# Patient Record
Sex: MALE | Race: WHITE | NOT HISPANIC OR LATINO | Employment: OTHER | URBAN - METROPOLITAN AREA
[De-identification: names, ages, dates, MRNs, and addresses within clinical notes are randomized per-mention and may not be internally consistent; named-entity substitution may affect disease eponyms.]

---

## 2017-01-05 ENCOUNTER — ALLSCRIPTS OFFICE VISIT (OUTPATIENT)
Dept: OTHER | Facility: OTHER | Age: 62
End: 2017-01-05

## 2017-01-17 ENCOUNTER — APPOINTMENT (OUTPATIENT)
Dept: CARDIAC REHAB | Facility: HOSPITAL | Age: 62
End: 2017-01-17
Payer: COMMERCIAL

## 2017-01-17 PROCEDURE — 94620 HB PULMONARY STRESS TEST/SIMPLE: CPT

## 2017-01-27 ENCOUNTER — APPOINTMENT (OUTPATIENT)
Dept: CARDIAC REHAB | Facility: HOSPITAL | Age: 62
End: 2017-01-27
Payer: COMMERCIAL

## 2017-01-27 ENCOUNTER — GENERIC CONVERSION - ENCOUNTER (OUTPATIENT)
Dept: OTHER | Facility: OTHER | Age: 62
End: 2017-01-27

## 2017-01-27 PROCEDURE — G0424 PULMONARY REHAB W EXER: HCPCS

## 2017-01-30 ENCOUNTER — APPOINTMENT (OUTPATIENT)
Dept: CARDIAC REHAB | Facility: HOSPITAL | Age: 62
End: 2017-01-30
Payer: COMMERCIAL

## 2017-01-30 PROCEDURE — G0424 PULMONARY REHAB W EXER: HCPCS

## 2017-01-31 ENCOUNTER — ALLSCRIPTS OFFICE VISIT (OUTPATIENT)
Dept: OTHER | Facility: OTHER | Age: 62
End: 2017-01-31

## 2017-01-31 ENCOUNTER — APPOINTMENT (EMERGENCY)
Dept: CT IMAGING | Facility: HOSPITAL | Age: 62
DRG: 309 | End: 2017-01-31
Payer: COMMERCIAL

## 2017-01-31 ENCOUNTER — APPOINTMENT (EMERGENCY)
Dept: RADIOLOGY | Facility: HOSPITAL | Age: 62
DRG: 309 | End: 2017-01-31
Payer: COMMERCIAL

## 2017-01-31 ENCOUNTER — HOSPITAL ENCOUNTER (INPATIENT)
Facility: HOSPITAL | Age: 62
LOS: 4 days | Discharge: HOME | DRG: 309 | End: 2017-02-04
Attending: EMERGENCY MEDICINE | Admitting: INTERNAL MEDICINE
Payer: COMMERCIAL

## 2017-01-31 DIAGNOSIS — G47.30 SLEEP APNEA: ICD-10-CM

## 2017-01-31 DIAGNOSIS — J18.9 PNEUMONIA: ICD-10-CM

## 2017-01-31 DIAGNOSIS — I48.91 ATRIAL FIBRILLATION, UNSPECIFIED TYPE (HCC): Primary | ICD-10-CM

## 2017-01-31 PROBLEM — I51.9 CARDIAC DISEASE: Status: ACTIVE | Noted: 2017-01-31

## 2017-01-31 PROBLEM — E78.5 HYPERLIPIDEMIA: Status: ACTIVE | Noted: 2017-01-31

## 2017-01-31 PROBLEM — I63.9 CVA (CEREBRAL VASCULAR ACCIDENT) (HCC): Status: ACTIVE | Noted: 2017-01-31

## 2017-01-31 PROBLEM — Q24.0 DEXTROCARDIA: Status: ACTIVE | Noted: 2017-01-31

## 2017-01-31 PROBLEM — I10 HYPERTENSION: Status: ACTIVE | Noted: 2017-01-31

## 2017-01-31 PROBLEM — E11.9 DIABETES MELLITUS TYPE 2, CONTROLLED (HCC): Status: ACTIVE | Noted: 2017-01-31

## 2017-01-31 PROBLEM — E66.01 MORBID OBESITY (HCC): Status: ACTIVE | Noted: 2017-01-31

## 2017-01-31 LAB
ALBUMIN SERPL BCP-MCNC: 3.9 G/DL (ref 3.5–5)
ALP SERPL-CCNC: 60 U/L (ref 46–116)
ALT SERPL W P-5'-P-CCNC: 38 U/L (ref 12–78)
ANION GAP SERPL CALCULATED.3IONS-SCNC: 8 MMOL/L (ref 4–13)
APTT PPP: 33 SECONDS (ref 24–36)
AST SERPL W P-5'-P-CCNC: 33 U/L (ref 5–45)
BASOPHILS # BLD AUTO: 0.04 THOUSANDS/ΜL (ref 0–0.1)
BASOPHILS NFR BLD AUTO: 1 % (ref 0–1)
BILIRUB SERPL-MCNC: 0.6 MG/DL (ref 0.2–1)
BUN SERPL-MCNC: 17 MG/DL (ref 5–25)
CALCIUM SERPL-MCNC: 8.5 MG/DL (ref 8.3–10.1)
CHLORIDE SERPL-SCNC: 104 MMOL/L (ref 100–108)
CO2 SERPL-SCNC: 30 MMOL/L (ref 21–32)
CREAT SERPL-MCNC: 0.89 MG/DL (ref 0.6–1.3)
EOSINOPHIL # BLD AUTO: 0.09 THOUSAND/ΜL (ref 0–0.61)
EOSINOPHIL NFR BLD AUTO: 1 % (ref 0–6)
ERYTHROCYTE [DISTWIDTH] IN BLOOD BY AUTOMATED COUNT: 14.3 % (ref 11.6–15.1)
GFR SERPL CREATININE-BSD FRML MDRD: >60 ML/MIN/1.73SQ M
GLUCOSE SERPL-MCNC: 100 MG/DL (ref 65–140)
GLUCOSE SERPL-MCNC: 132 MG/DL (ref 65–140)
GLUCOSE SERPL-MCNC: 62 MG/DL (ref 65–140)
GLUCOSE SERPL-MCNC: 93 MG/DL (ref 65–140)
HCT VFR BLD AUTO: 40.5 % (ref 36.5–49.3)
HGB BLD-MCNC: 13.2 G/DL (ref 12–17)
INR PPP: 1.24 (ref 0.86–1.16)
LACTATE SERPL-SCNC: 1.6 MMOL/L (ref 0.5–2)
LYMPHOCYTES # BLD AUTO: 1.15 THOUSANDS/ΜL (ref 0.6–4.47)
LYMPHOCYTES NFR BLD AUTO: 18 % (ref 14–44)
MAGNESIUM SERPL-MCNC: 1.6 MG/DL (ref 1.6–2.6)
MCH RBC QN AUTO: 30.8 PG (ref 26.8–34.3)
MCHC RBC AUTO-ENTMCNC: 32.6 G/DL (ref 31.4–37.4)
MCV RBC AUTO: 94 FL (ref 82–98)
MONOCYTES # BLD AUTO: 1.08 THOUSAND/ΜL (ref 0.17–1.22)
MONOCYTES NFR BLD AUTO: 17 % (ref 4–12)
NEUTROPHILS # BLD AUTO: 4.19 THOUSANDS/ΜL (ref 1.85–7.62)
NEUTS SEG NFR BLD AUTO: 63 % (ref 43–75)
NT-PROBNP SERPL-MCNC: 515 PG/ML
PLATELET # BLD AUTO: 186 THOUSANDS/UL (ref 149–390)
PMV BLD AUTO: 10.6 FL (ref 8.9–12.7)
POTASSIUM SERPL-SCNC: 4 MMOL/L (ref 3.5–5.3)
PROT SERPL-MCNC: 7.4 G/DL (ref 6.4–8.2)
PROTHROMBIN TIME: 15.4 SECONDS (ref 12–14.3)
RBC # BLD AUTO: 4.29 MILLION/UL (ref 3.88–5.62)
SODIUM SERPL-SCNC: 142 MMOL/L (ref 136–145)
TROPONIN I SERPL-MCNC: 0.02 NG/ML
TROPONIN I SERPL-MCNC: <0.02 NG/ML
TROPONIN I SERPL-MCNC: <0.02 NG/ML
WBC # BLD AUTO: 6.55 THOUSAND/UL (ref 4.31–10.16)

## 2017-01-31 PROCEDURE — 85610 PROTHROMBIN TIME: CPT | Performed by: NURSE PRACTITIONER

## 2017-01-31 PROCEDURE — 96366 THER/PROPH/DIAG IV INF ADDON: CPT

## 2017-01-31 PROCEDURE — 94640 AIRWAY INHALATION TREATMENT: CPT

## 2017-01-31 PROCEDURE — 83880 ASSAY OF NATRIURETIC PEPTIDE: CPT | Performed by: EMERGENCY MEDICINE

## 2017-01-31 PROCEDURE — 94664 DEMO&/EVAL PT USE INHALER: CPT

## 2017-01-31 PROCEDURE — 94760 N-INVAS EAR/PLS OXIMETRY 1: CPT

## 2017-01-31 PROCEDURE — 36415 COLL VENOUS BLD VENIPUNCTURE: CPT | Performed by: EMERGENCY MEDICINE

## 2017-01-31 PROCEDURE — 85730 THROMBOPLASTIN TIME PARTIAL: CPT | Performed by: NURSE PRACTITIONER

## 2017-01-31 PROCEDURE — 96375 TX/PRO/DX INJ NEW DRUG ADDON: CPT

## 2017-01-31 PROCEDURE — 82948 REAGENT STRIP/BLOOD GLUCOSE: CPT

## 2017-01-31 PROCEDURE — 71010 HB CHEST X-RAY 1 VIEW FRONTAL (PORTABLE): CPT

## 2017-01-31 PROCEDURE — 84484 ASSAY OF TROPONIN QUANT: CPT | Performed by: EMERGENCY MEDICINE

## 2017-01-31 PROCEDURE — 96365 THER/PROPH/DIAG IV INF INIT: CPT

## 2017-01-31 PROCEDURE — 85025 COMPLETE CBC W/AUTO DIFF WBC: CPT | Performed by: EMERGENCY MEDICINE

## 2017-01-31 PROCEDURE — 71275 CT ANGIOGRAPHY CHEST: CPT

## 2017-01-31 PROCEDURE — 84484 ASSAY OF TROPONIN QUANT: CPT | Performed by: INTERNAL MEDICINE

## 2017-01-31 PROCEDURE — 99285 EMERGENCY DEPT VISIT HI MDM: CPT

## 2017-01-31 PROCEDURE — 83735 ASSAY OF MAGNESIUM: CPT | Performed by: EMERGENCY MEDICINE

## 2017-01-31 PROCEDURE — 80053 COMPREHEN METABOLIC PANEL: CPT | Performed by: EMERGENCY MEDICINE

## 2017-01-31 PROCEDURE — 83605 ASSAY OF LACTIC ACID: CPT | Performed by: NURSE PRACTITIONER

## 2017-01-31 PROCEDURE — 87040 BLOOD CULTURE FOR BACTERIA: CPT | Performed by: NURSE PRACTITIONER

## 2017-01-31 PROCEDURE — 93005 ELECTROCARDIOGRAM TRACING: CPT | Performed by: EMERGENCY MEDICINE

## 2017-01-31 RX ORDER — FUROSEMIDE 10 MG/ML
40 INJECTION INTRAMUSCULAR; INTRAVENOUS 2 TIMES DAILY
Status: DISCONTINUED | OUTPATIENT
Start: 2017-01-31 | End: 2017-02-04 | Stop reason: HOSPADM

## 2017-01-31 RX ORDER — CEFTRIAXONE SODIUM 1 G/50ML
1000 INJECTION, SOLUTION INTRAVENOUS ONCE
Status: COMPLETED | OUTPATIENT
Start: 2017-01-31 | End: 2017-01-31

## 2017-01-31 RX ORDER — INSULIN GLARGINE 100 [IU]/ML
40 INJECTION, SOLUTION SUBCUTANEOUS 2 TIMES DAILY
Status: DISCONTINUED | OUTPATIENT
Start: 2017-01-31 | End: 2017-02-04 | Stop reason: HOSPADM

## 2017-01-31 RX ORDER — FLUTICASONE PROPIONATE 50 MCG
1 SPRAY, SUSPENSION (ML) NASAL DAILY
COMMUNITY
End: 2017-02-04 | Stop reason: HOSPADM

## 2017-01-31 RX ORDER — SODIUM CHLORIDE 30 MG/ML INHALATION SOLUTION 30 MG/ML
3 SOLUTION INHALANT ONCE
Status: DISCONTINUED | OUTPATIENT
Start: 2017-01-31 | End: 2017-01-31

## 2017-01-31 RX ORDER — DEXTROSE MONOHYDRATE 25 G/50ML
50 INJECTION, SOLUTION INTRAVENOUS ONCE
Status: DISCONTINUED | OUTPATIENT
Start: 2017-01-31 | End: 2017-01-31

## 2017-01-31 RX ORDER — SODIUM CHLORIDE 30 MG/ML INHALATION SOLUTION 30 MG/ML
4 SOLUTION INHALANT ONCE
Status: DISCONTINUED | OUTPATIENT
Start: 2017-01-31 | End: 2017-01-31

## 2017-01-31 RX ORDER — TAMSULOSIN HYDROCHLORIDE 0.4 MG/1
0.4 CAPSULE ORAL
Status: DISCONTINUED | OUTPATIENT
Start: 2017-01-31 | End: 2017-02-04 | Stop reason: HOSPADM

## 2017-01-31 RX ORDER — BETAMETHASONE DIPROPIONATE 0.5 MG/G
CREAM TOPICAL
COMMUNITY
Start: 2016-06-09 | End: 2017-02-04 | Stop reason: HOSPADM

## 2017-01-31 RX ORDER — RANITIDINE 150 MG/1
TABLET ORAL
COMMUNITY
Start: 2013-11-03 | End: 2018-02-06 | Stop reason: SDUPTHER

## 2017-01-31 RX ORDER — ALBUTEROL SULFATE 2.5 MG/3ML
2.5 SOLUTION RESPIRATORY (INHALATION) EVERY 4 HOURS PRN
Status: DISCONTINUED | OUTPATIENT
Start: 2017-01-31 | End: 2017-02-04 | Stop reason: HOSPADM

## 2017-01-31 RX ORDER — ONDANSETRON 2 MG/ML
4 INJECTION INTRAMUSCULAR; INTRAVENOUS EVERY 6 HOURS PRN
Status: DISCONTINUED | OUTPATIENT
Start: 2017-01-31 | End: 2017-02-04 | Stop reason: HOSPADM

## 2017-01-31 RX ORDER — MONTELUKAST SODIUM 10 MG/1
10 TABLET ORAL
COMMUNITY
End: 2018-02-06 | Stop reason: SDUPTHER

## 2017-01-31 RX ORDER — 0.9 % SODIUM CHLORIDE 0.9 %
3 VIAL (ML) INJECTION ONCE
Status: DISCONTINUED | OUTPATIENT
Start: 2017-01-31 | End: 2017-02-04 | Stop reason: HOSPADM

## 2017-01-31 RX ORDER — DILTIAZEM HYDROCHLORIDE 5 MG/ML
20 INJECTION INTRAVENOUS ONCE
Status: COMPLETED | OUTPATIENT
Start: 2017-01-31 | End: 2017-01-31

## 2017-01-31 RX ORDER — HYDROCHLOROTHIAZIDE 25 MG/1
TABLET ORAL
COMMUNITY
Start: 2012-12-31 | End: 2017-02-04 | Stop reason: HOSPADM

## 2017-01-31 RX ORDER — ATORVASTATIN CALCIUM 80 MG/1
80 TABLET, FILM COATED ORAL
COMMUNITY
End: 2018-06-11 | Stop reason: SDUPTHER

## 2017-01-31 RX ORDER — GABAPENTIN 300 MG/1
CAPSULE ORAL 2 TIMES DAILY
COMMUNITY
Start: 2012-02-24 | End: 2018-03-14 | Stop reason: SDUPTHER

## 2017-01-31 RX ORDER — TAMSULOSIN HYDROCHLORIDE 0.4 MG/1
CAPSULE ORAL
COMMUNITY
Start: 2016-08-16 | End: 2021-01-01 | Stop reason: HOSPADM

## 2017-01-31 RX ORDER — LEVALBUTEROL 1.25 MG/.5ML
2.5 SOLUTION, CONCENTRATE RESPIRATORY (INHALATION) ONCE
Status: COMPLETED | OUTPATIENT
Start: 2017-01-31 | End: 2017-01-31

## 2017-01-31 RX ORDER — METOPROLOL TARTRATE 50 MG/1
50 TABLET, FILM COATED ORAL EVERY 12 HOURS SCHEDULED
Status: DISCONTINUED | OUTPATIENT
Start: 2017-01-31 | End: 2017-02-04 | Stop reason: HOSPADM

## 2017-01-31 RX ORDER — BETAMETHASONE DIPROPIONATE 0.5 MG/G
CREAM TOPICAL 2 TIMES DAILY
Status: DISCONTINUED | OUTPATIENT
Start: 2017-01-31 | End: 2017-02-02

## 2017-01-31 RX ORDER — AZITHROMYCIN 250 MG/1
500 TABLET, FILM COATED ORAL ONCE
Status: COMPLETED | OUTPATIENT
Start: 2017-01-31 | End: 2017-01-31

## 2017-01-31 RX ORDER — GABAPENTIN 300 MG/1
300 CAPSULE ORAL 2 TIMES DAILY
Status: DISCONTINUED | OUTPATIENT
Start: 2017-01-31 | End: 2017-02-04 | Stop reason: HOSPADM

## 2017-01-31 RX ORDER — SODIUM CHLORIDE FOR INHALATION 0.9 %
VIAL, NEBULIZER (ML) INHALATION
Status: COMPLETED
Start: 2017-01-31 | End: 2017-01-31

## 2017-01-31 RX ORDER — 0.9 % SODIUM CHLORIDE 0.9 %
3 VIAL (ML) INJECTION AS NEEDED
Status: DISCONTINUED | OUTPATIENT
Start: 2017-01-31 | End: 2017-02-04 | Stop reason: HOSPADM

## 2017-01-31 RX ORDER — FAMOTIDINE 20 MG/1
20 TABLET, FILM COATED ORAL DAILY
Status: DISCONTINUED | OUTPATIENT
Start: 2017-01-31 | End: 2017-02-04 | Stop reason: HOSPADM

## 2017-01-31 RX ORDER — ASPIRIN 325 MG
325 TABLET, DELAYED RELEASE (ENTERIC COATED) ORAL DAILY
Status: DISCONTINUED | OUTPATIENT
Start: 2017-01-31 | End: 2017-02-04 | Stop reason: HOSPADM

## 2017-01-31 RX ORDER — ACETAMINOPHEN 325 MG/1
650 TABLET ORAL EVERY 6 HOURS PRN
Status: DISCONTINUED | OUTPATIENT
Start: 2017-01-31 | End: 2017-02-04 | Stop reason: HOSPADM

## 2017-01-31 RX ORDER — ATORVASTATIN CALCIUM 40 MG/1
80 TABLET, FILM COATED ORAL
Status: DISCONTINUED | OUTPATIENT
Start: 2017-01-31 | End: 2017-02-04 | Stop reason: HOSPADM

## 2017-01-31 RX ORDER — CHOLECALCIFEROL (VITAMIN D3) 125 MCG
CAPSULE ORAL
COMMUNITY
End: 2017-02-04 | Stop reason: HOSPADM

## 2017-01-31 RX ORDER — AMLODIPINE BESYLATE 10 MG/1
TABLET ORAL
COMMUNITY
Start: 2016-01-08 | End: 2017-02-04 | Stop reason: HOSPADM

## 2017-01-31 RX ADMIN — INSULIN LISPRO 25 UNITS: 100 INJECTION, SOLUTION INTRAVENOUS; SUBCUTANEOUS at 17:41

## 2017-01-31 RX ADMIN — ATORVASTATIN CALCIUM 80 MG: 40 TABLET, FILM COATED ORAL at 17:45

## 2017-01-31 RX ADMIN — DILTIAZEM HYDROCHLORIDE 5 MG/HR: 5 INJECTION INTRAVENOUS at 12:34

## 2017-01-31 RX ADMIN — IOHEXOL 100 ML: 350 INJECTION, SOLUTION INTRAVENOUS at 14:00

## 2017-01-31 RX ADMIN — ACETAMINOPHEN 650 MG: 325 TABLET, FILM COATED ORAL at 21:20

## 2017-01-31 RX ADMIN — ALBUTEROL SULFATE 2.5 MG: 2.5 SOLUTION RESPIRATORY (INHALATION) at 20:29

## 2017-01-31 RX ADMIN — INSULIN GLARGINE 40 UNITS: 100 INJECTION, SOLUTION SUBCUTANEOUS at 17:42

## 2017-01-31 RX ADMIN — TAMSULOSIN HYDROCHLORIDE 0.4 MG: 0.4 CAPSULE ORAL at 17:51

## 2017-01-31 RX ADMIN — METFORMIN HYDROCHLORIDE 1000 MG: 500 TABLET, FILM COATED ORAL at 17:45

## 2017-01-31 RX ADMIN — FAMOTIDINE 20 MG: 20 TABLET ORAL at 17:46

## 2017-01-31 RX ADMIN — GABAPENTIN 300 MG: 300 CAPSULE ORAL at 17:46

## 2017-01-31 RX ADMIN — CEFTRIAXONE 1000 MG: 1 INJECTION, SOLUTION INTRAVENOUS at 14:42

## 2017-01-31 RX ADMIN — LEVALBUTEROL HYDROCHLORIDE 2.5 MG: 1.25 SOLUTION, CONCENTRATE RESPIRATORY (INHALATION) at 12:26

## 2017-01-31 RX ADMIN — AZITHROMYCIN 500 MG: 250 TABLET, FILM COATED ORAL at 14:42

## 2017-01-31 RX ADMIN — ISODIUM CHLORIDE 3 ML: 0.03 SOLUTION RESPIRATORY (INHALATION) at 12:27

## 2017-01-31 RX ADMIN — METOPROLOL TARTRATE 50 MG: 50 TABLET ORAL at 17:45

## 2017-01-31 RX ADMIN — APIXABAN 5 MG: 5 TABLET, FILM COATED ORAL at 17:45

## 2017-01-31 RX ADMIN — FUROSEMIDE 40 MG: 10 INJECTION, SOLUTION INTRAMUSCULAR; INTRAVENOUS at 17:51

## 2017-01-31 RX ADMIN — BETAMETHASONE DIPROPIONATE: 0.5 CREAM TOPICAL at 18:01

## 2017-01-31 RX ADMIN — FLUTICASONE PROPIONATE AND SALMETEROL 1 PUFF: 50; 250 POWDER RESPIRATORY (INHALATION) at 20:05

## 2017-01-31 RX ADMIN — DILTIAZEM HYDROCHLORIDE 20 MG: 5 INJECTION INTRAVENOUS at 12:34

## 2017-02-01 ENCOUNTER — APPOINTMENT (INPATIENT)
Dept: NON INVASIVE DIAGNOSTICS | Facility: HOSPITAL | Age: 62
DRG: 309 | End: 2017-02-01
Payer: COMMERCIAL

## 2017-02-01 ENCOUNTER — GENERIC CONVERSION - ENCOUNTER (OUTPATIENT)
Dept: OTHER | Facility: OTHER | Age: 62
End: 2017-02-01

## 2017-02-01 PROBLEM — I63.9 CVA (CEREBRAL VASCULAR ACCIDENT) (HCC): Chronic | Status: ACTIVE | Noted: 2017-01-31

## 2017-02-01 PROBLEM — L03.119 CELLULITIS OF EXTREMITY: Status: ACTIVE | Noted: 2017-02-01

## 2017-02-01 LAB
ANION GAP SERPL CALCULATED.3IONS-SCNC: 9 MMOL/L (ref 4–13)
ATRIAL RATE: 72 BPM
BUN SERPL-MCNC: 18 MG/DL (ref 5–25)
CALCIUM SERPL-MCNC: 8.2 MG/DL (ref 8.3–10.1)
CHLORIDE SERPL-SCNC: 103 MMOL/L (ref 100–108)
CO2 SERPL-SCNC: 29 MMOL/L (ref 21–32)
CREAT SERPL-MCNC: 0.9 MG/DL (ref 0.6–1.3)
ERYTHROCYTE [DISTWIDTH] IN BLOOD BY AUTOMATED COUNT: 14.5 % (ref 11.6–15.1)
GFR SERPL CREATININE-BSD FRML MDRD: >60 ML/MIN/1.73SQ M
GLUCOSE SERPL-MCNC: 130 MG/DL (ref 65–140)
GLUCOSE SERPL-MCNC: 143 MG/DL (ref 65–140)
GLUCOSE SERPL-MCNC: 199 MG/DL (ref 65–140)
GLUCOSE SERPL-MCNC: 65 MG/DL (ref 65–140)
GLUCOSE SERPL-MCNC: 78 MG/DL (ref 65–140)
GLUCOSE SERPL-MCNC: 98 MG/DL (ref 65–140)
HCT VFR BLD AUTO: 38.4 % (ref 36.5–49.3)
HGB BLD-MCNC: 12.3 G/DL (ref 12–17)
MCH RBC QN AUTO: 30.4 PG (ref 26.8–34.3)
MCHC RBC AUTO-ENTMCNC: 32 G/DL (ref 31.4–37.4)
MCV RBC AUTO: 95 FL (ref 82–98)
PLATELET # BLD AUTO: 176 THOUSANDS/UL (ref 149–390)
PMV BLD AUTO: 10.5 FL (ref 8.9–12.7)
POTASSIUM SERPL-SCNC: 3.6 MMOL/L (ref 3.5–5.3)
QRS AXIS: -16 DEGREES
QRSD INTERVAL: 78 MS
QT INTERVAL: 322 MS
QTC INTERVAL: 475 MS
RBC # BLD AUTO: 4.05 MILLION/UL (ref 3.88–5.62)
SODIUM SERPL-SCNC: 141 MMOL/L (ref 136–145)
T WAVE AXIS: 48 DEGREES
TSH SERPL DL<=0.05 MIU/L-ACNC: 1.23 UIU/ML (ref 0.36–3.74)
VENTRICULAR RATE: 131 BPM
WBC # BLD AUTO: 5.52 THOUSAND/UL (ref 4.31–10.16)

## 2017-02-01 PROCEDURE — 87205 SMEAR GRAM STAIN: CPT | Performed by: INTERNAL MEDICINE

## 2017-02-01 PROCEDURE — 87070 CULTURE OTHR SPECIMN AEROBIC: CPT | Performed by: INTERNAL MEDICINE

## 2017-02-01 PROCEDURE — 84443 ASSAY THYROID STIM HORMONE: CPT | Performed by: NURSE PRACTITIONER

## 2017-02-01 PROCEDURE — 80048 BASIC METABOLIC PNL TOTAL CA: CPT | Performed by: NURSE PRACTITIONER

## 2017-02-01 PROCEDURE — 93306 TTE W/DOPPLER COMPLETE: CPT

## 2017-02-01 PROCEDURE — 93005 ELECTROCARDIOGRAM TRACING: CPT | Performed by: INTERNAL MEDICINE

## 2017-02-01 PROCEDURE — 85027 COMPLETE CBC AUTOMATED: CPT | Performed by: INTERNAL MEDICINE

## 2017-02-01 PROCEDURE — 94760 N-INVAS EAR/PLS OXIMETRY 1: CPT

## 2017-02-01 PROCEDURE — 82948 REAGENT STRIP/BLOOD GLUCOSE: CPT

## 2017-02-01 PROCEDURE — 94640 AIRWAY INHALATION TREATMENT: CPT

## 2017-02-01 RX ORDER — OXYBUTYNIN CHLORIDE 5 MG/1
5 TABLET ORAL 2 TIMES DAILY
Status: DISCONTINUED | OUTPATIENT
Start: 2017-02-01 | End: 2017-02-04 | Stop reason: HOSPADM

## 2017-02-01 RX ORDER — CEFTRIAXONE 1 G/1
1000 INJECTION, POWDER, FOR SOLUTION INTRAMUSCULAR; INTRAVENOUS EVERY 24 HOURS
Status: DISCONTINUED | OUTPATIENT
Start: 2017-02-02 | End: 2017-02-02

## 2017-02-01 RX ORDER — CEPHALEXIN 250 MG/1
500 CAPSULE ORAL EVERY 6 HOURS SCHEDULED
Status: DISCONTINUED | OUTPATIENT
Start: 2017-02-01 | End: 2017-02-01

## 2017-02-01 RX ADMIN — TAMSULOSIN HYDROCHLORIDE 0.4 MG: 0.4 CAPSULE ORAL at 16:12

## 2017-02-01 RX ADMIN — OXYBUTYNIN CHLORIDE 5 MG: 5 TABLET ORAL at 21:47

## 2017-02-01 RX ADMIN — GABAPENTIN 300 MG: 300 CAPSULE ORAL at 18:09

## 2017-02-01 RX ADMIN — DILTIAZEM HYDROCHLORIDE 5 MG/HR: 5 INJECTION INTRAVENOUS at 21:11

## 2017-02-01 RX ADMIN — ASPIRIN 325 MG: 325 TABLET, DELAYED RELEASE ORAL at 08:50

## 2017-02-01 RX ADMIN — INSULIN LISPRO 15 UNITS: 100 INJECTION, SOLUTION INTRAVENOUS; SUBCUTANEOUS at 09:38

## 2017-02-01 RX ADMIN — ALBUTEROL SULFATE 2.5 MG: 2.5 SOLUTION RESPIRATORY (INHALATION) at 17:57

## 2017-02-01 RX ADMIN — APIXABAN 5 MG: 5 TABLET, FILM COATED ORAL at 08:50

## 2017-02-01 RX ADMIN — METOPROLOL TARTRATE 50 MG: 50 TABLET ORAL at 21:47

## 2017-02-01 RX ADMIN — APIXABAN 5 MG: 5 TABLET, FILM COATED ORAL at 18:09

## 2017-02-01 RX ADMIN — FLUTICASONE PROPIONATE AND SALMETEROL 1 PUFF: 50; 250 POWDER RESPIRATORY (INHALATION) at 20:35

## 2017-02-01 RX ADMIN — CEPHALEXIN 500 MG: 250 CAPSULE ORAL at 12:27

## 2017-02-01 RX ADMIN — FLUTICASONE PROPIONATE AND SALMETEROL 1 PUFF: 50; 250 POWDER RESPIRATORY (INHALATION) at 09:29

## 2017-02-01 RX ADMIN — GABAPENTIN 300 MG: 300 CAPSULE ORAL at 08:50

## 2017-02-01 RX ADMIN — CEPHALEXIN 500 MG: 250 CAPSULE ORAL at 23:03

## 2017-02-01 RX ADMIN — FAMOTIDINE 20 MG: 20 TABLET ORAL at 08:50

## 2017-02-01 RX ADMIN — FUROSEMIDE 40 MG: 10 INJECTION, SOLUTION INTRAMUSCULAR; INTRAVENOUS at 18:09

## 2017-02-01 RX ADMIN — FUROSEMIDE 40 MG: 10 INJECTION, SOLUTION INTRAMUSCULAR; INTRAVENOUS at 08:51

## 2017-02-01 RX ADMIN — METFORMIN HYDROCHLORIDE 1000 MG: 500 TABLET, FILM COATED ORAL at 08:50

## 2017-02-01 RX ADMIN — METFORMIN HYDROCHLORIDE 1000 MG: 500 TABLET, FILM COATED ORAL at 16:12

## 2017-02-01 RX ADMIN — INSULIN GLARGINE 40 UNITS: 100 INJECTION, SOLUTION SUBCUTANEOUS at 09:03

## 2017-02-01 RX ADMIN — INSULIN GLARGINE 40 UNITS: 100 INJECTION, SOLUTION SUBCUTANEOUS at 18:09

## 2017-02-01 RX ADMIN — ATORVASTATIN CALCIUM 80 MG: 40 TABLET, FILM COATED ORAL at 16:12

## 2017-02-01 RX ADMIN — METOPROLOL TARTRATE 50 MG: 50 TABLET ORAL at 08:50

## 2017-02-01 RX ADMIN — CEPHALEXIN 500 MG: 250 CAPSULE ORAL at 18:09

## 2017-02-02 ENCOUNTER — APPOINTMENT (INPATIENT)
Dept: NON INVASIVE DIAGNOSTICS | Facility: HOSPITAL | Age: 62
DRG: 309 | End: 2017-02-02
Payer: COMMERCIAL

## 2017-02-02 ENCOUNTER — GENERIC CONVERSION - ENCOUNTER (OUTPATIENT)
Dept: OTHER | Facility: OTHER | Age: 62
End: 2017-02-02

## 2017-02-02 ENCOUNTER — APPOINTMENT (INPATIENT)
Dept: NON INVASIVE DIAGNOSTICS | Facility: HOSPITAL | Age: 62
DRG: 309 | End: 2017-02-02
Attending: INTERNAL MEDICINE
Payer: COMMERCIAL

## 2017-02-02 LAB
ANION GAP SERPL CALCULATED.3IONS-SCNC: 9 MMOL/L (ref 4–13)
ATRIAL RATE: 122 BPM
ATRIAL RATE: 250 BPM
BUN SERPL-MCNC: 21 MG/DL (ref 5–25)
CALCIUM SERPL-MCNC: 8.1 MG/DL (ref 8.3–10.1)
CHLORIDE SERPL-SCNC: 102 MMOL/L (ref 100–108)
CO2 SERPL-SCNC: 30 MMOL/L (ref 21–32)
CREAT SERPL-MCNC: 0.93 MG/DL (ref 0.6–1.3)
ERYTHROCYTE [DISTWIDTH] IN BLOOD BY AUTOMATED COUNT: 14.2 % (ref 11.6–15.1)
GFR SERPL CREATININE-BSD FRML MDRD: >60 ML/MIN/1.73SQ M
GLUCOSE SERPL-MCNC: 104 MG/DL (ref 65–140)
GLUCOSE SERPL-MCNC: 113 MG/DL (ref 65–140)
GLUCOSE SERPL-MCNC: 139 MG/DL (ref 65–140)
GLUCOSE SERPL-MCNC: 155 MG/DL (ref 65–140)
GLUCOSE SERPL-MCNC: 96 MG/DL (ref 65–140)
HCT VFR BLD AUTO: 38.1 % (ref 36.5–49.3)
HGB BLD-MCNC: 12.1 G/DL (ref 12–17)
MCH RBC QN AUTO: 30.1 PG (ref 26.8–34.3)
MCHC RBC AUTO-ENTMCNC: 31.8 G/DL (ref 31.4–37.4)
MCV RBC AUTO: 95 FL (ref 82–98)
PLATELET # BLD AUTO: 167 THOUSANDS/UL (ref 149–390)
PMV BLD AUTO: 10.5 FL (ref 8.9–12.7)
POTASSIUM SERPL-SCNC: 3.7 MMOL/L (ref 3.5–5.3)
QRS AXIS: 221 DEGREES
QRS AXIS: 231 DEGREES
QRSD INTERVAL: 68 MS
QRSD INTERVAL: 82 MS
QT INTERVAL: 356 MS
QT INTERVAL: 366 MS
QTC INTERVAL: 430 MS
QTC INTERVAL: 432 MS
RBC # BLD AUTO: 4.02 MILLION/UL (ref 3.88–5.62)
SODIUM SERPL-SCNC: 141 MMOL/L (ref 136–145)
T WAVE AXIS: 145 DEGREES
T WAVE AXIS: 167 DEGREES
VENTRICULAR RATE: 84 BPM
VENTRICULAR RATE: 88 BPM
WBC # BLD AUTO: 5.1 THOUSAND/UL (ref 4.31–10.16)

## 2017-02-02 PROCEDURE — 93005 ELECTROCARDIOGRAM TRACING: CPT | Performed by: INTERNAL MEDICINE

## 2017-02-02 PROCEDURE — G8978 MOBILITY CURRENT STATUS: HCPCS

## 2017-02-02 PROCEDURE — 92961 CARDIOVERSION ELECTRIC INT: CPT

## 2017-02-02 PROCEDURE — 5A2204Z RESTORATION OF CARDIAC RHYTHM, SINGLE: ICD-10-PCS | Performed by: INTERNAL MEDICINE

## 2017-02-02 PROCEDURE — G8980 MOBILITY D/C STATUS: HCPCS

## 2017-02-02 PROCEDURE — G8988 SELF CARE GOAL STATUS: HCPCS

## 2017-02-02 PROCEDURE — G8979 MOBILITY GOAL STATUS: HCPCS

## 2017-02-02 PROCEDURE — G8987 SELF CARE CURRENT STATUS: HCPCS

## 2017-02-02 PROCEDURE — 97163 PT EVAL HIGH COMPLEX 45 MIN: CPT

## 2017-02-02 PROCEDURE — 80048 BASIC METABOLIC PNL TOTAL CA: CPT | Performed by: INTERNAL MEDICINE

## 2017-02-02 PROCEDURE — 82948 REAGENT STRIP/BLOOD GLUCOSE: CPT

## 2017-02-02 PROCEDURE — 85027 COMPLETE CBC AUTOMATED: CPT | Performed by: INTERNAL MEDICINE

## 2017-02-02 PROCEDURE — 97165 OT EVAL LOW COMPLEX 30 MIN: CPT

## 2017-02-02 PROCEDURE — 94640 AIRWAY INHALATION TREATMENT: CPT

## 2017-02-02 PROCEDURE — 93312 ECHO TRANSESOPHAGEAL: CPT

## 2017-02-02 PROCEDURE — 94760 N-INVAS EAR/PLS OXIMETRY 1: CPT

## 2017-02-02 PROCEDURE — G8989 SELF CARE D/C STATUS: HCPCS

## 2017-02-02 RX ORDER — PROPOFOL 10 MG/ML
INJECTION, EMULSION INTRAVENOUS AS NEEDED
Status: DISCONTINUED | OUTPATIENT
Start: 2017-02-02 | End: 2017-02-02 | Stop reason: SURG

## 2017-02-02 RX ORDER — AMIODARONE HYDROCHLORIDE 200 MG/1
200 TABLET ORAL
Status: DISCONTINUED | OUTPATIENT
Start: 2017-02-02 | End: 2017-02-04 | Stop reason: HOSPADM

## 2017-02-02 RX ORDER — DEXTROSE AND SODIUM CHLORIDE 5; .45 G/100ML; G/100ML
75 INJECTION, SOLUTION INTRAVENOUS CONTINUOUS
Status: DISCONTINUED | OUTPATIENT
Start: 2017-02-02 | End: 2017-02-03

## 2017-02-02 RX ORDER — SODIUM CHLORIDE 9 MG/ML
INJECTION, SOLUTION INTRAVENOUS CONTINUOUS PRN
Status: DISCONTINUED | OUTPATIENT
Start: 2017-02-02 | End: 2017-02-02 | Stop reason: SURG

## 2017-02-02 RX ORDER — AZITHROMYCIN 500 MG/1
INJECTION, POWDER, LYOPHILIZED, FOR SOLUTION INTRAVENOUS
Status: DISPENSED
Start: 2017-02-02 | End: 2017-02-02

## 2017-02-02 RX ORDER — INSULIN GLARGINE 100 [IU]/ML
20 INJECTION, SOLUTION SUBCUTANEOUS ONCE
Status: COMPLETED | OUTPATIENT
Start: 2017-02-02 | End: 2017-02-02

## 2017-02-02 RX ADMIN — INSULIN GLARGINE 40 UNITS: 100 INJECTION, SOLUTION SUBCUTANEOUS at 18:00

## 2017-02-02 RX ADMIN — METOPROLOL TARTRATE 50 MG: 50 TABLET ORAL at 22:00

## 2017-02-02 RX ADMIN — AMIODARONE HYDROCHLORIDE 150 MG: 50 INJECTION, SOLUTION INTRAVENOUS at 16:31

## 2017-02-02 RX ADMIN — FLUTICASONE PROPIONATE AND SALMETEROL 1 PUFF: 50; 250 POWDER RESPIRATORY (INHALATION) at 09:10

## 2017-02-02 RX ADMIN — APIXABAN 5 MG: 5 TABLET, FILM COATED ORAL at 18:00

## 2017-02-02 RX ADMIN — INSULIN GLARGINE 20 UNITS: 100 INJECTION, SOLUTION SUBCUTANEOUS at 11:22

## 2017-02-02 RX ADMIN — FUROSEMIDE 40 MG: 10 INJECTION, SOLUTION INTRAMUSCULAR; INTRAVENOUS at 18:00

## 2017-02-02 RX ADMIN — FLUTICASONE PROPIONATE AND SALMETEROL 1 PUFF: 50; 250 POWDER RESPIRATORY (INHALATION) at 20:04

## 2017-02-02 RX ADMIN — PROPOFOL 50 MG: 10 INJECTION, EMULSION INTRAVENOUS at 15:39

## 2017-02-02 RX ADMIN — FAMOTIDINE 20 MG: 20 TABLET ORAL at 08:40

## 2017-02-02 RX ADMIN — METOPROLOL TARTRATE 50 MG: 50 TABLET ORAL at 08:40

## 2017-02-02 RX ADMIN — METFORMIN HYDROCHLORIDE 1000 MG: 500 TABLET, FILM COATED ORAL at 16:52

## 2017-02-02 RX ADMIN — ASPIRIN 325 MG: 325 TABLET, DELAYED RELEASE ORAL at 08:40

## 2017-02-02 RX ADMIN — PROPOFOL 50 MG: 10 INJECTION, EMULSION INTRAVENOUS at 15:48

## 2017-02-02 RX ADMIN — PROPOFOL 50 MG: 10 INJECTION, EMULSION INTRAVENOUS at 15:56

## 2017-02-02 RX ADMIN — ATORVASTATIN CALCIUM 80 MG: 40 TABLET, FILM COATED ORAL at 16:53

## 2017-02-02 RX ADMIN — GABAPENTIN 300 MG: 300 CAPSULE ORAL at 18:00

## 2017-02-02 RX ADMIN — APIXABAN 5 MG: 5 TABLET, FILM COATED ORAL at 08:40

## 2017-02-02 RX ADMIN — AMIODARONE HYDROCHLORIDE 200 MG: 200 TABLET ORAL at 16:52

## 2017-02-02 RX ADMIN — GABAPENTIN 300 MG: 300 CAPSULE ORAL at 08:40

## 2017-02-02 RX ADMIN — OXYBUTYNIN CHLORIDE 5 MG: 5 TABLET ORAL at 08:40

## 2017-02-02 RX ADMIN — CEFTRIAXONE SODIUM 1000 MG: 1 INJECTION, POWDER, FOR SOLUTION INTRAMUSCULAR; INTRAVENOUS at 00:18

## 2017-02-02 RX ADMIN — TAMSULOSIN HYDROCHLORIDE 0.4 MG: 0.4 CAPSULE ORAL at 16:52

## 2017-02-02 RX ADMIN — DEXTROSE AND SODIUM CHLORIDE 75 ML/HR: 5; .45 INJECTION, SOLUTION INTRAVENOUS at 11:22

## 2017-02-02 RX ADMIN — PROPOFOL 50 MG: 10 INJECTION, EMULSION INTRAVENOUS at 15:43

## 2017-02-02 RX ADMIN — AZITHROMYCIN MONOHYDRATE 500 MG: 500 INJECTION, POWDER, LYOPHILIZED, FOR SOLUTION INTRAVENOUS at 00:58

## 2017-02-02 RX ADMIN — FUROSEMIDE 40 MG: 10 INJECTION, SOLUTION INTRAMUSCULAR; INTRAVENOUS at 08:40

## 2017-02-02 RX ADMIN — SODIUM CHLORIDE: 0.9 INJECTION, SOLUTION INTRAVENOUS at 15:21

## 2017-02-02 RX ADMIN — OXYBUTYNIN CHLORIDE 5 MG: 5 TABLET ORAL at 18:00

## 2017-02-02 RX ADMIN — INSULIN LISPRO 1 UNITS: 100 INJECTION, SOLUTION INTRAVENOUS; SUBCUTANEOUS at 22:05

## 2017-02-03 ENCOUNTER — APPOINTMENT (INPATIENT)
Dept: RADIOLOGY | Facility: HOSPITAL | Age: 62
DRG: 309 | End: 2017-02-03
Payer: COMMERCIAL

## 2017-02-03 ENCOUNTER — APPOINTMENT (INPATIENT)
Dept: NON INVASIVE DIAGNOSTICS | Facility: HOSPITAL | Age: 62
DRG: 309 | End: 2017-02-03
Payer: COMMERCIAL

## 2017-02-03 ENCOUNTER — APPOINTMENT (OUTPATIENT)
Dept: CARDIAC REHAB | Facility: HOSPITAL | Age: 62
End: 2017-02-03
Payer: COMMERCIAL

## 2017-02-03 LAB
ANION GAP SERPL CALCULATED.3IONS-SCNC: 10 MMOL/L (ref 4–13)
ARTERIAL PATENCY WRIST A: ABNORMAL
BASE EXCESS BLDA CALC-SCNC: 6 MMOL/L (ref -2–3)
BUN SERPL-MCNC: 22 MG/DL (ref 5–25)
CALCIUM SERPL-MCNC: 8.1 MG/DL (ref 8.3–10.1)
CHLORIDE SERPL-SCNC: 101 MMOL/L (ref 100–108)
CO2 SERPL-SCNC: 28 MMOL/L (ref 21–32)
CREAT SERPL-MCNC: 0.94 MG/DL (ref 0.6–1.3)
FIO2 GAS DIL.REBREATH: 21 L
GFR SERPL CREATININE-BSD FRML MDRD: >60 ML/MIN/1.73SQ M
GLUCOSE SERPL-MCNC: 108 MG/DL (ref 65–140)
GLUCOSE SERPL-MCNC: 115 MG/DL (ref 65–140)
GLUCOSE SERPL-MCNC: 122 MG/DL (ref 65–140)
GLUCOSE SERPL-MCNC: 122 MG/DL (ref 65–140)
GLUCOSE SERPL-MCNC: 129 MG/DL (ref 65–140)
GLUCOSE SERPL-MCNC: 68 MG/DL (ref 65–140)
HCO3 BLDA-SCNC: 32.7 MMOL/L (ref 22–28)
PCO2 BLD: 34 MMOL/L (ref 21–32)
PCO2 BLD: 54.3 MM HG (ref 36–44)
PH BLD: 7.39 [PH] (ref 7.35–7.45)
PO2 BLD: 65 MM HG (ref 75–129)
POTASSIUM SERPL-SCNC: 3.7 MMOL/L (ref 3.5–5.3)
SAMPLE SITE: ABNORMAL
SAO2 % BLD FROM PO2: 91 % (ref 95–98)
SODIUM SERPL-SCNC: 139 MMOL/L (ref 136–145)
SPECIMEN SOURCE: ABNORMAL

## 2017-02-03 PROCEDURE — 93970 EXTREMITY STUDY: CPT

## 2017-02-03 PROCEDURE — 82803 BLOOD GASES ANY COMBINATION: CPT

## 2017-02-03 PROCEDURE — 82948 REAGENT STRIP/BLOOD GLUCOSE: CPT

## 2017-02-03 PROCEDURE — 36600 WITHDRAWAL OF ARTERIAL BLOOD: CPT

## 2017-02-03 PROCEDURE — 94760 N-INVAS EAR/PLS OXIMETRY 1: CPT

## 2017-02-03 PROCEDURE — 94640 AIRWAY INHALATION TREATMENT: CPT

## 2017-02-03 PROCEDURE — 71020 HB CHEST X-RAY 2VW FRONTAL&LATL: CPT

## 2017-02-03 PROCEDURE — 80048 BASIC METABOLIC PNL TOTAL CA: CPT | Performed by: INTERNAL MEDICINE

## 2017-02-03 RX ADMIN — FLUTICASONE PROPIONATE AND SALMETEROL 1 PUFF: 50; 250 POWDER RESPIRATORY (INHALATION) at 19:58

## 2017-02-03 RX ADMIN — INSULIN GLARGINE 40 UNITS: 100 INJECTION, SOLUTION SUBCUTANEOUS at 08:56

## 2017-02-03 RX ADMIN — OXYBUTYNIN CHLORIDE 5 MG: 5 TABLET ORAL at 17:10

## 2017-02-03 RX ADMIN — ASPIRIN 325 MG: 325 TABLET, DELAYED RELEASE ORAL at 08:56

## 2017-02-03 RX ADMIN — FUROSEMIDE 40 MG: 10 INJECTION, SOLUTION INTRAMUSCULAR; INTRAVENOUS at 17:10

## 2017-02-03 RX ADMIN — AMIODARONE HYDROCHLORIDE 200 MG: 200 TABLET ORAL at 11:29

## 2017-02-03 RX ADMIN — OXYBUTYNIN CHLORIDE 5 MG: 5 TABLET ORAL at 08:56

## 2017-02-03 RX ADMIN — AMIODARONE HYDROCHLORIDE 200 MG: 200 TABLET ORAL at 16:52

## 2017-02-03 RX ADMIN — APIXABAN 5 MG: 5 TABLET, FILM COATED ORAL at 08:56

## 2017-02-03 RX ADMIN — ALBUTEROL SULFATE 2.5 MG: 2.5 SOLUTION RESPIRATORY (INHALATION) at 07:31

## 2017-02-03 RX ADMIN — METFORMIN HYDROCHLORIDE 1000 MG: 500 TABLET, FILM COATED ORAL at 16:52

## 2017-02-03 RX ADMIN — ATORVASTATIN CALCIUM 80 MG: 40 TABLET, FILM COATED ORAL at 16:52

## 2017-02-03 RX ADMIN — GABAPENTIN 300 MG: 300 CAPSULE ORAL at 17:09

## 2017-02-03 RX ADMIN — FLUTICASONE PROPIONATE AND SALMETEROL 1 PUFF: 50; 250 POWDER RESPIRATORY (INHALATION) at 07:30

## 2017-02-03 RX ADMIN — ACETAMINOPHEN 650 MG: 325 TABLET, FILM COATED ORAL at 22:16

## 2017-02-03 RX ADMIN — FAMOTIDINE 20 MG: 20 TABLET ORAL at 08:56

## 2017-02-03 RX ADMIN — FLUTICASONE PROPIONATE AND SALMETEROL 1 PUFF: 50; 250 POWDER RESPIRATORY (INHALATION) at 20:25

## 2017-02-03 RX ADMIN — APIXABAN 5 MG: 5 TABLET, FILM COATED ORAL at 17:09

## 2017-02-03 RX ADMIN — GABAPENTIN 300 MG: 300 CAPSULE ORAL at 08:56

## 2017-02-03 RX ADMIN — METOPROLOL TARTRATE 50 MG: 50 TABLET ORAL at 22:17

## 2017-02-03 RX ADMIN — METOPROLOL TARTRATE 50 MG: 50 TABLET ORAL at 08:56

## 2017-02-03 RX ADMIN — FUROSEMIDE 40 MG: 10 INJECTION, SOLUTION INTRAMUSCULAR; INTRAVENOUS at 08:56

## 2017-02-03 RX ADMIN — AMIODARONE HYDROCHLORIDE 200 MG: 200 TABLET ORAL at 07:27

## 2017-02-03 RX ADMIN — METFORMIN HYDROCHLORIDE 1000 MG: 500 TABLET, FILM COATED ORAL at 07:27

## 2017-02-03 RX ADMIN — TAMSULOSIN HYDROCHLORIDE 0.4 MG: 0.4 CAPSULE ORAL at 16:52

## 2017-02-03 RX ADMIN — INSULIN GLARGINE 40 UNITS: 100 INJECTION, SOLUTION SUBCUTANEOUS at 17:09

## 2017-02-04 VITALS
TEMPERATURE: 97.5 F | WEIGHT: 315 LBS | HEART RATE: 61 BPM | RESPIRATION RATE: 16 BRPM | DIASTOLIC BLOOD PRESSURE: 64 MMHG | BODY MASS INDEX: 45.1 KG/M2 | OXYGEN SATURATION: 97 % | SYSTOLIC BLOOD PRESSURE: 136 MMHG | HEIGHT: 70 IN

## 2017-02-04 LAB
GLUCOSE SERPL-MCNC: 154 MG/DL (ref 65–140)
GLUCOSE SERPL-MCNC: 79 MG/DL (ref 65–140)

## 2017-02-04 PROCEDURE — 94640 AIRWAY INHALATION TREATMENT: CPT

## 2017-02-04 PROCEDURE — 82948 REAGENT STRIP/BLOOD GLUCOSE: CPT

## 2017-02-04 PROCEDURE — 94760 N-INVAS EAR/PLS OXIMETRY 1: CPT

## 2017-02-04 RX ORDER — METOPROLOL TARTRATE 50 MG/1
50 TABLET, FILM COATED ORAL EVERY 12 HOURS SCHEDULED
Qty: 60 TABLET | Refills: 0 | Status: SHIPPED | OUTPATIENT
Start: 2017-02-04 | End: 2018-04-04 | Stop reason: CLARIF

## 2017-02-04 RX ORDER — AMIODARONE HYDROCHLORIDE 200 MG/1
200 TABLET ORAL 2 TIMES DAILY
Qty: 60 TABLET | Refills: 0 | Status: SHIPPED | OUTPATIENT
Start: 2017-02-04 | End: 2018-04-04 | Stop reason: CLARIF

## 2017-02-04 RX ORDER — FUROSEMIDE 80 MG
80 TABLET ORAL DAILY
Qty: 30 TABLET | Refills: 3 | Status: SHIPPED | OUTPATIENT
Start: 2017-02-04 | End: 2018-04-04 | Stop reason: CLARIF

## 2017-02-04 RX ADMIN — AMIODARONE HYDROCHLORIDE 200 MG: 200 TABLET ORAL at 07:39

## 2017-02-04 RX ADMIN — METOPROLOL TARTRATE 50 MG: 50 TABLET ORAL at 08:18

## 2017-02-04 RX ADMIN — OXYBUTYNIN CHLORIDE 5 MG: 5 TABLET ORAL at 08:18

## 2017-02-04 RX ADMIN — GABAPENTIN 300 MG: 300 CAPSULE ORAL at 08:18

## 2017-02-04 RX ADMIN — APIXABAN 5 MG: 5 TABLET, FILM COATED ORAL at 08:18

## 2017-02-04 RX ADMIN — ASPIRIN 325 MG: 325 TABLET, DELAYED RELEASE ORAL at 08:18

## 2017-02-04 RX ADMIN — METFORMIN HYDROCHLORIDE 1000 MG: 500 TABLET, FILM COATED ORAL at 07:38

## 2017-02-04 RX ADMIN — FAMOTIDINE 20 MG: 20 TABLET ORAL at 08:18

## 2017-02-04 RX ADMIN — FLUTICASONE PROPIONATE AND SALMETEROL 1 PUFF: 50; 250 POWDER RESPIRATORY (INHALATION) at 07:52

## 2017-02-04 RX ADMIN — INSULIN GLARGINE 40 UNITS: 100 INJECTION, SOLUTION SUBCUTANEOUS at 08:18

## 2017-02-05 LAB
BACTERIA WND AEROBE CULT: NO GROWTH
GRAM STN SPEC: NORMAL

## 2017-02-06 ENCOUNTER — APPOINTMENT (OUTPATIENT)
Dept: CARDIAC REHAB | Facility: HOSPITAL | Age: 62
End: 2017-02-06
Payer: COMMERCIAL

## 2017-02-06 LAB
BACTERIA BLD CULT: NORMAL
BACTERIA BLD CULT: NORMAL

## 2017-02-08 ENCOUNTER — ALLSCRIPTS OFFICE VISIT (OUTPATIENT)
Dept: OTHER | Facility: OTHER | Age: 62
End: 2017-02-08

## 2017-02-08 DIAGNOSIS — I48.0 PAROXYSMAL ATRIAL FIBRILLATION (HCC): ICD-10-CM

## 2017-02-09 ENCOUNTER — GENERIC CONVERSION - ENCOUNTER (OUTPATIENT)
Dept: OTHER | Facility: OTHER | Age: 62
End: 2017-02-09

## 2017-02-09 ENCOUNTER — ALLSCRIPTS OFFICE VISIT (OUTPATIENT)
Dept: OTHER | Facility: OTHER | Age: 62
End: 2017-02-09

## 2017-02-10 ENCOUNTER — APPOINTMENT (OUTPATIENT)
Dept: CARDIAC REHAB | Facility: HOSPITAL | Age: 62
End: 2017-02-10
Payer: COMMERCIAL

## 2017-02-13 ENCOUNTER — APPOINTMENT (OUTPATIENT)
Dept: CARDIAC REHAB | Facility: HOSPITAL | Age: 62
End: 2017-02-13
Payer: COMMERCIAL

## 2017-02-16 ENCOUNTER — GENERIC CONVERSION - ENCOUNTER (OUTPATIENT)
Dept: OTHER | Facility: OTHER | Age: 62
End: 2017-02-16

## 2017-02-16 ENCOUNTER — TRANSCRIBE ORDERS (OUTPATIENT)
Dept: ADMINISTRATIVE | Facility: HOSPITAL | Age: 62
End: 2017-02-16

## 2017-02-16 DIAGNOSIS — I48.0 PAROXYSMAL ATRIAL FIBRILLATION (HCC): Primary | ICD-10-CM

## 2017-02-17 ENCOUNTER — APPOINTMENT (OUTPATIENT)
Dept: CARDIAC REHAB | Facility: HOSPITAL | Age: 62
End: 2017-02-17
Payer: COMMERCIAL

## 2017-02-20 ENCOUNTER — APPOINTMENT (OUTPATIENT)
Dept: CARDIAC REHAB | Facility: HOSPITAL | Age: 62
End: 2017-02-20
Payer: COMMERCIAL

## 2017-02-21 ENCOUNTER — TRANSCRIBE ORDERS (OUTPATIENT)
Dept: ADMINISTRATIVE | Facility: HOSPITAL | Age: 62
End: 2017-02-21

## 2017-02-21 ENCOUNTER — ALLSCRIPTS OFFICE VISIT (OUTPATIENT)
Dept: OTHER | Facility: OTHER | Age: 62
End: 2017-02-21

## 2017-02-21 DIAGNOSIS — R06.2 WHEEZING: Primary | ICD-10-CM

## 2017-02-24 ENCOUNTER — APPOINTMENT (OUTPATIENT)
Dept: CARDIAC REHAB | Facility: HOSPITAL | Age: 62
End: 2017-02-24
Payer: COMMERCIAL

## 2017-02-24 PROCEDURE — G0424 PULMONARY REHAB W EXER: HCPCS

## 2017-02-27 ENCOUNTER — APPOINTMENT (OUTPATIENT)
Dept: CARDIAC REHAB | Facility: HOSPITAL | Age: 62
End: 2017-02-27
Payer: COMMERCIAL

## 2017-02-27 ENCOUNTER — HOSPITAL ENCOUNTER (OUTPATIENT)
Dept: NON INVASIVE DIAGNOSTICS | Facility: CLINIC | Age: 62
Discharge: HOME | End: 2017-02-27
Payer: COMMERCIAL

## 2017-02-27 DIAGNOSIS — I48.0 PAROXYSMAL ATRIAL FIBRILLATION (HCC): ICD-10-CM

## 2017-02-27 LAB
MAX DIASTOLIC BP: 64 MMHG
MAX HEART RATE: 68 BPM
MAX PREDICTED HEART RATE: 158 BPM
MAX. SYSTOLIC BP: 145 MMHG
PROTOCOL NAME: NORMAL
REASON FOR TERMINATION: NORMAL
TARGET HR FORMULA: NORMAL
TIME IN EXERCISE PHASE: 183 S

## 2017-02-27 PROCEDURE — 93017 CV STRESS TEST TRACING ONLY: CPT

## 2017-02-27 PROCEDURE — A9502 TC99M TETROFOSMIN: HCPCS

## 2017-02-27 PROCEDURE — 78452 HT MUSCLE IMAGE SPECT MULT: CPT

## 2017-02-27 RX ADMIN — REGADENOSON 0.4 MG: 0.08 INJECTION, SOLUTION INTRAVENOUS at 08:30

## 2017-03-03 ENCOUNTER — APPOINTMENT (OUTPATIENT)
Dept: CARDIAC REHAB | Facility: HOSPITAL | Age: 62
End: 2017-03-03
Payer: COMMERCIAL

## 2017-03-03 ENCOUNTER — HOSPITAL ENCOUNTER (OUTPATIENT)
Dept: PULMONOLOGY | Facility: HOSPITAL | Age: 62
Discharge: HOME | End: 2017-03-03
Attending: INTERNAL MEDICINE
Payer: COMMERCIAL

## 2017-03-03 ENCOUNTER — GENERIC CONVERSION - ENCOUNTER (OUTPATIENT)
Dept: OTHER | Facility: OTHER | Age: 62
End: 2017-03-03

## 2017-03-03 DIAGNOSIS — R06.2 WHEEZING: ICD-10-CM

## 2017-03-03 PROCEDURE — G0424 PULMONARY REHAB W EXER: HCPCS

## 2017-03-03 PROCEDURE — 94760 N-INVAS EAR/PLS OXIMETRY 1: CPT

## 2017-03-03 PROCEDURE — 94726 PLETHYSMOGRAPHY LUNG VOLUMES: CPT

## 2017-03-03 PROCEDURE — 94070 EVALUATION OF WHEEZING: CPT

## 2017-03-03 PROCEDURE — 94729 DIFFUSING CAPACITY: CPT

## 2017-03-03 PROCEDURE — 94060 EVALUATION OF WHEEZING: CPT

## 2017-03-03 RX ORDER — ALBUTEROL SULFATE 2.5 MG/3ML
2.5 SOLUTION RESPIRATORY (INHALATION) EVERY 6 HOURS PRN
Status: DISCONTINUED | OUTPATIENT
Start: 2017-03-03 | End: 2017-03-07 | Stop reason: HOSPADM

## 2017-03-06 ENCOUNTER — APPOINTMENT (OUTPATIENT)
Dept: CARDIAC REHAB | Facility: HOSPITAL | Age: 62
End: 2017-03-06
Payer: COMMERCIAL

## 2017-03-06 PROCEDURE — G0424 PULMONARY REHAB W EXER: HCPCS

## 2017-03-10 ENCOUNTER — APPOINTMENT (OUTPATIENT)
Dept: CARDIAC REHAB | Facility: HOSPITAL | Age: 62
End: 2017-03-10
Payer: COMMERCIAL

## 2017-03-10 PROCEDURE — G0424 PULMONARY REHAB W EXER: HCPCS

## 2017-03-13 ENCOUNTER — APPOINTMENT (OUTPATIENT)
Dept: CARDIAC REHAB | Facility: HOSPITAL | Age: 62
End: 2017-03-13
Payer: COMMERCIAL

## 2017-03-15 ENCOUNTER — LAB CONVERSION - ENCOUNTER (OUTPATIENT)
Dept: OTHER | Facility: OTHER | Age: 62
End: 2017-03-15

## 2017-03-16 ENCOUNTER — ALLSCRIPTS OFFICE VISIT (OUTPATIENT)
Dept: OTHER | Facility: OTHER | Age: 62
End: 2017-03-16

## 2017-03-17 ENCOUNTER — APPOINTMENT (OUTPATIENT)
Dept: CARDIAC REHAB | Facility: HOSPITAL | Age: 62
End: 2017-03-17
Payer: COMMERCIAL

## 2017-03-17 LAB — MICROALBUM.,U,RANDOM (HISTORICAL): 47.3 UG/ML

## 2017-03-17 PROCEDURE — G0424 PULMONARY REHAB W EXER: HCPCS

## 2017-03-20 ENCOUNTER — APPOINTMENT (OUTPATIENT)
Dept: CARDIAC REHAB | Facility: HOSPITAL | Age: 62
End: 2017-03-20
Payer: COMMERCIAL

## 2017-03-20 PROCEDURE — G0424 PULMONARY REHAB W EXER: HCPCS

## 2017-03-21 ENCOUNTER — GENERIC CONVERSION - ENCOUNTER (OUTPATIENT)
Dept: OTHER | Facility: OTHER | Age: 62
End: 2017-03-21

## 2017-03-24 ENCOUNTER — APPOINTMENT (OUTPATIENT)
Dept: CARDIAC REHAB | Facility: HOSPITAL | Age: 62
End: 2017-03-24
Payer: COMMERCIAL

## 2017-03-24 ENCOUNTER — ALLSCRIPTS OFFICE VISIT (OUTPATIENT)
Dept: OTHER | Facility: OTHER | Age: 62
End: 2017-03-24

## 2017-03-24 PROCEDURE — G0424 PULMONARY REHAB W EXER: HCPCS

## 2017-03-27 ENCOUNTER — APPOINTMENT (OUTPATIENT)
Dept: CARDIAC REHAB | Facility: HOSPITAL | Age: 62
End: 2017-03-27
Payer: COMMERCIAL

## 2017-03-27 PROCEDURE — G0424 PULMONARY REHAB W EXER: HCPCS

## 2017-03-28 ENCOUNTER — APPOINTMENT (OUTPATIENT)
Dept: CARDIAC REHAB | Facility: HOSPITAL | Age: 62
End: 2017-03-28
Payer: COMMERCIAL

## 2017-03-30 ENCOUNTER — APPOINTMENT (OUTPATIENT)
Dept: CARDIAC REHAB | Facility: HOSPITAL | Age: 62
End: 2017-03-30
Payer: COMMERCIAL

## 2017-03-30 PROCEDURE — G0424 PULMONARY REHAB W EXER: HCPCS

## 2017-03-31 ENCOUNTER — APPOINTMENT (OUTPATIENT)
Dept: CARDIAC REHAB | Facility: HOSPITAL | Age: 62
End: 2017-03-31
Payer: COMMERCIAL

## 2017-04-03 ENCOUNTER — APPOINTMENT (OUTPATIENT)
Dept: CARDIAC REHAB | Facility: HOSPITAL | Age: 62
End: 2017-04-03
Payer: COMMERCIAL

## 2017-04-04 ENCOUNTER — APPOINTMENT (OUTPATIENT)
Dept: CARDIAC REHAB | Facility: HOSPITAL | Age: 62
End: 2017-04-04
Payer: COMMERCIAL

## 2017-04-04 PROCEDURE — G0424 PULMONARY REHAB W EXER: HCPCS

## 2017-04-06 ENCOUNTER — APPOINTMENT (OUTPATIENT)
Dept: CARDIAC REHAB | Facility: HOSPITAL | Age: 62
End: 2017-04-06
Payer: COMMERCIAL

## 2017-04-06 PROCEDURE — G0424 PULMONARY REHAB W EXER: HCPCS

## 2017-04-07 ENCOUNTER — APPOINTMENT (OUTPATIENT)
Dept: CARDIAC REHAB | Facility: HOSPITAL | Age: 62
End: 2017-04-07
Payer: COMMERCIAL

## 2017-04-10 ENCOUNTER — APPOINTMENT (OUTPATIENT)
Dept: CARDIAC REHAB | Facility: HOSPITAL | Age: 62
End: 2017-04-10
Payer: COMMERCIAL

## 2017-04-11 ENCOUNTER — APPOINTMENT (OUTPATIENT)
Dept: CARDIAC REHAB | Facility: HOSPITAL | Age: 62
End: 2017-04-11
Payer: COMMERCIAL

## 2017-04-11 PROCEDURE — G0424 PULMONARY REHAB W EXER: HCPCS

## 2017-04-12 ENCOUNTER — GENERIC CONVERSION - ENCOUNTER (OUTPATIENT)
Dept: OTHER | Facility: OTHER | Age: 62
End: 2017-04-12

## 2017-04-13 ENCOUNTER — APPOINTMENT (OUTPATIENT)
Dept: CARDIAC REHAB | Facility: HOSPITAL | Age: 62
End: 2017-04-13
Payer: COMMERCIAL

## 2017-04-13 PROCEDURE — G0424 PULMONARY REHAB W EXER: HCPCS

## 2017-04-14 ENCOUNTER — APPOINTMENT (OUTPATIENT)
Dept: CARDIAC REHAB | Facility: HOSPITAL | Age: 62
End: 2017-04-14
Payer: COMMERCIAL

## 2017-04-17 ENCOUNTER — APPOINTMENT (OUTPATIENT)
Dept: CARDIAC REHAB | Facility: HOSPITAL | Age: 62
End: 2017-04-17
Payer: COMMERCIAL

## 2017-04-18 ENCOUNTER — APPOINTMENT (OUTPATIENT)
Dept: CARDIAC REHAB | Facility: HOSPITAL | Age: 62
End: 2017-04-18
Payer: COMMERCIAL

## 2017-04-19 ENCOUNTER — GENERIC CONVERSION - ENCOUNTER (OUTPATIENT)
Dept: OTHER | Facility: OTHER | Age: 62
End: 2017-04-19

## 2017-04-20 ENCOUNTER — GENERIC CONVERSION - ENCOUNTER (OUTPATIENT)
Dept: OTHER | Facility: OTHER | Age: 62
End: 2017-04-20

## 2017-04-20 ENCOUNTER — APPOINTMENT (OUTPATIENT)
Dept: CARDIAC REHAB | Facility: HOSPITAL | Age: 62
End: 2017-04-20
Payer: COMMERCIAL

## 2017-04-20 LAB — HBA1C MFR BLD HPLC: 6.7 %

## 2017-04-20 PROCEDURE — G0424 PULMONARY REHAB W EXER: HCPCS

## 2017-04-21 ENCOUNTER — APPOINTMENT (OUTPATIENT)
Dept: CARDIAC REHAB | Facility: HOSPITAL | Age: 62
End: 2017-04-21
Payer: COMMERCIAL

## 2017-04-24 ENCOUNTER — APPOINTMENT (OUTPATIENT)
Dept: CARDIAC REHAB | Facility: HOSPITAL | Age: 62
End: 2017-04-24
Payer: COMMERCIAL

## 2017-04-25 ENCOUNTER — APPOINTMENT (OUTPATIENT)
Dept: CARDIAC REHAB | Facility: HOSPITAL | Age: 62
End: 2017-04-25
Payer: COMMERCIAL

## 2017-04-25 PROCEDURE — G0424 PULMONARY REHAB W EXER: HCPCS

## 2017-04-27 ENCOUNTER — APPOINTMENT (OUTPATIENT)
Dept: CARDIAC REHAB | Facility: HOSPITAL | Age: 62
End: 2017-04-27
Payer: COMMERCIAL

## 2017-04-28 ENCOUNTER — APPOINTMENT (OUTPATIENT)
Dept: CARDIAC REHAB | Facility: HOSPITAL | Age: 62
End: 2017-04-28
Payer: COMMERCIAL

## 2017-04-28 PROCEDURE — G0424 PULMONARY REHAB W EXER: HCPCS

## 2017-04-30 ENCOUNTER — HOSPITAL ENCOUNTER (EMERGENCY)
Facility: HOSPITAL | Age: 62
Discharge: HOME | End: 2017-04-30
Attending: EMERGENCY MEDICINE | Admitting: EMERGENCY MEDICINE
Payer: COMMERCIAL

## 2017-04-30 VITALS
SYSTOLIC BLOOD PRESSURE: 172 MMHG | HEART RATE: 66 BPM | TEMPERATURE: 97.4 F | DIASTOLIC BLOOD PRESSURE: 76 MMHG | OXYGEN SATURATION: 94 % | RESPIRATION RATE: 20 BRPM | BODY MASS INDEX: 51.65 KG/M2 | WEIGHT: 315 LBS

## 2017-04-30 DIAGNOSIS — R33.9 URINARY RETENTION: ICD-10-CM

## 2017-04-30 DIAGNOSIS — K56.41 FECAL IMPACTION (HCC): Primary | ICD-10-CM

## 2017-04-30 LAB
BILIRUB UR QL STRIP: NEGATIVE
CLARITY UR: NORMAL
COLOR UR: YELLOW
GLUCOSE UR STRIP-MCNC: NEGATIVE MG/DL
HGB UR QL STRIP.AUTO: NEGATIVE
KETONES UR STRIP-MCNC: NEGATIVE MG/DL
LEUKOCYTE ESTERASE UR QL STRIP: NEGATIVE
NITRITE UR QL STRIP: NEGATIVE
PH UR STRIP.AUTO: 6 [PH] (ref 4.5–8)
PROT UR STRIP-MCNC: NEGATIVE MG/DL
SP GR UR STRIP.AUTO: 1.02 (ref 1–1.03)
UROBILINOGEN UR QL STRIP.AUTO: 0.2 E.U./DL

## 2017-04-30 PROCEDURE — 81003 URINALYSIS AUTO W/O SCOPE: CPT | Performed by: EMERGENCY MEDICINE

## 2017-04-30 PROCEDURE — 99283 EMERGENCY DEPT VISIT LOW MDM: CPT

## 2017-04-30 RX ORDER — QUINAPRIL 40 MG/1
40 TABLET ORAL
COMMUNITY
End: 2018-09-10 | Stop reason: SDUPTHER

## 2017-05-01 ENCOUNTER — APPOINTMENT (OUTPATIENT)
Dept: CARDIAC REHAB | Facility: HOSPITAL | Age: 62
End: 2017-05-01
Payer: COMMERCIAL

## 2017-05-01 DIAGNOSIS — E11.65 TYPE 2 DIABETES MELLITUS WITH HYPERGLYCEMIA (HCC): ICD-10-CM

## 2017-05-02 ENCOUNTER — APPOINTMENT (OUTPATIENT)
Dept: CARDIAC REHAB | Facility: HOSPITAL | Age: 62
End: 2017-05-02
Payer: COMMERCIAL

## 2017-05-02 PROCEDURE — G0424 PULMONARY REHAB W EXER: HCPCS

## 2017-05-03 ENCOUNTER — GENERIC CONVERSION - ENCOUNTER (OUTPATIENT)
Dept: OTHER | Facility: OTHER | Age: 62
End: 2017-05-03

## 2017-05-04 ENCOUNTER — APPOINTMENT (OUTPATIENT)
Dept: CARDIAC REHAB | Facility: HOSPITAL | Age: 62
End: 2017-05-04
Payer: COMMERCIAL

## 2017-05-05 ENCOUNTER — APPOINTMENT (OUTPATIENT)
Dept: CARDIAC REHAB | Facility: HOSPITAL | Age: 62
End: 2017-05-05
Payer: COMMERCIAL

## 2017-05-05 PROCEDURE — G0424 PULMONARY REHAB W EXER: HCPCS

## 2017-05-08 ENCOUNTER — APPOINTMENT (OUTPATIENT)
Dept: CARDIAC REHAB | Facility: HOSPITAL | Age: 62
End: 2017-05-08
Payer: COMMERCIAL

## 2017-05-09 ENCOUNTER — APPOINTMENT (OUTPATIENT)
Dept: CARDIAC REHAB | Facility: HOSPITAL | Age: 62
End: 2017-05-09
Payer: COMMERCIAL

## 2017-05-09 PROCEDURE — G0424 PULMONARY REHAB W EXER: HCPCS

## 2017-05-11 ENCOUNTER — APPOINTMENT (OUTPATIENT)
Dept: CARDIAC REHAB | Facility: HOSPITAL | Age: 62
End: 2017-05-11
Payer: COMMERCIAL

## 2017-05-11 PROCEDURE — G0424 PULMONARY REHAB W EXER: HCPCS

## 2017-05-12 ENCOUNTER — GENERIC CONVERSION - ENCOUNTER (OUTPATIENT)
Dept: OTHER | Facility: OTHER | Age: 62
End: 2017-05-12

## 2017-05-12 ENCOUNTER — APPOINTMENT (OUTPATIENT)
Dept: CARDIAC REHAB | Facility: HOSPITAL | Age: 62
End: 2017-05-12
Payer: COMMERCIAL

## 2017-05-15 ENCOUNTER — APPOINTMENT (OUTPATIENT)
Dept: CARDIAC REHAB | Facility: HOSPITAL | Age: 62
End: 2017-05-15
Payer: COMMERCIAL

## 2017-05-16 ENCOUNTER — APPOINTMENT (OUTPATIENT)
Dept: CARDIAC REHAB | Facility: HOSPITAL | Age: 62
End: 2017-05-16
Payer: COMMERCIAL

## 2017-05-16 PROCEDURE — G0424 PULMONARY REHAB W EXER: HCPCS

## 2017-05-17 ENCOUNTER — ALLSCRIPTS OFFICE VISIT (OUTPATIENT)
Dept: OTHER | Facility: OTHER | Age: 62
End: 2017-05-17

## 2017-05-18 ENCOUNTER — APPOINTMENT (OUTPATIENT)
Dept: CARDIAC REHAB | Facility: HOSPITAL | Age: 62
End: 2017-05-18
Payer: COMMERCIAL

## 2017-05-18 PROCEDURE — G0424 PULMONARY REHAB W EXER: HCPCS

## 2017-05-19 ENCOUNTER — APPOINTMENT (OUTPATIENT)
Dept: CARDIAC REHAB | Facility: HOSPITAL | Age: 62
End: 2017-05-19
Payer: COMMERCIAL

## 2017-05-22 ENCOUNTER — APPOINTMENT (OUTPATIENT)
Dept: CARDIAC REHAB | Facility: HOSPITAL | Age: 62
End: 2017-05-22
Payer: COMMERCIAL

## 2017-05-23 ENCOUNTER — APPOINTMENT (OUTPATIENT)
Dept: CARDIAC REHAB | Facility: HOSPITAL | Age: 62
End: 2017-05-23
Payer: COMMERCIAL

## 2017-05-23 PROCEDURE — G0424 PULMONARY REHAB W EXER: HCPCS

## 2017-05-25 ENCOUNTER — APPOINTMENT (OUTPATIENT)
Dept: CARDIAC REHAB | Facility: HOSPITAL | Age: 62
End: 2017-05-25
Payer: COMMERCIAL

## 2017-05-25 PROCEDURE — G0424 PULMONARY REHAB W EXER: HCPCS

## 2017-05-26 ENCOUNTER — APPOINTMENT (OUTPATIENT)
Dept: CARDIAC REHAB | Facility: HOSPITAL | Age: 62
End: 2017-05-26
Payer: COMMERCIAL

## 2017-05-26 ENCOUNTER — ALLSCRIPTS OFFICE VISIT (OUTPATIENT)
Dept: OTHER | Facility: OTHER | Age: 62
End: 2017-05-26

## 2017-05-30 ENCOUNTER — APPOINTMENT (OUTPATIENT)
Dept: CARDIAC REHAB | Facility: HOSPITAL | Age: 62
End: 2017-05-30
Payer: COMMERCIAL

## 2017-05-30 PROCEDURE — G0424 PULMONARY REHAB W EXER: HCPCS

## 2017-05-31 ENCOUNTER — ALLSCRIPTS OFFICE VISIT (OUTPATIENT)
Dept: OTHER | Facility: OTHER | Age: 62
End: 2017-05-31

## 2017-06-01 ENCOUNTER — APPOINTMENT (OUTPATIENT)
Dept: CARDIAC REHAB | Facility: HOSPITAL | Age: 62
End: 2017-06-01
Payer: COMMERCIAL

## 2017-06-01 PROCEDURE — G0424 PULMONARY REHAB W EXER: HCPCS

## 2017-06-06 ENCOUNTER — APPOINTMENT (OUTPATIENT)
Dept: CARDIAC REHAB | Facility: HOSPITAL | Age: 62
End: 2017-06-06
Payer: COMMERCIAL

## 2017-06-08 ENCOUNTER — APPOINTMENT (OUTPATIENT)
Dept: CARDIAC REHAB | Facility: HOSPITAL | Age: 62
End: 2017-06-08
Payer: COMMERCIAL

## 2017-06-08 LAB
A/G RATIO (HISTORICAL): 1.8 (ref 1.2–2.2)
ALBUMIN SERPL BCP-MCNC: 4.6 G/DL (ref 3.6–4.8)
ALP SERPL-CCNC: 65 IU/L (ref 39–117)
ALT SERPL W P-5'-P-CCNC: 21 IU/L (ref 0–44)
AST SERPL W P-5'-P-CCNC: 21 IU/L (ref 0–40)
BILIRUB SERPL-MCNC: 0.6 MG/DL (ref 0–1.2)
BUN SERPL-MCNC: 15 MG/DL (ref 8–27)
BUN/CREA RATIO (HISTORICAL): 16 (ref 10–24)
CALCIUM SERPL-MCNC: 9.2 MG/DL (ref 8.6–10.2)
CHLORIDE SERPL-SCNC: 100 MMOL/L (ref 96–106)
CO2 SERPL-SCNC: 27 MMOL/L (ref 18–29)
CREAT SERPL-MCNC: 0.92 MG/DL (ref 0.76–1.27)
EGFR AFRICAN AMERICAN (HISTORICAL): 103 ML/MIN/1.73
EGFR-AMERICAN CALC (HISTORICAL): 89 ML/MIN/1.73
GLUCOSE SERPL-MCNC: 118 MG/DL (ref 65–99)
POTASSIUM SERPL-SCNC: 4.3 MMOL/L (ref 3.5–5.2)
SODIUM SERPL-SCNC: 144 MMOL/L (ref 134–144)
TOT. GLOBULIN, SERUM (HISTORICAL): 2.5 G/DL (ref 1.5–4.5)
TOTAL PROTEIN (HISTORICAL): 7.1 G/DL (ref 6–8.5)

## 2017-06-08 PROCEDURE — G0424 PULMONARY REHAB W EXER: HCPCS

## 2017-06-09 LAB — HBA1C MFR BLD HPLC: 7.1 % (ref 4.8–5.6)

## 2017-06-13 ENCOUNTER — APPOINTMENT (OUTPATIENT)
Dept: CARDIAC REHAB | Facility: HOSPITAL | Age: 62
End: 2017-06-13
Payer: COMMERCIAL

## 2017-06-13 PROCEDURE — G0424 PULMONARY REHAB W EXER: HCPCS

## 2017-06-15 ENCOUNTER — APPOINTMENT (OUTPATIENT)
Dept: CARDIAC REHAB | Facility: HOSPITAL | Age: 62
End: 2017-06-15
Payer: COMMERCIAL

## 2017-06-15 PROCEDURE — G0424 PULMONARY REHAB W EXER: HCPCS

## 2017-06-19 ENCOUNTER — ALLSCRIPTS OFFICE VISIT (OUTPATIENT)
Dept: OTHER | Facility: OTHER | Age: 62
End: 2017-06-19

## 2017-06-19 DIAGNOSIS — I89.0 LYMPHEDEMA, NOT ELSEWHERE CLASSIFIED: ICD-10-CM

## 2017-06-19 DIAGNOSIS — E11.65 TYPE 2 DIABETES MELLITUS WITH HYPERGLYCEMIA (HCC): ICD-10-CM

## 2017-06-20 ENCOUNTER — APPOINTMENT (OUTPATIENT)
Dept: CARDIAC REHAB | Facility: HOSPITAL | Age: 62
End: 2017-06-20
Payer: COMMERCIAL

## 2017-06-22 ENCOUNTER — APPOINTMENT (OUTPATIENT)
Dept: CARDIAC REHAB | Facility: HOSPITAL | Age: 62
End: 2017-06-22
Payer: COMMERCIAL

## 2017-06-27 ENCOUNTER — APPOINTMENT (OUTPATIENT)
Dept: PHYSICAL THERAPY | Facility: CLINIC | Age: 62
End: 2017-06-27
Payer: COMMERCIAL

## 2017-06-27 ENCOUNTER — APPOINTMENT (OUTPATIENT)
Dept: CARDIAC REHAB | Facility: HOSPITAL | Age: 62
End: 2017-06-27
Payer: COMMERCIAL

## 2017-06-27 DIAGNOSIS — I89.0 LYMPHEDEMA, NOT ELSEWHERE CLASSIFIED: ICD-10-CM

## 2017-06-27 PROCEDURE — 97162 PT EVAL MOD COMPLEX 30 MIN: CPT

## 2017-06-29 ENCOUNTER — APPOINTMENT (OUTPATIENT)
Dept: CARDIAC REHAB | Facility: HOSPITAL | Age: 62
End: 2017-06-29
Payer: COMMERCIAL

## 2017-07-03 ENCOUNTER — GENERIC CONVERSION - ENCOUNTER (OUTPATIENT)
Dept: OTHER | Facility: OTHER | Age: 62
End: 2017-07-03

## 2017-07-05 ENCOUNTER — APPOINTMENT (OUTPATIENT)
Dept: PHYSICAL THERAPY | Facility: CLINIC | Age: 62
End: 2017-07-05
Payer: COMMERCIAL

## 2017-07-05 PROCEDURE — 97140 MANUAL THERAPY 1/> REGIONS: CPT

## 2017-07-05 PROCEDURE — 97016 VASOPNEUMATIC DEVICE THERAPY: CPT

## 2017-07-07 ENCOUNTER — APPOINTMENT (OUTPATIENT)
Dept: PHYSICAL THERAPY | Facility: CLINIC | Age: 62
End: 2017-07-07
Payer: COMMERCIAL

## 2017-07-07 PROCEDURE — 97016 VASOPNEUMATIC DEVICE THERAPY: CPT

## 2017-07-07 PROCEDURE — 97140 MANUAL THERAPY 1/> REGIONS: CPT

## 2017-07-10 ENCOUNTER — APPOINTMENT (OUTPATIENT)
Dept: PHYSICAL THERAPY | Facility: CLINIC | Age: 62
End: 2017-07-10
Payer: COMMERCIAL

## 2017-07-10 PROCEDURE — 97016 VASOPNEUMATIC DEVICE THERAPY: CPT

## 2017-07-10 PROCEDURE — 97140 MANUAL THERAPY 1/> REGIONS: CPT

## 2017-07-13 ENCOUNTER — APPOINTMENT (OUTPATIENT)
Dept: PHYSICAL THERAPY | Facility: CLINIC | Age: 62
End: 2017-07-13
Payer: COMMERCIAL

## 2017-07-13 PROCEDURE — 97140 MANUAL THERAPY 1/> REGIONS: CPT

## 2017-07-13 PROCEDURE — 97016 VASOPNEUMATIC DEVICE THERAPY: CPT

## 2017-07-17 ENCOUNTER — APPOINTMENT (OUTPATIENT)
Dept: CARDIAC REHAB | Facility: HOSPITAL | Age: 62
End: 2017-07-17
Payer: COMMERCIAL

## 2017-07-17 PROCEDURE — G0424 PULMONARY REHAB W EXER: HCPCS

## 2017-07-20 ENCOUNTER — APPOINTMENT (OUTPATIENT)
Dept: CARDIAC REHAB | Facility: HOSPITAL | Age: 62
End: 2017-07-20
Payer: COMMERCIAL

## 2017-07-20 PROCEDURE — G0424 PULMONARY REHAB W EXER: HCPCS

## 2017-07-25 ENCOUNTER — APPOINTMENT (OUTPATIENT)
Dept: PHYSICAL THERAPY | Facility: CLINIC | Age: 62
End: 2017-07-25
Payer: COMMERCIAL

## 2017-07-25 PROCEDURE — 97140 MANUAL THERAPY 1/> REGIONS: CPT

## 2017-07-25 PROCEDURE — 97016 VASOPNEUMATIC DEVICE THERAPY: CPT

## 2017-07-28 ENCOUNTER — APPOINTMENT (OUTPATIENT)
Dept: PHYSICAL THERAPY | Facility: CLINIC | Age: 62
End: 2017-07-28
Payer: COMMERCIAL

## 2017-07-28 PROCEDURE — 97016 VASOPNEUMATIC DEVICE THERAPY: CPT

## 2017-07-28 PROCEDURE — 97140 MANUAL THERAPY 1/> REGIONS: CPT

## 2017-08-01 ENCOUNTER — APPOINTMENT (OUTPATIENT)
Dept: PHYSICAL THERAPY | Facility: CLINIC | Age: 62
End: 2017-08-01
Payer: COMMERCIAL

## 2017-08-01 PROCEDURE — 97140 MANUAL THERAPY 1/> REGIONS: CPT

## 2017-08-01 PROCEDURE — 97016 VASOPNEUMATIC DEVICE THERAPY: CPT

## 2017-08-04 ENCOUNTER — APPOINTMENT (OUTPATIENT)
Dept: PHYSICAL THERAPY | Facility: CLINIC | Age: 62
End: 2017-08-04
Payer: COMMERCIAL

## 2017-08-04 PROCEDURE — 97140 MANUAL THERAPY 1/> REGIONS: CPT

## 2017-08-04 PROCEDURE — 97016 VASOPNEUMATIC DEVICE THERAPY: CPT

## 2017-08-08 ENCOUNTER — APPOINTMENT (OUTPATIENT)
Dept: PHYSICAL THERAPY | Facility: CLINIC | Age: 62
End: 2017-08-08
Payer: COMMERCIAL

## 2017-08-08 PROCEDURE — 97140 MANUAL THERAPY 1/> REGIONS: CPT

## 2017-08-08 PROCEDURE — 97016 VASOPNEUMATIC DEVICE THERAPY: CPT

## 2017-08-10 ENCOUNTER — APPOINTMENT (OUTPATIENT)
Dept: PHYSICAL THERAPY | Facility: CLINIC | Age: 62
End: 2017-08-10
Payer: COMMERCIAL

## 2017-08-10 PROCEDURE — 97140 MANUAL THERAPY 1/> REGIONS: CPT

## 2017-08-10 PROCEDURE — 97016 VASOPNEUMATIC DEVICE THERAPY: CPT

## 2017-08-11 ENCOUNTER — APPOINTMENT (OUTPATIENT)
Dept: PHYSICAL THERAPY | Facility: CLINIC | Age: 62
End: 2017-08-11
Payer: COMMERCIAL

## 2017-08-15 ENCOUNTER — APPOINTMENT (OUTPATIENT)
Dept: PHYSICAL THERAPY | Facility: CLINIC | Age: 62
End: 2017-08-15
Payer: COMMERCIAL

## 2017-08-15 PROCEDURE — 97140 MANUAL THERAPY 1/> REGIONS: CPT

## 2017-08-15 PROCEDURE — 97016 VASOPNEUMATIC DEVICE THERAPY: CPT

## 2017-08-18 ENCOUNTER — APPOINTMENT (OUTPATIENT)
Dept: PHYSICAL THERAPY | Facility: CLINIC | Age: 62
End: 2017-08-18
Payer: COMMERCIAL

## 2017-08-18 PROCEDURE — 97016 VASOPNEUMATIC DEVICE THERAPY: CPT

## 2017-08-18 PROCEDURE — 97140 MANUAL THERAPY 1/> REGIONS: CPT

## 2017-09-19 ENCOUNTER — GENERIC CONVERSION - ENCOUNTER (OUTPATIENT)
Dept: OTHER | Facility: OTHER | Age: 62
End: 2017-09-19

## 2017-09-19 ENCOUNTER — ALLSCRIPTS OFFICE VISIT (OUTPATIENT)
Dept: OTHER | Facility: OTHER | Age: 62
End: 2017-09-19

## 2017-09-19 DIAGNOSIS — E11.65 TYPE 2 DIABETES MELLITUS WITH HYPERGLYCEMIA (HCC): ICD-10-CM

## 2017-09-19 LAB
A/G RATIO (HISTORICAL): 1.8 (ref 1.2–2.2)
ALBUMIN SERPL BCP-MCNC: 4.3 G/DL (ref 3.6–4.8)
ALP SERPL-CCNC: 62 IU/L (ref 39–117)
ALT SERPL W P-5'-P-CCNC: 25 IU/L (ref 0–44)
AMBIG ABBREV CMP14 DEFAULT (HISTORICAL): NORMAL
AMBIG ABBREV LP DEFAULT (HISTORICAL): NORMAL
AST SERPL W P-5'-P-CCNC: 27 IU/L (ref 0–40)
BILIRUB SERPL-MCNC: 0.6 MG/DL (ref 0–1.2)
BUN SERPL-MCNC: 16 MG/DL (ref 8–27)
BUN/CREA RATIO (HISTORICAL): 19 (ref 10–24)
CALCIUM SERPL-MCNC: 8.9 MG/DL (ref 8.6–10.2)
CHLORIDE SERPL-SCNC: 101 MMOL/L (ref 96–106)
CHOLEST SERPL-MCNC: 133 MG/DL (ref 100–199)
CO2 SERPL-SCNC: 26 MMOL/L (ref 18–29)
CREAT SERPL-MCNC: 0.83 MG/DL (ref 0.76–1.27)
EGFR AFRICAN AMERICAN (HISTORICAL): 108 ML/MIN/1.73
EGFR-AMERICAN CALC (HISTORICAL): 94 ML/MIN/1.73
GLUCOSE SERPL-MCNC: 97 MG/DL (ref 65–99)
HBA1C MFR BLD HPLC: 8.3 % (ref 4.8–5.6)
HDLC SERPL-MCNC: 47 MG/DL
LDLC SERPL CALC-MCNC: 63 MG/DL (ref 0–99)
POTASSIUM SERPL-SCNC: 4.1 MMOL/L (ref 3.5–5.2)
SODIUM SERPL-SCNC: 144 MMOL/L (ref 134–144)
TOT. GLOBULIN, SERUM (HISTORICAL): 2.4 G/DL (ref 1.5–4.5)
TOTAL PROTEIN (HISTORICAL): 6.7 G/DL (ref 6–8.5)
TRIGL SERPL-MCNC: 117 MG/DL (ref 0–149)

## 2017-09-20 ENCOUNTER — GENERIC CONVERSION - ENCOUNTER (OUTPATIENT)
Dept: OTHER | Facility: OTHER | Age: 62
End: 2017-09-20

## 2017-10-05 ENCOUNTER — GENERIC CONVERSION - ENCOUNTER (OUTPATIENT)
Dept: OTHER | Facility: OTHER | Age: 62
End: 2017-10-05

## 2017-11-22 ENCOUNTER — ALLSCRIPTS OFFICE VISIT (OUTPATIENT)
Dept: OTHER | Facility: OTHER | Age: 62
End: 2017-11-22

## 2017-11-23 NOTE — PROGRESS NOTES
Assessment  Assessed    1  Paroxysmal atrial fibrillation (427 31) (I48 0)   2  Benign essential hypertension (401 1) (I10)    Plan  Benign essential hypertension, Chronic venous insufficiency    · Furosemide 20 MG Oral Tablet; take 1 tablet daily prn edema   Rx By: Snow Barron; Dispense: 30 Days ; #:30 Tablet; Refill: 4;Benign essential hypertension, Chronic venous insufficiency; DIVYA = N; Verified Transmission to [facility]; Last Updated By: System, [employer]; 11/22/2017 11:32:26 AM  Benign essential hypertension, Paroxysmal atrial fibrillation    · Follow-up visit in 6 months Evaluation and Treatment  Follow-up  Status: Complete Done: 91HUC2411   Ordered; For: Benign essential hypertension, Paroxysmal atrial fibrillation; Ordered By: Snow Barron Performed:  Order Comments: put on w/l Due: 67CAW5354; Last Updated By: Ruben Garcia; 11/22/2017 11:33:06 AM  Paroxysmal atrial fibrillation    · EKG/ECG- POC; Status:Complete;   Done: 74SVF6050 11:04AM   Perform: In Office; Last Updated By:Mikki Fields; 11/22/2017 11:04:20 AM;Ordered;atrial fibrillation; Ordered By:Romina Velásquez; Unlinked    · Furosemide 80 MG Oral Tablet   Dispense: 90 Days ; #:90 Tablet; Refill: 3; DIVYA = N; Record; Last Updated By: Snow Barron; 11/22/2017 11:30:37 AM    Discussion/Summary  Cardiology Discussion Summary Free Text Note Form [facility]:   1  Persistent Atrial Fibrillation: s/p CHERYL guided cardioversion  He is stable on Flecainide and eliquis  he is cutting his lasix into quarters, so will change to 20mg daily as needed  he also uses compression stockings  HTN: BP stable on current medical therapy  Counseling Documentation With Imm: The patient was counseled regarding diagnostic results,-- instructions for management,-- risk factor reductions,-- impressions  total time of encounter was 25 minutes-- and-- 15 minutes was spent counseling        Chief Complaint  Chief Complaint Free Text Note Form: 6m rtn ov      History of Present Illness  Cardiology [facility] Free Text Note Form [facility]: followup for afib  some intermittent LE edema  Does not tolerate lasix well  no orthonpea, no PND  He has stable exertional dyspnea  Atrial Fibrillation (Follow-Up): The patient presents with persistent atrial fibrillation  He is status post cardioversion  He states his atrial fibrillation has been stable since the last visit  Symptoms: denies palpitations,-- denies chest pain,-- denies exercise intolerance,-- denies dyspnea on exertion-- and-- denies dizziness  Review of Systems  Cardiology Male ROS:    Cardiac: rhythm problems, but-- no chest pain  Skin: No complaints of nonhealing sores or skin rash  Genitourinary: No complaints of recurrent urinary tract infections, frequent urination at night, difficult urination, blood in urine, kidney stones, loss of bladder control, no kidney or prostate problems, no erectile dysfunction  Psychological: No complaints of feeling depressed, anxiety, panic attacks, or difficulty concentrating  General: No complaints of trouble sleeping, lack of energy, fatigue, appetite changes, weight changes, fever, frequent infections, or night sweats  Respiratory: No complaints of shortness of breath, cough with sputum, or wheezing  HEENT: No complaints of serious problems, hearing problems, nose problems, throat problems, or snoring  Gastrointestinal: No complaints of liver problems, nausea, vomiting, heartburn, constipation, bloody stools, diarrhea, problems swallowing, adbominal pain, or rectal bleeding  Hematologic: No complaints of bleeding disorders, anemia, blood clots, or excessive brusing  Neurological: No complaints of numbness, tingling, dizziness, weakness, seizures, headaches, syncope or fainting, AM fatigue, daytime sleepiness, no witnessed apnea episodes  Musculoskeletal: No complaints of arthritis, back pain, or painfull swelling  ROS Reviewed:   ROS reviewed        Active Problems  Problems 1  Back pain, lumbosacral (724 2,724 6) (M54 5)   2  Benign essential hypertension (401 1) (I10)   3  BPH (benign prostatic hyperplasia) (600 00) (N40 0)   4  Cerebrovascular accident (CVA) with involvement of left side of body (434 91) (I63 9)   5  Chronic venous insufficiency (459 81) (I87 2)   6  Esophagitis, reflux (530 11) (K21 0)   7  Hyperlipidemia (272 4) (E78 5)   8  Incontinence of urine (788 30) (R32)   9  Lymphedema (457 1) (I89 0)   10  Morbid obesity due to excess calories (278 01) (E66 01)   11  Need for influenza vaccination (V04 81) (Z23)   12  No advance directives (V49 89) (Z78 9)   13  Paroxysmal atrial fibrillation (427 31) (I48 0)   14  Restrictive lung disease (518 89) (J98 4)   15  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)   16  Sleep apnea (780 57) (G47 30)   17  Type 2 diabetes mellitus, uncontrolled (250 02) (E11 65)    Past Medical History  Problems    1  History of Allergic symptoms (995 3) (T78 40XA)   2  History of Dextrocardia (746 87)   3  History of High cholesterol (272 0) (E78 00)   4  History of cardiac arrhythmia (V12 59) (Z86 79)   5  History of diabetes mellitus (V12 29) (Z86 39)   6  History of gastroesophageal reflux (GERD) (V12 79) (Z87 19)   7  History of hypertension (V12 59) (Z86 79)   8  History of sinusitis (V12 69) (Z87 09)   9  History of sleep apnea (V13 89) (Z86 69)   10  History of stroke (V12 54) (Z86 73)   11  History of Need for immunization against influenza (V04 81) (Z23)  Active Problems And Past Medical History Reviewed: The active problems and past medical history were reviewed and updated today  Surgical History  Problems    1  History of Cholecystectomy   2  History of Gallbladder Surgery   3  History of Hernia Repair  Surgical History Reviewed: The surgical history was reviewed and updated today  Family History  Father    1  Family history of diabetes mellitus (V18 0) (Z83 3)   2   Family history of malignant neoplasm (V16 9) (Z80 9)  Paternal Grandmother    3  Family history of diabetes mellitus (V18 0) (Z83 3)   4  Family history of emphysema (V17 6) (Z82 5)  Maternal Grandfather    5  Family history of malignant neoplasm (V16 9) (Z80 9)  Paternal Grandfather    6  Family history of heart attack (V17 3) (Z82 49)  Family History    7  Family history of hypertension (V17 49) (Z82 49)   8  Family history of neuropathy (V17 2) (Z82 0)   9  Denied: Family history of substance abuse   10  Denied: Family history of Mental problem  Family History Reviewed: The family history was reviewed and updated today  Social History  Problems    · Completed 12th grade   · Daily caffeine consumption, 2-3 servings a day   · Dental care, regularly   ·    · Exercise: Cycling   · Lives with parents   · Living Situation: Supportive and safe   · Never A Smoker   · No advance directives (V49 89) (Z78 9)   · No advance directives (V49 89) (Z78 9)   · No drug use   · Social alcohol use (Z78 9)   · Unemployed (V62 0) (Z56 0)  Social History Reviewed: The social history was reviewed and updated today  Current Meds   1  AmLODIPine Besylate 10 MG Oral Tablet; TAKE ONE TABLET BY MOUTH ONCE DAILY; Therapy: 90SZS6368 to (Evaluate:74Dky5023)  Requested for: 05Jfk1372; Last Rx:88Oub1926 Ordered   2  Atorvastatin Calcium 80 MG Oral Tablet; TAKE ONE TABLET BY MOUTH AT BEDTIME; Therapy: 40NOZ2303 to (Evaluate:69Xoo6072)  Requested for: 02Zgc3512; Last Rx:07Bap3538 Ordered   3  Centrum Silver Ultra Mens TABS; TAKE 1 TABLET DAILY; Therapy: (Recorded:89Zjb0643) to Recorded   4  Eliquis 5 MG Oral Tablet; take one tablet by mouth twice daily; Therapy: 28TXP6219 to (Rajan Champion)  Requested for: 17RGZ4586; Last Rx:11Oct2017 Ordered   5  Flecainide Acetate 100 MG Oral Tablet; TAKE ONE TABLET BY MOUTH EVERY 12 HOURS DAILY; Therapy: 41NEN1394 to (Evaluate:11Jan2018)  Requested for: 22LLW8310; Last Rx:90Ysl3923 Ordered   6   Fluticasone Propionate 50 MCG/ACT Nasal Suspension; USE ONE SPRAY(S) IN EACH NOSTRIL TWICE DAILY; Therapy: 89IRG9757 to (Evaluate:09Dec2017)  Requested for: 89WMI6015; Last Rx:09Nov2017 Ordered   7  Furosemide 80 MG Oral Tablet; Take 1/4 tab daily; Therapy: 46UHO1126 to (Evaluate:17Nov2018) Recorded   8  Gabapentin 300 MG Oral Capsule; TAKE ONE CAPSULE BY MOUTH TWICE DAILY; Therapy: 26HGS1645 to (Evaluate:19Mar2018)  Requested for: 67WCH3575; Last Rx:20Sep2017 Ordered   9  Lantus SoloStar 100 UNIT/ML Subcutaneous Solution Pen-injector; INJECT 50 UNITS TWICE DAILY; Therapy: 71Zgq8428 to (Evaluate:03Rhk8217)  Requested for: 15Dvp7667; Last Rx:08Aug2017 Ordered   10  MetFORMIN HCl - 500 MG Oral Tablet; take 1 tablet twice daily with food; Therapy: 26BGE1304 to (Evaluate:21Sep2018)  Requested for: 52EKZ9743; Last  Rx:80Zug1475 Ordered   11  Metoprolol Succinate ER 25 MG Oral Tablet Extended Release 24 Hour; TAKE ONE  TABLET BY MOUTH ONCE DAILY; Therapy: 91VNG8852 to (Ronnell Sanchez)  Requested for: 24ZWK8036; Last  Rx:09Nov2017 Ordered   12  Montelukast Sodium 10 MG Oral Tablet; TAKE ONE TABLET BY MOUTH ONCE DAILY; Therapy: 98Anf6310 to (Evaluate:09Dec2017)  Requested for: 46UUI7346; Last  Rx:09Nov2017 Ordered   13  Myrbetriq 50 MG Oral Tablet Extended Release 24 Hour; 1 qd  by dr Cassandra Melgar; Therapy: 74AKE6186 to Recorded   14  NovoLOG 100 UNIT/ML Subcutaneous Solution; INJECT 55 to 60  UNITS  SUBQUTANEOUSLY THREE TIMES DAILY; Therapy: 42Zhv3361 to (Evaluate:19Mar2018)  Requested for: 21NQY0373 Recorded   15  Quinapril HCl - 40 MG Oral Tablet; TAKE ONE TABLET BY MOUTH ONCE DAILY AS  DIRECTED; Therapy: 11CCQ8584 to (Evaluate:78Dkr4779)  Requested for: 78Vjb6871; Last  Rx:65Ikx4049 Ordered   16  RaNITidine HCl - 150 MG Oral Tablet; TAKE ONE TABLET BY MOUTH EVERY 12 HOURS; Therapy: 34PAL5746 to (Evaluate:30Jan2018)  Requested for: 56Uub5156; Last  Rx:64Skm3203 Ordered   17   Spiriva Respimat 2 5 MCG/ACT Inhalation Aerosol Solution; INHALE 2 PUFFS ONCE  DAILY; Therapy: 72PZZ1689 to (Last Rx:24Mar2017)  Requested for: 24Mar2017 Ordered   18  Tamsulosin HCl - 0 4 MG Oral Capsule; 1 qd; Therapy: 18YQA4745 to Recorded   19  Vitamin D3 2000 UNIT Oral Tablet; 1 Tab daily; Therapy: 95CMO7475 to Recorded   20  Vitamin E 1000 UNIT Oral Capsule; take 1 tab daily; Therapy: 62GWW9181 to Recorded  Medication List Reviewed: The medication list was reviewed and updated today  Allergies  Medication    1  Codeine Derivatives    Vitals  Vital Signs    Recorded: 22Nov2017 11:07AM   Heart Rate 66   Systolic 603   Diastolic 62   Weight 853 lb 1 oz   BMI Calculated 53 02   BSA Calculated 2 65       Physical Exam   Constitutional  General appearance: No acute distress, well appearing and well nourished  Eyes  Conjunctiva and Sclera examination: Conjunctiva pink, sclera anicteric  Ears, Nose, Mouth, and Throat - Oropharynx: Clear, nares are clear, mucous membranes are moist   Neck  Neck and thyroid: Normal, supple, trachea midline, no thyromegaly  Pulmonary  Respiratory effort: No increased work of breathing or signs of respiratory distress  Auscultation of lungs: Clear to auscultation, no rales, no rhonchi, no wheezing, good air movement  Cardiovascular  Auscultation of heart: Normal rate and rhythm, normal S1 and S2, no murmurs  Carotid pulses: Normal, 2+ bilaterally  Peripheral vascular exam: Normal pulses throughout, no tenderness, erythema or swelling  Pedal pulses: Normal, 2+ bilaterally  Examination of extremities for edema and/or varicosities: Normal    Abdomen  Abdomen: Non-tender and no distention  Liver and spleen: No hepatomegaly or splenomegaly  Musculoskeletal Gait and station: Normal gait  -- Digits and nails: Normal without clubbing or cyanosis  -- Inspection/palpation of joints, bones, and muscles: Normal, ROM normal    Skin - Skin and subcutaneous tissue: Normal without rashes or lesions   Skin is warm and well perfused, normal екатерина  Neurologic - Cranial nerves: II - XII intact  -- Speech: Normal    Psychiatric - Orientation to person, place, and time: Normal -- Mood and affect: Normal       Health Management  Type 2 diabetes mellitus, uncontrolled   *VB - Eye Exam; every 1 year; Last 52BHO4794; Next Due: 94WKF4107;  Active    Future Appointments    Date/Time Provider Specialty Site   12/05/2017 11:00 AM Promise Kingsley DO Pulmonary Medicine [address] [facility]   Electronically signed by : KIERSTEN Leyva ; Nov 22 2017  2:20PM EST                       (Author)

## 2017-12-05 ENCOUNTER — GENERIC CONVERSION - ENCOUNTER (OUTPATIENT)
Dept: OTHER | Facility: OTHER | Age: 62
End: 2017-12-05

## 2018-01-03 ENCOUNTER — ALLSCRIPTS OFFICE VISIT (OUTPATIENT)
Dept: OTHER | Facility: OTHER | Age: 63
End: 2018-01-03

## 2018-01-04 NOTE — PROGRESS NOTES
Assessment   1  Type 2 diabetes mellitus, uncontrolled (250 02) (E11 65)   2  Benign essential hypertension (401 1) (I10)   3  Paroxysmal atrial fibrillation (427 31) (I48 0)   4  Encounter for preventive health examination (V70 0) (Z00 00)   5  Cerebrovascular accident (CVA) with involvement of left side of body (434 91) (I63 9)   6  Morbid obesity due to excess calories (278 01) (E66 01)    Plan   Health Maintenance    · COLONOSCOPY; Status:Active; Requested [id number];    · 2 - Wili Barr MD, Kristian Win  (Gastroenterology) Co-Management  *  Status: Hold For - Scheduling     Requested for: 66JEG3790  Care Summary provided  : Yes  Morbid obesity due to excess calories    · We recommend that you bring your body mass index down to 26 ; Status:Complete;   Done:    26TXW5090  Type 2 diabetes mellitus, uncontrolled    · *1 -  ENDOCRINOLOGY Co-Management  *  Status: Active  Requested for: 15DNQ2002  Care Summary provided  : Yes    Discussion/Summary   Discussion Summary:    Diabetes, refer to endocrine, contin current regimen for now stable fib stable sees cards advised weight management  Counseling Documentation With Imm: The patient was counseled regarding instructions for management  Medication SE Review and Pt Understands Tx: Possible side effects of new medications were reviewed with the patient/guardian today  The treatment plan was reviewed with the patient/guardian  The patient/guardian understands and agrees with the treatment plan      Chief Complaint   Chief Complaint Free Text Note Form: Pt is here today for a 4 month follow up visit, Pt had his HgA1c done  DD      History of Present Illness   HPI: follow up of chronic, DM2, HTN, afib, obesity      Review of Systems   Complete-Male:      Constitutional: No fever or chills, feels well, no tiredness, no recent weight gain or weight loss  Eyes: No complaints of eye pain, no red eyes, no discharge from eyes, no itchy eyes        ENT: no complaints of earache, no hearing loss, no nosebleeds, no nasal discharge, no sore throat, no hoarseness  Cardiovascular: No complaints of slow heart rate, no fast heart rate, no chest pain, no palpitations, no leg claudication, no lower extremity  Respiratory: No complaints of shortness of breath, no wheezing, no cough, no SOB on exertion, no orthopnea or PND  Gastrointestinal: No complaints of abdominal pain, no constipation, no nausea or vomiting, no diarrhea or bloody stools  Genitourinary: No complaints of dysuria, no incontinence, no hesitancy, no nocturia, no genital lesion, no testicular pain  Musculoskeletal: No complaints of arthralgia, no myalgias, no joint swelling or stiffness, no limb pain or swelling  Integumentary: No complaints of skin rash or skin lesions, no itching, no skin wound, no dry skin  Neurological: No compliants of headache, no confusion, no convulsions, no numbness or tingling, no dizziness or fainting, no limb weakness, no difficulty walking  Psychiatric: Is not suicidal, no sleep disturbances, no anxiety or depression, no change in personality, no emotional problems  Endocrine: No complaints of proptosis, no hot flashes, no muscle weakness, no erectile dysfunction, no deepening of the voice, no feelings of weakness  Hematologic/Lymphatic: No complaints of swollen glands, no swollen glands in the neck, does not bleed easily, no easy bruising  Active Problems   1  Benign essential hypertension (401 1) (I10)   2  BPH (benign prostatic hyperplasia) (600 00) (N40 0)   3  Cerebrovascular accident (CVA) with involvement of left side of body (434 91) (I63 9)   4  Chronic venous insufficiency (459 81) (I87 2)   5  Esophagitis, reflux (530 11) (K21 0)   6  Hyperlipidemia (272 4) (E78 5)   7  Morbid obesity due to excess calories (278 01) (E66 01)   8  Paroxysmal atrial fibrillation (427 31) (I48 0)   9  Restrictive lung disease (518 89) (J98 4)   10  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)   11  Sleep apnea (780 57) (G47 30)   12  Type 2 diabetes mellitus, uncontrolled (250 02) (E11 65)    Past Medical History   1  History of Allergic symptoms (995 3) (T78 40XA)   2  History of Dextrocardia (746 87)   3  History of High cholesterol (272 0) (E78 00)   4  History of cardiac arrhythmia (V12 59) (Z86 79)   5  History of diabetes mellitus (V12 29) (Z86 39)   6  History of gastroesophageal reflux (GERD) (V12 79) (Z87 19)   7  History of hypertension (V12 59) (Z86 79)   8  History of sinusitis (V12 69) (Z87 09)   9  History of sleep apnea (V13 89) (Z86 69)   10  History of stroke (V12 54) (Z86 73)   11  History of Need for immunization against influenza (V04 81) (Z23)    Surgical History   1  History of Cholecystectomy   2  History of Gallbladder Surgery   3  History of Hernia Repair    Family History   Father    1  Family history of diabetes mellitus (V18 0) (Z83 3)   2  Family history of malignant neoplasm (V16 9) (Z80 9)  Paternal Grandmother    3  Family history of diabetes mellitus (V18 0) (Z83 3)   4  Family history of emphysema (V17 6) (Z82 5)  Maternal Grandfather    5  Family history of malignant neoplasm (V16 9) (Z80 9)  Paternal Grandfather    6  Family history of heart attack (V17 3) (Z82 49)  Family History    7  Family history of hypertension (V17 49) (Z82 49)   8  Family history of neuropathy (V17 2) (Z82 0)   9  Denied: Family history of substance abuse   10  Denied: Family history of Mental problem    Social History    · Completed 12th grade   · Daily caffeine consumption, 2-3 servings a day   · Dental care, regularly   ·    · Exercise: Cycling   · Lives with parents   · Living Situation: Supportive and safe   · Never A Smoker   · No advance directives (V49 89) (Z78 9)   · No drug use   · Social alcohol use (Z78 9)   · Unemployed (V62 0) (Z56 0)    Current Meds    1   AmLODIPine Besylate 10 MG Oral Tablet; TAKE ONE TABLET BY MOUTH ONCE DAILY; Therapy: 04IIX7297 to (Evaluate:89Ecy8255)  Requested for: 22Rut1010; Last Rx:20Aoo9224     Ordered   2  Atorvastatin Calcium 80 MG Oral Tablet; TAKE ONE TABLET BY MOUTH AT BEDTIME; Therapy: 62OPN7437 to (Evaluate:12Khf2950)  Requested for: 11Xtf1584; Last Rx:87Dwx3723     Ordered   3  Centrum Silver Ultra Mens TABS; TAKE 1 TABLET DAILY; Therapy: (Recorded:36Jou5844) to Recorded   4  Eliquis 5 MG Oral Tablet; take one tablet by mouth twice daily; Therapy: 14GQC4969 to ([facility] [place])  Requested for: 87MCV0177; Last Rx:11Oct2017     Ordered   5  Flecainide Acetate 100 MG Oral Tablet; TAKE ONE TABLET BY MOUTH EVERY 12 HOURS DAILY; Therapy: 88FHX6248 to (Evaluate:11Jan2018)  Requested for: 20BMX6651; Last Rx:23Nny1441     Ordered   6  Fluticasone Propionate 50 MCG/ACT Nasal Suspension; USE ONE SPRAY(S) IN EACH NOSTRIL     TWICE DAILY; Therapy: 99PQP2342 to (Evaluate:51Pcs7714)  Requested for: 25VNH8605; Last Rx:09Nov2017     Ordered   7  Furosemide 20 MG Oral Tablet; take 1 tablet daily prn edema; Therapy: 89GRO3502 to (Evaluate:21Apr2018)  Requested for: 40NWM2519; Last Rx:22Nov2017     Ordered   8  Gabapentin 300 MG Oral Capsule; TAKE ONE CAPSULE BY MOUTH TWICE DAILY; Therapy: 34AOO7931 to (Evaluate:19Mar2018)  Requested for: 74TAK3646; Last Rx:91Kco3639     Ordered   9  Lantus SoloStar 100 UNIT/ML Subcutaneous Solution Pen-injector; INJECT 50 UNITS TWICE DAILY; Therapy: 72Wss2717 to (Evaluate:14Apr2018)  Requested for: 64ONM9533; Last Rx:66Bpd0476     Ordered   10  MetFORMIN HCl - 500 MG Oral Tablet; take 1 tablet twice daily with food; Therapy: 40RCM6610 to (Evaluate:56Oin4786)  Requested for: 82MSU4513; Last Rx:67Cqh3134      Ordered   11  Metoprolol Succinate ER 25 MG Oral Tablet Extended Release 24 Hour; TAKE ONE TABLET BY      MOUTH ONCE DAILY; Therapy: 61KWZ4790 to ([facility] [place])  Requested for: 37XYD1511; Last Rx:09Nov2017      Ordered   12  Montelukast Sodium 10 MG Oral Tablet; TAKE ONE TABLET BY MOUTH ONCE DAILY; Therapy: 95Ejg3237 to (Evaluate:05Jan2018)  Requested for: 31XHT9951; Last Rx:04Dwe2893      Ordered   13  Myrbetriq 50 MG Oral Tablet Extended Release 24 Hour; 1 qd  by dr Terri Dorado; Therapy: 84HID7541 to Recorded   14  NovoLOG 100 UNIT/ML Subcutaneous Solution; INJECT 55 to 60  UNITS SUBQUTANEOUSLY THREE      TIMES DAILY; Therapy: 14Sdp4834 to (Evaluate:19Mar2018)  Requested for: 93TCE5161 Recorded   15  Quinapril HCl - 40 MG Oral Tablet; TAKE ONE TABLET BY MOUTH ONCE DAILY AS DIRECTED; Therapy: 94EFM6199 to (Evaluate:24Aug2018)  Requested for: 80Iae7382; Last Rx:64Ozh0539      Ordered   16  RaNITidine HCl - 150 MG Oral Tablet; TAKE ONE TABLET BY MOUTH EVERY 12 HOURS; Therapy: 77NMV1602 to (Evaluate:30Jan2018)  Requested for: 06Waa2509; Last Rx:30Qow3547      Ordered   17  Spiriva Respimat 2 5 MCG/ACT Inhalation Aerosol Solution; INHALE 2 PUFFS ONCE DAILY; Therapy: 90WRB4999 to (Last Rx:24Mar2017)  Requested for: 24Mar2017 Ordered   18  Tamsulosin HCl - 0 4 MG Oral Capsule; 1 qd; Therapy: 70UEW5100 to Recorded   19  Vitamin D3 2000 UNIT Oral Tablet; 1 Tab daily; Therapy: 49LUE1952 to Recorded   20  Vitamin E 1000 UNIT Oral Capsule; take 1 tab daily; Therapy: 92DKW2967 to Recorded  Medication List Reviewed: The medication list was reviewed and updated today  Allergies   1  Codeine Derivatives    Vitals   Vital Signs    Recorded: 63WTW2709 12:13PM Recorded: 41JXD2307 11:55AM   Heart Rate  73, R DP   Pulse Quality  Normal, R DP   Systolic  271, LUE, Sitting   Diastolic  62, LUE, Sitting   Height  5 ft 9 in   Weight 353 lb  253 lb    BMI Calculated 52 13 37 36   BSA Calculated 2 63 2 28   O2 Saturation  95, RA     Physical Exam        Constitutional      General appearance: No acute distress, well appearing and well nourished         Eyes      Conjunctiva and lids: No swelling, erythema, or discharge  Pupils and irises: Equal, round and reactive to light  Ears, Nose, Mouth, and Throat      External inspection of ears and nose: Normal        Otoscopic examination: Tympanic membrance translucent with normal light reflex  Canals patent without erythema  Nasal mucosa, septum, and turbinates: Normal without edema or erythema  Oropharynx: Normal with no erythema, edema, exudate or lesions  Pulmonary      Respiratory effort: No increased work of breathing or signs of respiratory distress  Auscultation of lungs: Clear to auscultation, equal breath sounds bilaterally, no wheezes, no rales, no rhonci  Cardiovascular      Palpation of heart: Normal PMI, no thrills  Auscultation of heart: Normal rate and rhythm, normal S1 and S2, without murmurs  Examination of extremities for edema and/or varicosities: Normal        Carotid pulses: Normal        Abdomen      Abdomen: Non-tender, no masses  Liver and spleen: No hepatomegaly or splenomegaly  Lymphatic      Palpation of lymph nodes in neck: No lymphadenopathy  Musculoskeletal      Gait and station: Normal        Digits and nails: Normal without clubbing or cyanosis  Inspection/palpation of joints, bones, and muscles: Normal        Skin      Skin and subcutaneous tissue: Normal without rashes or lesions  Neurologic      Cranial nerves: Cranial nerves 2-12 intact  Reflexes: 2+ and symmetric  Sensation: No sensory loss  Psychiatric      Orientation to person, place and time: Normal        Mood and affect: Normal           Health Management   Type 2 diabetes mellitus, uncontrolled   *VB - Eye Exam; every 1 year; Last 97JVO6167; Next Due: 33AAF2517;  Active    Signatures    Electronically signed by : KIERSTEN Fagan ; Brian  3 2018 12:14PM EST                       (Author)

## 2018-01-09 NOTE — RESULT NOTES
Verified Results  (1) COMPREHENSIVE METABOLIC PANEL [id number] 49:77MQ Omaira Chang     Test Name Result Flag Reference   Glucose, Serum 97 mg/dL  65-99   BUN 16 mg/dL  8-27   Creatinine, Serum 0 83 mg/dL  0 76-1 27   BUN/Creatinine Ratio 19  10-24   Sodium, Serum 144 mmol/L  134-144   Potassium, Serum 4 1 mmol/L  3 5-5 2   Chloride, Serum 101 mmol/L     Carbon Dioxide, Total 26 mmol/L  18-29   Calcium, Serum 8 9 mg/dL  8 6-10 2   Protein, Total, Serum 6 7 g/dL  6 0-8 5   Albumin, Serum 4 3 g/dL  3 6-4 8   Globulin, Total 2 4 g/dL  1 5-4 5   A/G Ratio 1 8  1 2-2 2   Bilirubin, Total 0 6 mg/dL  0 0-1 2   Alkaline Phosphatase, S 62 IU/L     AST (SGOT) 27 IU/L  0-40   ALT (SGPT) 25 IU/L  0-44   eGFR If NonAfricn Am 94 mL/min/1 73  >59   eGFR If Africn Am 108 mL/min/1 73  >59

## 2018-01-12 VITALS
BODY MASS INDEX: 51.52 KG/M2 | SYSTOLIC BLOOD PRESSURE: 140 MMHG | HEART RATE: 66 BPM | DIASTOLIC BLOOD PRESSURE: 62 MMHG | WEIGHT: 315 LBS

## 2018-01-12 VITALS
DIASTOLIC BLOOD PRESSURE: 82 MMHG | HEART RATE: 70 BPM | OXYGEN SATURATION: 96 % | HEIGHT: 69 IN | RESPIRATION RATE: 16 BRPM | TEMPERATURE: 97.3 F | WEIGHT: 315 LBS | SYSTOLIC BLOOD PRESSURE: 140 MMHG | BODY MASS INDEX: 46.65 KG/M2

## 2018-01-12 NOTE — RESULT NOTES
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  There was a metal leidy that went across the seat - putting pressure on the pt's buttocks  pt described his pain as a bar groing across his buttocks  Acvised pt, will d/w Dr Anabelle Cadet and cb  Arnel Pruitt - 30 Sep 2016 11:40 AM     TASK REPLIED TO: Previously Assigned To Arnel Pruitt    Would consider right L3,4,5,S1 MBB   Alexus Kurtz - 30 Sep 2016 3:46 PM     TASK EDITED  LMOM to cb on home / cell  Alexus Kurtz - 11 Oct 2016 2:04 PM     TASK EDITED  [facility] to cb - 2nd attempt   Alexus Kurtz - 11 Oct 2016 3:51 PM     TASK EDITED    vmm 10/11/2016 @ [phone] - rc   Alexus Kurtz - 11 Oct 2016 4:08 PM     TASK EDITED   s/w pt, following up to earlier conversation  Dr Anabelle Cadet is recommending a diagnostic procedure - MBB  Still having pain? Interested in procedure? Ov to discuss? pt states he continues w/ pain, needs to know how much the procedure would cost  Advised pt, will need to d/w ins co  Pt requested info rt/ procedure in writing  States he has difficulty writing since his stroke  Advised pt, will d/w [facility] sched team - who will c/b when info is available for  at the        Tasking to spa surg sched team    Maribel Nash - 13 Oct 2016 4:47 PM     TASK REASSIGNED: Previously Assigned To [facility] sched,Maribel Garcia - 18 Oct 2016 10:55 AM     TASK EDITED  called pt and told him to  procedure code info and when he contacts his insurance tell them it is an outpt hospital procedure with SL[facility]  pt will p/u info and reach out to his insurance [phone]

## 2018-01-12 NOTE — PROGRESS NOTES
Chief Complaint  Pt  in for EKG after starting Flecainide as per Dr Bladimir Merino  Pt  doing well  Advise pt  to continue taking medication and if any questions or concerns please call the office  Active Problems    1  Abnormal PFTs (pulmonary function tests) (794 2) (R94 2)   2  Back pain, lumbosacral (724 2,724 6) (M54 5)   3  Benign essential hypertension (401 1) (I10)   4  BPH (benign prostatic hyperplasia) (600 00) (N40 0)   5  Cerebrovascular accident (CVA) with involvement of left side of body (434 91) (I63 9)   6  Chronic venous insufficiency (459 81) (I87 2)   7  Controlled diabetes mellitus (250 00) (E11 9)   8  Esophagitis, reflux (530 11) (K21 0)   9  Hyperlipidemia (272 4) (E78 5)   10  Morbid obesity due to excess calories (278 01) (E66 01)   11  No advance directives (V49 89) (Z78 9)   12  Paroxysmal atrial fibrillation (427 31) (I48 0)   13  Restrictive lung disease (518 89) (J98 4)   14  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)   15  Sleep apnea (780 57) (G47 30)   16  Type 2 diabetes mellitus, uncontrolled (250 02) (E11 65)    Current Meds   1  AmLODIPine Besylate 10 MG Oral Tablet; Therapy: (Recorded:00Pbr3670) to Recorded   2  Atorvastatin Calcium 80 MG Oral Tablet; TAKE 1 TABLET AT BEDTIME; Therapy: 67AYU6288 to (Pastor Guerra)  Requested for: 19Dad8747; Last   Rx:23Noo3780 Ordered   3  Centrum Silver Ultra Mens TABS; TAKE 1 TABLET DAILY; Therapy: (Recorded:05Uvx1599) to Recorded   4  Eliquis 5 MG Oral Tablet; Take 1 tablet twice daily; Therapy: 38EYQ1534 to (Evaluate:73Law3467)  Requested for: 88MCZ3884; Last   Rx:83Fef8517 Ordered   5  Flecainide Acetate 100 MG Oral Tablet; TAKE 1 TABLET EVERY 12 HOURS DAILY; Therapy: 00YUL9723 to (Evaluate:82Xrl4888)  Requested for: 34HVJ6956; Last   Rx:12Noa3854 Ordered   6  Fluticasone Propionate SUSP; Therapy: (Fred Rubin) to Recorded   7  Gabapentin 300 MG Oral Capsule; TAKE ONE CAPSULE BY MOUTH TWICE DAILY;    Therapy: 76KOQ8847 to (Evaluate:19Sqa0560)  Requested for: 17XWN6105; Last   Rx:09Jan2017 Ordered   8  Lantus SoloStar 100 UNIT/ML Subcutaneous Solution Pen-injector; INJECT 50 UNITS   TWICE DAILY AS DIRECTED; Therapy: 82Ozy4463 to (Last Rx:84Zvd7398)  Requested for: 85Wal0454 Ordered   9  Metoprolol Succinate ER 25 MG Oral Tablet Extended Release 24 Hour; Take 1 tablet   daily; Therapy: 79PVG6671 to (Last Rx:92Dcm3776)  Requested for: 72ZSU3578 Ordered   10  Montelukast Sodium 10 MG Oral Tablet; take 1 tablet by mouth every day; Therapy: 97Dgy4600 to (Evaluate:21Apr2017)  Requested for: 92Dbd9107; Last    Rx:54Rnc8264 Ordered   11  Myrbetriq 50 MG Oral Tablet Extended Release 24 Hour; 1 qd  by dr Verona Knutson; Therapy: 67NJO8929 to Recorded   12  NovoLOG 100 UNIT/ML Subcutaneous Solution; inject 50 units 3 times daily AC; Therapy: 47Wyh2107 to  Requested for: 07Apr2017 Recorded   13  Quinapril HCl TABS; Therapy: (Mulugeta Garcia) to Recorded   14  RaNITidine HCl - 150 MG Oral Tablet; TAKE ONE TABLET BY MOUTH EVERY 12    HOURS; Therapy: 34GKQ4077 to (Sharmila Gomes)  Requested for: 92AGN2194; Last    Rx:09Jan2017 Ordered   15  Spiriva Respimat 2 5 MCG/ACT Inhalation Aerosol Solution; INHALE 2 PUFFS ONCE    DAILY; Therapy: 36FSY5781 to (Last Rx:24Mar2017)  Requested for: 24Mar2017 Ordered   16  Tamsulosin HCl - 0 4 MG Oral Capsule; 1 qd; Therapy: 44OKJ7884 to Recorded    Allergies    1  Codeine Derivatives    Plan  Paroxysmal atrial fibrillation    · EKG/ECG- POC; Status:Complete;   Done: 13YYW5744 11:29AM    Future Appointments    Date/Time Provider Specialty Site   06/16/2017 09:45 AM KIERSTEN Montanez   Internal Medicine [facility] INTERNAL MED     Signatures   Electronically signed by : Adryan Lanier, ; May 31 2017 11:30AM EST                       (Author)    Electronically signed by : KIERSTNE Das ; May 31 2017 12:58PM EST                       (Author)

## 2018-01-13 VITALS
HEIGHT: 69 IN | HEART RATE: 64 BPM | SYSTOLIC BLOOD PRESSURE: 152 MMHG | DIASTOLIC BLOOD PRESSURE: 60 MMHG | RESPIRATION RATE: 16 BRPM | WEIGHT: 315 LBS | BODY MASS INDEX: 46.65 KG/M2

## 2018-01-13 VITALS
SYSTOLIC BLOOD PRESSURE: 120 MMHG | WEIGHT: 315 LBS | BODY MASS INDEX: 51.94 KG/M2 | DIASTOLIC BLOOD PRESSURE: 60 MMHG | HEART RATE: 60 BPM

## 2018-01-13 VITALS
HEIGHT: 69 IN | HEART RATE: 72 BPM | DIASTOLIC BLOOD PRESSURE: 70 MMHG | BODY MASS INDEX: 46.65 KG/M2 | WEIGHT: 315 LBS | SYSTOLIC BLOOD PRESSURE: 128 MMHG

## 2018-01-13 NOTE — PROGRESS NOTES
History of Present Illness  Care Coordination Encounter Information:   Type of Encounter: Telephonic   Contact: Follow-Up    Spoke to Patient   Leonarda Olguin  Care Coordination [facility]: I reached out to Leonarda Olguin today and he was driving so our conversation was limited  He states that he is "OK" and verbalized changes to his medication regime and disclosed glucose level of 182 and 180  He continues to consume higher fat foods but furthered his reporting to add that he has suffered from diarrhea for life and the cheese helps "bind" him to reduce stooling  I will offer additional suggestions to help with his diet and diarrhea for our next contact  We did agree to reach out again and he has my contact information  JG      Active Problems    1  Back pain, lumbosacral (724 2,724 6) (M54 5)   2  Benign essential hypertension (401 1) (I10)   3  BPH (benign prostatic hyperplasia) (600 00) (N40 0)   4  Cerebrovascular accident (CVA) with involvement of left side of body (434 91) (I63 9)   5  Chronic venous insufficiency (459 81) (I87 2)   6  Esophagitis, reflux (530 11) (K21 0)   7  Hyperlipidemia (272 4) (E78 5)   8  Morbid obesity due to excess calories (278 01) (E66 01)   9  No advance directives (V49 89) (Z78 9)   10  Paroxysmal atrial fibrillation (427 31) (I48 0)   11  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)   12  Sleep apnea (780 57) (G47 30)   13  Type 2 diabetes mellitus, uncontrolled (250 02) (E11 65)    Past Medical History    1  History of Allergic symptoms (995 3) (T78 40XA)   2  History of Dextrocardia (746 87)   3  History of High cholesterol (272 0) (E78 00)   4  History of cardiac arrhythmia (V12 59) (Z86 79)   5  History of diabetes mellitus (V12 29) (Z86 39)   6  History of gastroesophageal reflux (GERD) (V12 79) (Z87 19)   7  History of hypertension (V12 59) (Z86 79)   8  History of sinusitis (V12 69) (Z87 09)   9  History of sleep apnea (V13 89) (Z86 69)   10   History of stroke (V12 54) (Z86 73)   11  History of Need for immunization against influenza (V04 81) (Z23)    Surgical History    1  History of Cholecystectomy   2  History of Gallbladder Surgery   3  History of Hernia Repair    Family History  Father    1  Family history of diabetes mellitus (V18 0) (Z83 3)   2  Family history of malignant neoplasm (V16 9) (Z80 9)  Paternal Grandmother    3  Family history of diabetes mellitus (V18 0) (Z83 3)   4  Family history of emphysema (V17 6) (Z82 5)  Maternal Grandfather    5  Family history of malignant neoplasm (V16 9) (Z80 9)  Paternal Grandfather    6  Family history of heart attack (V17 3) (Z82 49)  Family History    7  Family history of hypertension (V17 49) (Z82 49)   8  Family history of neuropathy (V17 2) (Z82 0)   9  Denied: Family history of substance abuse   10  Denied: Family history of Mental problem    Social History    · Completed 12th grade   · Daily caffeine consumption, 2-3 servings a day   · Dental care, regularly   ·    · Exercise: Cycling   · Lives with parents   · Living Situation: Supportive and safe   · Never A Smoker   · No advance directives (V49 89) (Z78 9)   · No advance directives (V49 89) (Z78 9)   · No drug use   · Social alcohol use (Z78 9)   · Unemployed (V62 0) (Z56 0)    Current Meds    1  Furosemide 80 MG Oral Tablet; Take 1/2  tablet daily; Therapy: 80GDO0259 to (Evaluate:24Gcp2012) Recorded   2  Metoprolol Tartrate 50 MG Oral Tablet; TAKE ONE TABLET BY MOUTH EVERY 12   HOURS; Therapy: 60FHI7588 to (02 37 15 52 25)  Requested for: 03FGI9971; Last   Rx:06Mar2017 Ordered    3  Myrbetriq 50 MG Oral Tablet Extended Release 24 Hour; 1 qd  by dr Rakesh Mcnulty; Therapy: 15RWD2039 to Recorded    4  RaNITidine HCl - 150 MG Oral Tablet; TAKE ONE TABLET BY MOUTH EVERY 12 HOURS; Therapy: 46EHX5872 to (Francesca Thompson)  Requested for: 23ZZY9300; Last   Rx:09Jan2017 Ordered    5   Atorvastatin Calcium 80 MG Oral Tablet (Lipitor); TAKE 1 TABLET AT BEDTIME; Therapy: 44VLB8922 to (Taty Hart)  Requested for: 47Kvb3166; Last   Rx:82Aau6477 Ordered    6  Eliquis 5 MG Oral Tablet; Take 1 tablet twice daily; Therapy: 95SWT9840 to (Evaluate:06Sep2017)  Requested for: 62EBA5995; Last   Rx:08Feb2017 Ordered    7  Amiodarone HCl - 200 MG Oral Tablet; Take 1 tablet daily; Therapy: 86JVD2689 to (Evaluate:06Sep2017)  Requested for: 11KMQ6070; Last   Rx:08Feb2017 Ordered    8  Betamethasone Dipropionate Aug 0 05 % External Cream; APPLY  AND RUB  IN A THIN   FILM TO AFFECTED AREAS TWICE DAILY  (AM AND PM); Therapy: 13UPZ6293 to (Last Rx:09Jun2016)  Requested for: 10UYY8541 Ordered    9  Gabapentin 300 MG Oral Capsule; TAKE ONE CAPSULE BY MOUTH TWICE DAILY; Therapy: 14YCH4054 to (Evaluate:15Ccx6507)  Requested for: 83FXA3422; Last   Rx:09Jan2017 Ordered    10  Montelukast Sodium 10 MG Oral Tablet (Singulair); take 1 tablet by mouth every day; Therapy: 80Dvb8919 to (Evaluate:21Apr2017)  Requested for: 39Tqb0372; Last    Rx:64Cld7935 Ordered    11  Lantus SoloStar 100 UNIT/ML Subcutaneous Solution Pen-injector; INJECT 50 UNITS    TWICE DAILY AS DIRECTED; Therapy: 30Lla9856 to (Last Rx:13Yfu1876)  Requested for: 83Nuf5913 Ordered   12  MetFORMIN HCl - 1000 MG Oral Tablet; take one tablet by mouth twice daily; Therapy: 22KZE5849 to (Tierney Randhawa)  Requested for: 66TYP2408; Last    Rx:09Jan2017 Ordered   13  NovoLOG 100 UNIT/ML Subcutaneous Solution; inject 45 units 3 times daily; Therapy: 29Rct0463 to (Last Rx:59Wsn7469)  Requested for: 68Fhg2735 Ordered    14  Centrum Silver Ultra Mens TABS; TAKE 1 TABLET DAILY; Therapy: (Recorded:73Wdt0085) to Recorded   15  Tamsulosin HCl - 0 4 MG Oral Capsule; 1 qd; Therapy: 37JZL0161 to Recorded    Allergies    1  Codeine Derivatives    Health Management   *VB - Eye Exam; every 1 year; Last 81AFH5273; Next Due: 52PXV6567; Near Due    End of Encounter Meds    1  Furosemide 80 MG Oral Tablet;  Take 1/2 tablet daily; Therapy: 47IZM5915 to (Evaluate:45Zmn4019) Recorded   2  Metoprolol Tartrate 50 MG Oral Tablet; TAKE ONE TABLET BY MOUTH EVERY 12   HOURS; Therapy: 84PAZ8558 to (Toni Cortes)  Requested for: 29ERC7572; Last   Rx:06Mar2017 Ordered    3  Myrbetriq 50 MG Oral Tablet Extended Release 24 Hour; 1 qd  by dr Roldan Levy; Therapy: 19LYW9500 to Recorded    4  RaNITidine HCl - 150 MG Oral Tablet; TAKE ONE TABLET BY MOUTH EVERY 12 HOURS; Therapy: 93SVF5668 to (Tierney Randhawa)  Requested for: 82NXN4901; Last   Rx:09Jan2017 Ordered    5  Atorvastatin Calcium 80 MG Oral Tablet (Lipitor); TAKE 1 TABLET AT BEDTIME; Therapy: 71XHI0558 to (Ayush Phillips)  Requested for: 94Nuf1601; Last   Rx:01Aug2016 Ordered    6  Eliquis 5 MG Oral Tablet; Take 1 tablet twice daily; Therapy: 41FBW5615 to (Evaluate:06Sep2017)  Requested for: 21PSH9670; Last   Rx:08Feb2017 Ordered    7  Amiodarone HCl - 200 MG Oral Tablet; Take 1 tablet daily; Therapy: 62MFV3627 to (Evaluate:06Sep2017)  Requested for: 58EBS6138; Last   Rx:08Feb2017 Ordered    8  Betamethasone Dipropionate Aug 0 05 % External Cream; APPLY  AND RUB  IN A THIN   FILM TO AFFECTED AREAS TWICE DAILY  (AM AND PM); Therapy: 33AUX4264 to (Last Rx:09Jun2016)  Requested for: 73WBC5051 Ordered    9  Gabapentin 300 MG Oral Capsule; TAKE ONE CAPSULE BY MOUTH TWICE DAILY; Therapy: 79RFQ2655 to (Evaluate:48Kch6921)  Requested for: 68HWL1515; Last   Rx:09Jan2017 Ordered    10  Montelukast Sodium 10 MG Oral Tablet (Singulair); take 1 tablet by mouth every day; Therapy: 29Tse2959 to (Evaluate:21Apr2017)  Requested for: 78Pah4091; Last    Rx:05Jba2333 Ordered    11  Lantus SoloStar 100 UNIT/ML Subcutaneous Solution Pen-injector; INJECT 50 UNITS    TWICE DAILY AS DIRECTED; Therapy: 25Cqn6009 to (Last Rx:78Ojr5055)  Requested for: 11Akp3767 Ordered   12  MetFORMIN HCl - 1000 MG Oral Tablet; take one tablet by mouth twice daily;     Therapy: 18QFV2311 to ([phone])  Requested for: 83TDY3704; Last    Rx:09Jan2017 Ordered   13  NovoLOG 100 UNIT/ML Subcutaneous Solution; inject 45 units 3 times daily; Therapy: 71Vpp8753 to (Last Rx:35Gei6734)  Requested for: 29Edk8985 Ordered    14  Centrum Silver Ultra Mens TABS; TAKE 1 TABLET DAILY; Therapy: (Recorded:33Cff9877) to Recorded   15  Tamsulosin HCl - 0 4 MG Oral Capsule; 1 qd; Therapy: 45UHV7678 to Recorded    Future Appointments    Date/Time Provider Specialty Site   06/16/2017 09:45 AM KIERSTEN Hackett  Internal Medicine [facility] INTERNAL MED   03/24/2017 10:50 AM Oneil Yates DO Pulmonary Medicine [facility] 70     Patient Care Team    Care Team Member Role Specialty Office Number   Denise Meyers [facility]  Internal Medicine [phone]   Man[facility]   Attending Internal Medicine [phone], Shira Lyon DO Specialist Pain Management [phone]   Maribell Marshall DO  Pulmonary Medicine [phone]     Signatures   Electronically signed by : Hipolito Cuadra RN; Mar 21 2017  1:55PM EST                       (Author)

## 2018-01-13 NOTE — PROGRESS NOTES
History of Present Illness  Care Coordination Encounter Information:   Type of Encounter: Telephonic   Contact: Follow-Up    Spoke to Patient   Leonarda Olguin  Care Coordination  Nurse [facility]: I continue to reach out to Leonarda Olguin in reference to his current health needs  He states that he "is doing pretty good this week but last week I wasn't doing as well"  He had recent changes to his medication regime and is feeling better with his glucose of 101 this AM  He reported a diet high in fat to include ham/cheese omelet for breakfast and pizza for lunch  When we spoke last he stated the need for "binding" foods so I told him I would present options that will assist such as breads low fat milk cheese and low fiber vegetable options  He will hopefully use some of those suggestions  I will continue to reach out to him  JG      Active Problems    1  Abnormal PFTs (pulmonary function tests) (794 2) (R94 2)   2  Back pain, lumbosacral (724 2,724 6) (M54 5)   3  Benign essential hypertension (401 1) (I10)   4  BPH (benign prostatic hyperplasia) (600 00) (N40 0)   5  Cerebrovascular accident (CVA) with involvement of left side of body (434 91) (I63 9)   6  Chronic venous insufficiency (459 81) (I87 2)   7  Esophagitis, reflux (530 11) (K21 0)   8  Hyperlipidemia (272 4) (E78 5)   9  Morbid obesity due to excess calories (278 01) (E66 01)   10  No advance directives (V49 89) (Z78 9)   11  Paroxysmal atrial fibrillation (427 31) (I48 0)   12  Restrictive lung disease (518 89) (J98 4)   13  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)   14  Sleep apnea (780 57) (G47 30)   15  Type 2 diabetes mellitus, uncontrolled (250 02) (E11 65)    Past Medical History    1  History of Allergic symptoms (995 3) (T78 40XA)   2  History of Dextrocardia (746 87)   3  History of High cholesterol (272 0) (E78 00)   4  History of cardiac arrhythmia (V12 59) (Z86 79)   5  History of diabetes mellitus (V12 29) (Z86 39)   6   History of gastroesophageal reflux (GERD) (V12 79) (Z87 19)   7  History of hypertension (V12 59) (Z86 79)   8  History of sinusitis (V12 69) (Z87 09)   9  History of sleep apnea (V13 89) (Z86 69)   10  History of stroke (V12 54) (Z86 73)   11  History of Need for immunization against influenza (V04 81) (Z23)    Surgical History    1  History of Cholecystectomy   2  History of Gallbladder Surgery   3  History of Hernia Repair    Family History  Father    1  Family history of diabetes mellitus (V18 0) (Z83 3)   2  Family history of malignant neoplasm (V16 9) (Z80 9)  Paternal Grandmother    3  Family history of diabetes mellitus (V18 0) (Z83 3)   4  Family history of emphysema (V17 6) (Z82 5)  Maternal Grandfather    5  Family history of malignant neoplasm (V16 9) (Z80 9)  Paternal Grandfather    6  Family history of heart attack (V17 3) (Z82 49)  Family History    7  Family history of hypertension (V17 49) (Z82 49)   8  Family history of neuropathy (V17 2) (Z82 0)   9  Denied: Family history of substance abuse   10  Denied: Family history of Mental problem    Social History    · Completed 12th grade   · Daily caffeine consumption, 2-3 servings a day   · Dental care, regularly   ·    · Exercise: Cycling   · Lives with parents   · Living Situation: Supportive and safe   · Never A Smoker   · No advance directives (V49 89) (Z78 9)   · No advance directives (V49 89) (Z78 9)   · No drug use   · Social alcohol use (Z78 9)   · Unemployed (V62 0) (Z56 0)    Current Meds    1  Spiriva Respimat 2 5 MCG/ACT Inhalation Aerosol Solution; INHALE 2 PUFFS ONCE   DAILY; Therapy: 16RPL6556 to (Last Rx:24Mar2017)  Requested for: 24Mar2017 Ordered    2  Metoprolol Tartrate 50 MG Oral Tablet; TAKE ONE TABLET BY MOUTH EVERY 12   HOURS; Therapy: 44QHG3236 to (Sarahi Chew)  Requested for: 05Apr2017; Last   Rx:05Apr2017 Ordered    3  Myrbetriq 50 MG Oral Tablet Extended Release 24 Hour; 1 qd  by dr Alysha Dunn;    Therapy: 82PLV4579 to Recorded 4  RaNITidine HCl - 150 MG Oral Tablet; TAKE ONE TABLET BY MOUTH EVERY 12 HOURS; Therapy: 20IDT6978 to ([facility])  Requested for: 36WKH7746; Last   Rx:09Jan2017 Ordered    5  Atorvastatin Calcium 80 MG Oral Tablet (Lipitor); TAKE 1 TABLET AT BEDTIME; Therapy: 53MJI9050 to (Eloisa Camas)  Requested for: 05Fce5233; Last   Rx:01Aug2016 Ordered    6  Eliquis 5 MG Oral Tablet; Take 1 tablet twice daily; Therapy: 67LST2399 to (Evaluate:06Sep2017)  Requested for: 26EWO4340; Last   Rx:08Feb2017 Ordered    7  Amiodarone HCl - 200 MG Oral Tablet; Take 1 tablet daily; Therapy: 60IAH5993 to (Evaluate:06Sep2017)  Requested for: 78BVU9660; Last   Rx:08Feb2017 Ordered    8  Gabapentin 300 MG Oral Capsule; TAKE ONE CAPSULE BY MOUTH TWICE DAILY; Therapy: 56OXW5437 to ([facility])  Requested for: 04OIH9878; Last   Rx:09Jan2017 Ordered    9  Montelukast Sodium 10 MG Oral Tablet (Singulair); take 1 tablet by mouth every day; Therapy: 90Fab3529 to (Evaluate:21Apr2017)  Requested for: 14Exs9943; Last   Rx:46Rry9089 Ordered    10  Lantus SoloStar 100 UNIT/ML Subcutaneous Solution Pen-injector; INJECT 50 UNITS    TWICE DAILY AS DIRECTED; Therapy: 06Ota6948 to (Last Rx:35Umw3367)  Requested for: 58Rck0748 Ordered   11  NovoLOG 100 UNIT/ML Subcutaneous Solution; inject 50 units 3 times daily AC; Therapy: 22Txq3783 to  Requested for: 07Apr2017 Recorded    12  Centrum Silver Ultra Mens TABS; TAKE 1 TABLET DAILY; Therapy: (Recorded:68Lqt3143) to Recorded   13  Tamsulosin HCl - 0 4 MG Oral Capsule; 1 qd; Therapy: 22CNM8233 to Recorded    Allergies    1  Codeine Derivatives    Health Management   *VB - Eye Exam; every 1 year; Last 32JSH4814; Next Due: 95TJE2595; Near Due    End of Encounter Meds    1  Spiriva Respimat 2 5 MCG/ACT Inhalation Aerosol Solution; INHALE 2 PUFFS ONCE   DAILY; Therapy: 50ZRO4000 to (Last Rx:24Mar2017)  Requested for: 24Mar2017 Ordered    2   Metoprolol Tartrate 50 MG Oral Tablet; TAKE ONE TABLET BY MOUTH EVERY 12   HOURS; Therapy: 18EWN0166 to (Theador Mis)  Requested for: 05Apr2017; Last   Rx:05Apr2017 Ordered    3  Myrbetriq 50 MG Oral Tablet Extended Release 24 Hour; 1 qd  by dr Kobe Hooks; Therapy: 31LGW9489 to Recorded    4  RaNITidine HCl - 150 MG Oral Tablet; TAKE ONE TABLET BY MOUTH EVERY 12 HOURS; Therapy: 77GLM4828 to (26 100188)  Requested for: 99YZB4751; Last   Rx:09Jan2017 Ordered    5  Atorvastatin Calcium 80 MG Oral Tablet (Lipitor); TAKE 1 TABLET AT BEDTIME; Therapy: 45XTH4405 to (Dalila Serene)  Requested for: 69Lnx3914; Last   Rx:01Aug2016 Ordered    6  Eliquis 5 MG Oral Tablet; Take 1 tablet twice daily; Therapy: 01VGF7842 to (Evaluate:06Sep2017)  Requested for: 67DEQ7083; Last   Rx:02Lqu3990 Ordered    7  Amiodarone HCl - 200 MG Oral Tablet; Take 1 tablet daily; Therapy: 57OIE6812 to (Evaluate:06Sep2017)  Requested for: 94RLW7717; Last   Rx:08Feb2017 Ordered    8  Gabapentin 300 MG Oral Capsule; TAKE ONE CAPSULE BY MOUTH TWICE DAILY; Therapy: 80ARH8700 to (26 100188)  Requested for: 69JBT3032; Last   Rx:09Jan2017 Ordered    9  Montelukast Sodium 10 MG Oral Tablet (Singulair); take 1 tablet by mouth every day; Therapy: 18Tyj0734 to (Evaluate:21Apr2017)  Requested for: 10Phq9664; Last   Rx:77Oyk4619 Ordered    10  Lantus SoloStar 100 UNIT/ML Subcutaneous Solution Pen-injector; INJECT 50 UNITS    TWICE DAILY AS DIRECTED; Therapy: 91Ylf8412 to (Last Rx:12Sep2016)  Requested for: 72Yxw5825 Ordered   11  NovoLOG 100 UNIT/ML Subcutaneous Solution; inject 50 units 3 times daily AC; Therapy: 68Tte7856 to  Requested for: 07Apr2017 Recorded    12  Centrum Silver Ultra Mens TABS; TAKE 1 TABLET DAILY; Therapy: (Recorded:37Uiv3831) to Recorded   13  Tamsulosin HCl - 0 4 MG Oral Capsule; 1 qd;     Therapy: 12ENI6180 to Recorded    Future Appointments    Date/Time Provider Specialty Site   06/16/2017 09:45 AM Opal Henry, KIERSTEN Zuniga  Internal Medicine [facility] INTERNAL MED   05/17/2017 11:00 AM KIERSTEN East  Cardiology [facility]   05/26/2017 09:40 AM Ange Gonzalez DO Pulmonary Medicine [address]     Patient Care Team    Care Team Member Role Specialty Office Number   Ayah Cordoba [facility]  Internal Medicine [phone]   Ilir WEST   Attending Internal Medicine [phone] DO Specialist Pain Management [phone]   Randall Mallory DO  Pulmonary Medicine [phone]     Signatures   Electronically signed by : Juani Escudero RN; Apr 12 2017  2:01PM EST                       (Author)

## 2018-01-14 VITALS
OXYGEN SATURATION: 97 % | SYSTOLIC BLOOD PRESSURE: 142 MMHG | HEART RATE: 62 BPM | RESPIRATION RATE: 16 BRPM | BODY MASS INDEX: 46.65 KG/M2 | HEIGHT: 69 IN | WEIGHT: 315 LBS | DIASTOLIC BLOOD PRESSURE: 68 MMHG | TEMPERATURE: 97.4 F

## 2018-01-14 VITALS
WEIGHT: 315 LBS | HEIGHT: 69 IN | SYSTOLIC BLOOD PRESSURE: 142 MMHG | DIASTOLIC BLOOD PRESSURE: 78 MMHG | HEART RATE: 68 BPM | BODY MASS INDEX: 46.65 KG/M2

## 2018-01-14 VITALS
DIASTOLIC BLOOD PRESSURE: 88 MMHG | RESPIRATION RATE: 22 BRPM | BODY MASS INDEX: 48.78 KG/M2 | HEART RATE: 105 BPM | WEIGHT: 315 LBS | SYSTOLIC BLOOD PRESSURE: 160 MMHG

## 2018-01-14 VITALS
WEIGHT: 315 LBS | DIASTOLIC BLOOD PRESSURE: 90 MMHG | HEIGHT: 69 IN | SYSTOLIC BLOOD PRESSURE: 140 MMHG | BODY MASS INDEX: 46.65 KG/M2 | TEMPERATURE: 98 F | HEART RATE: 98 BPM

## 2018-01-14 VITALS
DIASTOLIC BLOOD PRESSURE: 62 MMHG | BODY MASS INDEX: 50.21 KG/M2 | SYSTOLIC BLOOD PRESSURE: 126 MMHG | WEIGHT: 315 LBS | HEART RATE: 72 BPM

## 2018-01-14 VITALS
WEIGHT: 315 LBS | HEIGHT: 69 IN | RESPIRATION RATE: 16 BRPM | OXYGEN SATURATION: 98 % | DIASTOLIC BLOOD PRESSURE: 72 MMHG | HEART RATE: 58 BPM | TEMPERATURE: 97.4 F | BODY MASS INDEX: 46.65 KG/M2 | SYSTOLIC BLOOD PRESSURE: 136 MMHG

## 2018-01-15 NOTE — CONSULTS
Chief Complaint  6m rtn ov      History of Present Illness  followup for afib    has some intermittent LE edema  Does not tolerate lasix well  no orthonpea, no PND  He has stable exertional dyspnea  The patient presents with persistent atrial fibrillation  He is status post cardioversion  He states his atrial fibrillation has been stable since the last visit  Symptoms: denies palpitations, denies chest pain, denies exercise intolerance, denies dyspnea on exertion and denies dizziness  Review of Systems      Cardiac: rhythm problems, but no chest pain  Skin: No complaints of nonhealing sores or skin rash  Genitourinary: No complaints of recurrent urinary tract infections, frequent urination at night, difficult urination, blood in urine, kidney stones, loss of bladder control, no kidney or prostate problems, no erectile dysfunction  Psychological: No complaints of feeling depressed, anxiety, panic attacks, or difficulty concentrating  General: No complaints of trouble sleeping, lack of energy, fatigue, appetite changes, weight changes, fever, frequent infections, or night sweats  Respiratory: No complaints of shortness of breath, cough with sputum, or wheezing  HEENT: No complaints of serious problems, hearing problems, nose problems, throat problems, or snoring  Gastrointestinal: No complaints of liver problems, nausea, vomiting, heartburn, constipation, bloody stools, diarrhea, problems swallowing, adbominal pain, or rectal bleeding  Hematologic: No complaints of bleeding disorders, anemia, blood clots, or excessive brusing  Neurological: No complaints of numbness, tingling, dizziness, weakness, seizures, headaches, syncope or fainting, AM fatigue, daytime sleepiness, no witnessed apnea episodes  Musculoskeletal: No complaints of arthritis, back pain, or painfull swelling  ROS reviewed  Active Problems    1  Back pain, lumbosacral (724 2,724 6) (M54 5)   2   Benign essential hypertension (401 1) (I10)   3  BPH (benign prostatic hyperplasia) (600 00) (N40 0)   4  Cerebrovascular accident (CVA) with involvement of left side of body (434 91) (I63 9)   5  Chronic venous insufficiency (459 81) (I87 2)   6  Esophagitis, reflux (530 11) (K21 0)   7  Hyperlipidemia (272 4) (E78 5)   8  Incontinence of urine (788 30) (R32)   9  Lymphedema (457 1) (I89 0)   10  Morbid obesity due to excess calories (278 01) (E66 01)   11  Need for influenza vaccination (V04 81) (Z23)   12  No advance directives (V49 89) (Z78 9)   13  Paroxysmal atrial fibrillation (427 31) (I48 0)   14  Restrictive lung disease (518 89) (J98 4)   15  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)   16  Sleep apnea (780 57) (G47 30)   17  Type 2 diabetes mellitus, uncontrolled (250 02) (E11 65)    Past Medical History    · History of Allergic symptoms (995 3) (T78 40XA)   · History of Dextrocardia (746 87)   · History of High cholesterol (272 0) (E78 00)   · History of cardiac arrhythmia (V12 59) (Z86 79)   · History of diabetes mellitus (V12 29) (Z86 39)   · History of gastroesophageal reflux (GERD) (V12 79) (Z87 19)   · History of hypertension (V12 59) (Z86 79)   · History of sinusitis (V12 69) (Z87 09)   · History of sleep apnea (V13 89) (Z86 69)   · History of stroke (V12 54) (Z86 73)   · History of Need for immunization against influenza (V04 81) (Z23)    The active problems and past medical history were reviewed and updated today  Surgical History    · History of Cholecystectomy   · History of Gallbladder Surgery   · History of Hernia Repair    The surgical history was reviewed and updated today         Family History    · Family history of diabetes mellitus (V18 0) (Z83 3)   · Family history of malignant neoplasm (V16 9) (Z80 9)    · Family history of diabetes mellitus (V18 0) (Z83 3)   · Family history of emphysema (V17 6) (Z82 5)    · Family history of malignant neoplasm (V16 9) (Z80 9)    · Family history of heart attack (V17 3) (Z82 49)    · Family history of hypertension (V17 49) (Z82 49)   · Family history of neuropathy (V17 2) (Z82 0)   · Denied: Family history of substance abuse   · Denied: Family history of Mental problem    The family history was reviewed and updated today  Social History    · Completed 12th grade   · Daily caffeine consumption, 2-3 servings a day   · Dental care, regularly   ·    · Exercise: Cycling   · Lives with parents   · Living Situation: Supportive and safe   · Never A Smoker   · No advance directives (V49 89) (Z78 9)   · No advance directives (V49 89) (Z78 9)   · No drug use   · Social alcohol use (Z78 9)   · Unemployed (V62 0) (Z56 0)  The social history was reviewed and updated today  Current Meds   1  AmLODIPine Besylate 10 MG Oral Tablet; TAKE ONE TABLET BY MOUTH ONCE DAILY; Therapy: 28JNP1350 to (Evaluate:92Vig9125)  Requested for: 13Pjk7662; Last   Rx:10Wnl0230 Ordered   2  Atorvastatin Calcium 80 MG Oral Tablet; TAKE ONE TABLET BY MOUTH AT BEDTIME; Therapy: 54WSR0231 to (Evaluate:51Sbm7050)  Requested for: 31Oec2700; Last   Rx:40Szg1087 Ordered   3  Centrum Silver Ultra Mens TABS; TAKE 1 TABLET DAILY; Therapy: (Recorded:95Vyg6787) to Recorded   4  Eliquis 5 MG Oral Tablet; take one tablet by mouth twice daily; Therapy: 72BDM6545 to (Wilmar Pedroza)  Requested for: 28FWG9672; Last   Rx:11Oct2017 Ordered   5  Flecainide Acetate 100 MG Oral Tablet; TAKE ONE TABLET BY MOUTH EVERY 12   HOURS DAILY; Therapy: 78RQY0764 to (Evaluate:11Jan2018)  Requested for: 01SXG9235; Last   Rx:58Mup9070 Ordered   6  Fluticasone Propionate 50 MCG/ACT Nasal Suspension; USE ONE SPRAY(S) IN EACH   NOSTRIL TWICE DAILY; Therapy: 61VWI2102 to (Evaluate:29Ici2462)  Requested for: 18JUL7307; Last   Rx:09Nov2017 Ordered   7  Furosemide 80 MG Oral Tablet; Take 1/4 tab daily; Therapy: 97BEN0593 to (Evaluate:43Ass7071) Recorded   8   Gabapentin 300 MG Oral Capsule; TAKE ONE CAPSULE BY MOUTH TWICE DAILY; Therapy: 59NUZ8788 to (Evaluate:19Mar2018)  Requested for: 29IOP4009; Last   Rx:20Sep2017 Ordered   9  Lantus SoloStar 100 UNIT/ML Subcutaneous Solution Pen-injector; INJECT 50 UNITS   TWICE DAILY; Therapy: 08Pph0132 to (Evaluate:80Ksq8706)  Requested for: 35Vpt3263; Last   Rx:78Pra2563 Ordered   10  MetFORMIN HCl - 500 MG Oral Tablet; take 1 tablet twice daily with food; Therapy: 17VUP2940 to (Evaluate:73Dys2039)  Requested for: 35QFR1389; Last    Rx:74Pkk3243 Ordered   11  Metoprolol Succinate ER 25 MG Oral Tablet Extended Release 24 Hour; TAKE ONE    TABLET BY MOUTH ONCE DAILY; Therapy: 58WEF1073 to (Hina Thompson)  Requested for: 50XWL6870; Last    Rx:09Nov2017 Ordered   12  Montelukast Sodium 10 MG Oral Tablet; TAKE ONE TABLET BY MOUTH ONCE DAILY; Therapy: 38Ftj9686 to (Evaluate:09Dec2017)  Requested for: 63HUA6856; Last    Rx:09Nov2017 Ordered   13  Myrbetriq 50 MG Oral Tablet Extended Release 24 Hour; 1 qd  by dr Kobe Hooks; Therapy: 42LJX5647 to Recorded   14  NovoLOG 100 UNIT/ML Subcutaneous Solution; INJECT 55 to 60  UNITS    SUBQUTANEOUSLY THREE TIMES DAILY; Therapy: 92Jht0218 to (Evaluate:19Mar2018)  Requested for: 66IBO8257 Recorded   15  Quinapril HCl - 40 MG Oral Tablet; TAKE ONE TABLET BY MOUTH ONCE DAILY AS    DIRECTED; Therapy: 43BRA1573 to (Evaluate:54Qew1625)  Requested for: 27Xxj0899; Last    Rx:65Hwk2210 Ordered   16  RaNITidine HCl - 150 MG Oral Tablet; TAKE ONE TABLET BY MOUTH EVERY 12 HOURS; Therapy: 76GVY1767 to (Evaluate:30Jan2018)  Requested for: 14Bnx8758; Last    Rx:03Aug2017 Ordered   17  Spiriva Respimat 2 5 MCG/ACT Inhalation Aerosol Solution; INHALE 2 PUFFS ONCE    DAILY; Therapy: 20LTF5652 to (Last Rx:24Mar2017)  Requested for: 24Mar2017 Ordered   18  Tamsulosin HCl - 0 4 MG Oral Capsule; 1 qd; Therapy: 27WRU6897 to Recorded   19  Vitamin D3 2000 UNIT Oral Tablet; 1 Tab daily; Therapy: 47DYJ4235 to Recorded   20  Vitamin E 1000 UNIT Oral Capsule; take 1 tab daily; Therapy: 10YBW7106 to Recorded    The medication list was reviewed and updated today  Allergies    1  Codeine Derivatives    Vitals   Recorded: 22Nov2017 11:07AM   Heart Rate 66   Systolic 982   Diastolic 62   Weight 628 lb 1 oz   BMI Calculated 53 02   BSA Calculated 2 65     Physical Exam    Constitutional   General appearance: No acute distress, well appearing and well nourished  Eyes   Conjunctiva and Sclera examination: Conjunctiva pink, sclera anicteric  Ears, Nose, Mouth, and Throat - Oropharynx: Clear, nares are clear, mucous membranes are moist    Neck   Neck and thyroid: Normal, supple, trachea midline, no thyromegaly  Pulmonary   Respiratory effort: No increased work of breathing or signs of respiratory distress  Auscultation of lungs: Clear to auscultation, no rales, no rhonchi, no wheezing, good air movement  Cardiovascular   Auscultation of heart: Normal rate and rhythm, normal S1 and S2, no murmurs  Carotid pulses: Normal, 2+ bilaterally  Peripheral vascular exam: Normal pulses throughout, no tenderness, erythema or swelling  Pedal pulses: Normal, 2+ bilaterally  Examination of extremities for edema and/or varicosities: Normal     Abdomen   Abdomen: Non-tender and no distention  Liver and spleen: No hepatomegaly or splenomegaly  Musculoskeletal Gait and station: Normal gait  Digits and nails: Normal without clubbing or cyanosis  Inspection/palpation of joints, bones, and muscles: Normal, ROM normal     Skin - Skin and subcutaneous tissue: Normal without rashes or lesions  Skin is warm and well perfused, normal turgor  Neurologic - Cranial nerves: II - XII intact  Speech: Normal     Psychiatric - Orientation to person, place, and time: Normal  Mood and affect: Normal       Assessment    1  Paroxysmal atrial fibrillation (427 31) (I48 0)   2   Benign essential hypertension (401 1) (I10)    Plan  Benign essential hypertension, Chronic venous insufficiency    · Furosemide 20 MG Oral Tablet; take 1 tablet daily prn edema   Rx By: Jessica Damon; Dispense: 30 Days ; #:30 Tablet; Refill: 4; For: Benign essential hypertension, Chronic venous insufficiency; DIVYA = N; Verified Transmission to [facility]; Last Updated By: System, [employer]; 11/22/2017 11:32:26 AM  Benign essential hypertension, Paroxysmal atrial fibrillation    · Follow-up visit in 6 months Evaluation and Treatment  Follow-up  Status: Complete   Done: 83SFC7280   Ordered; For: Benign essential hypertension, Paroxysmal atrial fibrillation; Ordered By: Jessica Damon Performed:  Order Comments: put on w/l Due: 80SBS9268; Last Updated By: Sherrie Boo; 11/22/2017 11:33:06 AM  Paroxysmal atrial fibrillation    · EKG/ECG- POC; Status:Complete;   Done: 82NSA0249 11:04AM   Perform: In Office; Last Updated By:Kris Fields; 11/22/2017 11:04:20 AM;Ordered; For:Paroxysmal atrial fibrillation; Ordered By:Abe Velásquez; Unlinked    · Furosemide 80 MG Oral Tablet   Dispense: 90 Days ; #:90 Tablet; Refill: 3; DIVYA = N; Record; Last Updated By: Jessica Damon; 11/22/2017 11:30:37 AM    Discussion/Summary    1  Persistent Atrial Fibrillation: s/p CHERYL guided cardioversion  He is stable on Flecainide and eliquis  he is cutting his lasix into quarters, so will change to 20mg daily as needed  he also uses compression stockings  2  HTN: BP stable on current medical therapy  The patient was counseled regarding diagnostic results, instructions for management, risk factor reductions, impressions  total time of encounter was 25 minutes and 15 minutes was spent counseling        Future Appointments    Signatures   Electronically signed by : Thompson Ahumada, M D ; Nov 22 2017  2:20PM EST                       (Author)

## 2018-01-17 NOTE — PROGRESS NOTES
History of Present Illness  Care Coordination Encounter Information:   Type of Encounter: Telephonic    Spoke to Patient   Lilian Solitario  Care Coordination [facility] Nurse [facility]: Lilian Solitario called today and reported not feeling well at rehab and disclosed that his HR dropped to the 40s  He did say that he has "been in the 50s before but not 40s"  I instructed him to call both his cardiologist as well as PCP to alert them of this and ask to be seen today or what he should do next  He will call me to update on what is recommended to him  JG  Active Problems    1  Abnormal PFTs (pulmonary function tests) (794 2) (R94 2)   2  Back pain, lumbosacral (724 2,724 6) (M54 5)   3  Benign essential hypertension (401 1) (I10)   4  BPH (benign prostatic hyperplasia) (600 00) (N40 0)   5  Cerebrovascular accident (CVA) with involvement of left side of body (434 91) (I63 9)   6  Chronic venous insufficiency (459 81) (I87 2)   7  Esophagitis, reflux (530 11) (K21 0)   8  Hyperlipidemia (272 4) (E78 5)   9  Morbid obesity due to excess calories (278 01) (E66 01)   10  No advance directives (V49 89) (Z78 9)   11  Paroxysmal atrial fibrillation (427 31) (I48 0)   12  Restrictive lung disease (518 89) (J98 4)   13  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)   14  Sleep apnea (780 57) (G47 30)   15  Type 2 diabetes mellitus, uncontrolled (250 02) (E11 65)    Past Medical History    1  History of Allergic symptoms (995 3) (T78 40XA)   2  History of Dextrocardia (746 87)   3  History of High cholesterol (272 0) (E78 00)   4  History of cardiac arrhythmia (V12 59) (Z86 79)   5  History of diabetes mellitus (V12 29) (Z86 39)   6  History of gastroesophageal reflux (GERD) (V12 79) (Z87 19)   7  History of hypertension (V12 59) (Z86 79)   8  History of sinusitis (V12 69) (Z87 09)   9  History of sleep apnea (V13 89) (Z86 69)   10  History of stroke (V12 54) (Z86 73)   11   History of Need for immunization against influenza (V04 81) (Z23)    Surgical History    1  History of Cholecystectomy   2  History of Gallbladder Surgery   3  History of Hernia Repair    Family History  Father    1  Family history of diabetes mellitus (V18 0) (Z83 3)   2  Family history of malignant neoplasm (V16 9) (Z80 9)  Paternal Grandmother    3  Family history of diabetes mellitus (V18 0) (Z83 3)   4  Family history of emphysema (V17 6) (Z82 5)  Maternal Grandfather    5  Family history of malignant neoplasm (V16 9) (Z80 9)  Paternal Grandfather    6  Family history of heart attack (V17 3) (Z82 49)  Family History    7  Family history of hypertension (V17 49) (Z82 49)   8  Family history of neuropathy (V17 2) (Z82 0)   9  Denied: Family history of substance abuse   10  Denied: Family history of Mental problem    Social History    · Completed 12th grade   · Daily caffeine consumption, 2-3 servings a day   · Dental care, regularly   ·    · Exercise: Cycling   · Lives with parents   · Living Situation: Supportive and safe   · Never A Smoker   · No advance directives (V49 89) (Z78 9)   · No advance directives (V49 89) (Z78 9)   · No drug use   · Social alcohol use (Z78 9)   · Unemployed (V62 0) (Z56 0)    Current Meds    1  Spiriva Respimat 2 5 MCG/ACT Inhalation Aerosol Solution; INHALE 2 PUFFS ONCE   DAILY; Therapy: 51MOR3243 to (Last Rx:24Mar2017)  Requested for: 24Mar2017 Ordered    2  Metoprolol Tartrate 50 MG Oral Tablet; TAKE ONE TABLET BY MOUTH EVERY 12   HOURS; Therapy: 96CZC4260 to (Twilla Hard)  Requested for: 05Apr2017; Last   Rx:05Apr2017 Ordered    3  Myrbetriq 50 MG Oral Tablet Extended Release 24 Hour; 1 qd  by dr Gustavo Valenzuela; Therapy: 22IFZ3009 to Recorded    4  RaNITidine HCl - 150 MG Oral Tablet; TAKE ONE TABLET BY MOUTH EVERY 12 HOURS; Therapy: 00JYG0918 to (Tiny Alosa)  Requested for: 45PWJ0418; Last   Rx:09Jan2017 Ordered    5  Atorvastatin Calcium 80 MG Oral Tablet (Lipitor); TAKE 1 TABLET AT BEDTIME;    Therapy: 34NET9480 to (Evaluate:87Rib5872)  Requested for: 98Cns1812; Last   Rx:79Epg4538 Ordered    6  Eliquis 5 MG Oral Tablet; Take 1 tablet twice daily; Therapy: 87OUG5592 to (Evaluate:48Qpj0255)  Requested for: 52NWG5100; Last   Rx:39Ymz5660 Ordered    7  Amiodarone HCl - 200 MG Oral Tablet; Take 1 tablet daily; Therapy: 07NHP7363 to (Evaluate:06Sep2017)  Requested for: 57FXP2579; Last   Rx:08Feb2017 Ordered    8  Gabapentin 300 MG Oral Capsule; TAKE ONE CAPSULE BY MOUTH TWICE DAILY; Therapy: 62EGO2402 to (Aidan Villatoro)  Requested for: 55FXU5724; Last   Rx:09Jan2017 Ordered    9  Montelukast Sodium 10 MG Oral Tablet (Singulair); take 1 tablet by mouth every day; Therapy: 47Kae7329 to (Evaluate:21Apr2017)  Requested for: 57Tir2152; Last   Rx:36Ehs8064 Ordered    10  Lantus SoloStar 100 UNIT/ML Subcutaneous Solution Pen-injector; INJECT 50 UNITS    TWICE DAILY AS DIRECTED; Therapy: 55Zso7644 to (Last Rx:90Ycj7039)  Requested for: 53Hhe8254 Ordered   11  NovoLOG 100 UNIT/ML Subcutaneous Solution; inject 50 units 3 times daily AC; Therapy: 15Kzw4328 to  Requested for: 07Apr2017 Recorded    12  Centrum Silver Ultra Mens TABS; TAKE 1 TABLET DAILY; Therapy: (Recorded:80Xcd1805) to Recorded   13  Tamsulosin HCl - 0 4 MG Oral Capsule; 1 qd; Therapy: 19VDL5305 to Recorded    Allergies    1  Codeine Derivatives    Health Management   *VB - Eye Exam; every 1 year; Last 39YPF1731; Next Due: 43EDB9700; Near Due    End of Encounter Meds    1  Spiriva Respimat 2 5 MCG/ACT Inhalation Aerosol Solution; INHALE 2 PUFFS ONCE   DAILY; Therapy: 99PHG7145 to (Last Rx:24Mar2017)  Requested for: 24Mar2017 Ordered    2  Metoprolol Tartrate 50 MG Oral Tablet; TAKE ONE TABLET BY MOUTH EVERY 12   HOURS; Therapy: 00VKY5901 to (Liang Pike)  Requested for: 05Apr2017; Last   Rx:05Apr2017 Ordered    3  Myrbetriq 50 MG Oral Tablet Extended Release 24 Hour; 1 qd  by dr Lolita Alvarez;    Therapy: 85AYD2405 to Recorded    4  RaNITidine HCl - 150 MG Oral Tablet; TAKE ONE TABLET BY MOUTH EVERY 12 HOURS; Therapy: 22GYL3103 to ([phone])  Requested for: 53LDZ7383; Last   Rx:09Jan2017 Ordered    5  Atorvastatin Calcium 80 MG Oral Tablet (Lipitor); TAKE 1 TABLET AT BEDTIME; Therapy: 67HQY0429 to [phone])  Requested for: 68Rif2466; Last   Rx:01Aug2016 Ordered    6  Eliquis 5 MG Oral Tablet; Take 1 tablet twice daily; Therapy: 79DDV4714 to (Evaluate:06Sep2017)  Requested for: 09PQW3365; Last   Rx:08Feb2017 Ordered    7  Amiodarone HCl - 200 MG Oral Tablet; Take 1 tablet daily; Therapy: 26HKQ6262 to (Evaluate:06Sep2017)  Requested for: 55NXC1569; Last   Rx:08Feb2017 Ordered    8  Gabapentin 300 MG Oral Capsule; TAKE ONE CAPSULE BY MOUTH TWICE DAILY; Therapy: 41IIF8872 to ([phone])  Requested for: 49YTB8777; Last   Rx:09Jan2017 Ordered    9  Montelukast Sodium 10 MG Oral Tablet (Singulair); take 1 tablet by mouth every day; Therapy: 90Zbz0780 to (Evaluate:21Apr2017)  Requested for: 06Yeu6134; Last   Rx:74Prk2523 Ordered    10  Lantus SoloStar 100 UNIT/ML Subcutaneous Solution Pen-injector; INJECT 50 UNITS    TWICE DAILY AS DIRECTED; Therapy: 46Mhg2729 to (Last Rx:12Sep2016)  Requested for: 98Tui2946 Ordered   11  NovoLOG 100 UNIT/ML Subcutaneous Solution; inject 50 units 3 times daily AC; Therapy: 58Eug9621 to  Requested for: 07Apr2017 Recorded    12  Centrum Silver Ultra Mens TABS; TAKE 1 TABLET DAILY; Therapy: (Recorded:20Nds6320) to Recorded   13  Tamsulosin HCl - 0 4 MG Oral Capsule; 1 qd; Therapy: 15BOO2399 to Recorded    Future Appointments    Date/Time Provider Specialty Site   06/16/2017 09:45 AM KIERSTEN Taylor  Internal Medicine [facility] INTERNAL MED   05/17/2017 11:00 AM KIERSTEN Rothman   Cardiology  CARDIOLOGY [facility]   05/26/2017 09:40 AM Neva Mercado DO Pulmonary Medicine [address]     Patient Care Team    Care Team Member Role Specialty Office Number   Velma [facility]  Internal Medicine [phone]   Maurice WEST   Attending Internal Medicine [phone], Lupis Gonzalez DO Specialist Pain Management [phone]   Yahaira Alyssa DO  Pulmonary Medicine [phone]     Signatures   Electronically signed by : Michelle Zamora RN; Apr 19 2017  4:08PM EST                       (Author)

## 2018-01-17 NOTE — PROGRESS NOTES
History of Present Illness  Care Coordination Encounter Information:   Type of Encounter: Telephonic    Spoke to Patient   Serjio zelaya  Care Coordination [facility] Nurse [facility]: Serjio zelaya returned my phone call and agreed to converse with me for a few minutes  He is still working on better managing his glucose levels since hospitalization and reports hypoglycemia and not the best choices to raise the levels with foods such as high fat and simple carbohydrate consumption  I provided better options to raise glucose levels without the additional fat and calories  He verbalized that his is using only 40 units of Lantus BID and 25 units of Novolog 3X's daily  He has not made his stress test appointment but has made his pulmonology appointment  His only complaint was frequent urination from his Lasix dose  I will follow up next month  JG      Active Problems    1  Back pain, lumbosacral (724 2,724 6) (M54 5)   2  Benign essential hypertension (401 1) (I10)   3  BPH (benign prostatic hyperplasia) (600 00) (N40 0)   4  Cerebrovascular accident (CVA) with involvement of left side of body (434 91) (I63 9)   5  Chronic venous insufficiency (459 81) (I87 2)   6  Esophagitis, reflux (530 11) (K21 0)   7  Hyperlipidemia (272 4) (E78 5)   8  Denied: History of Mental health disorder   9  Morbid obesity due to excess calories (278 01) (E66 01)   10  No advance directives (V49 89) (Z78 9)   11  Paroxysmal atrial fibrillation (427 31) (I48 0)   12  Restrictive lung disease (518 89) (J98 4)   13  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)   14  Sleep apnea (780 57) (G47 30)   15  Type 2 diabetes mellitus, uncontrolled (250 02) (E11 65)    Past Medical History    1  History of Allergic symptoms (995 3) (T78 40XA)   2  History of Dextrocardia (746 87)   3  History of High cholesterol (272 0) (E78 00)   4  History of cardiac arrhythmia (V12 59) (Z86 79)   5  History of diabetes mellitus (V12 29) (Z86 39)   6   History of gastroesophageal reflux (GERD) (V12 79) (Z87 19)   7  History of hypertension (V12 59) (Z86 79)   8  History of sinusitis (V12 69) (Z87 09)   9  History of sleep apnea (V13 89) (Z86 69)   10  History of stroke (V12 54) (Z86 73)   11  Denied: History of Mental health disorder   12  History of Need for immunization against influenza (V04 81) (Z23)    Surgical History    1  History of Cholecystectomy   2  History of Gallbladder Surgery   3  History of Hernia Repair    Family History  Father    1  Family history of diabetes mellitus (V18 0) (Z83 3)   2  Family history of malignant neoplasm (V16 9) (Z80 9)  Paternal Grandmother    3  Family history of diabetes mellitus (V18 0) (Z83 3)   4  Family history of emphysema (V17 6) (Z82 5)  Maternal Grandfather    5  Family history of malignant neoplasm (V16 9) (Z80 9)  Paternal Grandfather    6  Family history of heart attack (V17 3) (Z82 49)  Family History    7  Family history of hypertension (V17 49) (Z82 49)   8  Family history of neuropathy (V17 2) (Z82 0)   9  Denied: Family history of substance abuse   10  Denied: Family history of Mental problem    Social History    · Completed 12th grade   · Dental care, regularly   ·    · Exercise: Cycling   · Lives with parents   · Living Situation: Supportive and safe   · Never A Smoker   · No advance directives (V49 89) (Z78 9)   · No advance directives (V49 89) (Z78 9)   · No drug use   · Social alcohol use (Z78 9)   · Unemployed (V62 0) (Z56 0)    Current Meds    1  Furosemide 80 MG Oral Tablet; Take 1 tablet daily; Therapy: 66ZRA5216 to (Evaluate:08Dkw2477) Recorded   2  Metoprolol Tartrate 50 MG Oral Tablet; TAKE 1 TABLET TWICE DAILY; Therapy: 63UGH9879 to (Evaluate:97Jgv7564); Last Rx:21Uhb1266 Ordered    3  Myrbetriq 50 MG Oral Tablet Extended Release 24 Hour; 1 qd  by dr Patria Candelario; Therapy: 44INZ9484 to Recorded    4  RaNITidine HCl - 150 MG Oral Tablet; TAKE ONE TABLET BY MOUTH EVERY 12 HOURS;    Therapy: 16GMR9943 to (Flakita Thorne)  Requested for: 64MMT0187; Last   Rx:09Jan2017 Ordered    5  Atorvastatin Calcium 80 MG Oral Tablet (Lipitor); TAKE 1 TABLET AT BEDTIME; Therapy: 29LYX1090 to (Tasha Sancho)  Requested for: 09Qxv5936; Last   Rx:01Aug2016 Ordered    6  Eliquis 5 MG Oral Tablet; Take 1 tablet twice daily; Therapy: 29NRX2684 to (Evaluate:06Sep2017)  Requested for: 51WUY7304; Last   Rx:08Feb2017 Ordered    7  Amiodarone HCl - 200 MG Oral Tablet; Take 1 tablet daily; Therapy: 96FLL4349 to (Evaluate:06Sep2017)  Requested for: 91WLX4051; Last   Rx:08Feb2017 Ordered    8  Betamethasone Dipropionate Aug 0 05 % External Cream; APPLY  AND RUB  IN A THIN   FILM TO AFFECTED AREAS TWICE DAILY  (AM AND PM); Therapy: 78BYC2441 to (Last Rx:09Jun2016)  Requested for: 70ZJF0053 Ordered    9  Gabapentin 300 MG Oral Capsule; TAKE ONE CAPSULE BY MOUTH TWICE DAILY; Therapy: 19VOX8081 to (Evaluate:67Nih7800)  Requested for: 33XFD0302; Last   Rx:09Jan2017 Ordered    10  Montelukast Sodium 10 MG Oral Tablet (Singulair); take 1 tablet by mouth every day; Therapy: 97Ubd9440 to (Evaluate:21Apr2017)  Requested for: 77Iqd7172; Last    Rx:91Zqn2293 Ordered    11  Advair Diskus 250-50 MCG/DOSE Inhalation Aerosol Powder Breath Activated; INHALE 1    PUFF TWICE DAILY; Therapy: 63EEQ4794 to (Last Rx:05Jan2017) Ordered    12  Accu-Chek Alejandra Plus In Vitro Strip; TESTING 4 TIMES DAILY; Therapy: 14ALF6824 to (Tamiko Green)  Requested for: 74MSZ3341; Last    Rx:01Jun2016 Ordered   13  Accu-Chek Alejandra Plus w/Device Kit; testing twice daily; Therapy: 71VFA7878 to (Last GV:83NEG0727)  Requested for: 51YSK3303 Ordered   14  Accu-Chek FastClix Lancets Miscellaneous; TEST FOUR TIMES A DAY; Therapy: 06Xfm0054 to (Last Rx:11Apr2016)  Requested for: 11Apr2016 Ordered   15  Lantus SoloStar 100 UNIT/ML Subcutaneous Solution Pen-injector; INJECT 50 UNITS    TWICE DAILY AS DIRECTED;     Therapy: 12Sep2016 to (Last Rx:12Sep2016) Requested for: 12Sep2016 Ordered   16  MetFORMIN HCl - 1000 MG Oral Tablet; take one tablet by mouth twice daily; Therapy: 28XNT6836 to (26 100188)  Requested for: 40TKE9877; Last    Rx:09Jan2017 Ordered   17  NovoLOG 100 UNIT/ML Subcutaneous Solution; inject 45 units 3 times daily; Therapy: 10Ivp5840 to (Last Rx:13Sep2016)  Requested for: 13Sep2016 Ordered    18  Centrum Silver Ultra Mens TABS; TAKE 1 TABLET DAILY; Therapy: (Recorded:36Mnk7243) to Recorded   19  Tamsulosin HCl - 0 4 MG Oral Capsule; 1 qd; Therapy: 56RFV5348 to Recorded    Allergies    1  Codeine Derivatives    Health Management   *VB - Eye Exam; every 1 year; Last 91ZGW6843; Next Due: 61GHQ6430; Active    End of Encounter Meds    1  Furosemide 80 MG Oral Tablet; Take 1 tablet daily; Therapy: 59OAS2414 to (Evaluate:51Bbe9825) Recorded   2  Metoprolol Tartrate 50 MG Oral Tablet; TAKE 1 TABLET TWICE DAILY; Therapy: 83LTS5242 to (Evaluate:10Feb2017); Last Rx:08Feb2017 Ordered    3  Myrbetriq 50 MG Oral Tablet Extended Release 24 Hour; 1 qd  by dr Micah Acuna; Therapy: 04BGX2768 to Recorded    4  RaNITidine HCl - 150 MG Oral Tablet; TAKE ONE TABLET BY MOUTH EVERY 12 HOURS; Therapy: 96SVQ4732 to (26 100188)  Requested for: 86AAA2906; Last   Rx:09Jan2017 Ordered    5  Atorvastatin Calcium 80 MG Oral Tablet (Lipitor); TAKE 1 TABLET AT BEDTIME; Therapy: 03DSN5766 to (26 100188)  Requested for: 35Zbd8251; Last   Rx:01Aug2016 Ordered    6  Eliquis 5 MG Oral Tablet; Take 1 tablet twice daily; Therapy: 55DQP6546 to (Evaluate:06Sep2017)  Requested for: 17BGR0171; Last   Rx:08Feb2017 Ordered    7  Amiodarone HCl - 200 MG Oral Tablet; Take 1 tablet daily; Therapy: 83UUZ0335 to (Evaluate:06Sep2017)  Requested for: 65ZDE2249; Last   Rx:08Feb2017 Ordered    8  Betamethasone Dipropionate Aug 0 05 % External Cream; APPLY  AND RUB  IN A THIN   FILM TO AFFECTED AREAS TWICE DAILY  (AM AND PM);    Therapy: 41DSB3631 to (Last Rx:09Jun2016)  Requested for: 63YAC0375 Ordered    9  Gabapentin 300 MG Oral Capsule; TAKE ONE CAPSULE BY MOUTH TWICE DAILY; Therapy: 87CPV5125 to (Evaluate:83Wgw8533)  Requested for: 15JMI6287; Last   Rx:09Jan2017 Ordered    10  Montelukast Sodium 10 MG Oral Tablet (Singulair); take 1 tablet by mouth every day; Therapy: 47Upw9130 to (Evaluate:21Apr2017)  Requested for: 29Ecj0059; Last    Rx:18Sfy6411 Ordered    11  Advair Diskus 250-50 MCG/DOSE Inhalation Aerosol Powder Breath Activated; INHALE 1    PUFF TWICE DAILY; Therapy: 25JEX3825 to (Last Rx:05Jan2017) Ordered    12  Accu-Chek Alejandra Plus In Vitro Strip; TESTING 4 TIMES DAILY; Therapy: 23QMU7969 to (Cloretta [place])  Requested for: 82DJL9244; Last    Rx:01Jun2016 Ordered   13  Accu-Chek Alejandra Plus w/Device Kit; testing twice daily; Therapy: 40NWE7718 to (Last DAYANNA:76EAY8767)  Requested for: 86NQP0785 Ordered   14  Accu-Chek FastClix Lancets Miscellaneous; TEST FOUR TIMES A DAY; Therapy: 37Wul1613 to (Last Rx:00Etu0807)  Requested for: 30Ivq5921 Ordered   15  Lantus SoloStar 100 UNIT/ML Subcutaneous Solution Pen-injector; INJECT 50 UNITS    TWICE DAILY AS DIRECTED; Therapy: 61Gus3586 to (Last Rx:28Npi3629)  Requested for: 83Jah3071 Ordered   16  MetFORMIN HCl - 1000 MG Oral Tablet; take one tablet by mouth twice daily; Therapy: 78GTH0121 to (Lopez Payton)  Requested for: 52LZW8731; Last    Rx:09Jan2017 Ordered   17  NovoLOG 100 UNIT/ML Subcutaneous Solution; inject 45 units 3 times daily; Therapy: 13Krl6046 to (Last Rx:97Cxf7360)  Requested for: 78Aum8764 Ordered    18  Centrum Silver Ultra Mens TABS; TAKE 1 TABLET DAILY; Therapy: (Recorded:52Tis4144) to Recorded   19  Tamsulosin HCl - 0 4 MG Oral Capsule; 1 qd; Therapy: 22DAK8205 to Recorded    Future Appointments    Date/Time Provider Specialty Site   03/14/2017 11:15 AM KIERSTEN Goldman   Internal Medicine [facility] INTERNAL MED   02/21/2017 03:40 PM Cintia Beltran, DO Pulmonary Medicine [facility]     Patient Care Team    Care Team Member Role Specialty Office Number   Sonny Hardin [facility]  Internal Medicine [phone]   B[place] [facility]ST   Attending Internal Medicine [phone], Laura Diaz DO Specialist Pain Management [phone]     Signatures   Electronically signed by : Johana Taylor RN; Feb 16 2017  1:02PM EST                       (Author)    Electronically signed by : Johana Taylor RN; Feb 16 2017  2:09PM EST                       (Author)

## 2018-01-19 ENCOUNTER — ALLSCRIPTS OFFICE VISIT (OUTPATIENT)
Dept: OTHER | Facility: OTHER | Age: 63
End: 2018-01-19

## 2018-01-20 NOTE — CONSULTS
Assessment   1  Type 2 diabetes mellitus, uncontrolled (250 02) (E11 65)    Plan   Type 2 diabetes mellitus, uncontrolled    · From  NovoLOG 100 UNIT/ML Subcutaneous Solution INJECT 50  UNITS    SUBQUTANEOUSLY THREE TIMES DAILY To NovoLOG 100 UNIT/ML Subcutaneous    Solution 50 units with breakfast, 50 unis with lunch, 55 units    with dinner   Rx By: Marcela Franklin; Dispense: 29 Days ; #:4 X 10 ML Vial; Refill: 5;For: Type 2 diabetes mellitus, uncontrolled; DIVYA = N; Faxed To: [facility]   · *1 - [facility] DIABETES SELF MANAGEMENT TRAINING OUTPATIENT Co-Management  *     Status: Hold For - Scheduling  Requested for: 22CZY8198   Ordered; For: Type 2 diabetes mellitus, uncontrolled; Ordered By: Marcela Franklin Performed:  Due: 98KFW8523  requires instruction : No  Needs requiring Individual DSMT? : No  Self-Management Education/Trainng : Individual Education  Care Summary provided  : Yes   · *1 - [facility] NUTRITION THERAPY FOR DIABETES Co-Management  *  Status:    Need Information - Financial Authorization  Requested for: [id number]   Ordered; For: Type 2 diabetes mellitus, uncontrolled; Ordered By: Marcela Franklin Performed:  Due: 06RUG2940  Care Summary provided  : Yes   · Follow-up visit in 1 month Evaluation and Treatment  Follow-up  Status: Complete  Done:    86NAY0155   Ordered; For: Type 2 diabetes mellitus, uncontrolled; Ordered By: Marcela Franklin Performed:  Due: 36FBM7707; Last Updated By: Daniela Coyne; 1/19/2018 9:13:59 AM    Discussion/Summary   Discussion Summary:    1  Type 2 diabetes uncontrolled based on A1c: My goal for his A1c would be closer to 7  Reviewing the patient's blood sugar logs on his phone show when he does have hyperglycemia over 200, this occurs at bedtime  He does note that his dinner is his biggest meal of the day  I would continue his Lantus 50 units twice a day, since he is on a large dose of insulin in general, I have asked him to try Toujeo 50 units twice a day   I have provided samples firm for this and we can prescribe this if it appears appropriate in the future  I will continue NovoLog 50 units with breakfast and lunch, but increase NovoLog to 55 units for dinner  Continue metformin  He will send in logs in about 2 weeks  I have also ordered a diagnostic tatiana CGM trial for further evaluation of his blood sugars  He will meet with diabetes education and nutrition  Otherwise he is up-to-date on lab work and other maintenance  Follow-up in 1 month  Counseling Documentation With Imm: The patient was counseled regarding diagnostic results,-- instructions for management,-- impressions  Medication SE Review and Pt Understands Tx: The treatment plan was reviewed with the patient/guardian  The patient/guardian understands and agrees with the treatment plan      Chief Complaint   Chief Complaint Free Text Note Form: consult      History of Present Illness   HPI: 60Year old male history of type 2 diabetes since about 2000 presents to establish care  Diabetes is complicated by sleep apnea, hyperlipidemia, obesity, hypertension, peripheral neuropathy with history of lower extremity edema and cellulitis  Initially he was treated with Avandia, but he developed weight gain and swelling on this  He currently takes metformin 500 mg twice a day, Lantus 50 units twice a day, NovoLog 50 units with meals  He denies hypoglycemia  Denies polyuria, polydipsia, nocturia  Reports history of diabetes in the family with his mother  He has not seen diabetes education or nutrition in a while  He follows regularly with Podiatry and allergy        Review of Systems   Endo Adult ROS Male New Patient:      Constitutional/General: no change in ring size,-- no change in shoe size,-- chills,-- no dizziness,-- no fainting,-- no fatigue,-- no fever,-- forgetfulness,-- no headache,-- no loss of sleep,-- no recent weight loss,-- no nervousness,-- numbness,-- temperature intolerance,-- no excessive sweating-- and-- no weight gain  Muscle/Joint/Bone: back pain,-- leg pain,-- foot pain,-- back weakness-- and-- leg weakness, but-- no arm pain,-- no hip pain,-- no neck pain,-- no hand pain,-- no shoulder pain,-- no arm weakness,-- no hip weakness,-- no foot weakness,-- no neck weakness,-- no hand weakness,-- no shoulder weakness,-- no arm numbness,-- no back numbness,-- no hip numbness,-- no leg numbness,-- no foot numbness,-- no neck numbness,-- no hand numbness-- and-- no shoulder numbness  Gastrointestinal: diarrhea, but-- no constipation,-- no excessive hunger,-- no excessive thirst,-- no nausea,-- no poor appetite,-- no rectal bleeding,-- no stomach pain,-- no vomiting-- and-- no vomiting blood  Cardiovascular: hypertension-- and-- rapid heart beat, but-- no chest pain,-- no irregular heart beat,-- no hypotension,-- no poor circulation-- and-- no ankle swelling  Eye/Ear/Nose/Throat: sinus problems, but-- no bleeding gums,-- no blurred vision,-- no difficulty swallowing,-- no double vision,-- no gritty eyes,-- no hoarseness,-- no hearing loss,-- no persistent cough,-- not seeing flashes-- and-- not seeing halos  Skin: bruising easily, but-- no hives,-- no itching,-- no change in a mole,-- no rashes,-- no scar-- and-- no slow healing sores  Genitourinary no blood in urine,-- no frequent urination,-- no night time urination-- and-- no painful urination  Genitourinary - Reproductive erection difficulties, but-- no breast lump  ROS Reviewed:    ROS reviewed  Active Problems   1  Benign essential hypertension (401 1) (I10)   2  BPH (benign prostatic hyperplasia) (600 00) (N40 0)   3  Cerebrovascular accident (CVA) with involvement of left side of body (434 91) (I63 9)   4  Chronic venous insufficiency (459 81) (I87 2)   5  Esophagitis, reflux (530 11) (K21 0)   6  Hyperlipidemia (272 4) (E78 5)   7  Morbid obesity due to excess calories (278 01) (E66 01)   8   Paroxysmal atrial fibrillation (427 31) (I48 0)   9  Restrictive lung disease (518 89) (J98 4)   10  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)   11  Sleep apnea (780 57) (G47 30)   12  Type 2 diabetes mellitus, uncontrolled (250 02) (E11 65)    Past Medical History   1  History of Allergic symptoms (995 3) (T78 40XA)   2  History of Dextrocardia (746 87)   3  History of High cholesterol (272 0) (E78 00)   4  History of cardiac arrhythmia (V12 59) (Z86 79)   5  History of diabetes mellitus (V12 29) (Z86 39)   6  History of gastroesophageal reflux (GERD) (V12 79) (Z87 19)   7  History of hypertension (V12 59) (Z86 79)   8  History of sinusitis (V12 69) (Z87 09)   9  History of sleep apnea (V13 89) (Z86 69)   10  History of stroke (V12 54) (Z86 73)   11  History of Need for immunization against influenza (V04 81) (Z23)  Active Problems And Past Medical History Reviewed: The active problems and past medical history were reviewed and updated today  Surgical History   1  History of Cholecystectomy   2  History of Gallbladder Surgery   3  History of Hernia Repair  Surgical History Reviewed: The surgical history was reviewed and updated today  Family History   Father    1  Family history of diabetes mellitus (V18 0) (Z83 3)   2  Family history of malignant neoplasm (V16 9) (Z80 9)  Paternal Grandmother    3  Family history of diabetes mellitus (V18 0) (Z83 3)   4  Family history of emphysema (V17 6) (Z82 5)  Maternal Grandfather    5  Family history of malignant neoplasm (V16 9) (Z80 9)  Paternal Grandfather    6  Family history of heart attack (V17 3) (Z82 49)  Family History    7  Family history of hypertension (V17 49) (Z82 49)   8  Family history of neuropathy (V17 2) (Z82 0)   9  Denied: Family history of substance abuse   10  Denied: Family history of Mental problem  Family History Reviewed: The family history was reviewed and updated today         Social History    · Completed 12th grade   · Daily caffeine consumption, 2-3 servings a day   · Dental care, regularly   ·    · Exercise: Cycling   · Lives with parents   · Living Situation: Supportive and safe   · Never A Smoker   · No advance directives (V49 89) (Z78 9)   · No drug use   · Social alcohol use (Z78 9)   · Unemployed (V62 0) (Z56 0)  Social History Reviewed: The social history was reviewed and updated today  Current Meds    1  AmLODIPine Besylate 10 MG Oral Tablet; TAKE ONE TABLET BY MOUTH ONCE DAILY; Therapy: 35DVR5566 to (Evaluate:68Oje1316)  Requested for: 26Oos8908; Last     Rx:84Tsf1073 Ordered   2  Atorvastatin Calcium 80 MG Oral Tablet; TAKE ONE TABLET BY MOUTH AT BEDTIME; Therapy: 00VTZ4197 to (Evaluate:12Wkh6462)  Requested for: 95Xeg2792; Last     Rx:34Lca0747 Ordered   3  Centrum Silver Ultra Mens TABS; TAKE 1 TABLET DAILY; Therapy: (Recorded:80Cid9742) to Recorded   4  Eliquis 5 MG Oral Tablet; take one tablet by mouth twice daily; Therapy: 91KVY7453 to (ZenPayrolle Simmonds)  Requested for: 71EQN3029; Last     Rx:11Oct2017 Ordered   5  Fish Oil CAPS; Therapy: (Recorded:19Jan2018) to Recorded   6  Flecainide Acetate 100 MG Oral Tablet; TAKE ONE TABLET BY MOUTH EVERY 12     HOURS DAILY; Therapy: 88OVM8846 to (ZenPayrolle Simmonds)  Requested for: 91PPD0873; Last     Rx:15Jan2018; Status: ACTIVE - Retrospective Authorization Ordered   7  Furosemide 20 MG Oral Tablet; take 1 tablet daily prn edema; Therapy: 28PAA7237 to (Evaluate:21Apr2018)  Requested for: 79DOZ2733; Last     Rx:22Nov2017 Ordered   8  Gabapentin 300 MG Oral Capsule; TAKE ONE CAPSULE BY MOUTH TWICE DAILY; Therapy: 15AZN1180 to (Evaluate:19Mar2018)  Requested for: 30TZR5223; Last     Rx:05Shr2836 Ordered   9  Lantus SoloStar 100 UNIT/ML Subcutaneous Solution Pen-injector; INJECT 50 UNITS     TWICE DAILY; Therapy: 01Apr2016 to (Evaluate:14Apr2018)  Requested for: 20PXY0925; Last     Rx:56Leu9232 Ordered   10   MetFORMIN HCl - 500 MG Oral Tablet; take 1 tablet twice daily with food; Therapy: 51VAL2132 to (Evaluate:11Vxa2539)  Requested for: 37GBB9972; Last      Rx:83Zch7976 Ordered   11  Metoprolol Succinate ER 25 MG Oral Tablet Extended Release 24 Hour; TAKE ONE      TABLET BY MOUTH ONCE DAILY; Therapy: 63IXC1941 to (Prashant Cardona)  Requested for: 03AIA0424; Last      Rx:09Nov2017 Ordered   12  Montelukast Sodium 10 MG Oral Tablet; TAKE ONE TABLET BY MOUTH ONCE DAILY; Therapy: 91Bxq0137 to Marley Cabello)  Requested for: 18BZM6769; Last      Rx:10Jan2018 Ordered   13  Myrbetriq 50 MG Oral Tablet Extended Release 24 Hour; 1 qd  by dr Kameron Brandon; Therapy: 69ICF0590 to Recorded   14  NovoLOG 100 UNIT/ML Subcutaneous Solution; INJECT 50  UNITS SUBQUTANEOUSLY      THREE TIMES DAILY; Therapy: 92Ffv1484 to (Evaluate:35Uwi2596)  Requested for: 30AJH2022 Recorded   15  Quinapril HCl - 40 MG Oral Tablet; TAKE ONE TABLET BY MOUTH ONCE DAILY AS      DIRECTED; Therapy: 53KFZ8370 to (Evaluate:12Ttk3625)  Requested for: 88Hkq6060; Last      Rx:09Rlb1355 Ordered   16  RaNITidine HCl - 150 MG Oral Tablet; TAKE ONE TABLET BY MOUTH EVERY 12 HOURS; Therapy: 62IEU5439 to (Evaluate:30Jan2018)  Requested for: 57Rnm2427; Last      Rx:16Nmy4071 Ordered   17  Tamsulosin HCl - 0 4 MG Oral Capsule; 1 qd; Therapy: 91ILM9067 to Recorded   18  Vitamin D3 2000 UNIT Oral Tablet; 1 Tab daily; Therapy: 68QPJ8406 to Recorded  Medication List Reviewed: The medication list was reviewed and updated today  Allergies   1  Codeine Derivatives  2  FRUIT    Vitals   Vital Signs    Recorded: 01BOZ2063 08:34AM   Heart Rate 60   Systolic 710   Diastolic 64   Height 5 ft 9 in   Weight 352 lb 8 oz   BMI Calculated 52 06   BSA Calculated 2 63     Physical Exam        Constitutional      General appearance: No acute distress, well appearing and well nourished         Eyes      Conjunctiva and lids: No swelling, erythema, or discharge  Pupils: Equal, round and reactive to light  The sclera are anicteric  Extraocular movements are intact  Ears, Nose, Mouth, and Throat      Neck: The neck is supple  The thyroid is normal in size with no palpable nodules  Pulmonary      Respiratory effort: No increased work of breathing or signs of respiratory distress  Auscultation of lungs: Clear to auscultation bilaterally with normal chest expansion  Cardiovascular      Auscultation of heart: Normal rate and rhythm with no murmurs, gallops or rubs  Examination of extremities for edema and/or varicosities: Abnormal  -- Edema bilateral lower extremities  Abdomen      Abdomen: Abdomen is soft, non-tender with normal bowel sounds  Lymphatic      Palpation of lymph nodes: No supraclavicular or suboccipital lymphadenopathy  Skin      Skin and subcutaneous tissue: Abnormal  -- Erythematous chronic venous stasis changes bilateral lower extremities        Psychiatric      Orientation to person, place and time: Normal        Mood and affect: Affect and attention span are normal        Results/Data   (1) HEMOGLOBIN A1C 26EFC5174 12:00AM Sheri Lovelace      Test Name Result Flag Reference   HEMOGLOBIN A1C 8 1        Summary / No summary entered :        No summary entered  Documents attached :        [address] Work - Sheri Lovelace; Enc: [id number] - Image Encounter - Ayazpatricia Fletcher -        (Internal Medicine) (Additional Information Document)  (1) COMPREHENSIVE METABOLIC PANEL 97UVI1142 69:08UW Sheri Lovelace      Test Name Result Flag Reference   Glucose, Serum 97 mg/dL  65-99   BUN 16 mg/dL  8-27   Creatinine, Serum 0 83 mg/dL  0 76-1 27   BUN/Creatinine Ratio 19  10-24   Sodium, Serum 144 mmol/L  134-144   Potassium, Serum 4 1 mmol/L  3 5-5 2   Chloride, Serum 101 mmol/L     Carbon Dioxide, Total 26 mmol/L  18-29   Calcium, Serum 8 9 mg/dL  8 6-10 2   Protein, Total, Serum 6 7 g/dL  6 0-8 5   Albumin, Serum 4 3 g/dL  3 6-4 8   Globulin, Total 2 4 g/dL  1 5-4 5   A/G Ratio 1 8  1 2-2 2   Bilirubin, Total 0 6 mg/dL  0 0-1 2   Alkaline Phosphatase, S 62 IU/L     AST (SGOT) 27 IU/L  0-40   ALT (SGPT) 25 IU/L  0-44   eGFR If NonAfricn Am 94 mL/min/1 73  >59   eGFR If Africn Am 108 mL/min/1 73  >59      (1) HEMOGLOBIN A1C 25Quy5864 07:04AM Anil Bernstein      Test Name Result Flag Reference   Hemoglobin A1c 8 3 % H 4 8-5 6   Pre-diabetes: 5 7 - 6 4              Diabetes: >6 4              Glycemic control for adults with diabetes: <7 0      (1) LIPID PANEL FASTING W DIRECT LDL REFLEX 19Sep2017 07:04AM Anil Bernstein      Test Name Result Flag Reference   Cholesterol, Total 133 mg/dL  100-199   Triglycerides 117 mg/dL  0-149   HDL Cholesterol 47 mg/dL  >39   LDL Cholesterol Calc 63 mg/dL  0-99      *VB - Foot Exam 87Hlf7330 12:00AM    [address] [place] [address]      Test Name Result Flag Reference   FOOT EXAM 8/9/17        Summary / No summary entered :        No summary entered  Documents attached :        sPodiatry - Anil Bernstein; Enc: [id number] - Form Encounter - Anil Bernstein -        (Internal Medicine) (Additional Information Document)  (1) COMPREHENSIVE METABOLIC PANEL 58XBQ0364 48:79OL Anil Bernstein      Test Name Result Flag Reference   Glucose, Serum 118 mg/dL H 65-99   BUN 15 mg/dL  8-27   Creatinine, Serum 0 92 mg/dL  0 76-1 27   BUN/Creatinine Ratio 16  10-24   Sodium, Serum 144 mmol/L  134-144   Potassium, Serum 4 3 mmol/L  3 5-5 2   Chloride, Serum 100 mmol/L     Carbon Dioxide, Total 27 mmol/L  18-29   Calcium, Serum 9 2 mg/dL  8 6-10 2   Protein, Total, Serum 7 1 g/dL  6 0-8 5   Albumin, Serum 4 6 g/dL  3 6-4 8   Globulin, Total 2 5 g/dL  1 5-4 5   A/G Ratio 1 8  1 2-2 2   Bilirubin, Total 0 6 mg/dL  0 0-1 2   Alkaline Phosphatase, S 65 IU/L     AST (SGOT) 21 IU/L  0-40   ALT (SGPT) 21 IU/L  0-44   eGFR If NonAfricn Am 89 mL/min/1 73  >59   eGFR If Africn Am 103 mL/min/1 73 >59      (1) HEMOGLOBIN A1C 11QES1405 10:05AM Mere Worley      Test Name Result Flag Reference   Hemoglobin A1c 7 1 % H 4 8-5 6   Pre-diabetes: 5 7 - 6 4              Diabetes: >6 4              Glycemic control for adults with diabetes: <7 0      [facility] Exam 63LIB2373 12:00AM       Test Name Result Flag Reference   Retinopathy Screening      No Retinopathy on exam      Summary / No summary entered :        No summary entered  Documents attached :        sOpthalmology - Mere Worley; Godfrey Aguilar: [id number] - Image Encounter - Deny Meza - (Internal Medicine) (Additional Information Document)  Future Appointments      Date/Time Provider Specialty Site   04/04/2018 11:45 AM KIERSTEN Ng   Internal Medicine [facility] INTERNAL MED     Signatures    Electronically signed by : KIERSTEN Henderson ; Jan 19 2018  9:18AM EST                       (Author)

## 2018-01-22 VITALS
BODY MASS INDEX: 46.65 KG/M2 | WEIGHT: 315 LBS | DIASTOLIC BLOOD PRESSURE: 62 MMHG | SYSTOLIC BLOOD PRESSURE: 140 MMHG | HEIGHT: 69 IN | OXYGEN SATURATION: 95 % | HEART RATE: 73 BPM

## 2018-01-22 VITALS
BODY MASS INDEX: 46.65 KG/M2 | SYSTOLIC BLOOD PRESSURE: 122 MMHG | HEIGHT: 69 IN | WEIGHT: 315 LBS | DIASTOLIC BLOOD PRESSURE: 84 MMHG | HEART RATE: 70 BPM | TEMPERATURE: 98 F

## 2018-01-22 VITALS
BODY MASS INDEX: 46.65 KG/M2 | DIASTOLIC BLOOD PRESSURE: 64 MMHG | HEART RATE: 60 BPM | HEIGHT: 69 IN | WEIGHT: 315 LBS | SYSTOLIC BLOOD PRESSURE: 140 MMHG

## 2018-01-23 ENCOUNTER — GENERIC CONVERSION - ENCOUNTER (OUTPATIENT)
Dept: OTHER | Facility: OTHER | Age: 63
End: 2018-01-23

## 2018-01-24 VITALS
TEMPERATURE: 97.6 F | HEART RATE: 71 BPM | OXYGEN SATURATION: 94 % | WEIGHT: 253 LBS | HEIGHT: 69 IN | DIASTOLIC BLOOD PRESSURE: 88 MMHG | SYSTOLIC BLOOD PRESSURE: 150 MMHG | BODY MASS INDEX: 37.47 KG/M2 | RESPIRATION RATE: 20 BRPM

## 2018-01-24 NOTE — MISCELLANEOUS
Assessment   1  Paroxysmal atrial fibrillation (427 31) (I48 0)1   2  Morbid obesity due to excess calories (278 01) (E66 01)1   3  Restrictive lung disease (518 89) (J98 4)1   4  Type 2 diabetes mellitus, uncontrolled (250 02) (E11 65)1      1 Amended By: Maggie Syed; Feb 09 2017 1:46 PM EST    Discussion/Summary  Discussion Summary:   Decrease lasix to 40 mg a day  take it daily  call and schedule stress test, order given to you by cardiology  schedule appt with pulmonary and keep this  1      weight loss is important  reconsider pulmonary rehab after stress test is done2    Goals and Barriers: The patient has the current Juan Benito  The patent has the current Barriers:1  Katherne Slot        1 Amended By: Maggie Syed; Feb 09 2017 1:57 PM EST   2 Amended By: Maggie Syed; Feb 09 2017 1:59 PM EST    Chief Complaint  Chief Complaint Free Text Note Form: t here today for toc1        1 Amended By: Joe Leone; Feb 09 2017 1:44 PM EST    History of Present Illness  TCM [facility]: The patient is being contacted for follow-up after hospitalization and spoke to PT on Monday Feb 6 2017  He was hospitalized at [facility]  The date of admission: Jan 31,2017, date of discharge: Feb 4,2017  Diagnosis: Atrial Fibrillation  He was discharged to home  Medications reviewed and updated today  He scheduled a follow up appointment  Follow-up appointments with other specialists: Dr Angela Snyder on Feb 9,2017  Communication performed and completed by TAMIKO Heredia   HPI: According to PT - he's doing OK - when I spoke with him - he was getting ready to go to Pulmonary rehab  Has no VNA coming in  Also has follow up appointments with Wound care, Cardiology, and Pulmonary disease doctors  No problems or concerns at this time  Review of Systems  Complete-Male:   Constitutional:1  No fever or chills, feels well, no tiredness, no recent weight gain or weight loss1      Eyes:1  No complaints of eye pain, no red eyes, no discharge from eyes, no itchy eyes1   ENT:1  no complaints of earache, no hearing loss, no nosebleeds, no nasal discharge, no sore throat, no hoarseness1   Cardiovascular: 1  No complaints of slow heart rate, no fast heart rate, no chest pain, no palpitations, no leg claudication, no lower extremity1   Respiratory:1  No complaints of shortness of breath, no wheezing, no cough, no SOB on exertion, no orthopnea or PND1   Gastrointestinal:1  No complaints of abdominal pain, no constipation, no nausea or vomiting, no diarrhea or bloody stools1   Genitourinary:1  No complaints of dysuria, no incontinence, no hesitancy, no nocturia, no genital lesion, no testicular pain1   Musculoskeletal:1  No complaints of arthralgia, no myalgias, no joint swelling or stiffness, no limb pain or swelling1   Integumentary:1  No complaints of skin rash or skin lesions, no itching, no skin wound, no dry skin1   Neurological:1  No compliants of headache, no confusion, no convulsions, no numbness or tingling, no dizziness or fainting, no limb weakness, no difficulty walking1   Psychiatric:1  Is not suicidal, no sleep disturbances, no anxiety or depression, no change in personality, no emotional problems1   Endocrine:1  No complaints of proptosis, no hot flashes, no muscle weakness, no erectile dysfunction, no deepening of the voice, no feelings of weakness1   Hematologic/Lymphatic:1  No complaints of swollen glands, no swollen glands in the neck, does not bleed easily, no easy bruising1         1 Amended By: Jose Juan Law; Feb 09 2017 1:46 PM EST    Active Problems   1  1   2  1   3  Back pain, lumbosacral (724 2,724 6) (M54 5)  4  Benign essential hypertension (401 1) (I10)  5  BPH (benign prostatic hyperplasia) (600 00) (N40 0)  6  Cerebrovascular accident (CVA) with involvement of left side of body (434 91) (I63 9)  7  Chronic venous insufficiency (459 81) (I87 2)  8   Esophagitis, reflux (530 11) (K21 0)  9  Hyperlipidemia (272 4) (E78 5)  10  Denied: History of Mental health disorder  6  Morbid obesity due to excess calories (278 01) (E66 01)  12  No advance directives (V49 89) (Z78 9)  13  Paroxysmal atrial fibrillation (427 31) (I48 0)  14  Restrictive lung disease (518 89) (J98 4)  15  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)  16  Sleep apnea (780 57) (G47 30)  17  1   18  Type 2 diabetes mellitus, uncontrolled (250 02) (E11 65)     1 Amended By: Larinda Ganser; Feb 09 2017 1:46 PM EST    Past Medical History   1  History of Allergic symptoms (995 3) (T78 40XA)  2  History of Dextrocardia (746 87)  3  History of High cholesterol (272 0) (E78 00)  4  History of diabetes mellitus (V12 29) (Z86 39)  5  History of gastroesophageal reflux (GERD) (V12 79) (Z87 19)  6  History of hypertension (V12 59) (Z86 79)  7  History of sinusitis (V12 69) (Z87 09)  8  History of sleep apnea (V13 89) (Z86 69)  9  History of stroke (V12 54) (Z86 73)  10  Denied: History of Mental health disorder  6  History of Need for immunization against influenza (V04 81) (Z23)    Surgical History   1  History of Cholecystectomy  2  History of Gallbladder Surgery  3  History of Hernia Repair    Family History  Father   1  Family history of diabetes mellitus (V18 0) (Z83 3)  2  Family history of malignant neoplasm (V16 9) (Z80 9)  Paternal Grandmother   3  Family history of diabetes mellitus (V18 0) (Z83 3)  4  Family history of emphysema (V17 6) (Z82 5)  Maternal Grandfather   5  Family history of malignant neoplasm (V16 9) (Z80 9)  Paternal Grandfather   6  Family history of heart attack (V17 3) (Z82 49)  Family History   7  Family history of hypertension (V17 49) (Z82 49)  8  Family history of neuropathy (V17 2) (Z82 0)  9  Denied: Family history of substance abuse  10   Denied: Family history of Mental problem    Social History    · Completed 12th grade   · Dental care, regularly   ·    · Exercise: Cycling · Lives with parents   · Living Situation: Supportive and safe   · Never A Smoker   · No advance directives (V49 89) (Z78 9)   · No advance directives (V49 89) (Z78 9)   · No drug use   · Social alcohol use (Z78 9)   · Unemployed (V62 0) (Z56 0)    Current Meds  1  Accu-Chek Alejandra Plus In Vitro Strip; TESTING 4 TIMES DAILY; Therapy: 42JFV4981 to (Alea Raphael)  Requested for: 90BMJ4761; Last   Rx:01Jun2016 Ordered  2  Accu-Chek Alejandra Plus w/Device Kit; testing twice daily; Therapy: 93KIE3667 to (Last QM:08IXL4728)  Requested for: 44SZQ8765 Ordered  3  Accu-Chek FastClix Lancets Miscellaneous; TEST FOUR TIMES A DAY; Therapy: 29Lwf6185 to (Last Rx:11Apr2016)  Requested for: 13Evk1389 Ordered  4  Advair Diskus 250-50 MCG/DOSE Inhalation Aerosol Powder Breath Activated; INHALE 1   PUFF TWICE DAILY; Therapy: 37JJD3449 to (Last Rx:05Jan2017) Ordered  5  Amiodarone HCl - 200 MG Oral Tablet; Take 1 tablet daily;1    Therapy: 19XRP0618 to (Evaluate:76Iad1100)  Requested for: 56HQQ5511; Last Rx:08Feb2017 Ordered1   6  Atorvastatin Calcium 80 MG Oral Tablet (Lipitor); TAKE 1 TABLET AT BEDTIME; Therapy: 20KLE1583 to (Vernal Angst)  Requested for: 52Dkr5900; Last   Rx:44Xfx6616 Ordered  7  Betamethasone Dipropionate Aug 0 05 % External Cream; APPLY  AND RUB  IN A THIN   FILM TO AFFECTED AREAS TWICE DAILY  (AM AND PM); Therapy: 36SCN4914 to (Last Rx:09Jun2016)  Requested for: 95HKD0141 Ordered  8  Centrum Silver Ultra Mens TABS; TAKE 1 TABLET DAILY; Therapy: (Recorded:80Zfa3510) to Recorded  9  Eliquis 5 MG Oral Tablet; Take 1 tablet twice daily; Therapy: 34NTK7284 to (Evaluate:14Gyg1637)  Requested for: 38CIK3113; Last   Rx:08Feb2017 Ordered1   10  Furosemide 80 MG Oral Tablet; Take 1 tablet daily; Therapy: 19KYT7079 to (Evaluate:43Que8405) Recorded  11  Gabapentin 300 MG Oral Capsule; TAKE ONE CAPSULE BY MOUTH TWICE DAILY;     Therapy: 89NCR1021 to (Simeon Henry)  Requested for: 18JBX7655; Last    Rx:09Jan2017 Ordered  12  Lantus SoloStar 100 UNIT/ML Subcutaneous Solution Pen-injector; INJECT 50 UNITS    TWICE DAILY AS DIRECTED; Therapy: 85Hoe5933 to (Last Rx:34Cad2022)  Requested for: 71Idb4281 Ordered  13  MetFORMIN HCl - 1000 MG Oral Tablet; take one tablet by mouth twice daily; Therapy: 99DEV7067 to (David Riff)  Requested for: 55DAS4379; Last    Rx:09Jan2017 Ordered  14  Metoprolol Tartrate 50 MG Oral Tablet; TAKE 1 TABLET TWICE DAILY; Therapy: 77PVG2013 to (Evaluate:25Vtw3505); Last Rx:12Dyj7137 Ordered1   15  Montelukast Sodium 10 MG Oral Tablet (Singulair); take 1 tablet by mouth every day; Therapy: 02Kqq9678 to (Evaluate:21Apr2017)  Requested for: 19Pam7288; Last    Rx:06Jcq3642 Ordered  16  Myrbetriq 50 MG Oral Tablet Extended Release 24 Hour; 1 qd  by dr Brent Velasquez; Therapy: 20KFZ2725 to Recorded  17  NovoLOG 100 UNIT/ML Subcutaneous Solution; inject 45 units 3 times daily; Therapy: 38Rqf0474 to (Last Rx:96Zur6710)  Requested for: 10Tle5695 Ordered  18  RaNITidine HCl - 150 MG Oral Tablet; TAKE ONE TABLET BY MOUTH EVERY 12 HOURS; Therapy: 05XDA4469 to (David Riff)  Requested for: 30WWF2982; Last    Rx:09Jan2017 Ordered  19  Tamsulosin HCl - 0 4 MG Oral Capsule; 1 qd; Therapy: 07MNC4941 to Recorded     1 Amended By: Jacob Mills; Feb 09 2017 1:51 PM EST    Allergies   1  Codeine Derivatives    Vitals  Signs   Recorded: 60KWL5997 01:30PM    Temperature: 98 F, Tympanic 1    Heart Rate: 70 1    Systolic: 054, LUE, Sitting 1    Diastolic: 84, LUE, Sitting 1    Height: 5 ft 9 in 1    Weight: 333 lb 5 oz 1    BMI Calculated: 49 22 1    BSA Calculated: 2 57 1      1 Amended By: Jacob Mills; Feb 09 2017 1:46 PM EST    Physical Exam    Constitutional1    General appearance: No acute distress, well appearing and well nourished  1    Eyes1    Conjunctiva and lids: No swelling, erythema, or discharge  1    Pupils and irises: Equal, round and reactive to light  1    Ears, Nose, Mouth, and Throat1    External inspection of ears and nose: Normal 1    Otoscopic examination: Tympanic membrance translucent with normal light reflex  Canals patent without erythema  1    Nasal mucosa, septum, and turbinates: Normal without edema or erythema  1    Oropharynx: Normal with no erythema, edema, exudate or lesions  1    Pulmonary1    Respiratory effort: No increased work of breathing or signs of respiratory distress  1    Auscultation of lungs: Clear to auscultation, equal breath sounds bilaterally, no wheezes, no rales, no rhonci  1    Cardiovascular1    Palpation of heart: Normal PMI, no thrills  1    Auscultation of heart: Normal rate and rhythm, normal S1 and S2, without murmurs  1    Examination of extremities for edema and/or varicosities: Normal 1    Carotid pulses: Normal 1    Abdomen1    Abdomen: Non-tender, no masses  1    Liver and spleen: No hepatomegaly or splenomegaly  1    Lymphatic1    Palpation of lymph nodes in neck: No lymphadenopathy  1    Musculoskeletal1    Gait and station: Normal 1    Digits and nails: Normal without clubbing or cyanosis  1    Inspection/palpation of joints, bones, and muscles: Normal 1    Skin1    Skin and subcutaneous tissue: Normal without rashes or lesions  1    Neurologic1    Cranial nerves: Cranial nerves 2-12 intact  1    Reflexes: 2+ and symmetric  1    Sensation: No sensory loss  1    Psychiatric1    Orientation to person, place and time: Normal 1    Mood and affect: Normal 1          1 Amended By: Isabel Mello; Feb 09 2017 1:46 PM EST    Health Management  Type 2 diabetes mellitus, uncontrolled   *VB - Eye Exam; every 1 year; Last 23UHU9574; Next Due: 63BPE5189; Active    Future Appointments    Date/Time Provider Specialty Site   02/09/2017 01:30 PM KIERSTEN Wiseman  Internal Medicine [facility] INTERNAL MED   03/14/2017 11:15 AM KIERSTEN Wiseman   Internal Medicine [facility] INTERNAL MED   02/08/2017 11:40 AM KIERSTEN Davies   Cardiology  CARDIOLOGY [facility]   03/24/2017 10:00 AM Dianna Schuler DO Pulmonary Medicine [facility]     Signatures   Electronically signed by : KIERSTEN Arana ; Feb 6 2017 12:55PM EST                          Electronically signed by : KIERSTEN Arana ; Feb 9 2017  2:00PM EST                       (Author)

## 2018-01-26 ENCOUNTER — TELEPHONE (OUTPATIENT)
Dept: ENDOCRINOLOGY | Facility: HOSPITAL | Age: 63
End: 2018-01-26

## 2018-01-30 ENCOUNTER — TELEPHONE (OUTPATIENT)
Dept: ENDOCRINOLOGY | Facility: HOSPITAL | Age: 63
End: 2018-01-30

## 2018-01-30 DIAGNOSIS — E11.8 CONTROLLED TYPE 2 DIABETES MELLITUS WITH COMPLICATION, WITH LONG-TERM CURRENT USE OF INSULIN (HCC): Primary | ICD-10-CM

## 2018-01-30 DIAGNOSIS — Z79.4 CONTROLLED TYPE 2 DIABETES MELLITUS WITH COMPLICATION, WITH LONG-TERM CURRENT USE OF INSULIN (HCC): Primary | ICD-10-CM

## 2018-01-30 NOTE — TELEPHONE ENCOUNTER
Kain Edgar should be scheduled now  I will send in Lantus to [address] [facility]  Thanks

## 2018-01-30 NOTE — TELEPHONE ENCOUNTER
Patient called bc he said we called him about the [place]  He doesn't think the Toujao 50 units is working and would like to get lantus at [facility] in hospitals  Do you want the [place] scheduled now or wait until after the insulin is adjusted?

## 2018-02-06 DIAGNOSIS — J45.909 MILD ASTHMA WITHOUT COMPLICATION, UNSPECIFIED WHETHER PERSISTENT: ICD-10-CM

## 2018-02-06 DIAGNOSIS — K21.9 GASTROESOPHAGEAL REFLUX DISEASE WITHOUT ESOPHAGITIS: Primary | ICD-10-CM

## 2018-02-07 RX ORDER — RANITIDINE 150 MG/1
TABLET ORAL
Qty: 60 TABLET | Refills: 5 | Status: SHIPPED | OUTPATIENT
Start: 2018-02-07 | End: 2018-09-10 | Stop reason: SDUPTHER

## 2018-02-07 RX ORDER — MONTELUKAST SODIUM 10 MG/1
TABLET ORAL
Qty: 30 TABLET | Refills: 0 | Status: SHIPPED | OUTPATIENT
Start: 2018-02-07 | End: 2018-03-14 | Stop reason: SDUPTHER

## 2018-02-08 ENCOUNTER — OFFICE VISIT (OUTPATIENT)
Dept: ENDOCRINOLOGY | Facility: HOSPITAL | Age: 63
End: 2018-02-08
Payer: COMMERCIAL

## 2018-02-08 DIAGNOSIS — E11.8 CONTROLLED DIABETES MELLITUS TYPE 2 WITH COMPLICATIONS, UNSPECIFIED LONG TERM INSULIN USE STATUS: Primary | ICD-10-CM

## 2018-02-08 PROCEDURE — 95250 CONT GLUC MNTR PHYS/QHP EQP: CPT | Performed by: INTERNAL MEDICINE

## 2018-02-08 NOTE — PROGRESS NOTES
Continous glucose monitoring tatiana placement     Date/Time 2/8/2018 12:45 PM     Performed by  Екатерина Rizo by Meliton Mcnulty       Consent: Verbal consent obtained  Written consent obtained    Risks and benefits: risks, benefits and alternatives were discussed  Consent given by: patient  Patient understanding: patient states understanding of the procedure being performed  Patient consent: the patient's understanding of the procedure matches consent given  Procedure consent: procedure consent matches procedure scheduled  Relevant documents: relevant documents present and verified  Required items: required blood products, implants, devices, and special equipment available  Patient identity confirmed: verbally with patient      Local anesthesia used: no     Anesthesia   Local anesthesia used: no     Sedation   Patient sedated: no      Specimen: no    Culture: no    Patient tolerance: Patient tolerated the procedure well with no immediate complications      Comments: Serial # [id number] Lot: [id number] Exp: 3/31/18

## 2018-02-09 ENCOUNTER — TELEPHONE (OUTPATIENT)
Dept: ENDOCRINOLOGY | Facility: HOSPITAL | Age: 63
End: 2018-02-09

## 2018-02-19 RX ORDER — AMLODIPINE BESYLATE 10 MG/1
10 TABLET ORAL DAILY
COMMUNITY
End: 2018-09-05 | Stop reason: SDUPTHER

## 2018-02-19 RX ORDER — MAG HYDROX/ALUMINUM HYD/SIMETH 400-400-40
SUSPENSION, ORAL (FINAL DOSE FORM) ORAL DAILY
COMMUNITY
End: 2018-04-04 | Stop reason: CLARIF

## 2018-02-19 RX ORDER — CHLORAL HYDRATE 500 MG
1000 CAPSULE ORAL DAILY
COMMUNITY
End: 2021-01-01 | Stop reason: HOSPADM

## 2018-02-19 RX ORDER — FLECAINIDE ACETATE 100 MG/1
100 TABLET ORAL 2 TIMES DAILY
COMMUNITY
End: 2018-05-16 | Stop reason: SDUPTHER

## 2018-02-19 RX ORDER — FLUTICASONE PROPIONATE 50 MCG
1 SPRAY, SUSPENSION (ML) NASAL DAILY
COMMUNITY
End: 2021-01-01 | Stop reason: HOSPADM

## 2018-02-21 ENCOUNTER — ANESTHESIA (OUTPATIENT)
Dept: PERIOP | Facility: HOSPITAL | Age: 63
End: 2018-02-21
Payer: COMMERCIAL

## 2018-02-21 ENCOUNTER — HOSPITAL ENCOUNTER (OUTPATIENT)
Facility: HOSPITAL | Age: 63
Setting detail: OUTPATIENT SURGERY
Discharge: HOME | End: 2018-02-21
Attending: INTERNAL MEDICINE | Admitting: INTERNAL MEDICINE
Payer: COMMERCIAL

## 2018-02-21 ENCOUNTER — ANESTHESIA EVENT (OUTPATIENT)
Dept: PERIOP | Facility: HOSPITAL | Age: 63
End: 2018-02-21
Payer: COMMERCIAL

## 2018-02-21 VITALS
RESPIRATION RATE: 18 BRPM | SYSTOLIC BLOOD PRESSURE: 139 MMHG | HEART RATE: 60 BPM | OXYGEN SATURATION: 95 % | TEMPERATURE: 97.7 F | BODY MASS INDEX: 45.1 KG/M2 | HEIGHT: 70 IN | DIASTOLIC BLOOD PRESSURE: 64 MMHG | WEIGHT: 315 LBS

## 2018-02-21 DIAGNOSIS — Z12.11 ENCOUNTER FOR SCREENING FOR MALIGNANT NEOPLASM OF COLON: ICD-10-CM

## 2018-02-21 PROCEDURE — 88305 TISSUE EXAM BY PATHOLOGIST: CPT | Performed by: PATHOLOGY

## 2018-02-21 PROCEDURE — 88305 TISSUE EXAM BY PATHOLOGIST: CPT | Performed by: INTERNAL MEDICINE

## 2018-02-21 RX ORDER — SODIUM CHLORIDE 9 MG/ML
20 INJECTION, SOLUTION INTRAVENOUS CONTINUOUS
Status: DISCONTINUED | OUTPATIENT
Start: 2018-02-21 | End: 2018-02-21 | Stop reason: HOSPADM

## 2018-02-21 RX ORDER — PROPOFOL 10 MG/ML
INJECTION, EMULSION INTRAVENOUS AS NEEDED
Status: DISCONTINUED | OUTPATIENT
Start: 2018-02-21 | End: 2018-02-21 | Stop reason: SURG

## 2018-02-21 RX ADMIN — SODIUM CHLORIDE 20 ML/HR: 0.9 INJECTION, SOLUTION INTRAVENOUS at 07:05

## 2018-02-21 RX ADMIN — PROPOFOL 30 MG: 10 INJECTION, EMULSION INTRAVENOUS at 08:38

## 2018-02-21 RX ADMIN — PROPOFOL 40 MG: 10 INJECTION, EMULSION INTRAVENOUS at 08:31

## 2018-02-21 RX ADMIN — PROPOFOL 20 MG: 10 INJECTION, EMULSION INTRAVENOUS at 08:13

## 2018-02-21 RX ADMIN — PROPOFOL 30 MG: 10 INJECTION, EMULSION INTRAVENOUS at 08:23

## 2018-02-21 RX ADMIN — PROPOFOL 30 MG: 10 INJECTION, EMULSION INTRAVENOUS at 08:41

## 2018-02-21 RX ADMIN — PROPOFOL 30 MG: 10 INJECTION, EMULSION INTRAVENOUS at 08:48

## 2018-02-21 RX ADMIN — PROPOFOL 30 MG: 10 INJECTION, EMULSION INTRAVENOUS at 08:28

## 2018-02-21 RX ADMIN — PROPOFOL 30 MG: 10 INJECTION, EMULSION INTRAVENOUS at 08:20

## 2018-02-21 RX ADMIN — PROPOFOL 30 MG: 10 INJECTION, EMULSION INTRAVENOUS at 08:17

## 2018-02-21 RX ADMIN — PROPOFOL 30 MG: 10 INJECTION, EMULSION INTRAVENOUS at 08:15

## 2018-02-21 RX ADMIN — PROPOFOL 30 MG: 10 INJECTION, EMULSION INTRAVENOUS at 08:26

## 2018-02-21 RX ADMIN — PROPOFOL 30 MG: 10 INJECTION, EMULSION INTRAVENOUS at 08:34

## 2018-02-21 RX ADMIN — PROPOFOL 30 MG: 10 INJECTION, EMULSION INTRAVENOUS at 08:45

## 2018-02-21 RX ADMIN — PROPOFOL 40 MG: 10 INJECTION, EMULSION INTRAVENOUS at 08:09

## 2018-02-21 RX ADMIN — PROPOFOL 30 MG: 10 INJECTION, EMULSION INTRAVENOUS at 08:51

## 2018-02-21 RX ADMIN — PROPOFOL 20 MG: 10 INJECTION, EMULSION INTRAVENOUS at 08:11

## 2018-02-21 NOTE — ANESTHESIA PREPROCEDURE EVALUATION
Review of Systems/Medical History  Patient summary reviewed  Chart reviewed      Cardiovascular  Exercise tolerance: poor,  Hyperlipidemia, Hypertension controlled, CAD, , Dysrhythmias, atrial fibrillation,    Pulmonary  Asthma: well controlled/ stable , Shortness of breath, Sleep apnea ,        GI/Hepatic    GERD well controlled,        Prostatic disorder, benign prostatic hyperplasia       Endo/Other  Diabetes well controlled type 2 Insulin,   Obesity  super morbid obesity   GYN       Hematology  Negative hematology ROS      Musculoskeletal    Arthritis     Neurology    CVA , no residual symptoms,    Psychology     Chronic pain           Physical Exam    Airway    Mallampati score: III  TM Distance: <3 FB  Neck ROM: full     Dental   No notable dental hx     Cardiovascular  Rhythm: irregular, Rate: normal,     Pulmonary  Decreased breath sounds,     Other Findings        Anesthesia Plan  ASA Score- 3     Anesthesia Type- IV sedation with anesthesia with ASA Monitors  Additional Monitors:   Airway Plan:         Plan Factors-    Induction- intravenous  Postoperative Plan-     Informed Consent- Anesthetic plan and risks discussed with patient

## 2018-02-21 NOTE — OP NOTE
**** GI/ENDOSCOPY REPORT ****     PATIENT NAME: Mary Meadows ------ VISIT ID:  Patient ID:   [id number] YOB: 1955     INTRODUCTION: Colonoscopy - A 60 male patient presents for an outpatient   Colonoscopy at [facility]  PREVIOUS COLONOSCOPY: Patient's last colonoscopy was more than 10 years   ago  INDICATIONS: Screening-ADR  CONSENT:  The benefits, risks, and alternatives to the procedure were   discussed and informed consent was obtained from the patient  PREPARATION: EKG, pulse, pulse oximetry and blood pressure were monitored   throughout the procedure  The patient was identified by myself both   verbally and by visual inspection of ID band  Airway Assessment   Classification: Airway class 2 - Visualization of the soft palate, fauces   and uvula  ASA Classification: See anesthesia record  MEDICATIONS: Anesthesia-check records     PROCEDURE:  The endoscope was passed with difficulty through the anus   under direct visualization and advanced to the cecum, confirmed by   appendiceal orifice and ileocecal valve  The patient required   counterpressure to aid in the passage of the scope  The scope was   withdrawn and the mucosa was carefully examined  The quality of the   preparation was fair  Cecal Intubation Time: 32 minutes(s) Scope   Withdrawal Time: 8 minutes(s)     RECTAL EXAM: Normal rectal exam  No masses  FINDINGS:  Five flat and sessile polyps, measuring 4-9 mm in size, were   found in the descending colon and sigmoid colon  It was not bleeding  All polyps were completely removed by snare cautery polypectomy  The   polyps were retrieved  Three sessile polyps, measuring 5-8 mm in size,   were found in the ascending colon and transverse colon  The polyps were   removed by snare cautery polypectomy  On retroflexed view, internal   hemorrhoids were found  COMPLICATIONS: There were no complications       IMPRESSIONS: Five flat and sessile polyps found in the descending colon   and sigmoid colon; all polyps removed by snare cautery polypectomy  Three   sessile polyps found in the ascending colon and transverse colon; removed   by snare cautery polypectomy  Internal hemorrhoids  RECOMMENDATIONS: Resume regular diet as tolerated  Continue taking the   following medications as prescribed: Restart Eliquis tomorrow  Colonoscopy   recommended in 5 years  Call if any signs or symptoms of abdominal pain,   bleeding or diverticulitis  ESTIMATED BLOOD LOSS: None  PATHOLOGY SPECIMENS: All polyps completely removed by snare cautery   polypectomy  Removed by snare cautery polypectomy  PROCEDURE CODES: : Colonoscopy with snare polypectomy     ICD-9 Codes: 211 3 Benign neoplasm of colon 211 3 Benign neoplasm of colon     ICD-10 Codes: K63 5 Polyp of colon K63 5 Polyp of colon K64 9 Unspecified   hemorrhoids     PERFORMED BY: KIERSTEN Roldan  on 02/21/2018  Version 1, electronically signed by KIERSTEN Roldan  on 02/21/2018   at 09:04

## 2018-02-27 ENCOUNTER — TRANSCRIBE ORDERS (OUTPATIENT)
Dept: FAMILY MEDICINE CLINIC | Facility: HOSPITAL | Age: 63
End: 2018-02-27

## 2018-02-27 ENCOUNTER — OFFICE VISIT (OUTPATIENT)
Dept: DIABETES SERVICES | Facility: HOSPITAL | Age: 63
End: 2018-02-27
Payer: COMMERCIAL

## 2018-02-27 ENCOUNTER — OFFICE VISIT (OUTPATIENT)
Dept: ENDOCRINOLOGY | Facility: HOSPITAL | Age: 63
End: 2018-02-27
Payer: COMMERCIAL

## 2018-02-27 VITALS — BODY MASS INDEX: 45.1 KG/M2 | HEIGHT: 70 IN | WEIGHT: 315 LBS

## 2018-02-27 VITALS
BODY MASS INDEX: 45.1 KG/M2 | SYSTOLIC BLOOD PRESSURE: 164 MMHG | HEART RATE: 80 BPM | WEIGHT: 315 LBS | DIASTOLIC BLOOD PRESSURE: 70 MMHG | HEIGHT: 70 IN

## 2018-02-27 DIAGNOSIS — E78.5 HYPERLIPIDEMIA, UNSPECIFIED HYPERLIPIDEMIA TYPE: ICD-10-CM

## 2018-02-27 DIAGNOSIS — IMO0001 UNCONTROLLED TYPE 2 DIABETES MELLITUS WITHOUT COMPLICATION, WITHOUT LONG-TERM CURRENT USE OF INSULIN: Primary | ICD-10-CM

## 2018-02-27 DIAGNOSIS — I10 ESSENTIAL HYPERTENSION: ICD-10-CM

## 2018-02-27 DIAGNOSIS — Z79.4 UNCONTROLLED TYPE 2 DIABETES MELLITUS WITH HYPERGLYCEMIA, WITH LONG-TERM CURRENT USE OF INSULIN (HCC): Primary | ICD-10-CM

## 2018-02-27 DIAGNOSIS — S90.412A ABRASION OF LEFT GREAT TOE, INITIAL ENCOUNTER: ICD-10-CM

## 2018-02-27 DIAGNOSIS — E11.65 UNCONTROLLED TYPE 2 DIABETES MELLITUS WITH HYPERGLYCEMIA, WITH LONG-TERM CURRENT USE OF INSULIN (HCC): Primary | ICD-10-CM

## 2018-02-27 DIAGNOSIS — E11.49 OTHER DIABETIC NEUROLOGICAL COMPLICATION ASSOCIATED WITH TYPE 2 DIABETES MELLITUS (HCC): ICD-10-CM

## 2018-02-27 DIAGNOSIS — B35.1 TINEA UNGUIUM: Primary | ICD-10-CM

## 2018-02-27 DIAGNOSIS — G47.33 OBSTRUCTIVE SLEEP APNEA: ICD-10-CM

## 2018-02-27 PROCEDURE — 97802 MEDICAL NUTRITION INDIV IN: CPT | Performed by: DIETITIAN, REGISTERED

## 2018-02-27 PROCEDURE — 99214 OFFICE O/P EST MOD 30 MIN: CPT | Performed by: NURSE PRACTITIONER

## 2018-02-27 NOTE — PATIENT INSTRUCTIONS
Please check blood sugars 4 times daily and for the record to the office in 1 week for review and adjustment, if necessary  A Liseth device was placed today to obtain blood glucose information  Please be careful of the area to keep it intact on your skin  For now, continue current regimen with Lantus 50 units twice daily and NovoLog 50 units at breakfast and lunch and 55 units at dinner  Be mindful of diet  Increase activity/exercise as tolerated  Your blood pressure was elevated in the office today  Continue current medication regimen and monitor blood pressure  Please make all attempts to utilize your CPAP regularly  Complete prescribed lab work

## 2018-02-27 NOTE — PROGRESS NOTES
Follow Up - Anbial Garcia  60 y o  male MRN: [id number] @ Encounter: [id number]      Assessment/Plan     Assessment: This is a 60y o -year-old male with uncontrolled type 2 diabetes with hypertension, hyperlipidemia and obstructive sleep apnea  Plan:  1  Uncontrolled type 2 diabetes with hyperglycemia and long-term insulin use: There is limited blood sugar information available to make any changes to his present diabetes medication regimen  His Liseth device was replaced at the office visit today  He will return in 2 weeks to have it removed and reports processed  I have also asked him to check his blood sugars 4 times daily and provide a record to the office in 2 weeks for review so that adjustments may be made to his regimen  Discussed the impact of diet and weight loss on the treatment of his diabetes  He will be following up with diabetes education after his office visit today  Check hemoglobin A1c, TSH and free T4     2   Hypertension:  His blood pressure is slightly elevated in the office today  Continue current medication regimen  Continue to monitor at home  Check comprehensive metabolic panel and urine microalbumin  3   Hyperlipidemia:  Continue atorvastatin and fish oil  Check fasting lipid panel  4   Obstructive sleep apnea:  Discussed utilizing his CPAP on a regular basis to improve his fatigue and glycemic control  CC: Diabetes Follow Up    History of Present Illness     HPI: 60 y o  male presents in the office today for follow up of Type 2 Diabetes  he states he has been diagnosed with Type 2 Diabetes for 20 years and is checking blood glucose readings sporadically  Review of his most recent blood sugars shows only 6 readings in the past 2 weeks  He ran out of blood glucose test strips due to financials reasons  He claims to have He was in the midst of continuous glucose monitor testing with a Liseth device and states that device fell off after 2 days    he denies episodes of hypoglycemia, polydipsia, polyphagia and polyuria  Lab Results   Component Value Date    HGBA1C 8 3 (H) 09/19/2017   Current Diabetic medication regimen is as follows:  Metformin 500 mg twice daily  Lantus 50 units twice daily  NovoLog 50 units at breakfast and lunch and 55 units at dinner  he states he is up to date with his annual diabetic eye exam, last was July 2017, and denies retinopathy  He states he does have cataracts and glaucoma  he complains about numbness, tingling and dryness to his feet and follows [facility] for regular diabetic foot care every 12 weeks or so  For his hypertension, he takes amlodipine 10 mg daily, Furosemide 80 mg daily, Quinapril 40 mg daily and metoprolol 50 mg twice daily  He states he checks his blood pressure at home  His hyperlipidemia is treated with atorvastatin 80 mg daily and fish oil 1 g daily  For his obstructive sleep apnea, he is utilizing CPAP on an irregular basis  Consults    Review of Systems   Constitutional: Positive for fatigue (generalized)  Negative for chills and fever  HENT: Positive for ear pain and postnasal drip (longstanding in winter)  Negative for congestion and sore throat  Respiratory: Positive for shortness of breath (dyspnea on exertion)  Negative for cough  Cardiovascular: Negative for chest pain and palpitations  Gastrointestinal: Negative for abdominal pain, constipation, diarrhea, nausea and vomiting  Endocrine: Positive for cold intolerance (at times) and polyuria (Lasix)  Negative for heat intolerance, polydipsia and polyphagia  Genitourinary: Positive for urgency (at times-on Tamilosin)  Musculoskeletal: Positive for back pain (longstanding/chronic)  Skin: Negative  Allergic/Immunologic: Negative  Neurological: Negative for dizziness, syncope, light-headedness and headaches  Hematological: Negative  Psychiatric/Behavioral: Negative      All other systems reviewed and are negative        Historical Information   Past Medical History:   Diagnosis Date    Arthritis     Asthma     BPH (benign prostatic hyperplasia)     Cardiac disease     Chronic a-fib (HCC)     Chronic pain     CVA (cerebral vascular accident) ([address]     Dextrocardia     Diabetes mellitus ([address]     Hyperlipidemia     Hypertension     Sleep apnea      Past Surgical History:   Procedure Laterality Date    CHOLECYSTECTOMY      EXPLORATORY LAPAROTOMY  1971    TN COLONOSCOPY FLX DX W/COLLJ SPEC WHEN PFRMD N/A 2/21/2018    Procedure: COLONOSCOPY;  Surgeon: Durga Maher MD;  Location: [facility] OR;  Service: Gastroenterology    UMBILICAL HERNIA REPAIR  2007     Social History   History   Alcohol Use    0 6 oz/week    1 Glasses of wine per week     History   Drug Use No     History   Smoking Status    Never Smoker   Smokeless Tobacco    Never Used     Comment: Occasionally smoked a pipe while fishing     Family History:   Family History   Problem Relation Age of Onset    Coronary artery disease Father        Meds/Allergies   Current Outpatient Prescriptions   Medication Sig Dispense Refill    amiodarone 200 mg tablet Take 1 tablet by mouth 2 (two) times a day for 30 days 60 tablet 0    amLODIPine (NORVASC) 10 mg tablet Take 10 mg by mouth daily      apixaban (ELIQUIS) 5 mg Take 1 tablet by mouth 2 (two) times a day for 30 days 60 tablet 0    atorvastatin (LIPITOR) 80 mg tablet Take 80 mg by mouth      Cholecalciferol (VITAMIN D3) 5000 units CAPS Take by mouth daily      flecainide (TAMBOCOR) 100 mg tablet Take 100 mg by mouth 2 (two) times a day      fluticasone (FLONASE) 50 mcg/act nasal spray 1 spray into each nostril daily      fluticasone-salmeterol (ADVAIR) 250-50 mcg/dose inhaler Inhale 1 puff every 12 (twelve) hours      furosemide (LASIX) 80 mg tablet Take 1 tablet by mouth daily for 30 days (Patient not taking: Reported on 4/30/2017 ) 30 tablet 3    gabapentin (NEURONTIN) 300 mg capsule Take by mouth 2 (two) times a day        insulin aspart (NOVOLOG) 100 units/mL injection Inject under the skin      insulin glargine (LANTUS SOLOSTAR) injection pen 100 units/mL Inject 0 5 mL (50 Units total) under the skin 2 (two) times a day for 30 days 10 pen 5    metFORMIN (GLUCOPHAGE) 500 mg tablet Take 500 mg by mouth 2 (two) times a day with meals      metoprolol tartrate (LOPRESSOR) 50 mg tablet Take 1 tablet by mouth every 12 (twelve) hours for 30 days (Patient taking differently: Take 25 mg by mouth every 12 (twelve) hours  ) 60 tablet 0    Mirabegron ER (MYRBETRIQ) 50 MG TB24 Take 1 tablet by mouth daily      montelukast (SINGULAIR) 10 mg tablet TAKE ONE TABLET BY MOUTH ONCE DAILY 30 tablet 0    Multiple Vitamins-Minerals (CENTRUM SILVER ULTRA MENS PO) Take by mouth      Omega-3 Fatty Acids (FISH OIL) 1,000 mg Take 1,000 mg by mouth daily      quinapril (ACCUPRIL) 40 MG tablet Take 40 mg by mouth daily at bedtime      ranitidine (ZANTAC) 150 mg tablet TAKE ONE TABLET BY MOUTH EVERY 12 HOURS 60 tablet 5    tamsulosin (FLOMAX) 0 4 mg Take by mouth daily with dinner        tiotropium (SPIRIVA) 18 mcg inhalation capsule Place 18 mcg into inhaler and inhale daily       No current facility-administered medications for this visit  Allergies   Allergen Reactions    Banana Shortness Of Breath    Clotrimazole     Codeine      Other reaction(s): Itching    Hydrocodone-Acetaminophen     Oxycodone Rash     Other reaction(s): Pruritis    Oxycodone-Acetaminophen Rash     Other reaction(s): Pruritis       Objective   Vitals: There were no vitals taken for this visit  Physical Exam   Constitutional: He is oriented to person, place, and time  He appears well-developed and well-nourished  No distress  Obese   HENT:   Head: Normocephalic and atraumatic     Nose: Nose normal    Mouth/Throat: Oropharynx is clear and moist    Eyes: Conjunctivae and EOM are normal  Pupils are equal, round, and reactive to light  Neck: Normal range of motion  Neck supple  No thyromegaly present  Cardiovascular: Normal rate, normal heart sounds and intact distal pulses  Pulmonary/Chest: Effort normal and breath sounds normal  No respiratory distress  Abdominal: Soft  Bowel sounds are normal  He exhibits no distension  There is no tenderness  Musculoskeletal: Normal range of motion  He exhibits edema  Neurological: He is alert and oriented to person, place, and time  He displays normal reflexes  No cranial nerve deficit  Skin: Skin is warm and dry  There is erythema  Lower extremites with +1-2 edema   Psychiatric: He has a normal mood and affect  His behavior is normal  Judgment and thought content normal    Vitals reviewed  Lab Results:       Lab Results   Component Value Date    WBC 5 10 02/02/2017    HGB 12 1 02/02/2017    HCT 38 1 02/02/2017    MCV 95 02/02/2017     02/02/2017     Lab Results   Component Value Date/Time    BUN 16 09/19/2017 07:04 AM     09/19/2017 07:04 AM    K 4 1 09/19/2017 07:04 AM     09/19/2017 07:04 AM    CO2 26 09/19/2017 07:04 AM    CREATININE 0 83 09/19/2017 07:04 AM    AST 27 09/19/2017 07:04 AM    ALT 25 09/19/2017 07:04 AM    ALB 4 3 09/19/2017 07:04 AM     No results for input(s): CHOL, HDL, LDL, TRIG, VLDL in the last 72 hours  No results found for: Mason Eller  POC Glucose (mg/dl)   Date Value   02/04/2017 154 (H)   02/04/2017 79   02/03/2017 115   02/03/2017 68   02/03/2017 108   02/03/2017 129   02/03/2017 122   02/02/2017 155 (H)   02/02/2017 139   02/02/2017 113       Portions of the record may have been created with voice recognition software

## 2018-02-27 NOTE — PROGRESS NOTES
Medical Nutrition Therapy     Assessment    Visit Type: Initial visit  Chief complaint:  Type 2     HPI:  Met with Cyndee Langston for initial MNT, Type 2 Diabetes many years, states education group classes at  and nothing since  Lives with his mom, states mom does all of the cooking and shopping and he has little say in anything  Discussed that while he does not have control over what is prepared that he does have control over his quantity he eats of it, that was met by a blank stare  Expresses very little desire to change, discussed that we just ask that he tries his best      Food recall shows primary issues: eats out every day for breakfast, carbs vary 30-75g and usually high fat meal  Lunch is decent, dinner varies a lot with large portions  Together we discussed what foods contain CHO, reading a food label, serving sizes, and set a carb goal of 45-60g CHO/meal to promote weight loss with 15g snacks  Put together sample meals for [facility] reference and evaluated [facility] current eating plan  Good understanding, Cyndee Langston will call with questions or for more education  F/u in early May after next Endo appt  Ht Readings from Last 1 Encounters:   02/27/18 5' 10" (1 778 m)     Wt Readings from Last 1 Encounters:   02/27/18 (!) 158 kg (348 lb 12 8 oz)     Weight Change: No     Lab Results   Component Value Date    HGBA1C 8 3 (H) 09/19/2017         Medical Diagnosis/ICD 10 Code:  E11 65    Barriers to Learning: no barriers    Do you follow any special diet presently?: No, lives with mom   Who shops: mother  Who cooks: mother    Food Log: Completed via the method of food recall    Breakfast: up around 5am-9:30am depending on how well he can sleep since the stroke   If up early will go out for breakfast and get an omelet and toast and hash browns and diet soda; or burger shanon burger shanon sausage cristant with double meat; no cereal, waffels twice a month, or 2 crisants with cream cheese at [facility]    Morning Snack:none Lunch:12:30-1:30: sandwich and half a can of soup with crackers a 5-6 of them club and diet soda diet tea iced tea or cute orange; Afternoon Snack: not much  Dinner: 5:30-6:30 meat starch veggie hot meal, mix of all starched but mostly a baked potato in the microwave  Out twice a month for dinner  Evening Snack: pretzels, nut mixes, seasame mix, fruit 1-2 cuties before bed, rare sweets   Beverages: water not much, diet soda, diet tea, no milk, no juice, just started coffee a few years ago after breakfast with flavored creamer vanilla,   Eating out/Take out: daily for breakfast  Exercise ADL,     Nutrition Diagnosis:  Food and nutrition related knowledge deficit  related to Lack of prior exposure to accurate nutrition related information as evidenced by Verbalizes inaccurate or incomplete information    Intervention: plate method, label reading, carbohydrate counting, meal planning and individualized meal plan     Treatment Goals: Patient understands education and recommendations    Education Material Given  Dorothy España was provided the Portion Booklet and Planning Healthy Meals     Monitoring and evaluation:    Term code indicator   1 6 3 Carbohydrate Intake Criteria: 45-60g CHO per meal, 15g CHO snacks    Patients Response to Instruction:  Tacos Almazan  Expected Compliancefair    Thank you for coming to the [facility] for education today  Please feel free to call with any questions or concerns      Yuni Andersen, [address] [place] [phone]

## 2018-03-06 ENCOUNTER — TELEPHONE (OUTPATIENT)
Dept: FAMILY MEDICINE CLINIC | Facility: HOSPITAL | Age: 63
End: 2018-03-06

## 2018-03-06 DIAGNOSIS — M43.06 LUMBAR SPONDYLOLYSIS: Primary | ICD-10-CM

## 2018-03-08 ENCOUNTER — OFFICE VISIT (OUTPATIENT)
Dept: ENDOCRINOLOGY | Facility: HOSPITAL | Age: 63
End: 2018-03-08
Payer: COMMERCIAL

## 2018-03-08 DIAGNOSIS — E11.8 TYPE 2 DIABETES MELLITUS WITH COMPLICATION, UNSPECIFIED LONG TERM INSULIN USE STATUS: Primary | ICD-10-CM

## 2018-03-08 PROCEDURE — 95251 CONT GLUC MNTR ANALYSIS I&R: CPT | Performed by: INTERNAL MEDICINE

## 2018-03-08 NOTE — PROGRESS NOTES
Continous glucose monitoring tatiana intrepretation     Date/Time 3/8/2018 2:27 PM     Performed by  Violeta Glover by Cammy Rodrigues       Consent: Verbal consent obtained  Written consent obtained    Risks and benefits: risks, benefits and alternatives were discussed  Consent given by: patient  Patient understanding: patient states understanding of the procedure being performed  Patient consent: the patient's understanding of the procedure matches consent given  Procedure consent: procedure consent matches procedure scheduled  Relevant documents: relevant documents present and verified  Test results: test results available and properly labeled  Site marked: the operative site was not marked  Imaging studies: imaging studies not available  Required items: required blood products, implants, devices, and special equipment available  Patient identity confirmed: verbally with patient      Local anesthesia used: no     Anesthesia   Local anesthesia used: no     Sedation   Patient sedated: no      Specimen: no    Culture: no

## 2018-03-12 DIAGNOSIS — Z79.4 CONTROLLED TYPE 2 DIABETES MELLITUS WITH COMPLICATION, WITH LONG-TERM CURRENT USE OF INSULIN (HCC): Primary | ICD-10-CM

## 2018-03-12 DIAGNOSIS — E11.8 CONTROLLED TYPE 2 DIABETES MELLITUS WITH COMPLICATION, WITH LONG-TERM CURRENT USE OF INSULIN (HCC): Primary | ICD-10-CM

## 2018-03-12 NOTE — TELEPHONE ENCOUNTER
Pt need's refill of his Novolog Vials, and Lantus Pens  Please refill to [facility] in [facility]

## 2018-03-14 DIAGNOSIS — R52 PAIN: Primary | ICD-10-CM

## 2018-03-14 DIAGNOSIS — J45.909 MILD ASTHMA WITHOUT COMPLICATION, UNSPECIFIED WHETHER PERSISTENT: ICD-10-CM

## 2018-03-15 RX ORDER — MONTELUKAST SODIUM 10 MG/1
TABLET ORAL
Qty: 30 TABLET | Refills: 0 | Status: SHIPPED | OUTPATIENT
Start: 2018-03-15 | End: 2018-04-04 | Stop reason: CLARIF

## 2018-03-15 RX ORDER — GABAPENTIN 300 MG/1
CAPSULE ORAL
Qty: 60 CAPSULE | Refills: 5 | Status: SHIPPED | OUTPATIENT
Start: 2018-03-15 | End: 2018-09-23 | Stop reason: SDUPTHER

## 2018-03-23 ENCOUNTER — OFFICE VISIT (OUTPATIENT)
Dept: OBGYN CLINIC | Facility: CLINIC | Age: 63
End: 2018-03-23
Payer: COMMERCIAL

## 2018-03-23 ENCOUNTER — APPOINTMENT (OUTPATIENT)
Dept: RADIOLOGY | Facility: CLINIC | Age: 63
End: 2018-03-23
Payer: COMMERCIAL

## 2018-03-23 VITALS
DIASTOLIC BLOOD PRESSURE: 82 MMHG | WEIGHT: 315 LBS | HEIGHT: 70 IN | SYSTOLIC BLOOD PRESSURE: 142 MMHG | HEART RATE: 82 BPM | BODY MASS INDEX: 45.1 KG/M2

## 2018-03-23 DIAGNOSIS — M25.561 ACUTE PAIN OF RIGHT KNEE: ICD-10-CM

## 2018-03-23 DIAGNOSIS — M54.5 LOW BACK PAIN, UNSPECIFIED BACK PAIN LATERALITY, UNSPECIFIED CHRONICITY, WITH SCIATICA PRESENCE UNSPECIFIED: ICD-10-CM

## 2018-03-23 DIAGNOSIS — M43.06 LUMBAR SPONDYLOLYSIS: ICD-10-CM

## 2018-03-23 DIAGNOSIS — S89.90XA KNEE INJURY, INITIAL ENCOUNTER: ICD-10-CM

## 2018-03-23 DIAGNOSIS — E66.01 MORBID OBESITY (HCC): ICD-10-CM

## 2018-03-23 DIAGNOSIS — M54.5 LOW BACK PAIN, UNSPECIFIED BACK PAIN LATERALITY, UNSPECIFIED CHRONICITY, WITH SCIATICA PRESENCE UNSPECIFIED: Primary | ICD-10-CM

## 2018-03-23 PROBLEM — M54.50 LOW BACK PAIN: Status: ACTIVE | Noted: 2018-03-23

## 2018-03-23 PROCEDURE — 73564 X-RAY EXAM KNEE 4 OR MORE: CPT

## 2018-03-23 PROCEDURE — 72110 X-RAY EXAM L-2 SPINE 4/>VWS: CPT

## 2018-03-23 PROCEDURE — 99205 OFFICE O/P NEW HI 60 MIN: CPT | Performed by: FAMILY MEDICINE

## 2018-03-23 RX ORDER — TRAMADOL HYDROCHLORIDE 50 MG/1
50 TABLET ORAL EVERY 8 HOURS PRN
Qty: 90 TABLET | Refills: 0 | Status: SHIPPED | OUTPATIENT
Start: 2018-03-23 | End: 2018-08-06 | Stop reason: SDUPTHER

## 2018-03-23 NOTE — ASSESSMENT & PLAN NOTE
X-ray demonstrates very mild degeneration of the lumbar spine with no evidence of spondylolisthesis  Recommend physical therapy for lumbar and core stretching and strengthening  Encouraged to continue with his weight loss as he has been doing

## 2018-03-23 NOTE — ASSESSMENT & PLAN NOTE
Knee pain secondary to bone contusion following his fall  X-ray demonstrates no loose body fragments, joint effusions or fractures  Continue to monitor for ongoing pain or any mechanical deficits of locking, clicking or giving out of the knee which may suggest a meniscus injury  For his pain, today I have provided him with tramadol and I have also recommended a referral to physical therapy

## 2018-03-23 NOTE — PROGRESS NOTES
Assessment:     1  Low back pain, unspecified back pain laterality, unspecified chronicity, with sciatica presence unspecified    2  Lumbar spondylolysis    3  Knee injury, initial encounter    4  Acute pain of right knee    5  Morbid obesity ([address] )        Plan:     Problem List Items Addressed This Visit     Morbid obesity [facility])     Patient tells me he has lost 10 lb in the past 3 months  I have encouraged him to continue with weight loss, as a 5 lb weight reduction can reduce up to 25 psi of pressure on the knees  Recommend following up with a dietitian if he is not already doing so  Acute pain of right knee     Knee pain secondary to bone contusion following his fall  X-ray demonstrates no loose body fragments, joint effusions or fractures  Continue to monitor for ongoing pain or any mechanical deficits of locking, clicking or giving out of the knee which may suggest a meniscus injury  For his pain, today I have provided him with tramadol and I have also recommended a referral to physical therapy  Relevant Medications    traMADol (ULTRAM) 50 mg tablet    Other Relevant Orders    Ambulatory referral to Physical Therapy    Knee injury, initial encounter     X-ray demonstrates very mild degeneration of the lumbar spine with no evidence of spondylolisthesis  Recommend physical therapy for lumbar and core stretching and strengthening  Encouraged to continue with his weight loss as he has been doing           Relevant Medications    traMADol (ULTRAM) 50 mg tablet    Other Relevant Orders    XR knee 4+ vw right injury    Ambulatory referral to Physical Therapy    Low back pain - Primary    Relevant Medications    traMADol (ULTRAM) 50 mg tablet    Other Relevant Orders    XR spine lumbar minimum 4 views non injury    Ambulatory referral to Physical Therapy      Other Visit Diagnoses     Lumbar spondylolysis        Relevant Medications    traMADol (ULTRAM) 50 mg tablet    Other Relevant Orders Ambulatory referral to Physical Therapy         Subjective:     Patient ID: Sera Zaldivar  is a 60 y o  male  Chief Complaint:  Patient is a 69-year-old male presenting today for evaluation of lower back pain and right knee pain  He reports falling in a parking lot 5 days ago onto his right knee  Since that time, he reports experiencing a throbbing, achy pain in the right knee which she rates as a 2/10 at rest   With any twisting or turning of the right leg and knee, his pain does increase to a 6/10  Pain is lower back is currently a 5/10 and also becomes worse with prolonged standing and ambulation  Pain in the lower back does radiate to the right buttocks region and also down the right hamstring muscle group  For wrist pain, he uses Neurontin and Tylenol which provides minimal to no relief  He does have a history of multiple falls with his previous fall current on January 2018 after slipping on ice  He also does report a history of a stroke 2 years ago  He currently denies any lower extremity crepitus, warmth or swelling  Karan Merle He denies any saddle anesthesia  He denies any loss of bowel bladder function        Allergy:  Allergies   Allergen Reactions    Banana Shortness Of Breath    Clotrimazole     Codeine      Other reaction(s): Itching    Hydrocodone-Acetaminophen     Oxycodone Rash     Other reaction(s): Pruritis    Oxycodone-Acetaminophen Rash     Other reaction(s): Pruritis     Medications:  all current active meds have been reviewed  Past Medical History:  Past Medical History:   Diagnosis Date    Arthritis     Asthma     BPH (benign prostatic hyperplasia)     Cardiac arrhythmia     resolved [id number]    Cardiac disease     Chronic a-fib (HCC)     Chronic pain     [facility]     Dextrocardia     Diabetes mellitus ([facility]     GERD (gastroesophageal reflux disease)     Hyperlipidemia     Hypertension     Sleep apnea     [facility] Past Surgical History:  Past Surgical History:   Procedure Laterality Date    CHOLECYSTECTOMY      EXPLORATORY LAPAROTOMY  1971    GALLBLADDER SURGERY      [place] COLONOSCOPY FLX DX W/COLLJ SPEC WHEN PFRMD N/A 2/21/2018    Procedure: COLONOSCOPY;  Surgeon: Amanda Nicholas MD;  Location: [facility];  Service: Gastroenterology    UMBILICAL HERNIA REPAIR  2007     Family History:  Family History   Problem Relation Age of Onset    Coronary artery disease Father     Hodgkin's lymphoma Father     Diabetes Father     Cancer Father     Diabetes type I Mother     Cancer Maternal Grandfather     Diabetes Paternal Grandmother     Emphysema Paternal Grandmother     Heart attack Paternal Grandfather     Hypertension Family     Neuropathy Family      Social History:  History   Alcohol Use    0 6 oz/week    1 Glasses of wine per week     Comment: 2-3 drinks a week, cherry liquer or beer      History   Drug Use No     History   Smoking Status    Never Smoker   Smokeless Tobacco    Never Used     Comment: Occasionally smoked a pipe while fishing     Review of Systems   Constitutional: Negative  HENT: Negative  Eyes: Negative  Respiratory: Negative  Cardiovascular: Negative  Gastrointestinal: Negative  Genitourinary: Negative  Musculoskeletal: Positive for arthralgias and myalgias  Skin: Negative  Allergic/Immunologic: Negative  Neurological: Negative  Hematological: Negative  Psychiatric/Behavioral: Negative  Objective:  BP Readings from Last 1 Encounters:   03/23/18 142/82      Wt Readings from Last 1 Encounters:   03/23/18 (!) 160 kg (353 lb)      BMI:   Estimated body mass index is 50 65 kg/m² as calculated from the following:    Height as of this encounter: 5' 10" (1 778 m)  Weight as of this encounter: 160 kg (353 lb)  BSA:   Estimated body surface area is 2 66 meters squared as calculated from the following:    Height as of this encounter: 5' 10" (1 778 m)  Weight as of this encounter: 160 kg (353 lb)  Physical Exam   Constitutional: He appears well-developed  Eyes: Pupils are equal, round, and reactive to light  Neck: Normal range of motion  Cardiovascular: Normal rate  Pulmonary/Chest: Effort normal    Musculoskeletal: He exhibits edema and tenderness  Neurological: He is alert  Skin: Skin is warm  Psychiatric: He has a normal mood and affect  Right Knee Exam     Tenderness   The patient is experiencing tenderness in the medial hamstring (Tibial tuberosity)  Range of Motion   Extension: normal   Flexion: normal     Tests   Gigi:  Medial - negative Lateral - negative  Lachman:  Anterior - negative    Posterior - negative  Varus: negative  Valgus: negative  Pivot Shift: negative  Patellar Apprehension: negative    Other   Erythema: absent  Pulse: present      Back Exam     Tenderness   The patient is experiencing tenderness in the lumbar and sacroiliac  Range of Motion   Extension: abnormal   Flexion: abnormal   Lateral Bend Right: abnormal   Lateral Bend Left: abnormal   Rotation Right: abnormal   Rotation Left: abnormal     Tests   Straight leg raise right: negative  Straight leg raise left: negative    Other   Toe Walk: normal  Heel Walk: normal  Sensation: decreased  Erythema: no back redness            I have personally reviewed pertinent films in PACS

## 2018-03-23 NOTE — ASSESSMENT & PLAN NOTE
Patient tells me he has lost 10 lb in the past 3 months  I have encouraged him to continue with weight loss, as a 5 lb weight reduction can reduce up to 25 psi of pressure on the knees  Recommend following up with a dietitian if he is not already doing so

## 2018-03-26 ENCOUNTER — TELEPHONE (OUTPATIENT)
Dept: ENDOCRINOLOGY | Facility: HOSPITAL | Age: 63
End: 2018-03-26

## 2018-03-26 NOTE — TELEPHONE ENCOUNTER
Reviewed blood sugar logs and suggest he continue his current regimen of Lantus 55 units twice a day and Novolog 55 units with meals as well as Metformin  His sugars are looking better  Please let him know  Thanks

## 2018-03-29 ENCOUNTER — EVALUATION (OUTPATIENT)
Dept: PHYSICAL THERAPY | Facility: CLINIC | Age: 63
End: 2018-03-29
Payer: COMMERCIAL

## 2018-03-29 DIAGNOSIS — M25.561 ACUTE PAIN OF RIGHT KNEE: ICD-10-CM

## 2018-03-29 DIAGNOSIS — M54.5 LOW BACK PAIN, UNSPECIFIED BACK PAIN LATERALITY, UNSPECIFIED CHRONICITY, WITH SCIATICA PRESENCE UNSPECIFIED: ICD-10-CM

## 2018-03-29 DIAGNOSIS — M43.06 LUMBAR SPONDYLOLYSIS: Primary | ICD-10-CM

## 2018-03-29 PROCEDURE — 97162 PT EVAL MOD COMPLEX 30 MIN: CPT | Performed by: PHYSICAL THERAPIST

## 2018-03-29 PROCEDURE — G8981 BODY POS CURRENT STATUS: HCPCS | Performed by: PHYSICAL THERAPIST

## 2018-03-29 PROCEDURE — G8982 BODY POS GOAL STATUS: HCPCS | Performed by: PHYSICAL THERAPIST

## 2018-03-29 NOTE — PROGRESS NOTES
PT Evaluation     Today's date: 3/29/2018  Patient name: Tej Montana  : 1955  MRN: [id number]  Referring provider: Rufus Jesus DO  Dx:   Encounter Diagnosis     ICD-10-CM    1  Lumbar spondylolysis M43 06 Ambulatory referral to Physical Therapy   2  Low back pain, unspecified back pain laterality, unspecified chronicity, with sciatica presence unspecified M54 5 Ambulatory referral to Physical Therapy   3  Acute pain of right knee M25 561 Ambulatory referral to Physical Therapy                  Assessment  Impairments: abnormal gait, abnormal muscle firing, abnormal or restricted ROM, impaired physical strength and pain with function    Assessment details: Pt is a 62 y o male coming to outpatient PT with low back pain and R knee pain  Pt presents with decreased lumbar ROM; decreased RLE strength; increased pain; gait and balance dysfunction; and overall decreased functional mobility  Pt would benefit from skilled PT services in order to address these deficits and reach maximum level of function  Thank you kindly for the referral!     Prognosis: good    Goals  ST  Pt will decrease pain levels by 3 points on NPS in 4 weeks  2  Pt will demonstrate improvement in RLE and core strength by 1/2 MMT grade in 4 weeks  3  Pt will be independent with HEP in 4 weeks  LT  Achieve FOTO score of 56/100 in 6 weeks for knee and 58/100 for low back in 6 weeks  2  Pt will be able to stand 8-10 minutes before having pain in low back in 6 weeks      Plan  Patient would benefit from: skilled PT  Planned modality interventions: cryotherapy  Planned therapy interventions: manual therapy, joint mobilization, neuromuscular re-education, patient education, postural training, therapeutic exercise, therapeutic activities, gait training, functional ROM exercises, abdominal trunk stabilization and activity modification  Frequency: 2x week  Duration in weeks: 6        Subjective Evaluation    History of Present Illness  Mechanism of injury: Pt reports he is in PT for his back and his R knee  Pt reports his back pain started when he was 13 he landed on the back of his L shoulders after flipping over his handle bars  Pt reports that since then his low back has been hurting him  He reports when he got real heavy his back started to hurt a lot  Pt reports that he is having some pain down his R buttock and down the back of the leg  Pt reports while sitting in the car the pressure makes it harder to stay seated  He reports he can't stand for more than 3-4 minutes without his back bothering him  Pt reports that in an office chair he can sit all day  Pt reports that his R knee started to bother him after he fell down  He then fell again 2 weeks ago and bothered him again  Pt reports that the  R knee has been feeling better since the 2nd fall  Pt ascends/descends stairs non-reciprocally with L leg leading due to pain and instability in R leg     Pain  At best pain ratin  At worst pain ratin  Aggravating factors: sitting, standing and walking    Treatments  No previous or current treatments  Patient Goals  Patient goals for therapy: decreased pain  Patient goal: to be able to go for walks again sometime        Objective     Active Range of Motion   Left Knee   Flexion: 110 degrees   Extension: 0 degrees     Right Knee   Flexion: 105 degrees   Extension: 0 degrees     Passive Range of Motion   Left Knee   Flexion: 110 degrees   Extension: 0 degrees     Right Knee   Flexion: 110 degrees   Extension: 0 degrees     Strength/Myotome Testing     Left Hip   Planes of Motion   Flexion: 3+  Extension: 3+    Right Hip   Planes of Motion   Flexion: 3+  Extension: 3+    Left Knee   Flexion: 3+  Extension: 3+    Right Knee   Flexion: 3+  Extension: 3+    Functional Assessment     Comments  TU seconds without Ad; SBA    General Comments     Lumbar Comments  STANDING  Observation/Posture = toe out b/l; increased lumbar lordosis  Gait assessment: decreased step length b/l; slightly antalgic; decreased mike  Functional Tests =     SLS (EO/EC) =  R: 3 secs - UE  L: 3 seconds - UE     Bilateral heel raise: 5 reps - difficulty     Bilateral toe raise: 5 reps - difficulty   Lumbar ROM:     Rotation (AROM; overpressure) = R: mod limitation  L: mod limitation     Flexion (AROM; overpressure) = mod limitation     Extension (AROM; overpressure) =  Mod limitation - increase in pain  SITTING  Reflexes =     L4: R not assessed L 1+/diminished    S1: 1+/diminished b/l    Clonus: absent  Slump test (at end): (-)  SUPINE:    SLR/Lasegue = (+) b/l  **unable to tolerate supine lying for long periods of time**      Aide's Most Recent Value   PT/OT G-Codes   Current Score  38   Projected Score  56   FOTO information reviewed  Yes   Assessment Type  Evaluation   G code set  Changing & Maintaining Body Position   Changing and Maintaining Body Position Current Status ()  CK   Changing and Maintaining Body Position Goal Status ()  CK          Daily Treatment Diary     Dx: LBP (with R sciatica); R knee pain  EPOC: 5/10/18  Precautions: DM Ii; cardiac disease; SOB quickly  CO-MORBIDITES: HTN  PERSONAL FACTORS: none    Manual  3/29            R Knee PROM                                                                     Exercise Diary  3/29            Recumbent Bike             Gastroc Stretch             HR/TR             Quad sets             LAQ             Standing Marches             HS curl             Standing Hip Abduction             Step Up/lateral             Mini squats             Iso Hip ABD             Iso Hip ADD             SLR             Seated hip add:DLS             Seated hip abd: DLS             Seated pball flexion stretch                                                        Modalities  3/29            CP - PRN

## 2018-03-31 LAB
ALBUMIN SERPL-MCNC: 4.5 G/DL (ref 3.6–4.8)
ALBUMIN/CREAT UR: 17.4 MG/G CREAT (ref 0–30)
ALBUMIN/GLOB SERPL: 1.8 {RATIO} (ref 1.2–2.2)
ALP SERPL-CCNC: 76 IU/L (ref 39–117)
ALT SERPL-CCNC: 23 IU/L (ref 0–44)
AST SERPL-CCNC: 19 IU/L (ref 0–40)
BILIRUB SERPL-MCNC: 0.6 MG/DL (ref 0–1.2)
BUN SERPL-MCNC: 17 MG/DL (ref 8–27)
BUN/CREAT SERPL: 21 (ref 10–24)
CALCIUM SERPL-MCNC: 9 MG/DL (ref 8.6–10.2)
CHLORIDE SERPL-SCNC: 100 MMOL/L (ref 96–106)
CHOLEST SERPL-MCNC: 123 MG/DL (ref 100–199)
CHOLEST/HDLC SERPL: 2.6 RATIO UNITS (ref 0–5)
CO2 SERPL-SCNC: 29 MMOL/L (ref 18–29)
CREAT SERPL-MCNC: 0.81 MG/DL (ref 0.76–1.27)
CREAT UR-MCNC: 111.8 MG/DL
EST. AVERAGE GLUCOSE BLD GHB EST-MCNC: 183 MG/DL
GLOBULIN SER-MCNC: 2.5 G/DL (ref 1.5–4.5)
GLUCOSE SERPL-MCNC: 165 MG/DL (ref 65–99)
HBA1C MFR BLD: 8 % (ref 4.8–5.6)
HDLC SERPL-MCNC: 48 MG/DL
LDLC SERPL CALC-MCNC: 53 MG/DL (ref 0–99)
MICROALBUMIN UR-MCNC: 19.5 UG/ML
POTASSIUM SERPL-SCNC: 4.2 MMOL/L (ref 3.5–5.2)
PROT SERPL-MCNC: 7 G/DL (ref 6–8.5)
SL AMB EGFR AFRICAN AMERICAN: 109 ML/MIN/1.73
SL AMB EGFR NON AFRICAN AMERICAN: 95 ML/MIN/1.73
SL AMB VLDL CHOLESTEROL CALC: 22 MG/DL (ref 5–40)
SODIUM SERPL-SCNC: 144 MMOL/L (ref 134–144)
T4 FREE SERPL-MCNC: 1.18 NG/DL (ref 0.82–1.77)
TRIGL SERPL-MCNC: 108 MG/DL (ref 0–149)
TSH SERPL DL<=0.005 MIU/L-ACNC: 4.09 UIU/ML (ref 0.45–4.5)

## 2018-04-02 ENCOUNTER — OFFICE VISIT (OUTPATIENT)
Dept: PHYSICAL THERAPY | Facility: CLINIC | Age: 63
End: 2018-04-02
Payer: COMMERCIAL

## 2018-04-02 DIAGNOSIS — M25.561 ACUTE PAIN OF RIGHT KNEE: ICD-10-CM

## 2018-04-02 DIAGNOSIS — M43.06 LUMBAR SPONDYLOLYSIS: Primary | ICD-10-CM

## 2018-04-02 DIAGNOSIS — M54.5 LOW BACK PAIN, UNSPECIFIED BACK PAIN LATERALITY, UNSPECIFIED CHRONICITY, WITH SCIATICA PRESENCE UNSPECIFIED: ICD-10-CM

## 2018-04-02 PROCEDURE — 97110 THERAPEUTIC EXERCISES: CPT

## 2018-04-02 PROCEDURE — 97010 HOT OR COLD PACKS THERAPY: CPT

## 2018-04-02 PROCEDURE — 97112 NEUROMUSCULAR REEDUCATION: CPT

## 2018-04-02 NOTE — PROGRESS NOTES
Daily Note     Today's date: 2018  Patient name: Isaiah Krishnan  : 1955  MRN: [id number]  Referring provider: Cherelle Gerber DO  Dx:   Encounter Diagnosis     ICD-10-CM    1  Lumbar spondylolysis M43 06    2  Low back pain, unspecified back pain laterality, unspecified chronicity, with sciatica presence unspecified M54 5    3  Acute pain of right knee M25 561                   Subjective: Pt reports he is still sore however, notes he was able to go up steps in SOS fashion for a few steps the other day  Objective: See treatment diary below      Assessment: Initiated above listed POC with fair tolerance and verbal cueing for form  Pt completed 5' on bike however, required an extra 5' secondary to breaks and adjusting L foot  May consider using TB for L foot nv  Pt declines TM as reports he cannot stand that long  T/o session - pt required frequent rest breaks to allow O2 sat to improve from low 90s range -- monitored via pulse ox  Pt arrived to session with c/o cramping in L gastrocs -- discussed with pt swapping out 1-2 sodas a day for water as pt reports he really doesn't drink water  Explained to pt importance of water for muscles to function well  Performed manuals noting fair tolerance (modified supine position)  Pt reports he was not given an HEP LV -- dispensed seated HR/TR, seated hip iso abd/add, and quad sets for home  Pt concluded with CP to LB and under R thigh -- pt reports his R hip/ posterior thigh has been sore since his LBP started  Plan: To monitor and progress as able nv       Daily Treatment Diary     Dx: LBP (with R sciatica); R knee pain  EPOC: 5/10/18  Precautions: DM Ii; cardiac disease; SOB quickly  CO-MORBIDITES: HTN  PERSONAL FACTORS: none    Manual  /            R Knee PROM 5'                                                                    Exercise Diary              Recumbent Bike 5' (vc'ing to breathe)            Gastroc Stretch 31"H2 HR/TR (seated) 15x ea            Quad sets 10"x10            LAQ 10"X10            Standing Marches  NV            HS curl   NV            Standing Hip Abduction             Step Up/lateral             Mini squats             Iso Hip ABD 10"X10            Iso Hip ADD 10"X10            SLR             Seated hip add:DLS             Seated hip abd: DLS             Seated pball flexion stretch                                                        Modalities  4/2             CP - PRN   (LB in seated) 10'

## 2018-04-03 PROBLEM — E11.8 TYPE 2 DIABETES MELLITUS WITH COMPLICATION (HCC): Status: ACTIVE | Noted: 2018-04-03

## 2018-04-03 NOTE — PROGRESS NOTES
Please call the patient regarding abnormal result  Thyroid function is normal  Fasting glucose is elevated on comprehensive metabolic panel at 615  Cholesterol is stable  Urine microalbumin is normal  Hemoglobin A1c is improved slightly to 8 0 but remains above goal   Please continue to check blood sugars and send in for evaluation and adjustment

## 2018-04-03 NOTE — PROGRESS NOTES
Assessment/Plan:    Type 2 diabetes mellitus, uncontrolled (HCC)  Followed by endocrine    Obstructive sleep apnea  followd by pulmonary    Atrial fibrillation (HCC)  Chronic and stable  On flecainide  Sees cardiology    Type 2 diabetes mellitus with complication ([facility] )  Follow up with endocrine  A1C not controlled  Discussed management with patient  Changes as per endo  Notes recent med changes and A1C not reflective  Has follow up scheduled  Essential hypertension  BP elevated today  Need to address  Will add ACE for better control    Morbid obesity ([facility] )  Weight management is chronic issue  Discussed management  Advised referral to dietitian if he desires  Patient Active Problem List   Diagnosis    [facility] fibrillation ([facility] )    Dextrocardia    CVA (cerebral vascular accident) ([facility] )    Essential hypertension    Cardiac disease    Hyperlipidemia    Sleep apnea    Morbid obesity ([facility] )    Diabetes mellitus type 2, controlled [facility]  )    Cellulitis of extremity    Type 2 diabetes mellitus, uncontrolled ([facility] )    Benign essential hypertension    Obstructive sleep apnea    Acute pain of right knee    Knee injury, initial encounter    Low back pain    Type 2 diabetes mellitus with complication ([place] [address] )     Orders Placed This Encounter   Procedures    Flecainide level            Diagnoses and all orders for this visit:    Type 2 diabetes mellitus with complication, unspecified long term insulin use status (HCC)    Essential hypertension    Obstructive sleep apnea    Chronic atrial fibrillation (HCC)  -     Flecainide level; Future  -     Flecainide level    Morbid obesity (HCC)    Mild asthma without complication, unspecified whether persistent  -     montelukast (SINGULAIR) 10 mg tablet; Take 1 tablet (10 mg total) by mouth daily    Other orders  -     Cholecalciferol (VITAMIN D) 2000 units CAPS; Take 1 capsule by mouth daily  -     furosemide (LASIX) 20 mg tablet;  Take 20 mg by mouth daily as needed  -     metoprolol tartrate (LOPRESSOR) 25 mg tablet; Take 25 mg by mouth every 12 (twelve) hours          Subjective:      Patient ID: Joselin Jeronimo  is a 60 y o  male  Follow up of chronic  Dm2, htn, landry, obesity, etc        The following portions of the patient's history were reviewed and updated as appropriate: allergies, current medications, past family history, past medical history, past social history, past surgical history and problem list     Review of Systems   Constitutional: Negative for fatigue and fever  HENT: Negative for hearing loss  Eyes: Negative for visual disturbance  Respiratory: Negative for cough, chest tightness, shortness of breath and wheezing  Cardiovascular: Negative for chest pain, palpitations and leg swelling  Gastrointestinal: Negative for abdominal pain, diarrhea and nausea  Genitourinary: Negative for dysuria and hematuria  Musculoskeletal: Negative for arthralgias  Neurological: Negative for dizziness, numbness and headaches  Psychiatric/Behavioral: Negative for confusion and dysphoric mood  All other systems reviewed and are negative          Objective:      Current Outpatient Prescriptions:     amLODIPine (NORVASC) 10 mg tablet, Take 10 mg by mouth daily, Disp: , Rfl:     atorvastatin (LIPITOR) 80 mg tablet, Take 80 mg by mouth, Disp: , Rfl:     Cholecalciferol (VITAMIN D) 2000 units CAPS, Take 1 capsule by mouth daily, Disp: , Rfl:     flecainide (TAMBOCOR) 100 mg tablet, Take 100 mg by mouth 2 (two) times a day, Disp: , Rfl:     fluticasone (FLONASE) 50 mcg/act nasal spray, 1 spray into each nostril daily, Disp: , Rfl:     fluticasone-salmeterol (ADVAIR) 250-50 mcg/dose inhaler, Inhale 1 puff every 12 (twelve) hours, Disp: , Rfl:     furosemide (LASIX) 20 mg tablet, Take 20 mg by mouth daily as needed, Disp: , Rfl:     gabapentin (NEURONTIN) 300 mg capsule, TAKE ONE CAPSULE BY MOUTH TWICE DAILY, Disp: 60 capsule, Rfl: 5   insulin aspart (NOVOLOG) 100 units/mL injection, Inject 55 Units under the skin 3 (three) times a day before meals 55 units with breakfast, lunch, and dinner , Disp: 50 mL, Rfl: 0    insulin glargine (LANTUS SOLOSTAR) injection pen 100 units/mL, Inject 0 55 mL (55 Units total) under the skin 2 (two) times a day for 30 days, Disp: 11 pen, Rfl: 0    metFORMIN (GLUCOPHAGE) 500 mg tablet, Take 500 mg by mouth 2 (two) times a day with meals, Disp: , Rfl:     metoprolol tartrate (LOPRESSOR) 25 mg tablet, Take 25 mg by mouth every 12 (twelve) hours, Disp: , Rfl:     Mirabegron ER (MYRBETRIQ) 50 MG TB24, Take 1 tablet by mouth daily, Disp: , Rfl:     montelukast (SINGULAIR) 10 mg tablet, Take 1 tablet (10 mg total) by mouth daily, Disp: 30 tablet, Rfl: 3    Multiple Vitamins-Minerals (CENTRUM SILVER ULTRA MENS PO), Take by mouth, Disp: , Rfl:     Omega-3 Fatty Acids (FISH OIL) 1,000 mg, Take 1,000 mg by mouth daily, Disp: , Rfl:     quinapril (ACCUPRIL) 40 MG tablet, Take 40 mg by mouth daily at bedtime, Disp: , Rfl:     ranitidine (ZANTAC) 150 mg tablet, TAKE ONE TABLET BY MOUTH EVERY 12 HOURS, Disp: 60 tablet, Rfl: 5    tamsulosin (FLOMAX) 0 4 mg, Take by mouth daily with dinner  , Disp: , Rfl:     tiotropium (SPIRIVA) 18 mcg inhalation capsule, Place 18 mcg into inhaler and inhale daily, Disp: , Rfl:     traMADol (ULTRAM) 50 mg tablet, Take 1 tablet (50 mg total) by mouth every 8 (eight) hours as needed for moderate pain or severe pain, Disp: 90 tablet, Rfl: 0    apixaban (ELIQUIS) 5 mg, Take 1 tablet by mouth 2 (two) times a day for 30 days, Disp: 60 tablet, Rfl: 0     Physical Exam   Constitutional: He is oriented to person, place, and time  He appears well-developed and well-nourished  Eyes: Pupils are equal, round, and reactive to light  Neck: Normal range of motion  Neck supple  No thyromegaly present  Cardiovascular: Normal rate, regular rhythm, normal heart sounds and intact distal pulses      No murmur heard  Pulmonary/Chest: Effort normal and breath sounds normal  He has no wheezes  He has no rales  Abdominal: Soft  Bowel sounds are normal  There is no tenderness  Musculoskeletal: Normal range of motion  He exhibits no edema, tenderness or deformity  Lymphadenopathy:     He has no cervical adenopathy  Neurological: He is alert and oriented to person, place, and time  He has normal reflexes  Skin: Skin is warm and dry  Psychiatric: He has a normal mood and affect  Vitals reviewed

## 2018-04-04 ENCOUNTER — OFFICE VISIT (OUTPATIENT)
Dept: FAMILY MEDICINE CLINIC | Facility: HOSPITAL | Age: 63
End: 2018-04-04
Payer: COMMERCIAL

## 2018-04-04 VITALS
BODY MASS INDEX: 45.1 KG/M2 | RESPIRATION RATE: 16 BRPM | HEART RATE: 72 BPM | DIASTOLIC BLOOD PRESSURE: 82 MMHG | WEIGHT: 315 LBS | SYSTOLIC BLOOD PRESSURE: 132 MMHG | HEIGHT: 70 IN

## 2018-04-04 DIAGNOSIS — I10 ESSENTIAL HYPERTENSION: ICD-10-CM

## 2018-04-04 DIAGNOSIS — G47.33 OBSTRUCTIVE SLEEP APNEA: ICD-10-CM

## 2018-04-04 DIAGNOSIS — I48.20 CHRONIC ATRIAL FIBRILLATION (HCC): ICD-10-CM

## 2018-04-04 DIAGNOSIS — J45.909 MILD ASTHMA WITHOUT COMPLICATION, UNSPECIFIED WHETHER PERSISTENT: ICD-10-CM

## 2018-04-04 DIAGNOSIS — E11.8 TYPE 2 DIABETES MELLITUS WITH COMPLICATION, UNSPECIFIED LONG TERM INSULIN USE STATUS: Primary | ICD-10-CM

## 2018-04-04 DIAGNOSIS — E66.01 MORBID OBESITY (HCC): ICD-10-CM

## 2018-04-04 PROCEDURE — 99214 OFFICE O/P EST MOD 30 MIN: CPT | Performed by: INTERNAL MEDICINE

## 2018-04-04 RX ORDER — MONTELUKAST SODIUM 10 MG/1
10 TABLET ORAL DAILY
Qty: 30 TABLET | Refills: 3 | Status: SHIPPED | OUTPATIENT
Start: 2018-04-04 | End: 2018-08-17 | Stop reason: SDUPTHER

## 2018-04-04 RX ORDER — FUROSEMIDE 20 MG/1
20 TABLET ORAL DAILY PRN
COMMUNITY
End: 2019-07-08 | Stop reason: ALTCHOICE

## 2018-04-04 RX ORDER — MULTIVIT-MIN/IRON/FOLIC ACID/K 18-600-40
5000 CAPSULE ORAL DAILY
COMMUNITY
End: 2021-01-01 | Stop reason: HOSPADM

## 2018-04-04 NOTE — ASSESSMENT & PLAN NOTE
Weight management is chronic issue  Discussed management  Advised referral to dietitian if he desires

## 2018-04-04 NOTE — PATIENT INSTRUCTIONS
You were seen today for a diabetes follow up and routine care visit  The doctor likely reviewed with you your current medications and recent labs done  Please note the result of your A1C blood test and be aware that the goal for good diabetes control is <7  If new labs were ordered, please be sure to have them done before your next visit  Continue all your medications and make whatever adjustments to dosing that the doctor discussed  If new meds were started, get there Rx filled and call our office if you have any side effects that trouble you  Continue to watch your diet, low carbs, higher protein, fiber and good fats  Exercise regularly  And schedule any diabetic screenings and follow ups on a regular basis

## 2018-04-04 NOTE — ASSESSMENT & PLAN NOTE
Follow up with endocrine  A1C not controlled  Discussed management with patient  Changes as per endo  Notes recent med changes and A1C not reflective  Has follow up scheduled

## 2018-04-04 NOTE — ASSESSMENT & PLAN NOTE
BP elevated today  Patient monitors at home and readings usually in 130-140 range    Continue to monitor

## 2018-04-05 ENCOUNTER — OFFICE VISIT (OUTPATIENT)
Dept: PHYSICAL THERAPY | Facility: CLINIC | Age: 63
End: 2018-04-05
Payer: COMMERCIAL

## 2018-04-05 DIAGNOSIS — M54.5 LOW BACK PAIN, UNSPECIFIED BACK PAIN LATERALITY, UNSPECIFIED CHRONICITY, WITH SCIATICA PRESENCE UNSPECIFIED: ICD-10-CM

## 2018-04-05 DIAGNOSIS — M43.06 LUMBAR SPONDYLOLYSIS: Primary | ICD-10-CM

## 2018-04-05 DIAGNOSIS — M25.561 ACUTE PAIN OF RIGHT KNEE: ICD-10-CM

## 2018-04-05 PROCEDURE — 97010 HOT OR COLD PACKS THERAPY: CPT

## 2018-04-05 PROCEDURE — 97112 NEUROMUSCULAR REEDUCATION: CPT

## 2018-04-05 PROCEDURE — 97110 THERAPEUTIC EXERCISES: CPT

## 2018-04-05 NOTE — PROGRESS NOTES
Daily Note     Today's date: 2018  Patient name: Salima Portillo  : 1955  MRN: [id number]  Referring provider: Jeanie Ramirez DO  Dx:   Encounter Diagnosis     ICD-10-CM    1  Lumbar spondylolysis M43 06    2  Low back pain, unspecified back pain laterality, unspecified chronicity, with sciatica presence unspecified M54 5    3  Acute pain of right knee M25 561                   Subjective: Pt  Stated he is "50-50" pain is not out of control but still there in knee, and low back is the same as always  Objective: See treatment diary below      Assessment: O2 level 96% post recumbent bike for 5'  Pt  Able to tolerate more TE's today, still SOB post each set and needed frequent rest breaks  B/l gastroc cramping t/o when performing a static hold of a TE   No discomfort with ROM, limited by edema/swelling in LE   LBP did not increase t/o session    Plan: To monitor and progress as able nv       Daily Treatment Diary     Dx: LBP (with R sciatica); R knee pain  EPOC: 5/10/18  Precautions: DM Ii; cardiac disease; SOB quickly  CO-MORBIDITES: HTN  PERSONAL FACTORS: none    Manual             R Knee PROM 5' 5'                                                                   Exercise Diary             Recumbent Bike 5' (vc'ing to breathe) 5'           Gastroc Stretch 30"X3  15"x5  b/l           HR/TR (seated) 15x ea np           Quad sets 10"x10 5"x10  b/l           LAQ 10"X10 5"  x10           Standing Marches  NV 8ea           HS curl   NV 10  ea           Standing Hip Abduction             Step Up/lateral             Mini squats             Iso Hip ABD 10"X10 5"  x10           Iso Hip ADD 10"X10 5"  x10           SLR             Seated hip add:DLS             Seated hip abd: DLS             Seated pball flexion stretch                                                        Modalities    4           CP - PRN   (LB in seated) 10'

## 2018-04-09 ENCOUNTER — OFFICE VISIT (OUTPATIENT)
Dept: PHYSICAL THERAPY | Facility: CLINIC | Age: 63
End: 2018-04-09
Payer: COMMERCIAL

## 2018-04-09 DIAGNOSIS — M25.561 ACUTE PAIN OF RIGHT KNEE: ICD-10-CM

## 2018-04-09 DIAGNOSIS — M54.5 LOW BACK PAIN, UNSPECIFIED BACK PAIN LATERALITY, UNSPECIFIED CHRONICITY, WITH SCIATICA PRESENCE UNSPECIFIED: ICD-10-CM

## 2018-04-09 DIAGNOSIS — M43.06 LUMBAR SPONDYLOLYSIS: Primary | ICD-10-CM

## 2018-04-09 PROCEDURE — 97112 NEUROMUSCULAR REEDUCATION: CPT

## 2018-04-09 PROCEDURE — 97110 THERAPEUTIC EXERCISES: CPT

## 2018-04-12 ENCOUNTER — OFFICE VISIT (OUTPATIENT)
Dept: PHYSICAL THERAPY | Facility: CLINIC | Age: 63
End: 2018-04-12
Payer: COMMERCIAL

## 2018-04-12 DIAGNOSIS — M54.5 LOW BACK PAIN, UNSPECIFIED BACK PAIN LATERALITY, UNSPECIFIED CHRONICITY, WITH SCIATICA PRESENCE UNSPECIFIED: ICD-10-CM

## 2018-04-12 DIAGNOSIS — M43.06 LUMBAR SPONDYLOLYSIS: Primary | ICD-10-CM

## 2018-04-12 DIAGNOSIS — M25.561 ACUTE PAIN OF RIGHT KNEE: ICD-10-CM

## 2018-04-12 PROCEDURE — 97112 NEUROMUSCULAR REEDUCATION: CPT | Performed by: PHYSICAL THERAPIST

## 2018-04-12 PROCEDURE — 97110 THERAPEUTIC EXERCISES: CPT | Performed by: PHYSICAL THERAPIST

## 2018-04-12 NOTE — PROGRESS NOTES
Daily Note     Today's date: 2018  Patient name: Dora Toney  : 1955  MRN: [id number]  Referring provider: Belkys Ling DO  Dx:   Encounter Diagnosis     ICD-10-CM    1  Lumbar spondylolysis M43 06    2  Low back pain, unspecified back pain laterality, unspecified chronicity, with sciatica presence unspecified M54 5    3  Acute pain of right knee M25 561                   Subjective: Patient reports he feels stiff today - mostly in his R ankle  He denies dizziness coming into today's session  Reports last time he thinks it was because of the Tramadol  Objective: See treatment diary below      Assessment: Tolerated TE progressions fairly  Reported BP this AM was 147/62 mmHG  Pt denied dizziness throughout session and was monitored closely for s/s throughout session, as well as through Vc  Continues to be deconditioned with activity, but improving tolerance to therapy  Improvement noted in R knee PROM at this time  Noted edeam in b/l LE however pt reports this is normal  No increased warmth noted; only slight tenderness to touch reported  Monitor response nv and continue to progress as tolerates  Plan: Continue per plan of care        Dx: LBP (with R sciatica); R knee pain  EPOC: 5/10/18  Precautions: DM Ii; cardiac disease; SOB quickly  CO-MORBIDITES: HTN  PERSONAL FACTORS: none    Manual           R Knee PROM 5' 5' inadvertently missed NV 5'                                                                 Exercise Diary           Recumbent Bike 5' (vc'ing to breathe) 5' 5' 6'         Gastroc Stretch 30"X3  15"x5  b/l 15"x5 b/l 15"x5 b/l         HR/TR (seated) 15x ea np           Quad sets 10"x10 5"x10  b/l 5"x10  b/l 5"x10 b/l         LAQ 10"X10 5"  x10 5"x10   5"x10 b/l         Standing Marches  NV 8ea 8ea 8 ea         HS curl   NV 10  ea 10xea 10x ea         Standing Hip Abduction             Step Up/lateral    4" 5x ea  B/l (fwd only)         Mini squats   10x          Iso Hip ABD 10"X10 5"  x10 5"x15 5"x15         Iso Hip ADD 10"X10 5"  x10 5"x15 5"x15         SLR             Seated hip add:DLS             Seated hip abd: DLS   OTB Z47 5 sec OTB 15x         Seated pball flexion stretch   [id number] 10x10"         LTRs    B/l 15 ea                                       Modalities  4/2  4/5 4/9 4/12         CP - PRN   (LB in seated) 10'

## 2018-04-16 DIAGNOSIS — Z79.4 CONTROLLED TYPE 2 DIABETES MELLITUS WITH COMPLICATION, WITH LONG-TERM CURRENT USE OF INSULIN (HCC): ICD-10-CM

## 2018-04-16 DIAGNOSIS — E11.8 CONTROLLED TYPE 2 DIABETES MELLITUS WITH COMPLICATION, WITH LONG-TERM CURRENT USE OF INSULIN (HCC): ICD-10-CM

## 2018-04-16 RX ORDER — INSULIN GLARGINE 100 [IU]/ML
55 INJECTION, SOLUTION SUBCUTANEOUS 2 TIMES DAILY
Qty: 11 PEN | Refills: 5 | Status: SHIPPED | OUTPATIENT
Start: 2018-04-16 | End: 2018-04-27 | Stop reason: SDUPTHER

## 2018-04-17 ENCOUNTER — APPOINTMENT (OUTPATIENT)
Dept: PHYSICAL THERAPY | Facility: CLINIC | Age: 63
End: 2018-04-17
Payer: COMMERCIAL

## 2018-04-20 ENCOUNTER — APPOINTMENT (OUTPATIENT)
Dept: PHYSICAL THERAPY | Facility: CLINIC | Age: 63
End: 2018-04-20
Payer: COMMERCIAL

## 2018-04-20 NOTE — PROGRESS NOTES
Daily Note     Today's date: 2018  Patient name: Sergo Peterson  : 1955  MRN: [id number]  Referring provider: oJse Kohli DO  Dx: No diagnosis found  Subjective: ***      Objective: See treatment diary below      Assessment: Tolerated treatment {Tolerated treatment :}   Patient {assessment:[id number]}      Plan: {PLAN:}    Dx: LBP (with R sciatica); R knee pain  EPOC: 5/10/18  Precautions: DM Ii; cardiac disease; SOB quickly  CO-MORBIDITES: HTN  PERSONAL FACTORS: none     Manual                 R Knee PROM 5' 5' inadvertently missed NV 5'                                                                                                                     Exercise Diary                 Recumbent Bike 5' (vc'ing to breathe) 5' 5' 6'               Gastroc Stretch 30"X3  15"x5  b/l 15"x5 b/l 15"x5 b/l               HR/TR (seated) 15x ea np                   Quad sets 10"x10 5"x10  b/l 5"x10  b/l 5"x10 b/l               LAQ 10"X10 5"  x10 5"x10    5"x10 b/l               Standing Marches  NV 8ea 8ea 8 ea               HS curl   NV 10  ea 10xea 10x ea               Standing Hip Abduction                       Step Up/lateral       4" 5x ea  B/l (fwd only)               Mini squats     10x                 Iso Hip ABD 10"X10 5"  x10 5"x15 5"x15               Iso Hip ADD 10"X10 5"  x10 5"x15 5"x15               SLR                       Seated hip add:DLS                       Seated hip abd: DLS     OTB W23 5 sec OTB 15x               Seated pball flexion stretch     [id number] 10x10"               LTRs       B/l 15 ea                                                                     Modalities                 CP - PRN   (LB in seated) 10'

## 2018-04-23 ENCOUNTER — OFFICE VISIT (OUTPATIENT)
Dept: PHYSICAL THERAPY | Facility: CLINIC | Age: 63
End: 2018-04-23
Payer: COMMERCIAL

## 2018-04-23 DIAGNOSIS — M25.561 ACUTE PAIN OF RIGHT KNEE: Primary | ICD-10-CM

## 2018-04-23 DIAGNOSIS — M43.06 LUMBAR SPONDYLOLYSIS: ICD-10-CM

## 2018-04-23 PROCEDURE — 97140 MANUAL THERAPY 1/> REGIONS: CPT

## 2018-04-23 PROCEDURE — 97110 THERAPEUTIC EXERCISES: CPT

## 2018-04-23 PROCEDURE — 97112 NEUROMUSCULAR REEDUCATION: CPT

## 2018-04-23 PROCEDURE — 97010 HOT OR COLD PACKS THERAPY: CPT

## 2018-04-23 NOTE — PROGRESS NOTES
Daily Note     Today's date: 2018  Patient name: Ashely Walker  : 1955  MRN: [id number]  Referring provider: Sobia Holguin DO  Dx:   No diagnosis found  Subjective: Patient reports he feels stiff today - mostly in his R ankle  He denies dizziness coming into today's session  Reports last time he thinks it was because of the Tramadol  Objective: See treatment diary below      Assessment: Tolerated TE progressions fairly  O2 was 94 with activity today  Pt denied dizziness throughout session  Continues to be deconditioned with activity, but improving tolerance to therapy  Increased tolerance in R knee PROM  Noted edeam in b/l LE however pt reports this is normal   Monitor response nv and continue to progress as tolerates  Plan: Continue per plan of care        Dx: LBP (with R sciatica); R knee pain  EPOC: 5/10/18  Precautions: DM Ii; cardiac disease; SOB quickly  CO-MORBIDITES: HTN  PERSONAL FACTORS: none    Manual          R Knee PROM 5' 5' inadvertently missed NV 5' 10'  b/l                                                                Exercise Diary          Recumbent Bike 5' (vc'ing to breathe) 5' 5' 6' 6'  L7        Gastroc Stretch 30"X3  15"x5  b/l 15"x5 b/l 15"x5 b/l 15"x5  b/l        HR/TR (seated) 15x ea np           Quad sets 10"x10 5"x10  b/l 5"x10  b/l 5"x10 b/l 5"x10        LAQ 10"X10 5"  x10 5"x10   5"x10 b/l 5"x10        Standing Marches  NV 8ea 8ea 8 ea 8        HS curl   NV 10  ea 10xea 10x ea 10        Standing Hip Abduction             Step Up/lateral    4" 5x ea  B/l (fwd only) np        Mini squats   10x          Iso Hip ABD 10"X10 5"  x10 5"x15 5"x15 5"x15        Iso Hip ADD 10"X10 5"  x10 5"x15 5"x15 5"x15        SLR             Seated hip add:DLS             Seated hip abd: DLS   OTB G39 5 sec OTB 15x 15x        Seated pball flexion stretch   [id number] 10x10" 10x        LTRs    B/l 15 ea         Core pball press     10x5"                         Modalities  4/2 4/5 4/9 4/12         CP - PRN   (LB in seated) 10'    10

## 2018-04-25 LAB — HBA1C MFR BLD HPLC: 8.1 %

## 2018-04-26 RX ORDER — MULTIVIT WITH MINERALS/LUTEIN
1 TABLET ORAL DAILY
COMMUNITY
Start: 2017-06-19 | End: 2018-06-11 | Stop reason: ALTCHOICE

## 2018-04-26 RX ORDER — ACETAMINOPHEN 160 MG
1 TABLET,DISINTEGRATING ORAL DAILY
COMMUNITY
Start: 2017-06-19 | End: 2018-04-26 | Stop reason: SDUPTHER

## 2018-04-27 ENCOUNTER — TELEPHONE (OUTPATIENT)
Dept: FAMILY MEDICINE CLINIC | Facility: HOSPITAL | Age: 63
End: 2018-04-27

## 2018-04-27 ENCOUNTER — TRANSCRIBE ORDERS (OUTPATIENT)
Dept: FAMILY MEDICINE CLINIC | Facility: HOSPITAL | Age: 63
End: 2018-04-27

## 2018-04-27 ENCOUNTER — OFFICE VISIT (OUTPATIENT)
Dept: ENDOCRINOLOGY | Facility: HOSPITAL | Age: 63
End: 2018-04-27
Payer: COMMERCIAL

## 2018-04-27 VITALS
HEART RATE: 60 BPM | DIASTOLIC BLOOD PRESSURE: 70 MMHG | BODY MASS INDEX: 45.1 KG/M2 | SYSTOLIC BLOOD PRESSURE: 140 MMHG | HEIGHT: 70 IN | WEIGHT: 315 LBS

## 2018-04-27 DIAGNOSIS — Z79.4 UNCONTROLLED TYPE 2 DIABETES MELLITUS WITH HYPERGLYCEMIA, WITH LONG-TERM CURRENT USE OF INSULIN (HCC): Primary | ICD-10-CM

## 2018-04-27 DIAGNOSIS — Z79.4 CONTROLLED TYPE 2 DIABETES MELLITUS WITH COMPLICATION, WITH LONG-TERM CURRENT USE OF INSULIN (HCC): ICD-10-CM

## 2018-04-27 DIAGNOSIS — E78.5 HYPERLIPIDEMIA, UNSPECIFIED HYPERLIPIDEMIA TYPE: ICD-10-CM

## 2018-04-27 DIAGNOSIS — G47.33 OBSTRUCTIVE SLEEP APNEA: ICD-10-CM

## 2018-04-27 DIAGNOSIS — E11.65 UNCONTROLLED TYPE 2 DIABETES MELLITUS WITH COMPLICATION, UNSPECIFIED WHETHER LONG TERM INSULIN USE: Primary | ICD-10-CM

## 2018-04-27 DIAGNOSIS — I10 ESSENTIAL HYPERTENSION: ICD-10-CM

## 2018-04-27 DIAGNOSIS — E11.65 UNCONTROLLED TYPE 2 DIABETES MELLITUS WITH HYPERGLYCEMIA, WITH LONG-TERM CURRENT USE OF INSULIN (HCC): Primary | ICD-10-CM

## 2018-04-27 DIAGNOSIS — E11.8 CONTROLLED TYPE 2 DIABETES MELLITUS WITH COMPLICATION, WITH LONG-TERM CURRENT USE OF INSULIN (HCC): ICD-10-CM

## 2018-04-27 DIAGNOSIS — E11.8 UNCONTROLLED TYPE 2 DIABETES MELLITUS WITH COMPLICATION, UNSPECIFIED WHETHER LONG TERM INSULIN USE: Primary | ICD-10-CM

## 2018-04-27 DIAGNOSIS — E55.9 VITAMIN D DEFICIENCY: ICD-10-CM

## 2018-04-27 PROCEDURE — 99214 OFFICE O/P EST MOD 30 MIN: CPT | Performed by: NURSE PRACTITIONER

## 2018-04-27 RX ORDER — INSULIN GLARGINE 100 [IU]/ML
60 INJECTION, SOLUTION SUBCUTANEOUS 2 TIMES DAILY
Qty: 11 PEN | Refills: 2 | Status: SHIPPED | OUTPATIENT
Start: 2018-04-27 | End: 2019-02-13 | Stop reason: SDUPTHER

## 2018-04-27 NOTE — PATIENT INSTRUCTIONS
Increase Lantus dose to 60 units in AM and PM    For now, continue Novolog 55 units before meals  Continue to check blood sugars 3-4 times daily and send record to the office in 2 weeks for review  Contact the office with any consistent episodes of hypoglycemia  Keep your skin moisturized, as discussed

## 2018-04-27 NOTE — PROGRESS NOTES
Everardo Ratliff  60 y o  male MRN: [id number]    Encounter: [id number]      Assessment/Plan     Assessment: This is a 60y o -year-old male with uncontrolled type 2 diabetes with hypertension, hyperlipidemia and obstructive sleep apnea  Plan:  1  Uncontrolled type 2 diabetes with hyperglycemia and long-term insulin use:   based upon review of his blood sugars and his slightly decreased hemoglobin A1c, his glycemic control is improving  I have asked him to increase his Lantus from 55 units to 60 units morning and evening  I have also asked him to check his blood sugars 4 times daily and provide a record to the office in 2 weeks for review so that adjustments may be made to his regimen  Discussed the impact of diet and weight loss on the treatment of his diabetes  He will continue to follow up with medical nutrition therapy as needed  Check hemoglobin A1c prior to next visit      2  Hypertension:  His systolic blood pressure is slightly elevated in the office today  Continue current medication regimen  Continue to monitor at home  Check comprehensive metabolic panel prior to next visit      3  Hyperlipidemia:  Continue atorvastatin and fish oil  Check fasting lipid panel      4  Obstructive sleep apnea:  Discussed utilizing his CPAP on a regular basis to improve his fatigue and glycemic control  5   Vitamin-D deficiency:  Continue supplementation with vitamin D3 daily  CC:  Type 2 Diabetes follow-up    History of Present Illness     HPI:  60 y o  male presents in the office today for follow up of Type 2 Diabetes  he states he has been diagnosed with Type 2 Diabetes for 20 years and is checking blood glucose readings fairly regularly  He denies episodes of hypoglycemia, polydipsia, polyphagia and polyuria            Lab Results   Component Value Date     HGBA1C 8 0 (H) 03/30/2018   Current Diabetic medication regimen is as follows:  Metformin 500 mg twice daily  Lantus 55 units twice daily  NovoLog 55 units at breakfast, lunch and dinner  He states he is up to date with his annual diabetic eye exam, last was July 2017, and denies retinopathy  He states he does have cataracts and glaucoma  he complains about numbness, tingling and dryness to his feet and follows [facility] for regular diabetic foot care every 12 weeks or so      For his hypertension, he takes amlodipine 10 mg daily, furosemide 20 mg daily, quinapril 40 mg daily and metoprolol 50 mg twice daily  He states he checks his blood pressure at home      His hyperlipidemia is treated with atorvastatin 80 mg daily and fish oil 1 g daily      For his obstructive sleep apnea, he is utilizing CPAP on an irregular basis  Review of Systems   Constitutional: Positive for fatigue (Sometimes)  Negative for diaphoresis and fever  HENT: Negative  Eyes: Negative  Respiratory: Negative for cough and shortness of breath  Cardiovascular: Negative for chest pain and palpitations  Gastrointestinal: Negative for abdominal pain, constipation, diarrhea, nausea and vomiting  Endocrine: Negative  Negative for cold intolerance, heat intolerance, polydipsia, polyphagia and polyuria  Genitourinary: Negative  Musculoskeletal: Positive for arthralgias, back pain ( chronic at times) and gait problem ( due to chronic back pain)  Skin: Negative  Allergic/Immunologic: Negative  Neurological: Negative for dizziness, syncope, light-headedness and headaches  Hematological: Negative  Psychiatric/Behavioral: Negative  All other systems reviewed and are negative        Historical Information   Past Medical History:   Diagnosis Date    Arthritis     Asthma     BPH (benign prostatic hyperplasia)     Cardiac arrhythmia     resolved [id number]    Cardiac disease     Chronic a-fib (HCC)     Chronic pain     [facility]A [facility]re[facility] accident[facility] ([facility] ) 11/2015    Dextrocardia     Diabetes mellitus ([facility] )     GERD (gastroesophageal reflux disease)     Hyperlipidemia     Hypertension     Sleep apnea     Stroke (cerebrum) (HCC)      Past Surgical History:   Procedure Laterality Date    CHOLECYSTECTOMY      EXPLORATORY LAPAROTOMY  1971    GALLBLADDER SURGERY      MO COLONOSCOPY FLX DX W/COLLJ SPEC WHEN PFRMD N/A 2/21/2018    Procedure: COLONOSCOPY;  Surgeon: Liban Mcknight MD;  Location: [facility];  Service: Gastroenterology    UMBILICAL HERNIA REPAIR  2007     Social History   History   Alcohol Use    0 6 oz/week    1 Glasses of wine per week     Comment: 2-3 drinks a week, cherry liquer or beer      History   Drug Use No     History   Smoking Status    Never Smoker   Smokeless Tobacco    Never Used     Comment: Occasionally smoked a pipe while fishing     Family History:   Family History   Problem Relation Age of Onset    Coronary artery disease Father     Hodgkin's lymphoma Father     Diabetes Father     Cancer Father     Diabetes type I Mother     Cancer Maternal Grandfather     Diabetes Paternal Grandmother     Emphysema Paternal Grandmother     Heart attack Paternal Grandfather     Hypertension Family     Neuropathy Family        Meds/Allergies   Current Outpatient Prescriptions   Medication Sig Dispense Refill    amLODIPine (NORVASC) 10 mg tablet Take 10 mg by mouth daily      apixaban (ELIQUIS) 5 mg Take 1 tablet by mouth 2 (two) times a day for 30 days 60 tablet 0    atorvastatin (LIPITOR) 80 mg tablet Take 80 mg by mouth      Cholecalciferol (VITAMIN D) 2000 units CAPS Take 1 capsule by mouth daily      flecainide (TAMBOCOR) 100 mg tablet Take 100 mg by mouth 2 (two) times a day      fluticasone (FLONASE) 50 mcg/act nasal spray 1 spray into each nostril daily      fluticasone-salmeterol (ADVAIR) 250-50 mcg/dose inhaler Inhale 1 puff every 12 (twelve) hours      furosemide (LASIX) 20 mg tablet Take 20 mg by mouth daily as needed      gabapentin (NEURONTIN) 300 mg capsule TAKE ONE CAPSULE BY MOUTH TWICE DAILY 60 capsule 5    LANTUS SOLOSTAR injection pen 100 units/mL Inject 0 55 mL (55 Units total) under the skin 2 (two) times a day for 30 days 11 pen 5    metFORMIN (GLUCOPHAGE) 500 mg tablet Take 500 mg by mouth 2 (two) times a day with meals      metoprolol tartrate (LOPRESSOR) 25 mg tablet Take 25 mg by mouth every 12 (twelve) hours      Mirabegron ER (MYRBETRIQ) 50 MG TB24 Take 1 tablet by mouth daily      montelukast (SINGULAIR) 10 mg tablet Take 1 tablet (10 mg total) by mouth daily 30 tablet 3    Multiple Vitamins-Minerals (CENTRUM SILVER ULTRA MENS) TABS Take 1 tablet by mouth daily      NOVOLOG 100 UNIT/ML injection Inject 55 Units under the skin 3 (three) times a day before meals 55 units with breakfast, lunch, and dinner  50 mL 5    Omega-3 Fatty Acids (FISH OIL) 1,000 mg Take 1,000 mg by mouth daily      quinapril (ACCUPRIL) 40 MG tablet Take 40 mg by mouth daily at bedtime      ranitidine (ZANTAC) 150 mg tablet TAKE ONE TABLET BY MOUTH EVERY 12 HOURS 60 tablet 5    tamsulosin (FLOMAX) 0 4 mg Take by mouth daily with dinner        tiotropium (SPIRIVA) 18 mcg inhalation capsule Place 18 mcg into inhaler and inhale daily      traMADol (ULTRAM) 50 mg tablet Take 1 tablet (50 mg total) by mouth every 8 (eight) hours as needed for moderate pain or severe pain 90 tablet 0    vitamin E, tocopherol, 1,000 units capsule Take 1 tablet by mouth daily       No current facility-administered medications for this visit        Allergies   Allergen Reactions    Banana Shortness Of Breath    Fruit Extracts Anaphylaxis and Throat Swelling     [facility] - [id number]: Bananas    Clotrimazole     Codeine Itching     Other reaction(s): Itching    Hydrocodone-Acetaminophen     Oxycodone Rash     Other reaction(s): Pruritis    Oxycodone-Acetaminophen Rash     Other reaction(s): Pruritis       Objective   Vitals: Blood pressure 140/70, pulse 60, height 5' 10" (1 778 m), weight (!) 160 kg (351 lb 12 8 oz)  Physical Exam   Constitutional: He is oriented to person, place, and time  He appears well-developed and well-nourished  No distress  Obese   HENT:   Head: Normocephalic and atraumatic  Nose: Nose normal    Mouth/Throat: Oropharynx is clear and moist    Eyes: Conjunctivae and EOM are normal  Pupils are equal, round, and reactive to light  Right eye exhibits no discharge  Left eye exhibits no discharge  Neck: Normal range of motion  Neck supple  No tracheal deviation present  No thyromegaly present  Cardiovascular: Normal rate, regular rhythm, normal heart sounds and intact distal pulses  Pulmonary/Chest: Effort normal and breath sounds normal  No respiratory distress  He has no wheezes  He has no rales  He exhibits no tenderness  Abdominal: Soft  Bowel sounds are normal  He exhibits no distension  There is no tenderness  Musculoskeletal: Normal range of motion  He exhibits edema (+1 pedal edema)  Lymphadenopathy:     He has no cervical adenopathy  Neurological: He is alert and oriented to person, place, and time  No cranial nerve deficit  Coordination normal    Skin: Skin is warm and dry  No rash noted  There is erythema (To lower legs)  No pallor  Dry skin to feet and lower legs   Psychiatric: He has a normal mood and affect  His behavior is normal  Judgment and thought content normal    Vitals reviewed      Lab Results:   Lab Results   Component Value Date/Time    Hemoglobin A1C 8 0 (H) 03/30/2018 08:58 AM    Hemoglobin A1C 8 3 (H) 09/19/2017 07:04 AM    Hemoglobin A1C 7 1 (H) 06/08/2017 10:05 AM    BUN 17 03/30/2018 08:58 AM    BUN 16 09/19/2017 07:04 AM    BUN 15 06/08/2017 10:05 AM    Sodium 144 09/19/2017 07:04 AM    Sodium 144 06/08/2017 10:05 AM    Potassium 4 1 09/19/2017 07:04 AM    Potassium 4 3 06/08/2017 10:05 AM    Chloride 101 09/19/2017 07:04 AM    Chloride 100 06/08/2017 10:05 AM    CO2 26 09/19/2017 07:04 AM    CO2 27 06/08/2017 10:05 AM    Creatinine 0 83 09/19/2017 07:04 AM    Creatinine 0 92 06/08/2017 10:05 AM    Creatinine, Serum 0 81 03/30/2018 08:58 AM    AST 27 09/19/2017 07:04 AM    AST 21 06/08/2017 10:05 AM    ALT 25 09/19/2017 07:04 AM    ALT 21 06/08/2017 10:05 AM    Albumin 4 3 09/19/2017 07:04 AM    Albumin 4 6 06/08/2017 10:05 AM    Cholesterol 133 09/19/2017 07:04 AM    HDL 47 09/19/2017 07:04 AM    SL AMB LDL-CHOLESTEROL 53 03/30/2018 08:58 AM    Triglycerides 108 03/30/2018 08:58 AM    Triglycerides 117 09/19/2017 07:04 AM     Portions of the record may have been created with voice recognition software  Occasional wrong word or "sound a like" substitutions may have occurred due to the inherent limitations of voice recognition software  Read the chart carefully and recognize, using context, where substitutions have occurred

## 2018-04-30 ENCOUNTER — OFFICE VISIT (OUTPATIENT)
Dept: PHYSICAL THERAPY | Facility: CLINIC | Age: 63
End: 2018-04-30
Payer: COMMERCIAL

## 2018-04-30 DIAGNOSIS — M54.5 LOW BACK PAIN, UNSPECIFIED BACK PAIN LATERALITY, UNSPECIFIED CHRONICITY, WITH SCIATICA PRESENCE UNSPECIFIED: ICD-10-CM

## 2018-04-30 DIAGNOSIS — M25.561 ACUTE PAIN OF RIGHT KNEE: Primary | ICD-10-CM

## 2018-04-30 PROCEDURE — 97112 NEUROMUSCULAR REEDUCATION: CPT

## 2018-04-30 PROCEDURE — G8982 BODY POS GOAL STATUS: HCPCS

## 2018-04-30 PROCEDURE — 97110 THERAPEUTIC EXERCISES: CPT

## 2018-04-30 PROCEDURE — 97140 MANUAL THERAPY 1/> REGIONS: CPT

## 2018-04-30 PROCEDURE — G8981 BODY POS CURRENT STATUS: HCPCS

## 2018-04-30 NOTE — PROGRESS NOTES
Daily Note     Today's date: 2018  Patient name: Tessa Shirley  : 1955  MRN: [id number]  Referring provider: Malinda Lovelace DO  Dx:   Encounter Diagnosis     ICD-10-CM    1  Acute pain of right knee M25 561    2  Low back pain, unspecified back pain laterality, unspecified chronicity, with sciatica presence unspecified M54 5                   Subjective: Patient reports he he doing ok today, just tired, with nothing new to report  Objective: See treatment diary below      Assessment: Tolerated TE progressions fairly  O2 was 95 during activity (bike) today  Pt denied dizziness throughout session  Continues to be deconditioned with activity, but improving tolerance to TE's  Increased tolerance in b/l knee PROM  Noted edeam in b/l LE however pt reports this is normal   Monitor response nv and continue to progress as tolerates  Plan: Continue per plan of care        Dx: LBP (with R sciatica); R knee pain  EPOC: 5/10/18  Precautions: DM Ii; cardiac disease; SOB quickly  CO-MORBIDITES: HTN  PERSONAL FACTORS: none    Manual         R Knee PROM 5' 5' inadvertently missed NV 5' 10'  b/l                                                                Exercise Diary         Recumbent Bike 5' (vc'ing to breathe) 5' 5' 6' 6'  L7 6"  L10       Gastroc Stretch 30"X3  15"x5  b/l 15"x5 b/l 15"x5 b/l 15"x5  b/l 15"x15  b/l       HR/TR (seated) 15x ea np    standing  x10       Quad sets 10"x10 5"x10  b/l 5"x10  b/l 5"x10 b/l 5"x10        LAQ 10"X10 5"  x10 5"x10   5"x10 b/l 5"x10 5"x10       Standing Marches  NV 8ea 8ea 8 ea 8 12       HS curl   NV 10  ea 10xea 10x ea 10 12       Standing Hip Abduction      8ea       Step Up/lateral    4" 5x ea  B/l (fwd only) np 6"  8 ea       Mini squats   10x          Iso Hip ABD 10"X10 5"  x10 5"x15 5"x15 5"x15 5"x10       Iso Hip ADD 10"X10 5"  x10 5"x15 5"x15 5"x15 5"x10       SLR             Seated hip add:DLS Seated hip abd: DLS   OTB X52 5 sec OTB 15x 15x        Seated pball flexion stretch   [id number] 10x10" 10x 10x5"       LTRs    B/l 15 ea         Core pball press     10x5" 10x5"                        Modalities  4/2 4/5 4/9 4/12         CP - PRN   (LB in seated) 10'    10

## 2018-05-01 ENCOUNTER — OFFICE VISIT (OUTPATIENT)
Dept: DIABETES SERVICES | Facility: HOSPITAL | Age: 63
End: 2018-05-01
Payer: MEDICARE

## 2018-05-01 VITALS — HEIGHT: 70 IN | BODY MASS INDEX: 45.1 KG/M2 | WEIGHT: 315 LBS

## 2018-05-01 DIAGNOSIS — IMO0002 UNCONTROLLED TYPE 2 DIABETES MELLITUS WITH COMPLICATION, WITH LONG-TERM CURRENT USE OF INSULIN: Primary | ICD-10-CM

## 2018-05-01 PROCEDURE — 97803 MED NUTRITION INDIV SUBSEQ: CPT | Performed by: DIETITIAN, REGISTERED

## 2018-05-01 NOTE — PATIENT INSTRUCTIONS
Ask about Metformin ER- the extended release     Take Novolog 15min before meal     45g-60g carbs per meal    Make decreases to breakfast portions as discussed  Lunch is good  1 cup of carbs at dinner

## 2018-05-01 NOTE — PROGRESS NOTES
Medical Nutrition Therapy     Assessment    Visit Type: Follow-up visit  Chief complaint:  Type 2 Diabetes     HPI:  Met with Jesse Iverson for MNT f/u  States since our last visit he has been trying to back off on his carbs a little more  Still going out to eat every day for breakfast, discussed specific changes to make to each of his breakfast meals to reduce both the carbs and fat in them  Lunch is moderate and in his carb goal range, dinner depends on the portions he takes of his starch  Discussed again that while his mom does the cooking and shopping, he is in control of how much food he takes  States he has been trying to reduce his dinner portions  Revisited his goal of 45-60g carbs per meal  He usually is taking his insulin right before eating, or might take it at home and them leave for the restaurant and can be a 45min time difference  Discussed that 15min ahead is ideal and ways to do that, he feels he can make this changes and feels it might make a nice change to his sugars, he doesn't remember ever hearing about that before  Answered his questions, he will f/u with me as needed after Endo appts  Ht Readings from Last 1 Encounters:   05/01/18 5' 10" (1 778 m)     Wt Readings from Last 1 Encounters:   05/01/18 (!) 156 kg (345 lb)     Weight Change: No    Medical Diagnosis/ICD 10 Code:  e11 8, E11 65    Barriers to Learning: no barriers    Do you follow any special diet presently?: No  Who shops: mother  Who cooks: mother    Food Log: Completed via the method of food recall    Breakfast: up 4am-10am, goes out every day- [facility] house ham and cheese omelet hash browns and 2 raisin toast; burger shanon crisant sausage egg sandwich 1 double sausage; yobani 2 crisants with cream cheese  Morning Snack:breakfast holds him over   Lunch: 1:30 sandwich and small bowl of soup half a can, a cutie   Afternoon Snack: not much  Dinner: 5:30-6:30pm- mom cooks   Meat starch veggie hot meal, some casseroles, not out much less than once a week  Salads go through him  Sometimes cottage cheese and apple butter before dinner sometimes instead of the salad  Evening Snack: a few pretzels or chips  Baked goods when his mom bakes sometimes once a month   Beverages: water plain not much, diet soda or diet tea, not much coffee with flavored creamer not every day, pills with water, juice being a low from taking the insulin at home   Eating out/Take out:daily for breakfast  Exercise ADL, doing to rehab twice a week right now for back and knee  Nutrition Diagnosis:  Food and nutrition related knowledge deficit  related to Lack of prior exposure to accurate nutrition related information as evidenced by Verbalizes inaccurate or incomplete information    Intervention: plate method, label reading, behavior modification strategies, carbohydrate counting, meal planning and monitoring portion control     Treatment Goals: Patient understands education and recommendations    Education Material Given  Dot[place] was provided the Portion Booklet and Planning Healthy Meals     Monitoring and evaluation:    Term code indicator   1 6 3 Carbohydrate Intake Criteria: 45-60g CHO per meal, 15g CHO snacks    Patients Response to Instruction:  Anil Mcdonald  Expected Compliancefair    Thank you for coming to the [facility] for education today  Please feel free to call with any questions or concerns      Jose Noonan, [address] [place] [phone]

## 2018-05-03 ENCOUNTER — OFFICE VISIT (OUTPATIENT)
Dept: PHYSICAL THERAPY | Facility: CLINIC | Age: 63
End: 2018-05-03
Payer: MEDICARE

## 2018-05-03 ENCOUNTER — TELEPHONE (OUTPATIENT)
Dept: FAMILY MEDICINE CLINIC | Facility: HOSPITAL | Age: 63
End: 2018-05-03

## 2018-05-03 DIAGNOSIS — M25.561 ACUTE PAIN OF RIGHT KNEE: Primary | ICD-10-CM

## 2018-05-03 DIAGNOSIS — M43.06 LUMBAR SPONDYLOLYSIS: ICD-10-CM

## 2018-05-03 DIAGNOSIS — M54.5 LOW BACK PAIN, UNSPECIFIED BACK PAIN LATERALITY, UNSPECIFIED CHRONICITY, WITH SCIATICA PRESENCE UNSPECIFIED: ICD-10-CM

## 2018-05-03 PROCEDURE — 97140 MANUAL THERAPY 1/> REGIONS: CPT | Performed by: PHYSICAL THERAPIST

## 2018-05-03 PROCEDURE — 97112 NEUROMUSCULAR REEDUCATION: CPT | Performed by: PHYSICAL THERAPIST

## 2018-05-03 PROCEDURE — 97110 THERAPEUTIC EXERCISES: CPT | Performed by: PHYSICAL THERAPIST

## 2018-05-03 NOTE — PROGRESS NOTES
Daily Note     Today's date: 5/3/2018  Patient name: Jose Maria Fang  : 1955  MRN: [id number]  Referring provider: Mahsa Ward DO  Dx:   Encounter Diagnosis     ICD-10-CM    1  Acute pain of right knee M25 561    2  Low back pain, unspecified back pain laterality, unspecified chronicity, with sciatica presence unspecified M54 5    3  Lumbar spondylolysis M43 06                   Subjective: Patient reports he he doing ok today  Reports he feels that his knee is improving but reports he feels his back pain is just the same  Objective: See treatment diary below      Assessment: Tolerated TE progressions fairly  O2 was 95 during activity (bike) today  Pt denied dizziness throughout session  Continues to be deconditioned with activity  Trialed manuals over low back for pain relief- noted some relief post per patient  Noted increased redness in distal RLE above ankle  Pt usually has increased edema due to conditions, however, increased in skin redness and temperature of this area  Advised patient to call PCP immediately following PT, pt in good understanding  Plan to re-assess nv  Plan: Continue per plan of care        Dx: LBP (with R sciatica); R knee pain  EPOC: 5/10/18  Precautions: DM Ii; cardiac disease; SOB quickly  CO-MORBIDITES: HTN  PERSONAL FACTORS: none    Manual  4/2 4/5 4/9  4/12 4/23 4/30 5/3      R Knee PROM 5' 5' inadvertently missed NV 5' 10'  b/l  held      s/l MFR low back       8'                                                 Exercise Diary   5/3      Recumbent Bike 5' (vc'ing to breathe) 5' 5' 6' 6'  L7 6"  L10 L10 5'      Gastroc Stretch 30"X3  15"x5  b/l 15"x5 b/l 15"x5 b/l 15"x5  b/l 15"x15  b/l 3x30"      HR/TR (seated) 15x ea np    standing  x10 Stand 10x      Quad sets 10"x10 5"x10  b/l 5"x10  b/l 5"x10 b/l 5"x10  5"x10      LAQ 10"X10 5"  x10 5"x10   5"x10 b/l 5"x10 5"x10 5"x10      Standing Gogo CHANDLER 8ea 8ea 8 ea 8 12 12      HS curl   NV 10  ea 10xea 10x ea 10 12 12      Standing Hip Abduction      8ea 8 ea      Step Up/lateral    4" 5x ea  B/l (fwd only) np 6"  8 ea 6" 8 ea      Mini squats   10x          Iso Hip ABD 10"X10 5"  x10 5"x15 5"x15 5"x15 5"x10 5"x10      Iso Hip ADD 10"X10 5"  x10 5"x15 5"x15 5"x15 5"x10 5"x10      SLR             Seated hip add:DLS             Seated hip abd: DLS   OTB W51 5 sec OTB 15x 15x        Seated pball flexion stretch   [id number] 10x10" 10x 10x5" 10x5"      LTRs    B/l 15 ea         Core pball press     10x5" 10x5" 10x5"                       Modalities  4/2 4/5 4/9 4/12         CP - PRN   (LB in seated) 10'    10

## 2018-05-03 NOTE — TELEPHONE ENCOUNTER
Coming in fri 5/4   dk    I spoke to pt  I told him that he needs to be seen   Its late in the day so we put him in [facility] morning with lolis  He said ok  He was told if he worsens tonight to go to the Er   the patient did say that he didn't know if he should wait until antione- so that is why he might go to Er    dk

## 2018-05-04 ENCOUNTER — OFFICE VISIT (OUTPATIENT)
Dept: FAMILY MEDICINE CLINIC | Facility: HOSPITAL | Age: 63
End: 2018-05-04
Payer: MEDICARE

## 2018-05-04 VITALS
DIASTOLIC BLOOD PRESSURE: 70 MMHG | BODY MASS INDEX: 45.1 KG/M2 | SYSTOLIC BLOOD PRESSURE: 150 MMHG | HEIGHT: 70 IN | WEIGHT: 315 LBS | HEART RATE: 59 BPM | TEMPERATURE: 97.7 F | OXYGEN SATURATION: 94 %

## 2018-05-04 DIAGNOSIS — L03.119 CELLULITIS OF LOWER EXTREMITY, UNSPECIFIED LATERALITY: Primary | ICD-10-CM

## 2018-05-04 PROCEDURE — 99213 OFFICE O/P EST LOW 20 MIN: CPT | Performed by: PHYSICIAN ASSISTANT

## 2018-05-04 RX ORDER — CEPHALEXIN 500 MG/1
500 CAPSULE ORAL
Qty: 56 CAPSULE | Refills: 1 | Status: SHIPPED | OUTPATIENT
Start: 2018-05-04 | End: 2018-05-18

## 2018-05-04 NOTE — PATIENT INSTRUCTIONS
Recommend Keflex 500 mg  Qid for 1-2 weeks, (had that in the past)  Urged patient to reduce Lipitor, to discuss with cardiologist   Start CoQ10 200 mg  Daily

## 2018-05-04 NOTE — PROGRESS NOTES
Assessment/Plan:         Diagnoses and all orders for this visit:    Cellulitis of lower extremity, unspecified laterality  -     cephalexin (KEFLEX) 500 mg capsule; Take 1 capsule (500 mg total) by mouth 4 (four) times a day (with meals and at bedtime) for 14 days        Subjective:      Patient ID: Luis Car  is a 60 y o  male  60year old white male presents with c/o cellulitis  Had similar sx  In 2011, had to be hospitalized with IV antibiotics, was very tired  Was referred by PT, due to increasing redness and pain affecting right lower leg  Jacklyn Chua several times, 1st due to ice in January, and 2nd  Time tripped on curb, currently in therapy  Admits to having bad balance  Review of Systems   Musculoskeletal: Positive for arthralgias, back pain, gait problem and myalgias  Objective:      /70   Pulse 59   Temp 97 7 °F (36 5 °C) (Tympanic)   Ht 5' 10" (1 778 m)   Wt (!) 158 kg (349 lb)   SpO2 94%   BMI 50 08 kg/m²          Physical Exam   Constitutional: He is oriented to person, place, and time  He appears well-developed and well-nourished  No distress  Overweight  HENT:   Head: Normocephalic and atraumatic  Eyes: Conjunctivae and EOM are normal  Right eye exhibits no discharge  Left eye exhibits no discharge  No scleral icterus  Cardiovascular: Normal rate, regular rhythm and normal heart sounds  Pulmonary/Chest: Effort normal and breath sounds normal  No respiratory distress  He has no wheezes  He has no rales  Musculoskeletal: Normal range of motion  He exhibits edema, tenderness and deformity  Neurological: He is alert and oriented to person, place, and time  Skin: He is not diaphoretic  Psychiatric:   Patient appears foggy, trouble with thoughts and concentration, also memory deficits noted  Calm, appears tired/frustrated  Nursing note and vitals reviewed

## 2018-05-08 ENCOUNTER — EVALUATION (OUTPATIENT)
Dept: PHYSICAL THERAPY | Facility: CLINIC | Age: 63
End: 2018-05-08
Payer: MEDICARE

## 2018-05-08 DIAGNOSIS — M54.5 LOW BACK PAIN, UNSPECIFIED BACK PAIN LATERALITY, UNSPECIFIED CHRONICITY, WITH SCIATICA PRESENCE UNSPECIFIED: ICD-10-CM

## 2018-05-08 DIAGNOSIS — M43.06 LUMBAR SPONDYLOLYSIS: ICD-10-CM

## 2018-05-08 DIAGNOSIS — M25.561 ACUTE PAIN OF RIGHT KNEE: Primary | ICD-10-CM

## 2018-05-08 PROCEDURE — G8982 BODY POS GOAL STATUS: HCPCS | Performed by: PHYSICAL THERAPIST

## 2018-05-08 PROCEDURE — G8981 BODY POS CURRENT STATUS: HCPCS | Performed by: PHYSICAL THERAPIST

## 2018-05-08 PROCEDURE — 97140 MANUAL THERAPY 1/> REGIONS: CPT | Performed by: PHYSICAL THERAPIST

## 2018-05-08 NOTE — PROGRESS NOTES
PT Evaluation     Today's date: 2018  Patient name: Joselin Jeronimo  : 1955  MRN: [id number]  Referring provider: Capo Merritt DO  Dx:   Encounter Diagnosis     ICD-10-CM    1  Acute pain of right knee M25 561    2  Low back pain, unspecified back pain laterality, unspecified chronicity, with sciatica presence unspecified M54 5    3  Lumbar spondylolysis M43 06                   Assessment  Impairments: abnormal gait, abnormal muscle firing, abnormal or restricted ROM, impaired physical strength and pain with function    Assessment details: Pt is a 62 y o male coming to outpatient PT with low back pain and R knee pain  Pt's R knee pain has improved since starting PT, however pt continues to have low back pain  Pt presents with decreased lumbar ROM and increased pain levels; however has significant co-morbidities with BMI>30 and with history of chronic low back pain  Pt would benefit from skilled PT services in order to address these deficits and reach maximum level of function  Thank you kindly for the referral!     Prognosis: good    Goals  ST  Pt will decrease pain levels by 3 points on NPS in 4 weeks  - met for knee; not met for low back  2  Pt will demonstrate improvement in RLE and core strength by 1/2 MMT grade in 4 weeks  - met  3  Pt will be independent with HEP in 4 weeks  - met  LT  Achieve FOTO score of 56/100 in 6 weeks for knee and 58/100 for low back in 6 weeks  - not met   2  Pt will be able to stand 8-10 minutes before having pain in low back in 6 weeks   - not met    Plan  Patient would benefit from: skilled PT  Planned modality interventions: cryotherapy  Planned therapy interventions: manual therapy, joint mobilization, neuromuscular re-education, patient education, postural training, therapeutic exercise, therapeutic activities, gait training, functional ROM exercises, abdominal trunk stabilization and activity modification  Frequency: 2x week  Duration in weeks: 6        Subjective Evaluation    History of Present Illness  Mechanism of injury: Pt reports that his back is feeling a little better since starting PT but still bothers him  Pt reports he feels it is hard to get rid of 40 years worth of damage  Pt reports that his R knee is continuing to get better  Pt reports that his pain is low, but he tweaked it a few years ago, but it is a lot better than when he came here     Pain  At best pain ratin  At worst pain ratin  Location: R knee  Aggravating factors: sitting, standing and walking    Treatments  No previous or current treatments  Patient Goals  Patient goals for therapy: decreased pain  Patient goal: to be able to go for walks again sometime        Objective     Active Range of Motion   Left Knee   Flexion: 110 degrees   Extension: 0 degrees     Right Knee   Flexion: 109 degrees   Extension: 0 degrees     Passive Range of Motion   Left Knee   Flexion: 110 degrees   Extension: 0 degrees     Right Knee   Flexion: 112 degrees   Extension: 0 degrees     Strength/Myotome Testing     Left Hip   Planes of Motion   Flexion: 4-  Extension: 4-    Right Hip   Planes of Motion   Flexion: 4-  Extension: 4-    Left Knee   Flexion: 4  Extension: 4    Right Knee   Flexion: 4  Extension: 4    Functional Assessment     Comments  TU seconds without Ad; SBA    General Comments     Lumbar Comments  STANDING  Observation/Posture = toe out b/l; increased lumbar lordosis  Gait assessment: decreased step length b/l; slightly antalgic; decreased mike - rigid gait pattern  Functional Tests =     SLS (EO/EC) =  R: 3 secs - UE  L: 3 seconds - UE     Bilateral heel raise: 5 reps - difficulty     Bilateral toe raise: 5 reps - difficulty   Lumbar ROM:     Rotation (AROM; overpressure) = R: mod limitation  L: mod limitation     Flexion (AROM; overpressure) = min limitation     Extension (AROM; overpressure) =  Mod limitation - no pain  SUPINE:    SLR/Lasegue = (+) b/l  **unable to tolerate supine lying for long periods of time**       Dx: LBP (with R sciatica); R knee pain  EPOC: 6/19/18  Precautions: DM Ii; cardiac disease; SOB quickly  CO-MORBIDITES: HTN  PERSONAL FACTORS: none    Manual  4/2 4/5 4/9  4/12 4/23 4/30 5/3 5/8     R Knee PROM 5' 5' inadvertently missed NV 5' 10'  b/l  held held     s/l MFR low back       8' 13'                                                Exercise Diary  4/2 4/5 4/9 4/12 4/23 4/30 5/3 5/8     Recumbent Bike 5' (vc'ing to breathe) 5' 5' 6' 6'  L7 6"  L10 L10 5' np resume nv     Gastroc Stretch 30"X3  15"x5  b/l 15"x5 b/l 15"x5 b/l 15"x5  b/l 15"x15  b/l 3x30"      HR/TR (seated) 15x ea np    standing  x10 Stand 10x      Quad sets 10"x10 5"x10  b/l 5"x10  b/l 5"x10 b/l 5"x10  5"x10      LAQ 10"X10 5"  x10 5"x10   5"x10 b/l 5"x10 5"x10 5"x10      Standing Marches  NV 8ea 8ea 8 ea 8 12 12      HS curl   NV 10  ea 10xea 10x ea 10 12 12      Standing Hip Abduction      8ea 8 ea      Step Up/lateral    4" 5x ea  B/l (fwd only) np 6"  8 ea 6" 8 ea      Mini squats   10x          Iso Hip ABD 10"X10 5"  x10 5"x15 5"x15 5"x15 5"x10 5"x10      Iso Hip ADD 10"X10 5"  x10 5"x15 5"x15 5"x15 5"x10 5"x10      SLR             Seated hip add:DLS             Seated hip abd: DLS   OTB D82 5 sec OTB 15x 15x        Seated pball flexion stretch   [id number] 10x10" 10x 10x5" 10x5"      LTRs    B/l 15 ea         Core pball press     10x5" 10x5" 10x5"                       Modalities  4/2 4/5 4/9 4/12         CP - PRN   (LB in seated) 10'    10                                     *resume activities nv - progress report performed 5/8

## 2018-05-10 ENCOUNTER — OFFICE VISIT (OUTPATIENT)
Dept: PHYSICAL THERAPY | Facility: CLINIC | Age: 63
End: 2018-05-10
Payer: MEDICARE

## 2018-05-10 DIAGNOSIS — M25.561 ACUTE PAIN OF RIGHT KNEE: Primary | ICD-10-CM

## 2018-05-10 DIAGNOSIS — M54.5 LOW BACK PAIN, UNSPECIFIED BACK PAIN LATERALITY, UNSPECIFIED CHRONICITY, WITH SCIATICA PRESENCE UNSPECIFIED: ICD-10-CM

## 2018-05-10 DIAGNOSIS — M43.06 LUMBAR SPONDYLOLYSIS: ICD-10-CM

## 2018-05-10 PROCEDURE — 97110 THERAPEUTIC EXERCISES: CPT | Performed by: PHYSICAL THERAPIST

## 2018-05-10 PROCEDURE — 97140 MANUAL THERAPY 1/> REGIONS: CPT | Performed by: PHYSICAL THERAPIST

## 2018-05-10 PROCEDURE — 97112 NEUROMUSCULAR REEDUCATION: CPT | Performed by: PHYSICAL THERAPIST

## 2018-05-10 NOTE — PROGRESS NOTES
Daily Note     Today's date: 5/10/2018  Patient name: Estiven Marquez  : 1955  MRN: [id number]  Referring provider: Ольга Henry DO  Dx:   Encounter Diagnosis     ICD-10-CM    1  Acute pain of right knee M25 561    2  Low back pain, unspecified back pain laterality, unspecified chronicity, with sciatica presence unspecified M54 5    3  Lumbar spondylolysis M43 06                   Subjective: Pt reports his back felt better after last session a little - his knee is a little cranky this AM  He reports that he started taking antibiotics Monday for his leg but doesn't think it's improving a whole lot  Objective: See treatment diary below      Assessment: Tolerated treatment fair  Patient demonstrated fatigue post treatment  RLE redness slightly improved; however, remains red  Discussed with pt to contact MD regarding this if he hasn't followed-up  Focused on core stability and manuals for low back as this remains patient's chief complaint  Difficulty maintain DLS with supine TE  Continues to demonstratre decreased endurance  95% O2 and 78 bpm post bike  Noted increased edema in LLE however patient continues to have this regularly and he reports this is normal for him  Discussed using his compression device at home  Plan: Continue per plan of care         Dx: LBP (with R sciatica); R knee pain  EPOC: 18  Precautions: DM Ii; cardiac disease; SOB quickly  CO-MORBIDITES: HTN  PERSONAL FACTORS: none    Manual  4/2 4/5 4/9  4/12 4/23 4/30 5/3 5/8 5/10    R Knee PROM 5' 5' inadvertently missed NV 5' 10'  b/l  held held np    s/l MFR low back       8' 13' 10'                                               Exercise Diary  4/2 4/5 4/9 4/12 4/23 4/30 5/3 5/8 5/10    Recumbent Bike 5' (vc'ing to breathe) 5' 5' 6' 6'  L7 6"  L10 L10 5' np resume nv L10 6'    Gastroc Stretch 30"X3  15"x5  b/l 15"x5 b/l 15"x5 b/l 15"x5  b/l 15"x15  b/l 3x30"  3X30"    HR/TR (seated) 15x ea np    standing  x10 Stand 10x Quad sets 10"x10 5"x10  b/l 5"x10  b/l 5"x10 b/l 5"x10  5"x10      LAQ 10"X10 5"  x10 5"x10   5"x10 b/l 5"x10 5"x10 5"x10      Standing Marches  NV 8ea 8ea 8 ea 8 12 12  15 ea    HS curl   NV 10  ea 10xea 10x ea 10 12 12      Standing Hip Abduction      8ea 8 ea      Step Up/lateral    4" 5x ea  B/l (fwd only) np 6"  8 ea 6" 8 ea  6" 10 ea    Mini squats   10x          Iso Hip ABD 10"X10 5"  x10 5"x15 5"x15 5"x15 5"x10 5"x10  5"x10    Iso Hip ADD 10"X10 5"  x10 5"x15 5"x15 5"x15 5"x10 5"x10  5"x10    SLR             Seated hip add:DLS             Seated hip abd: DLS   OTB Q05 5 sec OTB 15x 15x        Seated pball flexion stretch   [id number] 10x10" 10x 10x5" 10x5"  10x5"    LTRs    B/l 15 ea     10 ea b/l    Core pball press     10x5" 10x5" 10x5"  10x5"    Supine bridging         10x5"        Modalities  4/2 4/5 4/9 4/12         CP - PRN   (LB in seated) 10'    10                                     *resume activities nv - progress report performed 5/8

## 2018-05-15 ENCOUNTER — OFFICE VISIT (OUTPATIENT)
Dept: PHYSICAL THERAPY | Facility: CLINIC | Age: 63
End: 2018-05-15
Payer: MEDICARE

## 2018-05-15 DIAGNOSIS — M54.5 LOW BACK PAIN, UNSPECIFIED BACK PAIN LATERALITY, UNSPECIFIED CHRONICITY, WITH SCIATICA PRESENCE UNSPECIFIED: ICD-10-CM

## 2018-05-15 DIAGNOSIS — M25.561 ACUTE PAIN OF RIGHT KNEE: Primary | ICD-10-CM

## 2018-05-15 PROCEDURE — 97140 MANUAL THERAPY 1/> REGIONS: CPT

## 2018-05-15 PROCEDURE — 97110 THERAPEUTIC EXERCISES: CPT

## 2018-05-15 PROCEDURE — 97112 NEUROMUSCULAR REEDUCATION: CPT

## 2018-05-15 NOTE — PROGRESS NOTES
Daily Note     Today's date: 5/15/2018  Patient name: Jadiel Cerrato  : 1955  MRN: [id number]  Referring provider: Liz Blackwell DO  Dx:   Encounter Diagnosis     ICD-10-CM    1  Acute pain of right knee M25 561    2  Low back pain, unspecified back pain laterality, unspecified chronicity, with sciatica presence unspecified M54 5                   Subjective: Pt stated his legs are progressively feeling better but still red, and back is doing a little better    Objective: See treatment diary below      Assessment: Tolerated treatment fair  Patient demonstrated fatigue post treatment  RLE redness slightly improved; however, remains red  Increased swelling noted in LLE, pt  States he has compression sleeves but hasn't worn then lately due to the leg pain but now that legs are feeling better he will start wearing again  Continued to focuse on core stability, LE TE's and manuals for low back as this remains patient's chief complaint  Difficulty maintain DLS with supine TE  Continues to demonstratre decreased endurance  Noted increased edema in LLE however patient continues to have this regularly and he reports this is normal for him  Plan: Continue per plan of care         Dx: LBP (with R sciatica); R knee pain  EPOC: 18  Precautions: DM Ii; cardiac disease; SOB quickly  CO-MORBIDITES: HTN  PERSONAL FACTORS: none    Manual  4/2 4/5 4/9  4/12 4/23 4/30 5/3 5/8 5/10 5/15     R Knee PROM 5' 5' inadvertently missed NV 5' 10'  b/l  held held np np     s/l MFR low back       8' 13' 10' 10'                                                      Exercise Diary  4/2 4/5 4/9 4/12 4/23 4/30 5/3 5/8 5/10 5/15   Recumbent Bike 5' (vc'ing to breathe) 5' 5' 6' 6'  L7 6"  L10 L10 5' np resume nv L10 6' L11  6'   Gastroc Stretch 30"X3  15"x5  b/l 15"x5 b/l 15"x5 b/l 15"x5  b/l 15"x15  b/l 3x30"  3X30" 30"x3   HR/TR (seated) 15x ea np    standing  x10 Stand 10x   Stand  15x   Quad sets 10"x10 5"x10  b/l 5"x10  b/l 5"x10 b/l 5"x10  5"x10   5"x10   LAQ 10"X10 5"  x10 5"x10   5"x10 b/l 5"x10 5"x10 5"x10   5"  x8 ea   Standing Marches  NV 8ea 8ea 8 ea 8 12 12  15 ea 15   HS curl   NV 10  ea 10xea 10x ea 10 12 12   15   Standing Hip Abduction      8ea 8 ea      Step Up/lateral    4" 5x ea  B/l (fwd only) np 6"  8 ea 6" 8 ea  6" 10 ea 6"  8x  ea   Mini squats   10x          Iso Hip ABD 10"X10 5"  x10 5"x15 5"x15 5"x15 5"x10 5"x10  5"x10 5"x10   Iso Hip ADD 10"X10 5"  x10 5"x15 5"x15 5"x15 5"x10 5"x10  5"x10 5"x10   SLR             Seated hip add:DLS             Seated hip abd: DLS   OTB U78 5 sec OTB 15x 15x        Seated pball flexion stretch   [id number] 10x10" 10x 10x5" 10x5"  10x5"    LTRs    B/l 15 ea     10 ea b/l 10   Core pball press     10x5" 10x5" 10x5"  10x5" 5"x10   Supine bridging         10x5" 5"x10       Modalities  4/2  4/5 4/9 4/12         CP - PRN   (LB in seated) 10'    10                                     *resume activities nv - progress report performed 5/8

## 2018-05-16 DIAGNOSIS — I48.0 PAROXYSMAL ATRIAL FIBRILLATION (HCC): Primary | ICD-10-CM

## 2018-05-16 RX ORDER — FLECAINIDE ACETATE 100 MG/1
TABLET ORAL
Qty: 60 TABLET | Refills: 3 | Status: SHIPPED | OUTPATIENT
Start: 2018-05-16 | End: 2018-06-11 | Stop reason: SDUPTHER

## 2018-05-16 RX ORDER — METOPROLOL SUCCINATE 25 MG/1
TABLET, EXTENDED RELEASE ORAL
Qty: 30 TABLET | Refills: 5 | Status: SHIPPED | OUTPATIENT
Start: 2018-05-16 | End: 2019-05-17 | Stop reason: SDUPTHER

## 2018-05-16 RX ORDER — APIXABAN 5 MG/1
TABLET, FILM COATED ORAL
Qty: 60 TABLET | Refills: 6 | Status: SHIPPED | OUTPATIENT
Start: 2018-05-16 | End: 2020-02-03 | Stop reason: SDUPTHER

## 2018-05-17 ENCOUNTER — OFFICE VISIT (OUTPATIENT)
Dept: PHYSICAL THERAPY | Facility: CLINIC | Age: 63
End: 2018-05-17
Payer: MEDICARE

## 2018-05-17 DIAGNOSIS — M54.5 LOW BACK PAIN, UNSPECIFIED BACK PAIN LATERALITY, UNSPECIFIED CHRONICITY, WITH SCIATICA PRESENCE UNSPECIFIED: ICD-10-CM

## 2018-05-17 DIAGNOSIS — M25.561 ACUTE PAIN OF RIGHT KNEE: Primary | ICD-10-CM

## 2018-05-17 PROCEDURE — 97112 NEUROMUSCULAR REEDUCATION: CPT

## 2018-05-17 PROCEDURE — 97140 MANUAL THERAPY 1/> REGIONS: CPT

## 2018-05-17 PROCEDURE — 97110 THERAPEUTIC EXERCISES: CPT

## 2018-05-17 NOTE — PROGRESS NOTES
Daily Note     Today's date: 2018  Patient name: Mechelle Lee  : 1955  MRN: [id number]  Referring provider: Emily Penny DO  Dx:   Encounter Diagnosis     ICD-10-CM    1  Acute pain of right knee M25 561    2  Low back pain, unspecified back pain laterality, unspecified chronicity, with sciatica presence unspecified M54 5                   Subjective: Pt stated he is tired today  Went to [employer] before session and there were no motorized carts available so he walked and had to  line for a long time  Objective: See treatment diary below      Assessment: Tolerated treatment fair  Patient demonstrated fatigue post treatment  94 O2 78 BPM capture while on recumbent bike  RLE redness slightly improved; however, remains red  Increased swelling noted in LLE, Pt presented with compression sleeve on the L  Continued to focuse on core stability, LE TE's and manuals for low back as this remains patient's chief complaint  Difficulty maintain DLS with supine TE  Continues to demonstratre decreased endurance  Noted increased edema in LLE however patient continues to have this regularly and he reports this is normal for him  Plan: Continue per plan of care         Dx: LBP (with R sciatica); R knee pain  EPOC: 18  Precautions: DM Ii; cardiac disease; SOB quickly  CO-MORBIDITES: HTN  PERSONAL FACTORS: none    Manual                R Knee PROM np              s/l MFR low back 10                                                               Exercise Diary              Recumbent Bike L12  6'            Gastroc Stretch 30"x6            HR/TR (seated) 15            Quad sets             LAQ 5"  x10             Standing Marches 10            HS curl 10            Standing Hip Abduction 10            Step Up/lateral np            Mini squats 5"x10            Iso Hip ABD 5"x10            Iso Hip ADD             SLR             Seated hip add:DLS             Seated hip abd: DLS Seated pball flexion stretch             LTRs 10            Core pball press 5"x10            Supine bridging 10                Modalities  4/2 4/5 4/9 4/12         CP - PRN   (LB in seated) 10'    10                                     *resume activities nv - progress report performed 5/8

## 2018-05-22 ENCOUNTER — TELEPHONE (OUTPATIENT)
Dept: ENDOCRINOLOGY | Facility: HOSPITAL | Age: 63
End: 2018-05-22

## 2018-05-22 NOTE — TELEPHONE ENCOUNTER
Spoke to pt in regards to blood sugar logs  Per Lianet Ashley pt is well controlled and no changes at this time

## 2018-05-23 ENCOUNTER — OFFICE VISIT (OUTPATIENT)
Dept: PHYSICAL THERAPY | Facility: CLINIC | Age: 63
End: 2018-05-23
Payer: MEDICARE

## 2018-05-23 DIAGNOSIS — M25.561 ACUTE PAIN OF RIGHT KNEE: Primary | ICD-10-CM

## 2018-05-23 DIAGNOSIS — M54.5 LOW BACK PAIN, UNSPECIFIED BACK PAIN LATERALITY, UNSPECIFIED CHRONICITY, WITH SCIATICA PRESENCE UNSPECIFIED: ICD-10-CM

## 2018-05-23 PROCEDURE — 97112 NEUROMUSCULAR REEDUCATION: CPT

## 2018-05-23 PROCEDURE — 97110 THERAPEUTIC EXERCISES: CPT

## 2018-05-23 PROCEDURE — 97140 MANUAL THERAPY 1/> REGIONS: CPT

## 2018-05-23 NOTE — PROGRESS NOTES
Daily Note     Today's date: 2018  Patient name: Dionne Baires  : 1955  MRN: [id number]  Referring provider: Mike Fish DO  Dx:   Encounter Diagnosis     ICD-10-CM    1  Acute pain of right knee M25 561    2  Low back pain, unspecified back pain laterality, unspecified chronicity, with sciatica presence unspecified M54 5                   Subjective: Pt stated he is doing ok today  Has been running around busy this morning so he feels tired but decreased stiffness in both knees    Objective: See treatment diary below      Assessment: Tolerated treatment fair  Patient demonstrated fatigue post treatment  93 O2 73 BPM captured prior to recumbent bike at start of session  RLE redness continues to improve; however, remains red  Increased swelling noted in LLE  Continued to focuse on core stability, LE TE's and manuals for low back as this remains patient's chief complaint  Continues to demonstratre decreased endurance  Noted increased edema in LLE however patient continues to have this regularly and he reports this is normal for him  Plan: Continue per plan of care         Dx: LBP (with R sciatica); R knee pain  EPOC: 18  Precautions: DM Ii; cardiac disease; SOB quickly  CO-MORBIDITES: HTN  PERSONAL FACTORS: none    Manual               R Knee PROM np 4'             s/l MFR low back 10 6'                                                              Exercise Diary             Recumbent Bike L12  6' L12  6'            Gastroc Stretch 30"x6 30"x5           HR/TR (seated) 15 15           Quad sets             LAQ 5"  x10  5"x10 ea           Standing Marches 10 10           HS curl 10 10           Standing Hip Abduction 10 10           Step Up/lateral np            Mini squats 5"x10 5"x10           Iso Hip ABD 5"x10 5"x10           Iso Hip ADD  5"x10           SLR  5 ea           Seated hip add:DLS             Seated hip abd: DLS             Seated pball flexion stretch LTRs 10 10           Core pball press 5"x10 5"x10           Supine bridging 10 10               Modalities  4/2 4/5 4/9 4/12         CP - PRN   (LB in seated) 10'    10                                     *resume activities nv - progress report performed 5/8

## 2018-05-25 ENCOUNTER — TRANSCRIBE ORDERS (OUTPATIENT)
Dept: FAMILY MEDICINE CLINIC | Facility: HOSPITAL | Age: 63
End: 2018-05-25

## 2018-05-25 ENCOUNTER — OFFICE VISIT (OUTPATIENT)
Dept: PHYSICAL THERAPY | Facility: CLINIC | Age: 63
End: 2018-05-25
Payer: MEDICARE

## 2018-05-25 DIAGNOSIS — I48.0 PAROXYSMAL ATRIAL FIBRILLATION (HCC): Primary | ICD-10-CM

## 2018-05-25 DIAGNOSIS — M25.561 ACUTE PAIN OF RIGHT KNEE: Primary | ICD-10-CM

## 2018-05-25 DIAGNOSIS — M54.5 LOW BACK PAIN, UNSPECIFIED BACK PAIN LATERALITY, UNSPECIFIED CHRONICITY, WITH SCIATICA PRESENCE UNSPECIFIED: ICD-10-CM

## 2018-05-25 PROCEDURE — 97110 THERAPEUTIC EXERCISES: CPT

## 2018-05-25 PROCEDURE — 97112 NEUROMUSCULAR REEDUCATION: CPT

## 2018-05-25 PROCEDURE — 97140 MANUAL THERAPY 1/> REGIONS: CPT

## 2018-05-25 NOTE — PROGRESS NOTES
Daily Note     Today's date: 2018  Patient name: Shahzad Grove  : 1955  MRN: [id number]  Referring provider: Saqib Grace DO  Dx:   Encounter Diagnosis     ICD-10-CM    1  Acute pain of right knee M25 561    2  Low back pain, unspecified back pain laterality, unspecified chronicity, with sciatica presence unspecified M54 5                   Subjective: Pt stated he is doing ok today, and his back is feeling a little better  Objective: See treatment diary below      Assessment: Tolerated treatment fair  Patient demonstrated fatigue post treatment  94 O2 74 BPM captured prior to recumbent bike at start of session  RLE redness continues to improve; however, remains red  Increased swelling noted in LLE  Continued to focus on core stability, LE TE's and manuals for low back as this remains patient's chief complaint  Noted increased edema in LLE however patient continues to have this regularly and he reports this is normal for him  Possible increased endurance today noted, less resting needed and less SOB  Plan: Continue per plan of care         Dx: LBP (with R sciatica); R knee pain  EPOC: 18  Precautions: DM Ii; cardiac disease; SOB quickly  CO-MORBIDITES: HTN  PERSONAL FACTORS: none    Manual              R Knee PROM np 4' np            s/l MFR low back 10 6' 10                                                             Exercise Diary            Recumbent Bike L12  6' L12  6'  L14  6'          Gastroc Stretch 30"x6 30"x5 30"x5          HR/TR (seated) 15 15 15  standing          Quad sets             LAQ 5"  x10  5"x10 ea 5"x10 b/l          Standing Marches 10 10 10          HS curl 10 10 10          Standing Hip Abduction 10 10 10 ea          Step Up/lateral np            Mini squats 5"x10 5"x10 5"x10          Iso Hip ABD 5"x10 5"x10 5"x10          Iso Hip ADD  5"x10 5"x10          SLR  5 ea           Seated hip add:DLS             Seated hip abd: DLS Seated pball flexion stretch             LTRs 10 10 10          Core pball press 5"x10 5"x10 5"x10          Supine bridging 10 10 10              Modalities  4/2 4/5 4/9 4/12         CP - PRN   (LB in seated) 10'    10                                     *resume activities nv - progress report performed 5/8

## 2018-05-29 ENCOUNTER — OFFICE VISIT (OUTPATIENT)
Dept: PHYSICAL THERAPY | Facility: CLINIC | Age: 63
End: 2018-05-29
Payer: MEDICARE

## 2018-05-29 DIAGNOSIS — M25.561 ACUTE PAIN OF RIGHT KNEE: Primary | ICD-10-CM

## 2018-05-29 DIAGNOSIS — M54.5 LOW BACK PAIN, UNSPECIFIED BACK PAIN LATERALITY, UNSPECIFIED CHRONICITY, WITH SCIATICA PRESENCE UNSPECIFIED: ICD-10-CM

## 2018-05-29 PROCEDURE — 97140 MANUAL THERAPY 1/> REGIONS: CPT

## 2018-05-29 PROCEDURE — 97110 THERAPEUTIC EXERCISES: CPT

## 2018-05-29 PROCEDURE — 97112 NEUROMUSCULAR REEDUCATION: CPT

## 2018-05-29 NOTE — PROGRESS NOTES
Daily Note     Today's date: 2018  Patient name: Latia Murillo  : 1955  MRN: [id number]  Referring provider: Esha Hyman DO  Dx:   Encounter Diagnosis     ICD-10-CM    1  Acute pain of right knee M25 561    2  Low back pain, unspecified back pain laterality, unspecified chronicity, with sciatica presence unspecified M54 5                   Subjective: Pt stated he is doing ok today, he states he is tired but offers no other complaints  Pt  Stated increased humidity is causing him to have more SOB today  Objective: See treatment diary below      Assessment: Tolerated treatment fair  Patient demonstrated fatigue post treatment  95 O2 70 BPM captured prior to recumbent bike at start of session  Increased swelling noted in LLE  Pt  Seemed to show increased endurence today with less rest breaks needed  Low back and b/l knee pain still present t/o but has decreased since starting therapy  Plan: Continue per plan of care         Dx: LBP (with R sciatica); R knee pain  EPOC: 18  Precautions: DM Ii; cardiac disease; SOB quickly  CO-MORBIDITES: HTN  PERSONAL FACTORS: none    Manual             R Knee PROM np 4' np brief           s/l MFR low back 10 6' 10 10                                                            Exercise Diary           Recumbent Bike L12  6' L12  6'  L14  6' L17  6'         Gastroc Stretch 30"x6 30"x5 30"x5 30"x5         HR/TR (seated) 15 15 15  standing 15         Quad sets             LAQ 5"  x10  5"x10 ea 5"x10 b/l 5"  x10         Standing Marches 10 10 10 10         HS curl 10 10 10 10         Standing Hip Abduction 10 10 10 ea 10         Step Up/lateral np            Mini squats 5"x10 5"x10 5"x10          Iso Hip ABD 5"x10 5"x10 5"x10 5"x10         Iso Hip ADD  5"x10 5"x10 5"x10         SLR  5 ea           Seated hip add:DLS             Seated hip abd: DLS             Seated pball flexion stretch             LTRs 10 10 10 10         Core pball press 5"x10 5"x10 5"x10 5"x10         Supine bridging 10 10 10 10             Modalities  4/2 4/5 4/9 4/12         CP - PRN   (LB in seated) 10'    10                                     *resume activities nv - progress report performed 5/8

## 2018-05-31 ENCOUNTER — OFFICE VISIT (OUTPATIENT)
Dept: PHYSICAL THERAPY | Facility: CLINIC | Age: 63
End: 2018-05-31
Payer: MEDICARE

## 2018-05-31 DIAGNOSIS — M54.5 LOW BACK PAIN, UNSPECIFIED BACK PAIN LATERALITY, UNSPECIFIED CHRONICITY, WITH SCIATICA PRESENCE UNSPECIFIED: ICD-10-CM

## 2018-05-31 DIAGNOSIS — M25.561 ACUTE PAIN OF RIGHT KNEE: Primary | ICD-10-CM

## 2018-05-31 PROCEDURE — 97112 NEUROMUSCULAR REEDUCATION: CPT

## 2018-05-31 PROCEDURE — 97140 MANUAL THERAPY 1/> REGIONS: CPT

## 2018-05-31 PROCEDURE — 97110 THERAPEUTIC EXERCISES: CPT

## 2018-05-31 NOTE — PROGRESS NOTES
Daily Note     Today's date: 2018  Patient name: Mechelle Lee  : 1955  MRN: [id number]  Referring provider: Emily Penny DO  Dx:   Encounter Diagnosis     ICD-10-CM    1  Acute pain of right knee M25 561    2  Low back pain, unspecified back pain laterality, unspecified chronicity, with sciatica presence unspecified M54 5                   Subjective: Pt stated he is doing ok today, but same as always  Objective: See treatment diary below      Assessment: Tolerated treatment fair  Patient demonstrated fatigue post treatment  98 O2 67 BPM captured post recumbent bike at start of session  Increased swelling noted in LLE  Pt  Continues to show increased endurance and less rest breaks needed  Pt  Knee pain has improved but low back is still chief complaint  Plan: Continue per plan of care         Dx: LBP (with R sciatica); R knee pain  EPOC: 18  Precautions: DM Ii; cardiac disease; SOB quickly  CO-MORBIDITES: HTN  PERSONAL FACTORS: none    Manual            R Knee PROM np 4' np brief np            s/l MFR low back 10 6' 10 10 12                                                           Exercise Diary          Recumbent Bike L12  6' L12  6'  L14  6' L17  6' L17  6'        Gastroc Stretch 30"x6 30"x5 30"x5 30"x5 30"x  5        HR/TR (seated) 15 15 15  standing 15 15        Quad sets             LAQ 5"  x10  5"x10 ea 5"x10 b/l 5"  x10 5"x10        Standing Marches 10 10 10 10 10        HS curl 10 10 10 10 10        Standing Hip Abduction 10 10 10 ea 10 10        Step Up/lateral np            Mini squats 5"x10 5"x10 5"x10  5"x10        Iso Hip ABD 5"x10 5"x10 5"x10 5"x10 5"x10        Iso Hip ADD  5"x10 5"x10 5"x10 5"x10        SLR  5 ea           Seated hip add:DLS             Seated hip abd: DLS             Seated pball flexion stretch             LTRs 10 10 10 10 10        Core pball press 5"x10 5"x10 5"x10 5"x10 5"x10        Supine bridging 10 10 10 10 10            Modalities  4/2 4/5 4/9 4/12         CP - PRN   (LB in seated) 10'    10                                     *resume activities nv - progress report performed 5/8

## 2018-06-06 ENCOUNTER — OFFICE VISIT (OUTPATIENT)
Dept: PHYSICAL THERAPY | Facility: CLINIC | Age: 63
End: 2018-06-06
Payer: MEDICARE

## 2018-06-06 DIAGNOSIS — M54.5 LOW BACK PAIN, UNSPECIFIED BACK PAIN LATERALITY, UNSPECIFIED CHRONICITY, WITH SCIATICA PRESENCE UNSPECIFIED: ICD-10-CM

## 2018-06-06 DIAGNOSIS — M25.561 ACUTE PAIN OF RIGHT KNEE: Primary | ICD-10-CM

## 2018-06-06 PROCEDURE — 97112 NEUROMUSCULAR REEDUCATION: CPT

## 2018-06-06 PROCEDURE — 97140 MANUAL THERAPY 1/> REGIONS: CPT

## 2018-06-06 PROCEDURE — 97110 THERAPEUTIC EXERCISES: CPT

## 2018-06-06 NOTE — PROGRESS NOTES
Daily Note     Today's date: 2018  Patient name: Tessa Shirley  : 1955  MRN: [id number]  Referring provider: Malinda Lovelace DO  Dx:   Encounter Diagnosis     ICD-10-CM    1  Acute pain of right knee M25 561    2  Low back pain, unspecified back pain laterality, unspecified chronicity, with sciatica presence unspecified M54 5                   Subjective: Pt stated his back is doing pretty good today, but knees are a little sore due to doing some work yesterday on his hands and knees    Objective: See treatment diary below      Assessment: Tolerated treatment fair  Patient demonstrated fatigue post treatment  Pt  Has shown progress to tolerance of TE's and back is feeling good today  Knees are little sore from over activity but over all feeling good  Plan: Continue per plan of care         Dx: LBP (with R sciatica); R knee pain  EPOC: 18  Precautions: DM Ii; cardiac disease; SOB quickly  CO-MORBIDITES: HTN  PERSONAL FACTORS: none    Manual   6/         R Knee PROM np 4' np brief np   3         s/l MFR low back 10 6' 10 10 12 12                                                          Exercise Diary   6/6       Recumbent Bike L12  6' L12  6'  L14  6' L17  6' L17  6' L14-20  6'       Gastroc Stretch 30"x6 30"x5 30"x5 30"x5 30"x  5 30"x5         HR/TR (seated) 15 15 15  standing 15 15 15       Quad sets             LAQ 5"  x10  5"x10 ea 5"x10 b/l 5"  x10 5"x10 5"x10       Standing Marches 10 10 10 10 10 10       HS curl 10 10 10 10 10 10       Standing Hip Abduction 10 10 10 ea 10 10 10       Step Up/lateral np            Mini squats 5"x10 5"x10 5"x10  5"x10 5"x10       Iso Hip ABD 5"x10 5"x10 5"x10 5"x10 5"x10 5"x10       Iso Hip ADD  5"x10 5"x10 5"x10 5"x10 5"x10       SLR  5 ea    5 ea       Seated hip add:DLS             Seated hip abd: DLS             Seated pball flexion stretch             LTRs 10 10 10 10 10 10       Core pball press 5"x10 5"x10 5"x10 5"x10 5"x10 5"x10       Supine bridging 10 10 10 10 10 10           Modalities  4/2 4/5 4/9 4/12         CP - PRN   (LB in seated) 10'    10                                     *resume activities nv - progress report performed 5/8

## 2018-06-08 ENCOUNTER — OFFICE VISIT (OUTPATIENT)
Dept: PHYSICAL THERAPY | Facility: CLINIC | Age: 63
End: 2018-06-08
Payer: MEDICARE

## 2018-06-08 DIAGNOSIS — M25.561 ACUTE PAIN OF RIGHT KNEE: Primary | ICD-10-CM

## 2018-06-08 DIAGNOSIS — M43.06 LUMBAR SPONDYLOLYSIS: ICD-10-CM

## 2018-06-08 DIAGNOSIS — M54.5 LOW BACK PAIN, UNSPECIFIED BACK PAIN LATERALITY, UNSPECIFIED CHRONICITY, WITH SCIATICA PRESENCE UNSPECIFIED: ICD-10-CM

## 2018-06-08 PROCEDURE — 97140 MANUAL THERAPY 1/> REGIONS: CPT | Performed by: PHYSICAL THERAPIST

## 2018-06-08 PROCEDURE — 97110 THERAPEUTIC EXERCISES: CPT | Performed by: PHYSICAL THERAPIST

## 2018-06-08 PROCEDURE — 97112 NEUROMUSCULAR REEDUCATION: CPT | Performed by: PHYSICAL THERAPIST

## 2018-06-08 NOTE — PROGRESS NOTES
Daily Note     Today's date: 2018  Patient name: Lisa Germain  : 1955  MRN: [id number]  Referring provider: Katarzyna Gregory DO  Dx:   Encounter Diagnosis     ICD-10-CM    1  Acute pain of right knee M25 561    2  Low back pain, unspecified back pain laterality, unspecified chronicity, with sciatica presence unspecified M54 5    3  Lumbar spondylolysis M43 06                   Subjective: Pt stated he is feeling okay - improving  Pt reports the other day he was able to go up steps reciprocally instead of non-reciprocally  Objective: See treatment diary below      Assessment: Tolerated treatment fair  Patient demonstrated fatigue post treatment  Continues to show progress with activities and exercises with endurance  Pt reports knee feels a little better today  Plan to D/C to [facility] end of next week  Plan: Continue per plan of care         Dx: LBP (with R sciatica); R knee pain  EPOC: 18  Precautions: DM Ii; cardiac disease; SOB quickly  CO-MORBIDITES: HTN  PERSONAL FACTORS: none    Manual   6/8        R Knee PROM np 4' np brief np   3 3'        s/l MFR low back 10 6' 10 10 12 12 10'                                                         Exercise Diary   6/8      Recumbent Bike L12  6' L12  6'  L14  6' L17  6' L17  6' L14-20  6' L14-20  6'      Gastroc Stretch 30"x6 30"x5 30"x5 30"x5 30"x  5 30"x5   30"x5      HR/TR (seated) 15 15 15  standing 15 15 15 15      Quad sets             LAQ 5"  x10  5"x10 ea 5"x10 b/l 5"  x10 5"x10 5"x10 5"x10      Standing Marches 10 10 10 10 10 10 10      HS curl 10 10 10 10 10 10 10      Standing Hip Abduction 10 10 10 ea 10 10 10 10      Step Up/lateral np            Mini squats 5"x10 5"x10 5"x10  5"x10 5"x10 5"x10      Iso Hip ABD 5"x10 5"x10 5"x10 5"x10 5"x10 5"x10 5"x10      Iso Hip ADD  5"x10 5"x10 5"x10 5"x10 5"x10 5"x10      SLR  5 ea    5 ea       Seated hip add:DLS             Seated hip abd:  DLS             Seated pball flexion stretch             LTRs 10 10 10 10 10 10 10      Core pball press 5"x10 5"x10 5"x10 5"x10 5"x10 5"x10 5"x10      Supine bridging 10 10 10 10 10 10 10          Modalities  4/2 4/5 4/9 4/12         CP - PRN   (LB in seated) 10'    10                                     *resume activities nv - progress report performed 5/8

## 2018-06-11 ENCOUNTER — OFFICE VISIT (OUTPATIENT)
Dept: CARDIOLOGY CLINIC | Facility: CLINIC | Age: 63
End: 2018-06-11
Payer: MEDICARE

## 2018-06-11 VITALS
HEART RATE: 59 BPM | BODY MASS INDEX: 45.1 KG/M2 | SYSTOLIC BLOOD PRESSURE: 158 MMHG | HEIGHT: 70 IN | DIASTOLIC BLOOD PRESSURE: 70 MMHG | WEIGHT: 315 LBS

## 2018-06-11 DIAGNOSIS — E78.5 DYSLIPIDEMIA: Primary | ICD-10-CM

## 2018-06-11 DIAGNOSIS — I48.0 PAROXYSMAL ATRIAL FIBRILLATION (HCC): ICD-10-CM

## 2018-06-11 PROCEDURE — 93000 ELECTROCARDIOGRAM COMPLETE: CPT | Performed by: INTERNAL MEDICINE

## 2018-06-11 PROCEDURE — 99214 OFFICE O/P EST MOD 30 MIN: CPT | Performed by: INTERNAL MEDICINE

## 2018-06-11 RX ORDER — ATORVASTATIN CALCIUM 40 MG/1
40 TABLET, FILM COATED ORAL DAILY
Qty: 90 TABLET | Refills: 3 | Status: SHIPPED | OUTPATIENT
Start: 2018-06-11 | End: 2019-07-14 | Stop reason: SDUPTHER

## 2018-06-11 RX ORDER — FLECAINIDE ACETATE 50 MG/1
100 TABLET ORAL EVERY 12 HOURS
Qty: 90 TABLET | Refills: 3 | Status: SHIPPED | OUTPATIENT
Start: 2018-06-11 | End: 2018-06-11 | Stop reason: SDUPTHER

## 2018-06-11 RX ORDER — FLECAINIDE ACETATE 50 MG/1
50 TABLET ORAL EVERY 12 HOURS
Qty: 90 TABLET | Refills: 0 | Status: SHIPPED | OUTPATIENT
Start: 2018-06-11 | End: 2018-08-17 | Stop reason: SDUPTHER

## 2018-06-11 NOTE — PROGRESS NOTES
Cardiology Follow Up    Michelle Vanegas  1955  [id number]  HEART & VASCULAR  [facility] [address]  [address]  [address] [address] [address]    1  Paroxysmal atrial fibrillation [facility])  Ambulatory referral to Cardiology    POCT ECG       Interval History:     Complains of fatigue  He would like to come down on the doses of some of his meds  No chest pain, no dyspnea, and no palpitations       Problem List     Atrial fibrillation ([address] )    Dextrocardia    CVA (cerebral vascular accident) ([address] ) (Chronic)    Essential hypertension    Cardiac disease    Hyperlipidemia    Sleep apnea    Morbid obesity ([address] )    Diabetes mellitus type 2, controlled ([address] )    Cellulitis of extremity    Type 2 diabetes mellitus, uncontrolled ([address] )    Benign essential hypertension    Obstructive sleep apnea    Acute pain of right knee    Knee injury, initial encounter    Back pain, lumbosacral    Type 2 diabetes mellitus with complication ([address] )        Past Medical History:   Diagnosis Date    Arthritis     Asthma     BPH (benign prostatic hyperplasia)     Cardiac arrhythmia     resolved [id number]    Cardiac disease     Chronic a-fib (HCC)     Chronic pain     CVA (cerebral vascular accident) ([address] ) 11/2015    Dextrocardia     Diabetes mellitus ([address] )     GERD (gastroesophageal reflux disease)     Hyperlipidemia     Hypertension     Sleep apnea     Stroke (cerebrum) ([address] )      Social History     Social History    Marital status:      Spouse name: N/A    Number of children: N/A    Years of education: 15     Occupational History    Not working      Social History Main Topics    Smoking status: Never Smoker    Smokeless tobacco: Never Used      Comment: Occasionally smoked a pipe while fishing    Alcohol use 0 6 oz/week     1 Glasses of wine per week      Comment: 2-3 drinks a week, cherry liquer or beer     Drug use: No    Sexual activity: Not on file Other Topics Concern    Not on file     Social History Narrative    Daily caffeine consumption, 2-3 servings a day    Dental care, regularly    Exercise, cycling    Lives with parents    Supportive and safe    No advance directives          Family History   Problem Relation Age of Onset    Coronary artery disease Father     Hodgkin's lymphoma Father     Diabetes Father     Cancer Father     Diabetes type I Mother     Cancer Maternal Grandfather     Diabetes Paternal Grandmother     Emphysema Paternal Grandmother     Heart attack Paternal Grandfather     Hypertension Family     Neuropathy Family      Past Surgical History:   Procedure Laterality Date    CHOLECYSTECTOMY      EXPLORATORY LAPAROTOMY  1971    GALLBLADDER SURGERY      DE COLONOSCOPY FLX DX W/COLLJ SPEC WHEN PFRMD N/A 2/21/2018    Procedure: COLONOSCOPY;  Surgeon: Brian Haywood MD;  Location:  MAIN OR;  Service: Gastroenterology    UMBILICAL HERNIA REPAIR  2007       Current Outpatient Prescriptions:     amLODIPine (NORVASC) 10 mg tablet, Take 10 mg by mouth daily, Disp: , Rfl:     atorvastatin (LIPITOR) 80 mg tablet, Take 80 mg by mouth, Disp: , Rfl:     Cholecalciferol (VITAMIN D) 2000 units CAPS, Take 1 capsule by mouth 2 (two) times a day  , Disp: , Rfl:     ELIQUIS 5 MG, TAKE ONE TABLET BY MOUTH TWICE DAILY, Disp: 60 tablet, Rfl: 6    flecainide (TAMBOCOR) 100 mg tablet, TAKE ONE TABLET BY MOUTH EVERY 12 HOURS DAILY, Disp: 60 tablet, Rfl: 3    gabapentin (NEURONTIN) 300 mg capsule, TAKE ONE CAPSULE BY MOUTH TWICE DAILY, Disp: 60 capsule, Rfl: 5    LANTUS SOLOSTAR injection pen 100 units/mL, Inject 0 6 mL (60 Units total) under the skin 2 (two) times a day for 30 days, Disp: 11 pen, Rfl: 2    metFORMIN (GLUCOPHAGE) 500 mg tablet, Take 500 mg by mouth 2 (two) times a day with meals, Disp: , Rfl:     metoprolol succinate (TOPROL-XL) 25 mg 24 hr tablet, TAKE ONE TABLET BY MOUTH ONCE DAILY, Disp: 30 tablet, Rfl: 5   Mirabegron ER (MYRBETRIQ) 50 MG TB24, Take 1 tablet by mouth daily, Disp: , Rfl:     montelukast (SINGULAIR) 10 mg tablet, Take 1 tablet (10 mg total) by mouth daily, Disp: 30 tablet, Rfl: 3    Multiple Vitamins-Minerals (CENTRUM SILVER ULTRA MENS) TABS, Take 1 tablet by mouth daily, Disp: , Rfl:     NOVOLOG 100 UNIT/ML injection, Inject 55 Units under the skin 3 (three) times a day before meals 55 units with breakfast, lunch, and dinner , Disp: 50 mL, Rfl: 5    Omega-3 Fatty Acids (FISH OIL) 1,000 mg, Take 1,000 mg by mouth daily, Disp: , Rfl:     quinapril (ACCUPRIL) 40 MG tablet, Take 40 mg by mouth daily at bedtime, Disp: , Rfl:     ranitidine (ZANTAC) 150 mg tablet, TAKE ONE TABLET BY MOUTH EVERY 12 HOURS, Disp: 60 tablet, Rfl: 5    tamsulosin (FLOMAX) 0 4 mg, Take by mouth daily with dinner  , Disp: , Rfl:     traMADol (ULTRAM) 50 mg tablet, Take 1 tablet (50 mg total) by mouth every 8 (eight) hours as needed for moderate pain or severe pain, Disp: 90 tablet, Rfl: 0    fluticasone (FLONASE) 50 mcg/act nasal spray, 1 spray into each nostril daily, Disp: , Rfl:     fluticasone-salmeterol (ADVAIR) 250-50 mcg/dose inhaler, Inhale 1 puff every 12 (twelve) hours, Disp: , Rfl:     furosemide (LASIX) 20 mg tablet, Take 20 mg by mouth daily as needed, Disp: , Rfl:     tiotropium (SPIRIVA) 18 mcg inhalation capsule, Place 18 mcg into inhaler and inhale daily, Disp: , Rfl:   Allergies   Allergen Reactions    Banana Shortness Of Breath    Fruit Extracts Anaphylaxis and Throat Swelling     Annotation - [id number]: Bananas    Clotrimazole     Codeine Itching     Other reaction(s): Itching    Hydrocodone-Acetaminophen     Oxycodone Rash     Other reaction(s): Pruritis    Oxycodone-Acetaminophen Rash     Other reaction(s): Pruritis       Labs:     Chemistry        Component Value Date/Time     09/19/2017 0704    K 4 1 09/19/2017 0704     09/19/2017 0704    CO2 26 09/19/2017 0704    BUN 17 03/30/2018 0858    CREATININE 0 81 03/30/2018 0858    CREATININE 0 83 09/19/2017 0704        Component Value Date/Time    CALCIUM 8 9 09/19/2017 0704    ALKPHOS 62 09/19/2017 0704    AST 27 09/19/2017 0704    ALT 25 09/19/2017 0704    BILITOT 0 6 09/19/2017 0704            Lab Results   Component Value Date    CHOL 133 09/19/2017    CHOL 125 04/29/2016    CHOL 134 11/09/2015     Lab Results   Component Value Date    HDL 47 09/19/2017    HDL 47 04/29/2016    HDL 37 11/09/2015     Lab Results   Component Value Date    LDLCALC 63 09/19/2017    LDLCALC 48 04/29/2016    LDLCALC 44 11/09/2015     Lab Results   Component Value Date    TRIG 108 03/30/2018    TRIG 117 09/19/2017    TRIG 152 (H) 04/29/2016     No components found for: CHOLHDL    Imaging: No results found  EKG: Normal Sinus Rhythm 1st degree AVB  Review of Systems   Constitution: Positive for weakness and malaise/fatigue  HENT: Negative  Eyes: Negative  Cardiovascular: Negative  Respiratory: Negative  Endocrine: Negative  Hematologic/Lymphatic: Negative  Skin: Negative  Musculoskeletal: Negative  Gastrointestinal: Negative  Genitourinary: Negative  Neurological: Positive for dizziness  Psychiatric/Behavioral: Negative  Allergic/Immunologic: Negative  Vitals:    06/11/18 1116   BP: 158/70   Pulse: 59           Physical Exam   Constitutional: He is oriented to person, place, and time  No distress  HENT:   Mouth/Throat: No oropharyngeal exudate  Eyes: No scleral icterus  Neck: No JVD present  Cardiovascular: Normal rate and regular rhythm  No murmur heard  Pulmonary/Chest: Effort normal and breath sounds normal  No respiratory distress  He has no wheezes  He has no rales  Abdominal: Soft  Bowel sounds are normal  He exhibits no distension  There is no tenderness  There is no rebound  Musculoskeletal: He exhibits no edema  Neurological: He is alert and oriented to person, place, and time     Skin: Skin is warm and dry  He is not diaphoretic  Psychiatric: He has a normal mood and affect  His behavior is normal        Discussion/Summary:    PAF: Okay to try flecainide at 50mg twice a day  Continue metoprolol and Eliquis    Dyslipidemia: Recheck lipids on reduced dose of atorvastatin    HTN: BP mildly high today  BP at home has been okay  The patient was counseled regarding diagnostic results, instructions for management, risk factor reductions, impressions  total time of encounter was 25 minutes and 15 minutes was spent counseling

## 2018-06-11 NOTE — PATIENT INSTRUCTIONS
Okay to decrease Flecainide to 50mg twice a day    Okay to decrease Atorvastatin to 40mg daily and check labs in six weeks

## 2018-06-12 ENCOUNTER — OFFICE VISIT (OUTPATIENT)
Dept: PHYSICAL THERAPY | Facility: CLINIC | Age: 63
End: 2018-06-12
Payer: MEDICARE

## 2018-06-12 DIAGNOSIS — M25.561 ACUTE PAIN OF RIGHT KNEE: Primary | ICD-10-CM

## 2018-06-12 DIAGNOSIS — M54.5 LOW BACK PAIN, UNSPECIFIED BACK PAIN LATERALITY, UNSPECIFIED CHRONICITY, WITH SCIATICA PRESENCE UNSPECIFIED: ICD-10-CM

## 2018-06-12 PROCEDURE — G8983 BODY POS D/C STATUS: HCPCS | Performed by: PHYSICAL THERAPIST

## 2018-06-12 PROCEDURE — G8982 BODY POS GOAL STATUS: HCPCS | Performed by: PHYSICAL THERAPIST

## 2018-06-12 PROCEDURE — 97140 MANUAL THERAPY 1/> REGIONS: CPT

## 2018-06-12 PROCEDURE — 97110 THERAPEUTIC EXERCISES: CPT

## 2018-06-12 PROCEDURE — 97112 NEUROMUSCULAR REEDUCATION: CPT

## 2018-06-14 ENCOUNTER — APPOINTMENT (OUTPATIENT)
Dept: PHYSICAL THERAPY | Facility: CLINIC | Age: 63
End: 2018-06-14
Payer: MEDICARE

## 2018-06-19 ENCOUNTER — APPOINTMENT (OUTPATIENT)
Dept: PHYSICAL THERAPY | Facility: CLINIC | Age: 63
End: 2018-06-19
Payer: MEDICARE

## 2018-06-25 ENCOUNTER — TRANSCRIBE ORDERS (OUTPATIENT)
Dept: FAMILY MEDICINE CLINIC | Facility: HOSPITAL | Age: 63
End: 2018-06-25

## 2018-06-25 DIAGNOSIS — G47.30 SLEEP APNEA: Primary | ICD-10-CM

## 2018-06-29 ENCOUNTER — OFFICE VISIT (OUTPATIENT)
Dept: PULMONOLOGY | Facility: HOSPITAL | Age: 63
End: 2018-06-29
Payer: COMMERCIAL

## 2018-06-29 VITALS
SYSTOLIC BLOOD PRESSURE: 140 MMHG | OXYGEN SATURATION: 96 % | HEIGHT: 70 IN | WEIGHT: 315 LBS | DIASTOLIC BLOOD PRESSURE: 68 MMHG | TEMPERATURE: 97.9 F | BODY MASS INDEX: 45.1 KG/M2 | HEART RATE: 61 BPM

## 2018-06-29 DIAGNOSIS — J98.4 RESTRICTIVE LUNG DISEASE: ICD-10-CM

## 2018-06-29 DIAGNOSIS — G47.33 OBSTRUCTIVE SLEEP APNEA: Primary | ICD-10-CM

## 2018-06-29 DIAGNOSIS — G47.30 SLEEP APNEA: ICD-10-CM

## 2018-06-29 PROCEDURE — 99213 OFFICE O/P EST LOW 20 MIN: CPT | Performed by: INTERNAL MEDICINE

## 2018-06-29 NOTE — PATIENT INSTRUCTIONS
Start getting in bed at 12:30 a m  And stay in bed with CPAP machine in place until 5:00 a m  Once sleeping comfortably increase time in bed by 30 min in till you are in bed for 6 hr using CPAP  Attempt to avoid napping during the day  If napping try to use CPAP machine  Continue to increase activity as tolerated

## 2018-06-29 NOTE — PROGRESS NOTES
Assessment:    Obstructive sleep apnea  I am happy that the patient is using his CPAP machine on a nightly basis  I still believe that he needs to increase the amount of time that he is in bed and using the machine  I have asked him to start getting in bed at 12:30 a m  And stay in bed with CPAP machine in place until 5:00 a m  Once sleeping comfortably increase time in bed by 30 min in till you are in bed for 6 hr using CPAP  Attempt to avoid napping during the day  If napping try to use CPAP machine  We will follow up with compliance report  Restrictive lung disease  Clearly the patient would benefit from a respiratory standpoint if he could lose weight and increase his ambulation  Plan:    Diagnoses and all orders for this visit:    Obstructive sleep apnea    Sleep apnea  -     Ambulatory referral to Pulmonology    Restrictive lung disease        Follow-up:  6 months      HPI:  He is using the CPAP more - now 85% of the time  He uses only 4 hours when he uses the mask  He notices no difference in his sleep  Only in bed from 1am-5am  He tells me that his stroke occurred while he was sleeping at night and he believes that psychologically he can't sleep longer because he is worried he will have another stroke  He continues to have some mild dyspnea on exertion  He denies any significant cough, wheezing or chest pain  He is not using any pulmonary medications at this time      Review of Systems    The following portions of the patient's history were reviewed and updated as appropriate: allergies, current medications, past family history, past medical history, past social history, past surgical history and problem list     VITALS:  Vitals:    06/29/18 1141   BP: 140/68   BP Location: Left arm   Patient Position: Sitting   Pulse: 61   Temp: 97 9 °F (36 6 °C)   TempSrc: Tympanic   SpO2: 96%   Weight: (!) 159 kg (350 lb)   Height: 5' 10" (1 778 m)       Physical Exam  General:  Patient is awake, alert, non-toxic and in no acute respiratory distress  Neck: No JVD  CV:  Regular, +S1 and S2, No murmurs, gallops or rubs appreciated  Lungs:  Decreased breath sounds at bilateral bases, otherwise lungs are clear without wheeze, rales or rhonchi  Abdomen: Soft, +BS, Non-tender, non-distended  Extremities:  Bilateral lower extremity lymphedema, left leg greater than right leg No clubbing, cyanosis   Neuro: No focal deficits        Diagnostic Testing:    Compliance report for last 14 days showed average AHI is less than 5 when the patient is using the machine  His average amount of usage is 4 hr      CBC:  Lab Results   Component Value Date    WBC 5 10 02/02/2017    HGB 12 1 02/02/2017    HCT 38 1 02/02/2017    MCV 95 02/02/2017     02/02/2017         BMP:   Lab Results   Component Value Date     09/19/2017    K 4 1 09/19/2017     09/19/2017    CO2 26 09/19/2017    ANIONGAP 10 02/03/2017    BUN 17 03/30/2018    CREATININE 0 81 03/30/2018    GLUCOSE 97 09/19/2017    CALCIUM 8 9 09/19/2017    AST 27 09/19/2017    ALT 25 09/19/2017    ALKPHOS 62 09/19/2017    PROT 6 7 09/19/2017    BILITOT 0 6 09/19/2017    EGFR >60 0 02/03/2017         Mandy Gil DO

## 2018-06-30 NOTE — ASSESSMENT & PLAN NOTE
I am happy that the patient is using his CPAP machine on a nightly basis  I still believe that he needs to increase the amount of time that he is in bed and using the machine  I have asked him to start getting in bed at 12:30 a m  And stay in bed with CPAP machine in place until 5:00 a m  Once sleeping comfortably increase time in bed by 30 min in till you are in bed for 6 hr using CPAP  Attempt to avoid napping during the day  If napping try to use CPAP machine  We will follow up with compliance report

## 2018-06-30 NOTE — ASSESSMENT & PLAN NOTE
Clearly the patient would benefit from a respiratory standpoint if he could lose weight and increase his ambulation

## 2018-07-02 ENCOUNTER — TRANSCRIBE ORDERS (OUTPATIENT)
Dept: FAMILY MEDICINE CLINIC | Facility: HOSPITAL | Age: 63
End: 2018-07-02

## 2018-07-02 DIAGNOSIS — E78.5 HYPERLIPIDEMIA: ICD-10-CM

## 2018-07-02 DIAGNOSIS — E11.65 TYPE 2 DIABETES MELLITUS WITH HYPERGLYCEMIA (HCC): Primary | ICD-10-CM

## 2018-07-02 DIAGNOSIS — E11.8 TYPE 2 DIABETES MELLITUS WITH COMPLICATIONS (HCC): ICD-10-CM

## 2018-07-09 LAB
LEFT EYE DIABETIC RETINOPATHY: NORMAL
RIGHT EYE DIABETIC RETINOPATHY: NORMAL
SEVERITY (EYE EXAM): NORMAL

## 2018-07-09 PROCEDURE — 3072F LOW RISK FOR RETINOPATHY: CPT | Performed by: INTERNAL MEDICINE

## 2018-07-25 LAB
ALBUMIN SERPL-MCNC: 4.3 G/DL (ref 3.6–4.8)
ALBUMIN/GLOB SERPL: 1.7 {RATIO} (ref 1.2–2.2)
ALP SERPL-CCNC: 77 IU/L (ref 39–117)
ALT SERPL-CCNC: 24 IU/L (ref 0–44)
AST SERPL-CCNC: 21 IU/L (ref 0–40)
BILIRUB SERPL-MCNC: 0.7 MG/DL (ref 0–1.2)
BUN SERPL-MCNC: 14 MG/DL (ref 8–27)
BUN/CREAT SERPL: 15 (ref 10–24)
CALCIUM SERPL-MCNC: 9.3 MG/DL (ref 8.6–10.2)
CHLORIDE SERPL-SCNC: 99 MMOL/L (ref 96–106)
CO2 SERPL-SCNC: 29 MMOL/L (ref 20–29)
CREAT SERPL-MCNC: 0.93 MG/DL (ref 0.76–1.27)
EST. AVERAGE GLUCOSE BLD GHB EST-MCNC: 169 MG/DL
GLOBULIN SER-MCNC: 2.5 G/DL (ref 1.5–4.5)
GLUCOSE SERPL-MCNC: 143 MG/DL (ref 65–99)
HBA1C MFR BLD: 7.5 % (ref 4.8–5.6)
POTASSIUM SERPL-SCNC: 4.2 MMOL/L (ref 3.5–5.2)
PROT SERPL-MCNC: 6.8 G/DL (ref 6–8.5)
SL AMB EGFR AFRICAN AMERICAN: 100 ML/MIN/1.73
SL AMB EGFR NON AFRICAN AMERICAN: 86 ML/MIN/1.73
SODIUM SERPL-SCNC: 144 MMOL/L (ref 134–144)

## 2018-07-30 ENCOUNTER — OFFICE VISIT (OUTPATIENT)
Dept: FAMILY MEDICINE CLINIC | Facility: HOSPITAL | Age: 63
End: 2018-07-30
Payer: COMMERCIAL

## 2018-07-30 VITALS
SYSTOLIC BLOOD PRESSURE: 164 MMHG | BODY MASS INDEX: 45.1 KG/M2 | OXYGEN SATURATION: 95 % | HEART RATE: 75 BPM | WEIGHT: 315 LBS | TEMPERATURE: 99.1 F | DIASTOLIC BLOOD PRESSURE: 62 MMHG | HEIGHT: 70 IN

## 2018-07-30 DIAGNOSIS — L03.116 CELLULITIS OF LEFT LOWER EXTREMITY: Primary | ICD-10-CM

## 2018-07-30 DIAGNOSIS — E11.8 TYPE 2 DIABETES MELLITUS WITH COMPLICATION, UNSPECIFIED WHETHER LONG TERM INSULIN USE: ICD-10-CM

## 2018-07-30 DIAGNOSIS — I10 ESSENTIAL HYPERTENSION: ICD-10-CM

## 2018-07-30 PROBLEM — S89.90XA KNEE INJURY, INITIAL ENCOUNTER: Status: RESOLVED | Noted: 2018-03-23 | Resolved: 2018-07-30

## 2018-07-30 PROBLEM — M25.561 ACUTE PAIN OF RIGHT KNEE: Status: RESOLVED | Noted: 2018-03-23 | Resolved: 2018-07-30

## 2018-07-30 PROBLEM — L03.119 CELLULITIS OF EXTREMITY: Status: RESOLVED | Noted: 2017-02-01 | Resolved: 2018-07-30

## 2018-07-30 PROCEDURE — 3008F BODY MASS INDEX DOCD: CPT | Performed by: INTERNAL MEDICINE

## 2018-07-30 PROCEDURE — 99214 OFFICE O/P EST MOD 30 MIN: CPT | Performed by: INTERNAL MEDICINE

## 2018-07-30 RX ORDER — CEPHALEXIN 500 MG/1
500 CAPSULE ORAL EVERY 8 HOURS SCHEDULED
Qty: 21 CAPSULE | Refills: 1 | Status: SHIPPED | OUTPATIENT
Start: 2018-07-30 | End: 2018-08-06 | Stop reason: SDUPTHER

## 2018-07-30 NOTE — PATIENT INSTRUCTIONS
Leg edema and swelling     Take lasix 40 mg daily for 3 days, then 20 mg daily  Hold myrbetriq for now  Elevate leg as possible  Continue eliquis as ordered to prevent DVT  Rx for cephalexin 3 x day for 7 days    Cellulitis   AMBULATORY CARE:   Cellulitis  is a skin infection caused by bacteria  Cellulitis usually appears on the legs and feet, arms and hands, or face  Common signs and symptoms include the following:   · A red, warm, swollen area on your skin    · Pain when the area is touched    · Bumps or blisters (abscess) that may drain pus    · Bumpy, raised skin that feels like an orange peel  Call 911 if:   · You have sudden trouble breathing or chest pain  Seek care immediately if:   · Your wound gets larger and more painful  · You feel a crackling under your skin when you touch it  · You have purple dots or bumps on your skin, or you see bleeding under your skin  · You have new swelling and pain in your legs  · The red, warm, swollen area gets larger  · You see red streaks coming from the infected area  Contact your healthcare provider if:   · You have a fever  · Your fever or pain does not go away or gets worse  · The area does not get smaller after 2 days of antibiotics  · Your skin is flaking or peeling off  · You have questions or concerns about your condition or care  Treatment for cellulitis  may decrease symptoms, stop the infection from spreading, and cure the infection  Treatment depends on how severe your cellulitis is  Cellulitis may go away on its own  You may  instead need antibiotics to help treat the bacterial infection and medicines for pain  Your healthcare provider may draw a [place] around the edges of your cellulitis  If your cellulitis spreads, your healthcare provider will see it outside of the [place]  Manage your symptoms:   · Elevate the area above the level of your heart  as often as you can  This will help decrease swelling and pain  Prop the area on pillows or blankets to keep it elevated comfortably  · Clean the area daily until the wound scabs over  Gently wash the area with soap and water  Pat dry  Use dressings as directed  · Place cool or warm, wet cloths on the area as directed  Use clean cloths and clean water  Leave it on the area until the cloth is room temperature  Pat the area dry with a clean, dry cloth  The cloths may help decrease pain  Prevent cellulitis:   · Do not scratch bug bites or areas of injury  You increase your risk for cellulitis by scratching these areas  · Do not share personal items, such as towels, clothing, and razors  · Clean exercise equipment  with germ-killing  before and after you use it  · Wash your hands often  Use soap and water  Wash your hands after you use the bathroom, change a child's diapers, or sneeze  Wash your hands before you prepare or eat food  Use lotion to prevent dry, cracked skin  · Wear pressure stockings as directed  You may be told to wear the stockings if you have peripheral edema  The stockings improve blood flow and decrease swelling  · Treat athlete's foot  This can help prevent the spread of a bacterial skin infection  Follow up with your healthcare provider within 3 days, or as directed: Your healthcare provider will check if your cellulitis is getting better  You may need different medicine  Write down your questions so you remember to ask them during your visits  © 2017 [employer] Information is for End User's use only and may not be sold, redistributed or otherwise used for commercial purposes  All illustrations and images included in CareNotes® are the copyrighted property of [employer]  or [employer]and[employer]  The above information is an  only  It is not intended as medical advice for individual conditions or treatments   Talk to your doctor, nurse or pharmacist before following any medical regimen to see if it is safe and effective for you

## 2018-07-30 NOTE — ASSESSMENT & PLAN NOTE
Lab Results   Component Value Date    HGBA1C 7 5 (H) 07/24/2018       No results for input(s): POCGLU in the last 72 hours  Blood Sugar Average: Last 72 hrs:  has in creased risk for infection due to DM2    Sees endocrine for management

## 2018-07-30 NOTE — PROGRESS NOTES
Assessment/Plan:    Cellulitis of left lower extremity  Concern for infection in leg    Essential hypertension  Stable on meds    Type 2 diabetes mellitus with complication (HCC)  Lab Results   Component Value Date    HGBA1C 7 5 (H) 07/24/2018       No results for input(s): POCGLU in the last 72 hours  Blood Sugar Average: Last 72 hrs:  has in creased risk for infection due to DM2  Sees endocrine for management    Patient Active Problem List   Diagnosis    Atrial fibrillation ([facility] [address] )    Dextrocardia    Cerebrovascular accident (CVA) with involvement of left side of body ([facility] [address] )    Essential hypertension    Cardiac disease    Hyperlipidemia    Morbid obesity due to excess calories ([facility] [address] )    Diabetes mellitus type 2, controlled ([facility] [address] )    Type 2 diabetes mellitus, uncontrolled ([facility] [address] )    Obstructive sleep apnea    Back pain, lumbosacral    Type 2 diabetes mellitus with complication (HCC)    Restrictive lung disease    Cellulitis of left lower extremity     No orders of the defined types were placed in this encounter  Diagnoses and all orders for this visit:    Cellulitis of left lower extremity  -     Blood culture; Future  -     Blood culture  -     cephalexin (KEFLEX) 500 mg capsule; Take 1 capsule (500 mg total) by mouth every 8 (eight) hours for 7 days  -     CBC and differential; Future  -     CBC and differential    Essential hypertension    Type 2 diabetes mellitus with complication, unspecified whether long term insulin use (HCC)          Subjective:      Patient ID: Chai Carrier  is a 58 y o  male  Left leg red and swollen,  Prior hx of cellulitis and chronic venous stasis    Has not been ltaking lasix due to "need to urinate too much"    Notes some sweats and chills past few days        The following portions of the patient's history were reviewed and updated as appropriate: allergies, current medications, past family history, past medical history, past social history, past surgical history and problem list     Review of Systems   Constitutional: Positive for chills, fatigue and fever  HENT: Negative for hearing loss  Eyes: Negative for visual disturbance  Respiratory: Negative for cough, chest tightness, shortness of breath and wheezing  Cardiovascular: Negative for chest pain, palpitations and leg swelling  Gastrointestinal: Negative for abdominal pain, diarrhea and nausea  Genitourinary: Negative for dysuria and hematuria  Musculoskeletal: Positive for joint swelling and myalgias  Negative for arthralgias  Skin: Positive for color change and rash  Left leg red, warm, scaly rash    Neurological: Negative for dizziness, numbness and headaches  Psychiatric/Behavioral: Negative for confusion and dysphoric mood  All other systems reviewed and are negative          Objective:      Current Outpatient Prescriptions:     amLODIPine (NORVASC) 10 mg tablet, Take 10 mg by mouth daily, Disp: , Rfl:     atorvastatin (LIPITOR) 40 mg tablet, Take 1 tablet (40 mg total) by mouth daily, Disp: 90 tablet, Rfl: 3    Cholecalciferol (VITAMIN D) 2000 units CAPS, Take 1 capsule by mouth 2 (two) times a day  , Disp: , Rfl:     ELIQUIS 5 MG, TAKE ONE TABLET BY MOUTH TWICE DAILY, Disp: 60 tablet, Rfl: 6    flecainide (TAMBOCOR) 50 mg tablet, Take 1 tablet (50 mg total) by mouth every 12 (twelve) hours, Disp: 90 tablet, Rfl: 0    fluticasone (FLONASE) 50 mcg/act nasal spray, 1 spray into each nostril daily, Disp: , Rfl:     fluticasone-salmeterol (ADVAIR) 250-50 mcg/dose inhaler, Inhale 1 puff every 12 (twelve) hours, Disp: , Rfl:     gabapentin (NEURONTIN) 300 mg capsule, TAKE ONE CAPSULE BY MOUTH TWICE DAILY, Disp: 60 capsule, Rfl: 5    LANTUS SOLOSTAR injection pen 100 units/mL, Inject 0 6 mL (60 Units total) under the skin 2 (two) times a day for 30 days, Disp: 11 pen, Rfl: 2    metFORMIN (GLUCOPHAGE) 500 mg tablet, Take 500 mg by mouth 2 (two) times a day with meals, Disp: , Rfl:     metoprolol succinate (TOPROL-XL) 25 mg 24 hr tablet, TAKE ONE TABLET BY MOUTH ONCE DAILY, Disp: 30 tablet, Rfl: 5    Mirabegron ER (MYRBETRIQ) 50 MG TB24, Take 1 tablet by mouth daily, Disp: , Rfl:     montelukast (SINGULAIR) 10 mg tablet, Take 1 tablet (10 mg total) by mouth daily, Disp: 30 tablet, Rfl: 3    Multiple Vitamins-Minerals (CENTRUM SILVER ULTRA MENS) TABS, Take 1 tablet by mouth daily, Disp: , Rfl:     NOVOLOG 100 UNIT/ML injection, Inject 55 Units under the skin 3 (three) times a day before meals 55 units with breakfast, lunch, and dinner , Disp: 50 mL, Rfl: 5    Omega-3 Fatty Acids (FISH OIL) 1,000 mg, Take 1,000 mg by mouth daily, Disp: , Rfl:     quinapril (ACCUPRIL) 40 MG tablet, Take 40 mg by mouth daily at bedtime, Disp: , Rfl:     ranitidine (ZANTAC) 150 mg tablet, TAKE ONE TABLET BY MOUTH EVERY 12 HOURS, Disp: 60 tablet, Rfl: 5    tamsulosin (FLOMAX) 0 4 mg, Take by mouth daily with dinner  , Disp: , Rfl:     tiotropium (SPIRIVA) 18 mcg inhalation capsule, Place 18 mcg into inhaler and inhale daily, Disp: , Rfl:     traMADol (ULTRAM) 50 mg tablet, Take 1 tablet (50 mg total) by mouth every 8 (eight) hours as needed for moderate pain or severe pain, Disp: 90 tablet, Rfl: 0    furosemide (LASIX) 20 mg tablet, Take 20 mg by mouth daily as needed, Disp: , Rfl:      Physical Exam   Constitutional: He is oriented to person, place, and time  He appears well-developed and well-nourished  Eyes: Pupils are equal, round, and reactive to light  Neck: Normal range of motion  Neck supple  No thyromegaly present  Cardiovascular: Normal rate, regular rhythm, normal heart sounds and intact distal pulses  No murmur heard  Pulmonary/Chest: Effort normal and breath sounds normal  He has no wheezes  He has no rales  Abdominal: Soft  Bowel sounds are normal  There is no tenderness  Musculoskeletal: Normal range of motion  He exhibits no edema, tenderness or deformity  Lymphadenopathy:     He has no cervical adenopathy  Neurological: He is alert and oriented to person, place, and time  He has normal reflexes  Skin: Skin is warm and dry  Left LE, calf to knee, erythema and +3 edema, brawny skin changes bilateral   Psychiatric: He has a normal mood and affect  Nursing note and vitals reviewed

## 2018-07-31 ENCOUNTER — OFFICE VISIT (OUTPATIENT)
Dept: ENDOCRINOLOGY | Facility: HOSPITAL | Age: 63
End: 2018-07-31
Payer: MEDICARE

## 2018-07-31 VITALS
HEIGHT: 70 IN | WEIGHT: 315 LBS | SYSTOLIC BLOOD PRESSURE: 120 MMHG | DIASTOLIC BLOOD PRESSURE: 60 MMHG | HEART RATE: 72 BPM | BODY MASS INDEX: 45.1 KG/M2

## 2018-07-31 DIAGNOSIS — G47.33 OBSTRUCTIVE SLEEP APNEA: ICD-10-CM

## 2018-07-31 DIAGNOSIS — E55.9 VITAMIN D DEFICIENCY: ICD-10-CM

## 2018-07-31 DIAGNOSIS — E78.5 HYPERLIPIDEMIA, UNSPECIFIED HYPERLIPIDEMIA TYPE: ICD-10-CM

## 2018-07-31 DIAGNOSIS — E11.65 UNCONTROLLED TYPE 2 DIABETES MELLITUS WITH HYPERGLYCEMIA, WITH LONG-TERM CURRENT USE OF INSULIN (HCC): Primary | ICD-10-CM

## 2018-07-31 DIAGNOSIS — I10 ESSENTIAL HYPERTENSION: ICD-10-CM

## 2018-07-31 DIAGNOSIS — Z79.4 UNCONTROLLED TYPE 2 DIABETES MELLITUS WITH HYPERGLYCEMIA, WITH LONG-TERM CURRENT USE OF INSULIN (HCC): Primary | ICD-10-CM

## 2018-07-31 LAB
BASOPHILS # BLD AUTO: 0 X10E3/UL (ref 0–0.2)
BASOPHILS NFR BLD AUTO: 1 %
EOSINOPHIL # BLD AUTO: 0.2 X10E3/UL (ref 0–0.4)
EOSINOPHIL NFR BLD AUTO: 3 %
ERYTHROCYTE [DISTWIDTH] IN BLOOD BY AUTOMATED COUNT: 14.7 % (ref 12.3–15.4)
HCT VFR BLD AUTO: 38.7 % (ref 37.5–51)
HGB BLD-MCNC: 13.1 G/DL (ref 13–17.7)
IMM GRANULOCYTES # BLD: 0 X10E3/UL (ref 0–0.1)
IMM GRANULOCYTES NFR BLD: 0 %
LYMPHOCYTES # BLD AUTO: 1.5 X10E3/UL (ref 0.7–3.1)
LYMPHOCYTES NFR BLD AUTO: 18 %
MCH RBC QN AUTO: 29.6 PG (ref 26.6–33)
MCHC RBC AUTO-ENTMCNC: 33.9 G/DL (ref 31.5–35.7)
MCV RBC AUTO: 88 FL (ref 79–97)
MONOCYTES # BLD AUTO: 0.9 X10E3/UL (ref 0.1–0.9)
MONOCYTES NFR BLD AUTO: 11 %
NEUTROPHILS # BLD AUTO: 5.6 X10E3/UL (ref 1.4–7)
NEUTROPHILS NFR BLD AUTO: 67 %
PLATELET # BLD AUTO: 166 X10E3/UL (ref 150–379)
RBC # BLD AUTO: 4.42 X10E6/UL (ref 4.14–5.8)
WBC # BLD AUTO: 8.4 X10E3/UL (ref 3.4–10.8)

## 2018-07-31 PROCEDURE — 99214 OFFICE O/P EST MOD 30 MIN: CPT | Performed by: NURSE PRACTITIONER

## 2018-07-31 NOTE — PROGRESS NOTES
Dawn Rao  58 y o  male MRN: [id number]    Encounter: [id number]      Assessment/Plan     Assessment: This is a 58y o -year-old male with uncontrolled type 2 diabetes with hypertension, hyperlipidemia and obstructive sleep apnea        Plan:  1   Uncontrolled type 2 diabetes with hyperglycemia and long-term insulin use:  Based upon review of his blood sugars and his decreased hemoglobin A1c to 7 5, his glycemic control is improving  I have asked him to increase his NovoLog dose at bedtime from 58 units to 60 units to treat his consistent hyperglycemia at bedtime  For now, his Lantus will remain at the current dose     I have also asked him to check his blood sugars 4 times daily and provide a record to the office in 2 weeks for review so that adjustments may be made to his regimen   Discussed the impact of diet and weight loss on the treatment of his diabetes  He will continue to follow up with medical nutrition therapy as needed    Check hemoglobin A1c prior to next visit      2   Hypertension: [facility] is normotensive in the office today    Continue current medication regimen   Continue to monitor at home  [facility] comprehensive metabolic panel prior to next visit      3   Hyperlipidemia:  Continue atorvastatin and fish oil       4   Obstructive sleep apnea:  Discussed utilizing his CPAP on a regular basis to improve his fatigue and glycemic control      5   Vitamin-D deficiency:  Continue supplementation with vitamin D3 daily  CC:  Type 2 Diabetes follow-up    History of Present Illness     HPI:  60 y  o  male presents in the office today for follow up of Type 2 Diabetes   he states he has been diagnosed with Type 2 Diabetes for 20 years and is checking blood glucose readings fairly regularly   He denies episodes of hypoglycemia, polydipsia, polyphagia and polyuria               Lab Results   Component Value Date     HGBA1C 7 5 (H) 07/24/2018   Current Diabetic medication regimen is as follows:  Metformin 500 mg twice daily  Lantus 60 units twice daily  NovoLog 58 units at breakfast, lunch and dinner  He states he is up to date with his annual diabetic eye exam, most recent was July 2018, and denies retinopathy  [facility] states he does have cataracts and glaucoma   He complains about numbness, tingling and dryness to his feet and follows [facility] for regular diabetic foot care every 12 weeks or so      For his hypertension, he takes amlodipine 10 mg daily, furosemide 20 mg daily, quinapril 40 mg daily and metoprolol 50 mg twice daily  [facility] states he checks his blood pressure at home      His hyperlipidemia is treated with atorvastatin 80 mg daily and fish oil 1 g daily      For his obstructive sleep apnea, he is utilizing CPAP on an irregular basis  For his vitamin-D deficiency, he supplements with 4000 units of vitamin D3 daily  Review of Systems   Constitutional: Positive for fatigue  Negative for chills and fever  HENT: Negative  Eyes: Negative  Respiratory: Positive for shortness of breath (FOSTER at times)  Negative for cough  Cardiovascular: Negative for chest pain and palpitations  Gastrointestinal: Negative for abdominal pain, constipation, diarrhea, nausea and vomiting  Endocrine: Negative for cold intolerance, heat intolerance, polydipsia, polyphagia and polyuria  Genitourinary: Negative  Musculoskeletal: Positive for arthralgias (Left leg), back pain (Chronic) and joint swelling (Left leg)  Skin: Negative  Allergic/Immunologic: Negative  Neurological: Negative for dizziness, syncope, light-headedness and headaches  Hematological: Negative  Psychiatric/Behavioral: Negative  All other systems reviewed and are negative        Historical Information   Past Medical History:   Diagnosis Date    Arthritis     Asthma     BPH (benign prostatic hyperplasia)     Cardiac arrhythmia     resolved [id number]    Cardiac disease     Chronic a-fib ([address] )  Chronic pain     CVA (cerebral vascular accident) ([facility] [address] ) 11/2015    Dextrocardia     Diabetes mellitus ([facility] [address] )     GERD (gastroesophageal reflux disease)     Hyperlipidemia     Hypertension     Sleep apnea     Stroke (cerebrum) (HCC)      Past Surgical History:   Procedure Laterality Date    CHOLECYSTECTOMY      EXPLORATORY LAPAROTOMY  1971    GALLBLADDER SURGERY      AL COLONOSCOPY FLX DX W/COLLJ SPEC WHEN PFRMD N/A 2/21/2018    Procedure: COLONOSCOPY;  Surgeon: Casimiro Rascon MD;  Location:  MAIN OR;  Service: Gastroenterology    UMBILICAL HERNIA REPAIR  2007     Social History   History   Alcohol Use    0 6 oz/week    1 Glasses of wine per week     Comment: 2-3 drinks a week, cherry liquer or beer      History   Drug Use No     History   Smoking Status    Never Smoker   Smokeless Tobacco    Never Used     Comment: Occasionally smoked a pipe while fishing     Family History:   Family History   Problem Relation Age of Onset    Coronary artery disease Father     Hodgkin's lymphoma Father     Diabetes Father     Cancer Father     Diabetes type I Mother     Cancer Maternal Grandfather     Diabetes Paternal Grandmother     Emphysema Paternal Grandmother     Heart attack Paternal Grandfather     Hypertension Family     Neuropathy Family        Meds/Allergies   Current Outpatient Prescriptions   Medication Sig Dispense Refill    amLODIPine (NORVASC) 10 mg tablet Take 10 mg by mouth daily      atorvastatin (LIPITOR) 40 mg tablet Take 1 tablet (40 mg total) by mouth daily 90 tablet 3    cephalexin (KEFLEX) 500 mg capsule Take 1 capsule (500 mg total) by mouth every 8 (eight) hours for 7 days 21 capsule 1    Cholecalciferol (VITAMIN D) 2000 units CAPS Take 1 capsule by mouth 2 (two) times a day        ELIQUIS 5 MG TAKE ONE TABLET BY MOUTH TWICE DAILY 60 tablet 6    flecainide (TAMBOCOR) 50 mg tablet Take 1 tablet (50 mg total) by mouth every 12 (twelve) hours 90 tablet 0    fluticasone (FLONASE) 50 mcg/act nasal spray 1 spray into each nostril daily      fluticasone-salmeterol (ADVAIR) 250-50 mcg/dose inhaler Inhale 1 puff every 12 (twelve) hours      furosemide (LASIX) 20 mg tablet Take 20 mg by mouth daily as needed      gabapentin (NEURONTIN) 300 mg capsule TAKE ONE CAPSULE BY MOUTH TWICE DAILY 60 capsule 5    LANTUS SOLOSTAR injection pen 100 units/mL Inject 0 6 mL (60 Units total) under the skin 2 (two) times a day for 30 days 11 pen 2    metFORMIN (GLUCOPHAGE) 500 mg tablet Take 500 mg by mouth 2 (two) times a day with meals      metoprolol succinate (TOPROL-XL) 25 mg 24 hr tablet TAKE ONE TABLET BY MOUTH ONCE DAILY 30 tablet 5    Mirabegron ER (MYRBETRIQ) 50 MG TB24 Take 1 tablet by mouth daily      montelukast (SINGULAIR) 10 mg tablet Take 1 tablet (10 mg total) by mouth daily 30 tablet 3    Multiple Vitamins-Minerals (CENTRUM SILVER ULTRA MENS) TABS Take 1 tablet by mouth daily      NOVOLOG 100 UNIT/ML injection Inject 55 Units under the skin 3 (three) times a day before meals 55 units with breakfast, lunch, and dinner  50 mL 5    Omega-3 Fatty Acids (FISH OIL) 1,000 mg Take 1,000 mg by mouth daily      quinapril (ACCUPRIL) 40 MG tablet Take 40 mg by mouth daily at bedtime      ranitidine (ZANTAC) 150 mg tablet TAKE ONE TABLET BY MOUTH EVERY 12 HOURS 60 tablet 5    tamsulosin (FLOMAX) 0 4 mg Take by mouth daily with dinner        tiotropium (SPIRIVA) 18 mcg inhalation capsule Place 18 mcg into inhaler and inhale daily      traMADol (ULTRAM) 50 mg tablet Take 1 tablet (50 mg total) by mouth every 8 (eight) hours as needed for moderate pain or severe pain 90 tablet 0     No current facility-administered medications for this visit        Allergies   Allergen Reactions    Banana Shortness Of Breath    Fruit Extracts Anaphylaxis and Throat Swelling     Eating [id number]: Bananas    Clotrimazole     Codeine Itching     Other reaction(s): Itching    Hydrocodone-Acetaminophen     Oxycodone Rash and Other (See Comments)     Other reaction(s): Pruritis    Oxycodone-Acetaminophen Rash     Other reaction(s): Pruritis       Objective     Physical Exam   Constitutional: He is oriented to person, place, and time  He appears well-developed and well-nourished  No distress  Obese   HENT:   Head: Normocephalic and atraumatic  Nose: Nose normal    Mouth/Throat: Oropharynx is clear and moist    Eyes: Conjunctivae and EOM are normal  Pupils are equal, round, and reactive to light  Right eye exhibits no discharge  Left eye exhibits no discharge  Wears glasses   Neck: Normal range of motion  Neck supple  No JVD present  No tracheal deviation present  No thyromegaly present  Cardiovascular: Normal rate, regular rhythm, normal heart sounds and intact distal pulses  Pulmonary/Chest: Effort normal and breath sounds normal  No stridor  No respiratory distress  He has no wheezes  He has no rales  He exhibits no tenderness  Abdominal: Soft  Bowel sounds are normal  He exhibits no distension  There is no tenderness  Musculoskeletal: Normal range of motion  He exhibits edema ( + 3-4 pedal edema)  He exhibits no tenderness or deformity  He is being treated by Dr Sabrina Yates for cellulitis to left lower leg  Lymphadenopathy:     He has no cervical adenopathy  Neurological: He is alert and oriented to person, place, and time  He displays normal reflexes  No cranial nerve deficit  Coordination normal    Skin: Skin is warm and dry  No rash noted  There is erythema (To lower legs)  No pallor  Extremely dry to legs and feet   Psychiatric: He has a normal mood and affect  His behavior is normal  Judgment and thought content normal    Vitals reviewed        Lab Results:   Lab Results   Component Value Date/Time    Hemoglobin A1C 7 5 (H) 07/24/2018 08:05 AM    Hemoglobin A1C 8 0 (H) 03/30/2018 08:58 AM    Hemoglobin A1C 8 3 (H) 09/19/2017 07:04 AM    Hemoglobin 13 1 07/30/2018 12:10 PM    Hematocrit 38 7 07/30/2018 12:10 PM    MCV 88 07/30/2018 12:10 PM    Platelet Count 108 24/13/1620 12:10 PM    BUN 14 07/24/2018 08:05 AM    BUN 17 03/30/2018 08:58 AM    BUN 16 09/19/2017 07:04 AM    Sodium 144 09/19/2017 07:04 AM    Potassium 4 1 09/19/2017 07:04 AM    Chloride 101 09/19/2017 07:04 AM    CO2 26 09/19/2017 07:04 AM    Creatinine 0 83 09/19/2017 07:04 AM    Creatinine, Serum 0 93 07/24/2018 08:05 AM    Creatinine, Serum 0 81 03/30/2018 08:58 AM    AST 27 09/19/2017 07:04 AM    ALT 25 09/19/2017 07:04 AM    Albumin 4 3 09/19/2017 07:04 AM    Cholesterol 133 09/19/2017 07:04 AM    HDL 48 03/30/2018 08:58 AM    HDL 47 09/19/2017 07:04 AM    LDL Direct 53 03/30/2018 08:58 AM    Triglycerides 108 03/30/2018 08:58 AM    Triglycerides 117 09/19/2017 07:04 AM     Portions of the record may have been created with voice recognition software  Occasional wrong word or "sound a like" substitutions may have occurred due to the inherent limitations of voice recognition software  Read the chart carefully and recognize, using context, where substitutions have occurred

## 2018-07-31 NOTE — PATIENT INSTRUCTIONS
Increase Lantus dose to 60 units in AM and PM     For now, continue Novolog 58 units before breakfast and lunch  Increase Novolog to 60 units before dinner  Continue to check blood sugars 3-4 times daily and send record to the office in 2 weeks for review  Contact the office with any consistent episodes of hypoglycemia  Keep your skin moisturized, as discussed  Continue to follow up with Dr Mireille Jules for treatment of your cellulitis  Continue your antihypertensive medications  Continue Atorvastatin

## 2018-08-05 LAB
BACTERIA BLD CULT: NORMAL
Lab: NORMAL

## 2018-08-06 ENCOUNTER — OFFICE VISIT (OUTPATIENT)
Dept: FAMILY MEDICINE CLINIC | Facility: HOSPITAL | Age: 63
End: 2018-08-06
Payer: MEDICARE

## 2018-08-06 VITALS
DIASTOLIC BLOOD PRESSURE: 68 MMHG | HEIGHT: 70 IN | SYSTOLIC BLOOD PRESSURE: 160 MMHG | WEIGHT: 315 LBS | HEART RATE: 65 BPM | BODY MASS INDEX: 45.1 KG/M2

## 2018-08-06 DIAGNOSIS — M54.5 LOW BACK PAIN, UNSPECIFIED BACK PAIN LATERALITY, UNSPECIFIED CHRONICITY, WITH SCIATICA PRESENCE UNSPECIFIED: ICD-10-CM

## 2018-08-06 DIAGNOSIS — L03.116 CELLULITIS OF LEFT LOWER EXTREMITY: ICD-10-CM

## 2018-08-06 DIAGNOSIS — I10 ESSENTIAL HYPERTENSION: ICD-10-CM

## 2018-08-06 DIAGNOSIS — Z79.4 UNCONTROLLED TYPE 2 DIABETES MELLITUS WITH HYPERGLYCEMIA, WITH LONG-TERM CURRENT USE OF INSULIN (HCC): Primary | ICD-10-CM

## 2018-08-06 DIAGNOSIS — M43.06 LUMBAR SPONDYLOLYSIS: ICD-10-CM

## 2018-08-06 DIAGNOSIS — E11.65 UNCONTROLLED TYPE 2 DIABETES MELLITUS WITH HYPERGLYCEMIA, WITH LONG-TERM CURRENT USE OF INSULIN (HCC): Primary | ICD-10-CM

## 2018-08-06 DIAGNOSIS — S89.90XA KNEE INJURY, INITIAL ENCOUNTER: ICD-10-CM

## 2018-08-06 DIAGNOSIS — M25.561 ACUTE PAIN OF RIGHT KNEE: ICD-10-CM

## 2018-08-06 PROCEDURE — 99214 OFFICE O/P EST MOD 30 MIN: CPT | Performed by: INTERNAL MEDICINE

## 2018-08-06 RX ORDER — TRAMADOL HYDROCHLORIDE 50 MG/1
50 TABLET ORAL EVERY 8 HOURS PRN
Qty: 90 TABLET | Refills: 0 | Status: SHIPPED | OUTPATIENT
Start: 2018-08-06 | End: 2021-01-01 | Stop reason: HOSPADM

## 2018-08-06 RX ORDER — CEPHALEXIN 500 MG/1
CAPSULE ORAL
Qty: 60 CAPSULE | Refills: 0 | Status: SHIPPED | OUTPATIENT
Start: 2018-08-06 | End: 2018-08-16

## 2018-08-06 NOTE — PROGRESS NOTES
Assessment/Plan:    Type 2 diabetes mellitus, uncontrolled (HCC)  Lab Results   Component Value Date    HGBA1C 7 5 (H) 07/24/2018       No results for input(s): POCGLU in the last 72 hours  Blood Sugar Average: Last 72 hrs:    Managed by endocrine  Note recent med changes per their last note      Cellulitis of left lower extremity  Here for follow up assessment  Essential hypertension  Slightly elevated today, but at home is better    Patient Active Problem List   Diagnosis    [facility]rial fibrillation ([facility]    Dextrocardia    Cerebrovascular accident (CVA) with involvement of left side of body ([facility] )    Essential hypertension    Cardiac disease    Hyperlipidemia    Morbid obesity due to excess calories ([facility] )    [facility]    Type 2 diabetes mellitus, uncontrolled ([facility] )    Obstructive sleep apnea    Back pain, lumbosacral    Type 2 diabetes mellitus with complication ([facility] )    Restrictive lung disease    Cellulitis of left lower extremity     No orders of the defined types were placed in this encounter  Diagnoses and all orders for this visit:    Uncontrolled type 2 diabetes mellitus with hyperglycemia, with long-term current use of insulin ([facility])    Lumbar spondylolysis  -     traMADol (ULTRAM) 50 mg tablet; Take 1 tablet (50 mg total) by mouth every 8 (eight) hours as needed for moderate pain or severe pain    Low back pain, unspecified back pain laterality, unspecified chronicity, with sciatica presence unspecified  -     traMADol (ULTRAM) 50 mg tablet; Take 1 tablet (50 mg total) by mouth every 8 (eight) hours as needed for moderate pain or severe pain    Knee injury, initial encounter  -     traMADol (ULTRAM) 50 mg tablet; Take 1 tablet (50 mg total) by mouth every 8 (eight) hours as needed for moderate pain or severe pain    Acute pain of right knee  -     traMADol (ULTRAM) 50 mg tablet;  Take 1 tablet (50 mg total) by mouth every 8 (eight) hours as needed for moderate pain or severe pain    Cellulitis of left lower extremity  -     cephalexin (KEFLEX) 500 mg capsule; Take 2 tablets 3 x day until finished    Essential hypertension          Subjective:      Patient ID: Satya King  is a 58 y o  male  Recheck of cellulitis and edema of leg    Leg less swollen and redness is decreased  Feels less sore and no fever noted  The following portions of the patient's history were reviewed and updated as appropriate: allergies, current medications, past family history, past medical history, past social history, past surgical history and problem list     Review of Systems   Constitutional: Negative for fatigue and fever  HENT: Negative for hearing loss  Eyes: Negative for visual disturbance  Respiratory: Negative for cough, chest tightness, shortness of breath and wheezing  Cardiovascular: Negative for chest pain, palpitations and leg swelling  Gastrointestinal: Negative for abdominal pain, diarrhea and nausea  Genitourinary: Negative for dysuria and hematuria  Musculoskeletal: Negative for arthralgias  Neurological: Negative for dizziness, numbness and headaches  Psychiatric/Behavioral: Negative for confusion and dysphoric mood  All other systems reviewed and are negative          Objective:      Current Outpatient Prescriptions:     amLODIPine (NORVASC) 10 mg tablet, Take 10 mg by mouth daily, Disp: , Rfl:     atorvastatin (LIPITOR) 40 mg tablet, Take 1 tablet (40 mg total) by mouth daily, Disp: 90 tablet, Rfl: 3    cephalexin (KEFLEX) 500 mg capsule, Take 2 tablets 3 x day until finished, Disp: 60 capsule, Rfl: 0    Cholecalciferol (VITAMIN D) 2000 units CAPS, Take 1 capsule by mouth 2 (two) times a day  , Disp: , Rfl:     ELIQUIS 5 MG, TAKE ONE TABLET BY MOUTH TWICE DAILY, Disp: 60 tablet, Rfl: 6    flecainide (TAMBOCOR) 50 mg tablet, Take 1 tablet (50 mg total) by mouth every 12 (twelve) hours, Disp: 90 tablet, Rfl: 0   fluticasone (FLONASE) 50 mcg/act nasal spray, 1 spray into each nostril daily, Disp: , Rfl:     furosemide (LASIX) 20 mg tablet, Take 20 mg by mouth daily as needed, Disp: , Rfl:     gabapentin (NEURONTIN) 300 mg capsule, TAKE ONE CAPSULE BY MOUTH TWICE DAILY, Disp: 60 capsule, Rfl: 5    LANTUS SOLOSTAR injection pen 100 units/mL, Inject 0 6 mL (60 Units total) under the skin 2 (two) times a day for 30 days, Disp: 11 pen, Rfl: 2    metFORMIN (GLUCOPHAGE) 500 mg tablet, Take 500 mg by mouth 2 (two) times a day with meals, Disp: , Rfl:     metoprolol succinate (TOPROL-XL) 25 mg 24 hr tablet, TAKE ONE TABLET BY MOUTH ONCE DAILY, Disp: 30 tablet, Rfl: 5    Mirabegron ER (MYRBETRIQ) 50 MG TB24, Take 1 tablet by mouth daily, Disp: , Rfl:     montelukast (SINGULAIR) 10 mg tablet, Take 1 tablet (10 mg total) by mouth daily, Disp: 30 tablet, Rfl: 3    Multiple Vitamins-Minerals (CENTRUM SILVER ULTRA MENS) TABS, Take 1 tablet by mouth daily, Disp: , Rfl:     NOVOLOG 100 UNIT/ML injection, Inject 55 Units under the skin 3 (three) times a day before meals 55 units with breakfast, lunch, and dinner , Disp: 50 mL, Rfl: 5    Omega-3 Fatty Acids (FISH OIL) 1,000 mg, Take 1,000 mg by mouth daily, Disp: , Rfl:     quinapril (ACCUPRIL) 40 MG tablet, Take 40 mg by mouth daily at bedtime, Disp: , Rfl:     ranitidine (ZANTAC) 150 mg tablet, TAKE ONE TABLET BY MOUTH EVERY 12 HOURS, Disp: 60 tablet, Rfl: 5    tamsulosin (FLOMAX) 0 4 mg, Take by mouth daily with dinner  , Disp: , Rfl:     tiotropium (SPIRIVA) 18 mcg inhalation capsule, Place 18 mcg into inhaler and inhale daily, Disp: , Rfl:     traMADol (ULTRAM) 50 mg tablet, Take 1 tablet (50 mg total) by mouth every 8 (eight) hours as needed for moderate pain or severe pain, Disp: 90 tablet, Rfl: 0     Physical Exam   Constitutional: He is oriented to person, place, and time  He appears well-developed and well-nourished     Eyes: Pupils are equal, round, and reactive to light    Neck: Normal range of motion  Neck supple  No thyromegaly present  Cardiovascular: Normal rate, regular rhythm, normal heart sounds and intact distal pulses  No murmur heard  Pulmonary/Chest: Effort normal and breath sounds normal  He has no wheezes  He has no rales  Abdominal: Soft  Bowel sounds are normal  There is no tenderness  Musculoskeletal: Normal range of motion  He exhibits no edema, tenderness or deformity  Lymphadenopathy:     He has no cervical adenopathy  Neurological: He is alert and oriented to person, place, and time  He has normal reflexes  Skin: Skin is warm and dry  Left calf +1 edema and redness saurabh posterior aspect, no blisters, no warmth   Psychiatric: He has a normal mood and affect  Nursing note and vitals reviewed

## 2018-08-06 NOTE — PATIENT INSTRUCTIONS
You were seen for a follow up of chronic medical problems today  Be sure to make any changes to your medication as discussed by your doctor  If blood tests or other testing was ordered, be sure to obtain this at the time requested by the doctor  Our office will call you with the results of your tests once they arrive in our office  Finish 10 days of antibiotic and continue high dose of lasix for another week

## 2018-08-06 NOTE — ASSESSMENT & PLAN NOTE
Lab Results   Component Value Date    HGBA1C 7 5 (H) 07/24/2018       No results for input(s): POCGLU in the last 72 hours  Blood Sugar Average: Last 72 hrs:    Managed by endocrine    Note recent med changes per their last note

## 2018-08-17 DIAGNOSIS — J45.909 MILD ASTHMA WITHOUT COMPLICATION, UNSPECIFIED WHETHER PERSISTENT: ICD-10-CM

## 2018-08-17 DIAGNOSIS — I48.0 PAROXYSMAL ATRIAL FIBRILLATION (HCC): ICD-10-CM

## 2018-08-17 RX ORDER — FLECAINIDE ACETATE 50 MG/1
TABLET ORAL
Qty: 90 TABLET | Refills: 0 | Status: SHIPPED | OUTPATIENT
Start: 2018-08-17 | End: 2018-12-04 | Stop reason: SDUPTHER

## 2018-08-20 RX ORDER — MONTELUKAST SODIUM 10 MG/1
TABLET ORAL
Qty: 30 TABLET | Refills: 3 | Status: SHIPPED | OUTPATIENT
Start: 2018-08-20 | End: 2018-08-21 | Stop reason: SDUPTHER

## 2018-08-21 DIAGNOSIS — J45.909 MILD ASTHMA WITHOUT COMPLICATION, UNSPECIFIED WHETHER PERSISTENT: ICD-10-CM

## 2018-08-21 RX ORDER — MONTELUKAST SODIUM 10 MG/1
10 TABLET ORAL DAILY
Qty: 30 TABLET | Refills: 5 | Status: SHIPPED | OUTPATIENT
Start: 2018-08-21 | End: 2019-03-06 | Stop reason: SDUPTHER

## 2018-08-22 ENCOUNTER — TELEPHONE (OUTPATIENT)
Dept: ENDOCRINOLOGY | Facility: HOSPITAL | Age: 63
End: 2018-08-22

## 2018-08-22 ENCOUNTER — TELEPHONE (OUTPATIENT)
Dept: CARDIOLOGY CLINIC | Facility: CLINIC | Age: 63
End: 2018-08-22

## 2018-08-22 DIAGNOSIS — I48.0 PAROXYSMAL ATRIAL FIBRILLATION (HCC): ICD-10-CM

## 2018-08-22 NOTE — TELEPHONE ENCOUNTER
Pt used Novolog 100 unit/ml vial/ he currently does not have a prescription plan/ it starts September 1  He is requesting Novolog samples which we do not have  Can he be given a sample in a substitute medication to get him through to the end of the month  He has enough Novolog for 4-5 days at this point   Please advise

## 2018-08-22 NOTE — TELEPHONE ENCOUNTER
Pt stopped in to request samples of Eliquis 5mg one BID to carry him through until his new rx plan is in place      Samples released:  Eliquis 5mg one tablet BID  2 boxes, 14 tablets each to equal 28 tablets  Lot #: [id number]   Exp: Oct 2020

## 2018-09-05 ENCOUNTER — TELEPHONE (OUTPATIENT)
Dept: FAMILY MEDICINE CLINIC | Facility: HOSPITAL | Age: 63
End: 2018-09-05

## 2018-09-05 DIAGNOSIS — I10 ESSENTIAL HYPERTENSION: Primary | ICD-10-CM

## 2018-09-05 DIAGNOSIS — I10 ESSENTIAL HYPERTENSION: ICD-10-CM

## 2018-09-05 RX ORDER — AMLODIPINE BESYLATE 10 MG/1
TABLET ORAL
Qty: 15 TABLET | Refills: 23 | Status: SHIPPED | OUTPATIENT
Start: 2018-09-05 | End: 2018-09-05 | Stop reason: SDUPTHER

## 2018-09-05 RX ORDER — AMLODIPINE BESYLATE 10 MG/1
10 TABLET ORAL DAILY
Qty: 90 TABLET | Refills: 3 | COMMUNITY
Start: 2018-09-05 | End: 2019-08-01 | Stop reason: SDUPTHER

## 2018-09-10 DIAGNOSIS — K21.9 GASTROESOPHAGEAL REFLUX DISEASE WITHOUT ESOPHAGITIS: ICD-10-CM

## 2018-09-10 DIAGNOSIS — I10 ESSENTIAL HYPERTENSION: Primary | ICD-10-CM

## 2018-09-10 PROCEDURE — 4010F ACE/ARB THERAPY RXD/TAKEN: CPT | Performed by: INTERNAL MEDICINE

## 2018-09-10 RX ORDER — QUINAPRIL 40 MG/1
40 TABLET ORAL
Qty: 30 TABLET | Refills: 6 | Status: SHIPPED | OUTPATIENT
Start: 2018-09-10 | End: 2021-01-01 | Stop reason: HOSPADM

## 2018-09-11 RX ORDER — RANITIDINE 150 MG/1
TABLET ORAL
Qty: 60 TABLET | Refills: 5 | Status: SHIPPED | OUTPATIENT
Start: 2018-09-11 | End: 2019-03-25 | Stop reason: SDUPTHER

## 2018-09-23 DIAGNOSIS — R52 PAIN: ICD-10-CM

## 2018-09-24 RX ORDER — GABAPENTIN 300 MG/1
CAPSULE ORAL
Qty: 60 CAPSULE | Refills: 5 | Status: SHIPPED | OUTPATIENT
Start: 2018-09-24 | End: 2019-03-31 | Stop reason: SDUPTHER

## 2018-10-13 DIAGNOSIS — E11.9 TYPE 2 DIABETES MELLITUS WITHOUT COMPLICATION, WITHOUT LONG-TERM CURRENT USE OF INSULIN (HCC): Primary | ICD-10-CM

## 2018-11-03 LAB
ALBUMIN SERPL-MCNC: 4.3 G/DL (ref 3.6–4.8)
ALBUMIN/GLOB SERPL: 1.7 {RATIO} (ref 1.2–2.2)
ALP SERPL-CCNC: 71 IU/L (ref 39–117)
ALT SERPL-CCNC: 21 IU/L (ref 0–44)
AST SERPL-CCNC: 19 IU/L (ref 0–40)
BILIRUB SERPL-MCNC: 0.6 MG/DL (ref 0–1.2)
BUN SERPL-MCNC: 15 MG/DL (ref 8–27)
BUN/CREAT SERPL: 17 (ref 10–24)
CALCIUM SERPL-MCNC: 9.6 MG/DL (ref 8.6–10.2)
CHLORIDE SERPL-SCNC: 102 MMOL/L (ref 96–106)
CHOLEST SERPL-MCNC: 120 MG/DL (ref 100–199)
CO2 SERPL-SCNC: 25 MMOL/L (ref 20–29)
CREAT SERPL-MCNC: 0.87 MG/DL (ref 0.76–1.27)
EST. AVERAGE GLUCOSE BLD GHB EST-MCNC: 194 MG/DL
GLOBULIN SER-MCNC: 2.6 G/DL (ref 1.5–4.5)
GLUCOSE SERPL-MCNC: 172 MG/DL (ref 65–99)
HBA1C MFR BLD: 8.4 % (ref 4.8–5.6)
HDLC SERPL-MCNC: 47 MG/DL
LABCORP COMMENT: NORMAL
LDLC SERPL CALC-MCNC: 52 MG/DL (ref 0–99)
LDLC/HDLC SERPL: 1.1 RATIO (ref 0–3.6)
POTASSIUM SERPL-SCNC: 4.5 MMOL/L (ref 3.5–5.2)
PROT SERPL-MCNC: 6.9 G/DL (ref 6–8.5)
SL AMB EGFR AFRICAN AMERICAN: 105 ML/MIN/1.73
SL AMB EGFR NON AFRICAN AMERICAN: 91 ML/MIN/1.73
SL AMB VLDL CHOLESTEROL CALC: 21 MG/DL (ref 5–40)
SODIUM SERPL-SCNC: 143 MMOL/L (ref 134–144)
TRIGL SERPL-MCNC: 106 MG/DL (ref 0–149)

## 2018-11-07 ENCOUNTER — OFFICE VISIT (OUTPATIENT)
Dept: ENDOCRINOLOGY | Facility: HOSPITAL | Age: 63
End: 2018-11-07
Payer: COMMERCIAL

## 2018-11-07 VITALS
HEART RATE: 72 BPM | BODY MASS INDEX: 45.1 KG/M2 | SYSTOLIC BLOOD PRESSURE: 150 MMHG | DIASTOLIC BLOOD PRESSURE: 80 MMHG | HEIGHT: 70 IN | WEIGHT: 315 LBS

## 2018-11-07 DIAGNOSIS — E78.5 HYPERLIPIDEMIA, UNSPECIFIED HYPERLIPIDEMIA TYPE: ICD-10-CM

## 2018-11-07 DIAGNOSIS — IMO0002 UNCONTROLLED TYPE 2 DIABETES MELLITUS WITH COMPLICATION, WITH LONG-TERM CURRENT USE OF INSULIN: Primary | ICD-10-CM

## 2018-11-07 DIAGNOSIS — I10 ESSENTIAL HYPERTENSION: ICD-10-CM

## 2018-11-07 DIAGNOSIS — E55.9 VITAMIN D DEFICIENCY: ICD-10-CM

## 2018-11-07 DIAGNOSIS — G47.33 OBSTRUCTIVE SLEEP APNEA: ICD-10-CM

## 2018-11-07 PROCEDURE — 99214 OFFICE O/P EST MOD 30 MIN: CPT | Performed by: NURSE PRACTITIONER

## 2018-11-07 NOTE — PROGRESS NOTES
Rachel Andrade  58 y o  male MRN: [id number]    Encounter: [id number]      Assessment/Plan     Assessment: This is a 58y o -year-old male with uncontrolled type 2 diabetes with hypertension, hyperlipidemia and obstructive sleep apnea  Plan:  1   Uncontrolled type 2 diabetes with hyperglycemia and long-term insulin use:   His most recent hemoglobin A1c has elevated to 8 4, however, he has been without his NovoLog insulin for approximately 6 weeks  I have asked him to resume his NovoLog at meals (58 units at breakfast and lunch and 60 units at dinner)  For now, his Lantus will remain at the current dose  Sample Humalog provided at the time of the office visit with all applicable documentation completed   I have also asked him to check his blood sugars 4 times daily and provide a record to the office in 2 weeks for review so that adjustments may be made to his regimen   Discussed the impact of diet and weight loss on the treatment of his diabetes  [facility] will continue to follow up with medical nutrition therapy as needed    Check hemoglobin A1c prior to next visit      2   Hypertension:  His systolic blood pressure is elevated in the office today  Continue current medication regimen   Continue to monitor at home  [facility] comprehensive metabolic panel prior to next visit      3   Hyperlipidemia:  Continue atorvastatin and fish oil       4   Obstructive sleep apnea:  Discussed utilizing his CPAP on a regular basis to improve his fatigue and glycemic control      5   Vitamin-D deficiency:  Continue supplementation with vitamin D3 daily      CC:   Type 2 Diabetes follow-up    History of Present Illness     HPI:  60 y  o  male presents in the office today for follow up of Type 2 Diabetes   he states he has been diagnosed with Type 2 Diabetes for 20 years and is checking blood glucose readings fairly regularly   He denies episodes of hypoglycemia, polydipsia, polyphagia and polyuria     Current Diabetic medication regimen is as follows:  Metformin 500 mg twice daily  Lantus 60 units twice daily  NovoLog 58 units at breakfast, lunch and dinner  He states that he has been without his NovoLog since the beginning of October due to a change in his prescription coverage  His most recent hemoglobin A1c from November 2, 2018 is 8 4  He states he is up to date with his annual diabetic eye exam, most recent was July 2018, and denies retinopathy  Simón Landis states he does have cataracts and glaucoma   He complains about numbness, tingling and dryness to his feet and follows [place] [facility] for regular diabetic foot care every 12 weeks      For his hypertension, he takes amlodipine 10 mg daily, furosemide 20 mg daily, quinapril 40 mg daily and metoprolol 50 mg twice daily  Simón Landis states he checks his blood pressure at home      His hyperlipidemia is treated with atorvastatin 80 mg daily and fish oil 1 g daily      For his obstructive sleep apnea, he is utilizing CPAP more regularly recently than in the past      For his vitamin-D deficiency, he supplements with 4000 units of vitamin D3 daily  Review of Systems   Constitutional: Positive for fatigue  Negative for chills and fever  HENT: Negative  Negative for trouble swallowing and voice change  Eyes: Negative for photophobia, pain, discharge, redness, itching and visual disturbance  Respiratory: Positive for shortness of breath (Dyspnea on exertion at times)  Negative for cough  Cardiovascular: Negative for chest pain and palpitations  Gastrointestinal: Negative for abdominal pain, constipation, diarrhea, nausea and vomiting  Endocrine: Negative for cold intolerance, heat intolerance, polydipsia, polyphagia and polyuria  Genitourinary: Negative  Musculoskeletal: Positive for arthralgias, back pain and joint swelling  Skin: Negative  Edema with dry cracked skin to legs bilaterally   Allergic/Immunologic: Negative      Neurological: Negative for dizziness, syncope, light-headedness and headaches  Hematological: Negative  Psychiatric/Behavioral: Negative  All other systems reviewed and are negative        Historical Information   Past Medical History:   Diagnosis Date    Arthritis     Asthma     BPH (benign prostatic hyperplasia)     Cardiac arrhythmia     resolved [id number]    Cardiac disease     Chronic a-fib (HCC)     Chronic pain     CVA (cerebral vascular accident) ([facility] ) 11/2015    Dextrocardia     Diabetes mellitus ([facility] )     GERD (gastroesophageal reflux disease)     Hyperlipidemia     Hypertension     Sleep apnea     Stroke (cerebrum) ([place] [address] )      Past Surgical History:   Procedure Laterality Date    CHOLECYSTECTOMY      EXPLORATORY LAPAROTOMY  1971    GALLBLADDER SURGERY      WY COLONOSCOPY FLX DX W/COLLJ SPEC WHEN PFRMD N/A 2/21/2018    Procedure: COLONOSCOPY;  Surgeon: Delilah Gallego MD;  [facility] OR;  Service: Gastroenterology    UMBILICAL HERNIA REPAIR  2007     Social History   History   Alcohol Use    0 6 oz/week    1 Glasses of wine per week     Comment: 2-3 drinks a week, cherry liquer or beer      History   Drug Use No     History   Smoking Status    Never Smoker   Smokeless Tobacco    Never Used     Comment: Occasionally smoked a pipe while fishing     Family History:   Family History   Problem Relation Age of Onset    Coronary artery disease Father     Hodgkin's lymphoma Father     Diabetes Father     Cancer Father     Diabetes type I Mother     Cancer Maternal Grandfather     Diabetes Paternal Grandmother     Emphysema Paternal Grandmother     Heart attack Paternal Grandfather     Hypertension Family     Neuropathy Family        Meds/Allergies   Current Outpatient Prescriptions   Medication Sig Dispense Refill    amLODIPine (NORVASC) 10 mg tablet Take 1 tablet (10 mg total) by mouth daily 90 tablet 3    atorvastatin (LIPITOR) 40 mg tablet Take 1 tablet (40 mg total) by mouth daily 90 tablet 3    Cholecalciferol (VITAMIN D) 2000 units CAPS Take 1 capsule by mouth 2 (two) times a day        ELIQUIS 5 MG TAKE ONE TABLET BY MOUTH TWICE DAILY 60 tablet 6    flecainide (TAMBOCOR) 50 mg tablet TAKE 1 TABLET BY MOUTH EVERY 12 HOURS 90 tablet 0    fluticasone (FLONASE) 50 mcg/act nasal spray 1 spray into each nostril daily      furosemide (LASIX) 20 mg tablet Take 20 mg by mouth daily as needed      gabapentin (NEURONTIN) 300 mg capsule TAKE 1 CAPSULE BY MOUTH TWICE DAILY 60 capsule 5    LANTUS SOLOSTAR injection pen 100 units/mL Inject 0 6 mL (60 Units total) under the skin 2 (two) times a day for 30 days 11 pen 2    metFORMIN (GLUCOPHAGE) 500 mg tablet TAKE ONE TABLET BY MOUTH TWICE DAILY WITH FOOD 60 tablet 11    metoprolol succinate (TOPROL-XL) 25 mg 24 hr tablet TAKE ONE TABLET BY MOUTH ONCE DAILY 30 tablet 5    Mirabegron ER (MYRBETRIQ) 50 MG TB24 Take 1 tablet by mouth daily      montelukast (SINGULAIR) 10 mg tablet Take 1 tablet (10 mg total) by mouth daily 30 tablet 5    Multiple Vitamins-Minerals (CENTRUM SILVER ULTRA MENS) TABS Take 1 tablet by mouth daily      NOVOLOG 100 UNIT/ML injection Inject 55 Units under the skin 3 (three) times a day before meals 55 units with breakfast, lunch, and dinner  50 mL 5    Omega-3 Fatty Acids (FISH OIL) 1,000 mg Take 1,000 mg by mouth daily      quinapril (ACCUPRIL) 40 MG tablet Take 1 tablet (40 mg total) by mouth daily at bedtime 30 tablet 6    ranitidine (ZANTAC) 150 mg tablet TAKE ONE TABLET BY MOUTH EVERY 12 HOURS 60 tablet 5    tamsulosin (FLOMAX) 0 4 mg Take by mouth daily with dinner        tiotropium (SPIRIVA) 18 mcg inhalation capsule Place 18 mcg into inhaler and inhale daily      traMADol (ULTRAM) 50 mg tablet Take 1 tablet (50 mg total) by mouth every 8 (eight) hours as needed for moderate pain or severe pain 90 tablet 0     No current facility-administered medications for this visit        Allergies   Allergen Reactions    Banana Shortness Of Breath    Fruit Extracts Anaphylaxis and Throat Swelling     [facility] [id number]: Bananas    Clotrimazole     Codeine Itching     Other reaction(s): Itching    Hydrocodone-Acetaminophen     Oxycodone Rash and Other (See Comments)     Other reaction(s): Pruritis    Oxycodone-Acetaminophen Rash     Other reaction(s): Pruritis       Objective   Vitals: Blood pressure 150/80, pulse 72, height 5' 10" (1 778 m), weight (!) 158 kg (349 lb)  Physical Exam   Constitutional: He is oriented to person, place, and time  He appears well-developed and well-nourished  No distress  Obese   HENT:   Head: Normocephalic and atraumatic  Nose: Nose normal    Mouth/Throat: Oropharynx is clear and moist    Eyes: Pupils are equal, round, and reactive to light  Conjunctivae and EOM are normal  Right eye exhibits no discharge  Left eye exhibits no discharge  No scleral icterus  Wears glasses   Neck: Normal range of motion  Neck supple  No JVD present  No tracheal deviation present  No thyromegaly present  Cardiovascular: Normal rate, regular rhythm, normal heart sounds and intact distal pulses  Exam reveals no gallop and no friction rub  No murmur heard  Pulmonary/Chest: Effort normal and breath sounds normal  No stridor  No respiratory distress  He has no wheezes  He has no rales  He exhibits no tenderness  Abdominal: Soft  Bowel sounds are normal  He exhibits no distension  There is no tenderness  Musculoskeletal: Normal range of motion  He exhibits edema (3 pedal edema- lymphedema)  He exhibits no tenderness or deformity  Lymphadenopathy:     He has no cervical adenopathy  Neurological: He is alert and oriented to person, place, and time  He displays normal reflexes  No cranial nerve deficit  Coordination normal    Skin: Skin is warm and dry  No rash noted  No erythema  No pallor  Dry cracked skin to feet and legs   Psychiatric: He has a normal mood and affect   His behavior is normal  Judgment and thought content normal    Vitals reviewed  Lab Results:   Lab Results   Component Value Date/Time    Hemoglobin A1C 8 4 (H) 11/02/2018 10:35 AM    Hemoglobin A1C 7 5 (H) 07/24/2018 08:05 AM    Hemoglobin A1C 8 0 (H) 03/30/2018 08:58 AM    White Blood Cell Count 8 4 07/30/2018 12:10 PM    Hemoglobin 13 1 07/30/2018 12:10 PM    HCT 38 7 07/30/2018 12:10 PM    MCV 88 07/30/2018 12:10 PM    Platelet Count 124 16/85/3815 12:10 PM    BUN 15 11/02/2018 10:35 AM    BUN 14 07/24/2018 08:05 AM    BUN 17 03/30/2018 08:58 AM    Potassium 4 5 11/02/2018 10:35 AM    Potassium 4 2 07/24/2018 08:05 AM    Potassium 4 2 03/30/2018 08:58 AM    Chloride 102 11/02/2018 10:35 AM    Chloride 99 07/24/2018 08:05 AM    Chloride 100 03/30/2018 08:58 AM    CO2 25 11/02/2018 10:35 AM    CO2 29 07/24/2018 08:05 AM    CO2 29 03/30/2018 08:58 AM    Albumin 4 3 11/02/2018 10:35 AM    Albumin 4 3 07/24/2018 08:05 AM    Albumin 4 5 03/30/2018 08:58 AM    Globulin, Total 2 6 11/02/2018 10:35 AM    Globulin, Total 2 5 07/24/2018 08:05 AM    Globulin, Total 2 5 03/30/2018 08:58 AM    HDL 47 11/02/2018 10:29 AM    HDL 48 03/30/2018 08:58 AM    Triglycerides 106 11/02/2018 10:29 AM    Triglycerides 108 03/30/2018 08:58 AM     Portions of the record may have been created with voice recognition software  Occasional wrong word or "sound a like" substitutions may have occurred due to the inherent limitations of voice recognition software  Read the chart carefully and recognize, using context, where substitutions have occurred

## 2018-11-07 NOTE — PATIENT INSTRUCTIONS
Continue Lantus dose to 60 units in AM and PM     For now, get back on track with Novolog 58 units before breakfast and lunch and 60 units before dinner consistently  Check blood sugars 3-4 times daily and send record to the office in 2 weeks for review  Contact the office with any consistent episodes of hypoglycemia  Keep your skin moisturized, as discussed  Continue your antihypertensive medications  Use her CPAP consistently  Continue to supplement with 4000 units of vitamin D3 daily

## 2018-11-12 ENCOUNTER — TELEPHONE (OUTPATIENT)
Dept: ENDOCRINOLOGY | Facility: HOSPITAL | Age: 63
End: 2018-11-12

## 2018-11-13 ENCOUNTER — TELEPHONE (OUTPATIENT)
Dept: ENDOCRINOLOGY | Facility: HOSPITAL | Age: 63
End: 2018-11-13

## 2018-11-13 NOTE — TELEPHONE ENCOUNTER
Reviewed 4 days of blood sugars from November 8 through November 11 2018 with blood sugars taken 2-3 times daily  At the time of his last office visit he was without his NovoLog for several weeks  Blood sugars provided appear relatively well controlled  Please continue to check blood sugars regularly

## 2018-12-03 ENCOUNTER — OFFICE VISIT (OUTPATIENT)
Dept: CARDIOLOGY CLINIC | Facility: CLINIC | Age: 63
End: 2018-12-03
Payer: COMMERCIAL

## 2018-12-03 ENCOUNTER — TELEPHONE (OUTPATIENT)
Dept: ENDOCRINOLOGY | Facility: HOSPITAL | Age: 63
End: 2018-12-03

## 2018-12-03 VITALS
HEART RATE: 69 BPM | DIASTOLIC BLOOD PRESSURE: 54 MMHG | WEIGHT: 315 LBS | BODY MASS INDEX: 45.1 KG/M2 | HEIGHT: 70 IN | SYSTOLIC BLOOD PRESSURE: 160 MMHG

## 2018-12-03 DIAGNOSIS — I48.91 ATRIAL FIBRILLATION, UNSPECIFIED TYPE (HCC): Primary | ICD-10-CM

## 2018-12-03 PROCEDURE — 99214 OFFICE O/P EST MOD 30 MIN: CPT | Performed by: INTERNAL MEDICINE

## 2018-12-03 PROCEDURE — 93000 ELECTROCARDIOGRAM COMPLETE: CPT | Performed by: INTERNAL MEDICINE

## 2018-12-03 NOTE — PROGRESS NOTES
Cardiology Follow Up    [address] [facility]   1955  [id number]  [address] [facility] [place]  3 [facility] [phone]    1  Atrial fibrillation, unspecified type ([place] [address] )  POCT ECG       Interval History: Followup PAF    Doing well  He had some dizziness this AM  Otherwise doing well  No other issues  He checks BP at home  Problem List     Atrial fibrillation [facility])    Dextrocardia    Cerebrovascular accident (CVA) with involvement of left side of body ([facility] ) (Chronic)    Essential hypertension    Cardiac disease    Hyperlipidemia    Morbid obesity due to excess calories ([place] [address] )    Diabetes mellitus type 2, controlled ([place] [address] )    Lab Results   Component Value Date    HGBA1C 8 4 (H) 11/02/2018       No results for input(s): POCGLU in the last 72 hours  Blood Sugar Average: Last 72 hrs:          Type 2 diabetes mellitus, uncontrolled ([place] [address] )    Lab Results   Component Value Date    HGBA1C 8 4 (H) 11/02/2018       No results for input(s): POCGLU in the last 72 hours  Blood Sugar Average: Last 72 hrs:          Obstructive sleep apnea    Back pain, lumbosacral    Type 2 diabetes mellitus with complication (HCC)    Lab Results   Component Value Date    HGBA1C 8 4 (H) 11/02/2018       No results for input(s): POCGLU in the last 72 hours      Blood Sugar Average: Last 72 hrs:          Restrictive lung disease    Cellulitis of left lower extremity        Past Medical History:   Diagnosis Date    Arthritis     Asthma     BPH (benign prostatic hyperplasia)     Cardiac arrhythmia     resolved [id number]    Cardiac disease     Chronic a-fib (HCC)     Chronic pain     CV[facility]re[facility]  ) 11/2015    Dextrocardia     Diabetes mellitus ([place] [address] )     GERD (gastroesophageal reflux disease)     Hyperlipidemia     Hypertension     Sleep apnea     Stroke (cerebrum) [facility])      Social History     Social History    Marital status:      Spouse name: N/A    Number of children: N/A    Years of education: 15     Occupational History    Not working      Social History Main Topics    Smoking status: Never Smoker    Smokeless tobacco: Never Used      Comment: Occasionally smoked a pipe while fishing    Alcohol use 0 6 oz/week     1 Glasses of wine per week      Comment: 2-3 drinks a week, cherry liquer or beer     Drug use: No    Sexual activity: Not on file     Other Topics Concern    Not on file     Social History Narrative    Daily caffeine consumption, 2-3 servings a day    Dental care, regularly    Exercise, cycling    Lives with parents    Supportive and safe    No advance directives          Family History   Problem Relation Age of Onset    Coronary artery disease Father     Hodgkin's lymphoma Father     Diabetes Father     Cancer Father     Diabetes type I Mother     Cancer Maternal Grandfather     Diabetes Paternal Grandmother     Emphysema Paternal Grandmother     Heart attack Paternal Grandfather     Hypertension Family     Neuropathy Family      Past Surgical History:   Procedure Laterality Date    CHOLECYSTECTOMY      EXPLORATORY LAPAROTOMY  1971    GALLBLADDER SURGERY      NM COLONOSCOPY FLX DX W/COLLJ SPEC WHEN PFRMD N/A 2/21/2018    Procedure: COLONOSCOPY;  Surgeon: Amanda Nicholas MD;  Location: [facility] OR;  Service: Gastroenterology    UMBILICAL HERNIA REPAIR  2007       Current Outpatient Prescriptions:     amLODIPine (NORVASC) 10 mg tablet, Take 1 tablet (10 mg total) by mouth daily, Disp: 90 tablet, Rfl: 3    atorvastatin (LIPITOR) 40 mg tablet, Take 1 tablet (40 mg total) by mouth daily, Disp: 90 tablet, Rfl: 3    Cholecalciferol (VITAMIN D) 2000 units CAPS, Take 1 capsule by mouth 2 (two) times a day  , Disp: , Rfl:     ELIQUIS 5 MG, TAKE ONE TABLET BY MOUTH TWICE DAILY, Disp: 60 tablet, Rfl: 6    flecainide (TAMBOCOR) 50 mg tablet, TAKE 1 TABLET BY MOUTH EVERY 12 HOURS, Disp: 90 tablet, Rfl: 0    fluticasone (FLONASE) 50 mcg/act nasal spray, 1 spray into each nostril daily, Disp: , Rfl:     furosemide (LASIX) 20 mg tablet, Take 20 mg by mouth daily as needed, Disp: , Rfl:     gabapentin (NEURONTIN) 300 mg capsule, TAKE 1 CAPSULE BY MOUTH TWICE DAILY, Disp: 60 capsule, Rfl: 5    LANTUS SOLOSTAR injection pen 100 units/mL, Inject 0 6 mL (60 Units total) under the skin 2 (two) times a day for 30 days, Disp: 11 pen, Rfl: 2    metFORMIN (GLUCOPHAGE) 500 mg tablet, TAKE ONE TABLET BY MOUTH TWICE DAILY WITH FOOD, Disp: 60 tablet, Rfl: 11    metoprolol succinate (TOPROL-XL) 25 mg 24 hr tablet, TAKE ONE TABLET BY MOUTH ONCE DAILY, Disp: 30 tablet, Rfl: 5    montelukast (SINGULAIR) 10 mg tablet, Take 1 tablet (10 mg total) by mouth daily, Disp: 30 tablet, Rfl: 5    Multiple Vitamins-Minerals (CENTRUM SILVER ULTRA MENS) TABS, Take 1 tablet by mouth daily, Disp: , Rfl:     Omega-3 Fatty Acids (FISH OIL) 1,000 mg, Take 1,000 mg by mouth daily, Disp: , Rfl:     quinapril (ACCUPRIL) 40 MG tablet, Take 1 tablet (40 mg total) by mouth daily at bedtime, Disp: 30 tablet, Rfl: 6    ranitidine (ZANTAC) 150 mg tablet, TAKE ONE TABLET BY MOUTH EVERY 12 HOURS, Disp: 60 tablet, Rfl: 5    tamsulosin (FLOMAX) 0 4 mg, Take by mouth daily with dinner  , Disp: , Rfl:     tiotropium (SPIRIVA) 18 mcg inhalation capsule, Place 18 mcg into inhaler and inhale daily, Disp: , Rfl:     traMADol (ULTRAM) 50 mg tablet, Take 1 tablet (50 mg total) by mouth every 8 (eight) hours as needed for moderate pain or severe pain, Disp: 90 tablet, Rfl: 0    Mirabegron ER (MYRBETRIQ) 50 MG TB24, Take 1 tablet by mouth daily, Disp: , Rfl:     NOVOLOG 100 UNIT/ML injection, Inject 55 Units under the skin 3 (three) times a day before meals 55 units with breakfast, lunch, and dinner   (Patient not taking: Reported on 12/3/2018 ), Disp: 50 mL, Rfl: 5  Allergies   Allergen Reactions    Banana Shortness Of Breath  Fruit Extracts Anaphylaxis and Throat Swelling     [facility] [id number]: Bananas    Clotrimazole     Codeine Itching     Other reaction(s): Itching    Hydrocodone-Acetaminophen     Oxycodone Rash and Other (See Comments)     Other reaction(s): Pruritis    Oxycodone-Acetaminophen Rash     Other reaction(s): Pruritis       Labs:     Chemistry        Component Value Date/Time     09/19/2017 0704    K 4 5 11/02/2018 1035     11/02/2018 1035    CO2 25 11/02/2018 1035    BUN 15 11/02/2018 1035    CREATININE 0 83 09/19/2017 0704        Component Value Date/Time    CALCIUM 8 9 09/19/2017 0704    ALKPHOS 62 09/19/2017 0704    AST 27 09/19/2017 0704    ALT 25 09/19/2017 0704    BILITOT 0 6 09/19/2017 0704            Lab Results   Component Value Date    CHOL 133 09/19/2017    CHOL 134 11/09/2015    CHOL 156 06/14/2014     Lab Results   Component Value Date    HDL 47 11/02/2018    HDL 48 03/30/2018    HDL 47 09/19/2017     Lab Results   Component Value Date    LDLCALC 63 09/19/2017    LDLCALC 48 04/29/2016    LDLCALC 44 11/09/2015     Lab Results   Component Value Date    TRIG 106 11/02/2018    TRIG 108 03/30/2018    TRIG 117 09/19/2017     Lab Results   Component Value Date    CHOLHDL 2 6 03/30/2018       Imaging: No results found  EKG: NSR  Normal ECG  Review of Systems   Constitution: Negative  HENT: Negative  Eyes: Negative  Cardiovascular: Negative  Respiratory: Negative  Endocrine: Negative  Hematologic/Lymphatic: Negative  Skin: Negative  Musculoskeletal: Negative  Gastrointestinal: Negative  Genitourinary: Negative  Neurological: Negative  Psychiatric/Behavioral: Negative  Allergic/Immunologic: Negative  Vitals:    12/03/18 0859   BP: 160/54   Pulse: 69           Physical Exam   Constitutional: He is oriented to person, place, and time  No distress  HENT:   Mouth/Throat: No oropharyngeal exudate  Eyes: No scleral icterus     Neck: No JVD present  Cardiovascular: Normal rate and regular rhythm  No murmur heard  Pulmonary/Chest: Effort normal and breath sounds normal  No respiratory distress  He has no wheezes  He has no rales  Abdominal: Soft  Bowel sounds are normal  He exhibits no distension  There is no tenderness  There is no rebound  Musculoskeletal: He exhibits no edema  Neurological: He is alert and oriented to person, place, and time  Skin: Skin is warm and dry  He is not diaphoretic  Psychiatric: He has a normal mood and affect  His behavior is normal        Discussion/Summary:    Paroxysmal Atrial Fibrillation: HE is in NSR today  Overall doing well  Continue with current medical therapy  HTN: HE checks BP at home  BP running 140/60s  No changes  Dextrocardia  The patient was counseled regarding diagnostic results, instructions for management, risk factor reductions, impressions  total time of encounter was 25 minutes and 15 minutes was spent counseling

## 2018-12-03 NOTE — TELEPHONE ENCOUNTER
Pt has been out of his Novolog since Friday and does not have the money for a script  We have 4 vials  Ok to give sample?

## 2018-12-04 ENCOUNTER — OFFICE VISIT (OUTPATIENT)
Dept: FAMILY MEDICINE CLINIC | Facility: HOSPITAL | Age: 63
End: 2018-12-04
Payer: COMMERCIAL

## 2018-12-04 VITALS
WEIGHT: 315 LBS | BODY MASS INDEX: 45.1 KG/M2 | OXYGEN SATURATION: 95 % | SYSTOLIC BLOOD PRESSURE: 150 MMHG | HEIGHT: 70 IN | HEART RATE: 73 BPM | DIASTOLIC BLOOD PRESSURE: 72 MMHG

## 2018-12-04 DIAGNOSIS — E11.8 TYPE 2 DIABETES MELLITUS WITH COMPLICATION, UNSPECIFIED WHETHER LONG TERM INSULIN USE: ICD-10-CM

## 2018-12-04 DIAGNOSIS — J98.4 RESTRICTIVE LUNG DISEASE: ICD-10-CM

## 2018-12-04 DIAGNOSIS — I48.0 PAROXYSMAL ATRIAL FIBRILLATION (HCC): ICD-10-CM

## 2018-12-04 DIAGNOSIS — E78.5 HYPERLIPIDEMIA, UNSPECIFIED HYPERLIPIDEMIA TYPE: ICD-10-CM

## 2018-12-04 DIAGNOSIS — Z23 NEED FOR PNEUMOCOCCAL VACCINATION: Primary | ICD-10-CM

## 2018-12-04 DIAGNOSIS — G47.33 OBSTRUCTIVE SLEEP APNEA: ICD-10-CM

## 2018-12-04 DIAGNOSIS — E66.01 CLASS 3 SEVERE OBESITY DUE TO EXCESS CALORIES WITH SERIOUS COMORBIDITY AND BODY MASS INDEX (BMI) OF 45.0 TO 49.9 IN ADULT (HCC): ICD-10-CM

## 2018-12-04 DIAGNOSIS — I10 ESSENTIAL HYPERTENSION: ICD-10-CM

## 2018-12-04 PROBLEM — M54.50 BACK PAIN, LUMBOSACRAL: Status: RESOLVED | Noted: 2018-03-23 | Resolved: 2018-12-04

## 2018-12-04 PROBLEM — L03.116 CELLULITIS OF LEFT LOWER EXTREMITY: Status: RESOLVED | Noted: 2018-07-30 | Resolved: 2018-12-04

## 2018-12-04 PROCEDURE — G0009 ADMIN PNEUMOCOCCAL VACCINE: HCPCS

## 2018-12-04 PROCEDURE — 3725F SCREEN DEPRESSION PERFORMED: CPT | Performed by: INTERNAL MEDICINE

## 2018-12-04 PROCEDURE — 90670 PCV13 VACCINE IM: CPT

## 2018-12-04 PROCEDURE — 99214 OFFICE O/P EST MOD 30 MIN: CPT | Performed by: INTERNAL MEDICINE

## 2018-12-04 RX ORDER — FLECAINIDE ACETATE 50 MG/1
50 TABLET ORAL EVERY 12 HOURS
Qty: 90 TABLET | Refills: 3 | Status: SHIPPED | OUTPATIENT
Start: 2018-12-04 | End: 2019-09-24 | Stop reason: SDUPTHER

## 2018-12-04 NOTE — ASSESSMENT & PLAN NOTE
Lab Results   Component Value Date    HGBA1C 8 4 (H) 11/02/2018       No results for input(s): POCGLU in the last 72 hours  Blood Sugar Average: Last 72 hrs:    Does see endocrine and they manage diabetes  Unfortunately, he can not afford insulin at this point due to being in the "donut hole" Rx plan

## 2018-12-04 NOTE — PROGRESS NOTES
Subjective:   Chief Complaint   Patient presents with    Follow-up     Pt had DM Foot exam at [facility], they are faxing over documentation  Pt received Prevnar 15 in office         Patient ID: Betina Miranda  is a 58 y o  male  Here for routine care  Not able to afford his insulin and this causes his blood sugars to be erratic  The following portions of the patient's history were reviewed and updated as appropriate: allergies, current medications, past family history, past medical history, past social history, past surgical history and problem list     Review of Systems   Constitutional: Negative for fatigue and fever  HENT: Negative for hearing loss  Eyes: Negative for visual disturbance  Respiratory: Negative for cough, chest tightness, shortness of breath and wheezing  Cardiovascular: Negative for chest pain, palpitations and leg swelling  Gastrointestinal: Negative for abdominal pain, diarrhea and nausea  Genitourinary: Negative for dysuria and hematuria  Musculoskeletal: Negative for arthralgias  Neurological: Negative for dizziness, numbness and headaches  Psychiatric/Behavioral: Negative for confusion and dysphoric mood  All other systems reviewed and are negative          Current Outpatient Prescriptions on File Prior to Visit   Medication Sig Dispense Refill    amLODIPine (NORVASC) 10 mg tablet Take 1 tablet (10 mg total) by mouth daily 90 tablet 3    atorvastatin (LIPITOR) 40 mg tablet Take 1 tablet (40 mg total) by mouth daily 90 tablet 3    Cholecalciferol (VITAMIN D) 2000 units CAPS Take 1 capsule by mouth 2 (two) times a day        ELIQUIS 5 MG TAKE ONE TABLET BY MOUTH TWICE DAILY 60 tablet 6    flecainide (TAMBOCOR) 50 mg tablet TAKE 1 TABLET BY MOUTH EVERY 12 HOURS 90 tablet 0    fluticasone (FLONASE) 50 mcg/act nasal spray 1 spray into each nostril daily      furosemide (LASIX) 20 mg tablet Take 20 mg by mouth daily as needed      gabapentin (NEURONTIN) 300 mg capsule TAKE 1 CAPSULE BY MOUTH TWICE DAILY 60 capsule 5    metFORMIN (GLUCOPHAGE) 500 mg tablet TAKE ONE TABLET BY MOUTH TWICE DAILY WITH FOOD 60 tablet 11    metoprolol succinate (TOPROL-XL) 25 mg 24 hr tablet TAKE ONE TABLET BY MOUTH ONCE DAILY 30 tablet 5    Mirabegron ER (MYRBETRIQ) 50 MG TB24 Take 1 tablet by mouth daily      montelukast (SINGULAIR) 10 mg tablet Take 1 tablet (10 mg total) by mouth daily 30 tablet 5    Multiple Vitamins-Minerals (CENTRUM SILVER ULTRA MENS) TABS Take 1 tablet by mouth daily      NOVOLOG 100 UNIT/ML injection Inject 55 Units under the skin 3 (three) times a day before meals 55 units with breakfast, lunch, and dinner  50 mL 5    Omega-3 Fatty Acids (FISH OIL) 1,000 mg Take 1,000 mg by mouth daily      quinapril (ACCUPRIL) 40 MG tablet Take 1 tablet (40 mg total) by mouth daily at bedtime 30 tablet 6    ranitidine (ZANTAC) 150 mg tablet TAKE ONE TABLET BY MOUTH EVERY 12 HOURS 60 tablet 5    tamsulosin (FLOMAX) 0 4 mg Take by mouth daily with dinner        tiotropium (SPIRIVA) 18 mcg inhalation capsule Place 18 mcg into inhaler and inhale daily      traMADol (ULTRAM) 50 mg tablet Take 1 tablet (50 mg total) by mouth every 8 (eight) hours as needed for moderate pain or severe pain 90 tablet 0    LANTUS SOLOSTAR injection pen 100 units/mL Inject 0 6 mL (60 Units total) under the skin 2 (two) times a day for 30 days 11 pen 2     No current facility-administered medications on file prior to visit  Objective:  Vitals:    12/04/18 0952   BP: 150/72   Pulse: 73   SpO2: 95%   Weight: (!) 156 kg (343 lb)   Height: 5' 10" (1 778 m)      Physical Exam   Constitutional: He is oriented to person, place, and time  He appears well-developed and well-nourished  Eyes: Pupils are equal, round, and reactive to light  Neck: Normal range of motion  Neck supple  No thyromegaly present     Cardiovascular: Normal rate, regular rhythm, normal heart sounds and intact distal pulses  No murmur heard  Pulmonary/Chest: Effort normal and breath sounds normal  He has no wheezes  He has no rales  Abdominal: Soft  Bowel sounds are normal  There is no tenderness  Musculoskeletal: Normal range of motion  He exhibits no edema, tenderness or deformity  Lymphadenopathy:     He has no cervical adenopathy  Neurological: He is alert and oriented to person, place, and time  He has normal reflexes  Skin: Skin is warm and dry  Psychiatric: He has a normal mood and affect  Nursing note and vitals reviewed  Assessment/Plan:    Class 3 severe obesity due to excess calories with serious comorbidity and body mass index (BMI) of 45 0 to 49 9 in adult ([facility])  BMI Counseling: Body mass index is 49 22 kg/m²  Discussed the patient's BMI with him  The BMI is above average  BMI counseling and education was provided to the patient  Nutrition recommendations include decreasing overall calorie intake  Type 2 diabetes mellitus with complication ([facility])  Lab Results   Component Value Date    HGBA1C 8 4 (H) 11/02/2018       No results for input(s): POCGLU in the last 72 hours  Blood Sugar Average: Last 72 hrs:    Does see endocrine and they manage diabetes  Unfortunately, he can not afford insulin at this point due to being in the "donut hole" Rx plan  Obstructive sleep apnea  Pulmonary follow this  Has c pap at home and uses intermittently  Diagnoses and all orders for this visit:    Need for pneumococcal vaccination  -     PNEUMOCOCCAL CONJUGATE VACCINE 13-VALENT GREATER THAN 6 MONTHS    Paroxysmal atrial fibrillation (HCC)  -     flecainide (TAMBOCOR) 50 mg tablet;  Take 1 tablet (50 mg total) by mouth every 12 (twelve) hours    Type 2 diabetes mellitus with complication, unspecified whether long term insulin use ([facility])    Restrictive lung disease    Obstructive sleep apnea    Essential hypertension    Hyperlipidemia, unspecified hyperlipidemia type    Class 3 severe obesity due to excess calories with serious comorbidity and body mass index (BMI) of 45 0 to 49 9 in adult [facility])

## 2018-12-04 NOTE — ASSESSMENT & PLAN NOTE
BMI Counseling: Body mass index is 49 22 kg/m²  Discussed the patient's BMI with him  The BMI is above average  BMI counseling and education was provided to the patient  Nutrition recommendations include decreasing overall calorie intake

## 2018-12-13 ENCOUNTER — TELEPHONE (OUTPATIENT)
Dept: ENDOCRINOLOGY | Facility: HOSPITAL | Age: 63
End: 2018-12-13

## 2018-12-13 NOTE — TELEPHONE ENCOUNTER
1 or 2 vials of Humalog, for now    There are a lot of request for samples now because a lot of people are in the doughnut hole and trying to make it to the end of the year

## 2018-12-21 DIAGNOSIS — M25.561 ACUTE PAIN OF RIGHT KNEE: ICD-10-CM

## 2018-12-21 DIAGNOSIS — S89.90XA KNEE INJURY, INITIAL ENCOUNTER: ICD-10-CM

## 2018-12-21 DIAGNOSIS — M43.06 LUMBAR SPONDYLOLYSIS: ICD-10-CM

## 2018-12-21 DIAGNOSIS — Z79.4 CONTROLLED TYPE 2 DIABETES MELLITUS WITH COMPLICATION, WITH LONG-TERM CURRENT USE OF INSULIN (HCC): ICD-10-CM

## 2018-12-21 DIAGNOSIS — E11.8 CONTROLLED TYPE 2 DIABETES MELLITUS WITH COMPLICATION, WITH LONG-TERM CURRENT USE OF INSULIN (HCC): ICD-10-CM

## 2018-12-21 DIAGNOSIS — M54.5 LOW BACK PAIN, UNSPECIFIED BACK PAIN LATERALITY, UNSPECIFIED CHRONICITY, WITH SCIATICA PRESENCE UNSPECIFIED: ICD-10-CM

## 2018-12-21 RX ORDER — TRAMADOL HYDROCHLORIDE 50 MG/1
TABLET ORAL
Qty: 90 TABLET | Refills: 0 | OUTPATIENT
Start: 2018-12-21

## 2018-12-21 RX ORDER — INSULIN GLARGINE 100 [IU]/ML
INJECTION, SOLUTION SUBCUTANEOUS
Qty: 5 PEN | Refills: 5 | Status: SHIPPED | OUTPATIENT
Start: 2018-12-21 | End: 2019-07-26 | Stop reason: SDUPTHER

## 2019-02-02 DIAGNOSIS — Z79.4 CONTROLLED TYPE 2 DIABETES MELLITUS WITH COMPLICATION, WITH LONG-TERM CURRENT USE OF INSULIN (HCC): ICD-10-CM

## 2019-02-02 DIAGNOSIS — E11.8 CONTROLLED TYPE 2 DIABETES MELLITUS WITH COMPLICATION, WITH LONG-TERM CURRENT USE OF INSULIN (HCC): ICD-10-CM

## 2019-02-13 ENCOUNTER — OFFICE VISIT (OUTPATIENT)
Dept: ENDOCRINOLOGY | Facility: HOSPITAL | Age: 64
End: 2019-02-13
Payer: COMMERCIAL

## 2019-02-13 VITALS
HEIGHT: 70 IN | BODY MASS INDEX: 45.1 KG/M2 | WEIGHT: 315 LBS | SYSTOLIC BLOOD PRESSURE: 146 MMHG | DIASTOLIC BLOOD PRESSURE: 66 MMHG | HEART RATE: 72 BPM

## 2019-02-13 DIAGNOSIS — Z79.4 CONTROLLED TYPE 2 DIABETES MELLITUS WITH COMPLICATION, WITH LONG-TERM CURRENT USE OF INSULIN (HCC): ICD-10-CM

## 2019-02-13 DIAGNOSIS — E11.8 CONTROLLED TYPE 2 DIABETES MELLITUS WITH COMPLICATION, WITH LONG-TERM CURRENT USE OF INSULIN (HCC): ICD-10-CM

## 2019-02-13 DIAGNOSIS — I10 ESSENTIAL HYPERTENSION: ICD-10-CM

## 2019-02-13 DIAGNOSIS — E78.5 HYPERLIPIDEMIA, UNSPECIFIED HYPERLIPIDEMIA TYPE: ICD-10-CM

## 2019-02-13 DIAGNOSIS — E11.8 TYPE 2 DIABETES MELLITUS WITH COMPLICATION, UNSPECIFIED WHETHER LONG TERM INSULIN USE: Primary | ICD-10-CM

## 2019-02-13 PROCEDURE — 99214 OFFICE O/P EST MOD 30 MIN: CPT | Performed by: INTERNAL MEDICINE

## 2019-02-13 NOTE — PROGRESS NOTES
2/13/2019    Assessment/Plan      Diagnoses and all orders for this visit:    Type 2 diabetes mellitus with complication, unspecified whether long term insulin use ([facility] )  -     HEMOGLOBIN A1C W/ EAG ESTIMATION Lab Collect; Future  -     Comprehensive metabolic panel Lab Collect; Future  -     Microalbumin / creatinine urine ratio- Lab Collect; Future  -     CBC and differential- Lab Collect; Future  -     TSH, 3rd generation Lab Collect; Future    Essential hypertension    Hyperlipidemia, unspecified hyperlipidemia type  -     Lipid Panel with Direct LDL reflex Lab Collect; Future    Controlled type 2 diabetes mellitus with complication, with long-term current use of insulin ([facility])        Assessment/Plan:  1  Type 2 diabetes with long-term insulin use:  A1c is improved but still above goal   The patient reports concerns with regards to insulin supply and hesitancy to increase insulin dosing given cost   I asked him to call us when he is getting low on insulin as we can supplying was samples to hold him over for the time being  I suggested that he should likely be transition to Humulin U 500 insulin given his high insulin requirements  I offered to send in a prescription for this but he would like to look into cost and coverage  I provided the information for [facility] information as well  He will look into this and let me know  Once this is transition to, he has a blood sugar log sheet that can he can send in blood sugar logs for review  Follow-up in 3 months with labs as ordered above just prior  2  Hypertension:  Continue current regimen for now  3  Hyperlipidemia:  Check lipids before next appointment  CC: Diabetes Consult    History of Present Illness     HPI: Marco Killian  is a 58y o  year old male with type 2 diabetes for 20 years    He is on oral agents and insulin at home and takes Lantus 60 units twice a day, metformin 500 mg twice a day, NovoLog 60 units with each meal  He denies any polyuria, polydipsia, nocturia and blurry vision  He denies retinopathy but does admit to neuropathy  Hypoglycemic episodes: No      The patient's last eye exam was in July 2018  Follows with Podiatry in quicker 10 every 12 weeks  Blood Sugar/Glucometer/Pump/CGM review:  Blood sugars provided from 1/11/19 through 2/8 show fasting blood sugar ranging from 130-190 and typically above goal   Pre lunch readings typically around 181 checked  Before dinner and before bedtime are frequently 170-200 as well  No hypoglycemia is recorded  For hypertension he takes metoprolol 25 mg daily, quinapril 40 mg daily, tamsulosin 0 4 mg daily, amlodipine 10 mg daily  For hyperlipidemia is maintained on atorvastatin 40 mg daily  Review of Systems   Constitutional: Negative for fatigue  HENT: Negative for trouble swallowing and voice change  Eyes: Negative for visual disturbance  Respiratory: Negative for shortness of breath  Cardiovascular: Negative for palpitations and leg swelling  Gastrointestinal: Negative for abdominal pain, nausea and vomiting  Endocrine: Negative for polydipsia and polyuria  Musculoskeletal: Negative for arthralgias and myalgias  Skin: Negative for rash  Neurological: Negative for dizziness, tremors and weakness  Hematological: Negative for adenopathy  Psychiatric/Behavioral: Negative for agitation and confusion         Historical Information   Past Medical History:   Diagnosis Date    Arthritis     Asthma     BPH (benign prostatic hyperplasia)     Cardiac arrhythmia     resolved [id number]    Cardiac disease     Chronic a-fib (HCC)     Chronic pain     CVA [facility]cerebral vascular accident) ([facility] ) 11/2015    Dextrocardia     Diabetes mellitus ([facility] )     GERD (gastroesophageal reflux disease)     Hyperlipidemia     Hypertension     Sleep apnea     [facility]      Past Surgical History:   Procedure Laterality Date    CHOLECYSTECTOMY      EXPLORATORY LAPAROTOMY  1971    GALLBLADDER SURGERY      NM COLONOSCOPY FLX DX W/COLLJ SPEC WHEN PFRMD N/A 2/21/2018    Procedure: COLONOSCOPY;  Surgeon: Amanda Nicholas MD;  Location: [facility];  Service: Gastroenterology    UMBILICAL HERNIA REPAIR  2007     Social History   Social History     Substance and Sexual Activity   Alcohol Use Yes    Alcohol/week: 0 6 oz    Types: 1 Glasses of wine per week    Comment: 2-3 drinks a week, cherry liquer or beer      Social History     Substance and Sexual Activity   Drug Use No     Social History     Tobacco Use   Smoking Status Never Smoker   Smokeless Tobacco Never Used   Tobacco Comment    Occasionally smoked a pipe while fishing     Family History:   Family History   Problem Relation Age of Onset    Coronary artery disease Father     Hodgkin's lymphoma Father     Diabetes Father     Cancer Father     Diabetes type I Mother     Cancer Maternal Grandfather     Diabetes Paternal Grandmother     Emphysema Paternal Grandmother     Heart attack Paternal Grandfather     Hypertension Family     Neuropathy Family        Meds/Allergies   Current Outpatient Medications   Medication Sig Dispense Refill    amLODIPine (NORVASC) 10 mg tablet Take 1 tablet (10 mg total) by mouth daily 90 tablet 3    atorvastatin (LIPITOR) 40 mg tablet Take 1 tablet (40 mg total) by mouth daily 90 tablet 3    Cholecalciferol (VITAMIN D) 2000 units CAPS Take 5,000 Units by mouth daily       ELIQUIS 5 MG TAKE ONE TABLET BY MOUTH TWICE DAILY 60 tablet 6    flecainide (TAMBOCOR) 50 mg tablet Take 1 tablet (50 mg total) by mouth every 12 (twelve) hours for 90 days 90 tablet 3    fluticasone (FLONASE) 50 mcg/act nasal spray 1 spray into each nostril daily      gabapentin (NEURONTIN) 300 mg capsule TAKE 1 CAPSULE BY MOUTH TWICE DAILY 60 capsule 5    LANTUS SOLOSTAR 100 units/mL injection pen INJECT 55 UNITS UNDER THE SKIN TWICE DAILY (Patient taking differently: INJECT 60 UNITS UNDER THE SKIN TWICE DAILY) 5 pen 5    metFORMIN (GLUCOPHAGE) 500 mg tablet TAKE ONE TABLET BY MOUTH TWICE DAILY WITH FOOD 60 tablet 11    metoprolol succinate (TOPROL-XL) 25 mg 24 hr tablet TAKE ONE TABLET BY MOUTH ONCE DAILY 30 tablet 5    montelukast (SINGULAIR) 10 mg tablet Take 1 tablet (10 mg total) by mouth daily 30 tablet 5    Multiple Vitamins-Minerals (CENTRUM SILVER ULTRA MENS) TABS Take 1 tablet by mouth daily      NOVOLOG 100 UNIT/ML injection INJECT 55 UNITS UNDER THE SKIN THREE TIMES DAILY BEFORE BREAKFAST, LUNCH, AND DINNER (Patient taking differently: INJECT 60 UNITS UNDER THE SKIN THREE TIMES DAILY BEFORE BREAKFAST, LUNCH, AND DINNER) 30 mL 5    Omega-3 Fatty Acids (FISH OIL) 1,000 mg Take 1,000 mg by mouth daily      quinapril (ACCUPRIL) 40 MG tablet Take 1 tablet (40 mg total) by mouth daily at bedtime 30 tablet 6    ranitidine (ZANTAC) 150 mg tablet TAKE ONE TABLET BY MOUTH EVERY 12 HOURS 60 tablet 5    tamsulosin (FLOMAX) 0 4 mg Take by mouth daily with dinner        tiotropium (SPIRIVA) 18 mcg inhalation capsule Place 18 mcg into inhaler and inhale daily      traMADol (ULTRAM) 50 mg tablet Take 1 tablet (50 mg total) by mouth every 8 (eight) hours as needed for moderate pain or severe pain 90 tablet 0    furosemide (LASIX) 20 mg tablet Take 20 mg by mouth daily as needed      Mirabegron ER (MYRBETRIQ) 50 MG TB24 Take 1 tablet by mouth daily       No current facility-administered medications for this visit        Allergies   Allergen Reactions    Banana Shortness Of Breath    Fruit Extracts Anaphylaxis and Throat Swelling     [facility] [id number]: Bananas    Clotrimazole     Codeine Itching     Other reaction(s): Itching    Hydrocodone-Acetaminophen     Oxycodone Rash and Other (See Comments)     Other reaction(s): Pruritis    Oxycodone-Acetaminophen Rash     Other reaction(s): Pruritis       Objective   Vitals: Blood pressure 146/66, pulse 72, height 5' 10" (1 778 m), weight (!) 155 kg (341 lb 6 4 oz)  Invasive Devices          None          Physical Exam   Constitutional: He is oriented to person, place, and time  He appears well-developed and well-nourished  No distress  HENT:   Head: Normocephalic and atraumatic  Eyes: Pupils are equal, round, and reactive to light  Conjunctivae are normal    Neck: Normal range of motion  Neck supple  No thyromegaly present  Cardiovascular: Normal rate and regular rhythm  No murmur heard  Pulmonary/Chest: Effort normal and breath sounds normal  He has no wheezes  Abdominal: Soft  Bowel sounds are normal  He exhibits no distension  There is no tenderness  Musculoskeletal: Normal range of motion  He exhibits no edema  Neurological: He is alert and oriented to person, place, and time  He exhibits normal muscle tone  Skin: Skin is warm and dry  No rash noted  He is not diaphoretic  Psychiatric: He has a normal mood and affect  His behavior is normal        The history was obtained from the review of the chart and from the patient      Lab Results:    Most recent Alc is  Lab Results   Component Value Date    HGBA1C 8 4 (H) 11/02/2018             Lab Results   Component Value Date    CREATININE 0 83 09/19/2017    CREATININE 0 92 06/08/2017    CREATININE 0 94 02/03/2017    BUN 15 11/02/2018     09/19/2017    K 4 5 11/02/2018     11/02/2018    CO2 25 11/02/2018     eGFR   Date Value Ref Range Status   02/03/2017 >60 0 ml/min/1 73sq m Final     No components found for: [facility]    Lab Results   Component Value Date    CHOL 133 09/19/2017    HDL 47 11/02/2018    TRIG 106 11/02/2018    CHOLHDL 2 6 03/30/2018       Lab Results   Component Value Date    ALT 21 11/02/2018    AST 19 11/02/2018    ALKPHOS 62 09/19/2017    BILITOT 0 6 09/19/2017       Lab Results   Component Value Date    TSH 4 090 03/30/2018    FREET4 1 18 03/30/2018       Recent Results (from the past 57902 hour(s))   HEMOGLOBIN A1C W/ EAG ESTIMATION    Collection Time: 12/30/17 12:00 AM   Result Value Ref Range    Hemoglobin A1C 8 1    Tissue Exam    Collection Time: 02/21/18  8:49 AM   Result Value Ref Range    Case Report       Surgical Pathology Report                         Case: [id number]                                   Authorizing Provider:  Gilberto Rivers MD          Collected:           02/21/2018 0849              Ordering Location:     [facility]        Received:            02/21/2018 0912                                     [facility] Operating Room                                                    Pathologist:           Rainer Hou MD                                                        Specimens:   A) - Large Intestine, Sigmoid Colon, polyps x 3 descending and sigmoid                              B) - Large Intestine, Transverse Colon, polyps ascending and transverse                    Final Diagnosis       A  Descending and sigmoid colon polyps:     - Tubular adenomas  - Negative for high grade dysplasia and malignancy  B   Ascending and transverse colon polyps:     - Tubular adenomas  - Negative for high grade dysplasia and malignancy  Additional Information       All controls performed with the immunohistochemical stains reported above reacted appropriately  These tests were developed and their performance characteristics determined by  [facility] or [facility]  They may not be cleared or approved by the [place]  Food and Drug Administration  The FDA has determined that such clearance or approval is not necessary  These tests are used for clinical purposes  They should not be regarded as investigational or for research  This laboratory has been approved by CLIA 88, designated as a high-complexity laboratory and is qualified to perform these tests  Gross Description       A   The specimen is received in formalin, labeled with the patient's name and hospital number, and is designated "polyps x3 descending and sigmoid, are 2 tan-red and glistening polypoid structures measuring 0 5 and 0 4 cm in greatest dimension  Entirely submitted  1 cassette  B  The specimen is received in formalin, labeled with the patient's name and hospital number, and is designated "polyps ascending and transverse, are 3 tan-pink polypoid structures measuring 1 0 x 0 5 x 0 3 cm in aggregate  Entirely submitted  1 cassette  Note: The estimated total formalin fixation time based upon information provided by the submitting clinician and the standard processing schedule is under 72 hours  [facility]           Comprehensive metabolic panel    Collection Time: 03/30/18  8:58 AM   Result Value Ref Range    Glucose, Random 165 (H) 65 - 99 mg/dL    BUN 17 8 - 27 mg/dL    Creatinine 0 81 0 76 - 1 27 mg/dL    eGFR Non African American 95 >59 mL/min/1 73    eGFR  109 >59 mL/min/1 73    SL AMB BUN/CREATININE RATIO 21 10 - 24    Sodium 144 134 - 144 mmol/L    Potassium 4 2 3 5 - 5 2 mmol/L    Chloride 100 96 - 106 mmol/L    CO2 29 18 - 29 mmol/L    CALCIUM 9 0 8 6 - 10 2 mg/dL    Protein, Total 7 0 6 0 - 8 5 g/dL    Albumin 4 5 3 6 - 4 8 g/dL    Globulin, Total 2 5 1 5 - 4 5 g/dL    Albumin/Globulin Ratio 1 8 1 2 - 2 2    TOTAL BILIRUBIN 0 6 0 0 - 1 2 mg/dL    Alk Phos Isoenzymes 76 39 - 117 IU/L    AST 19 0 - 40 IU/L    ALT 23 0 - 44 IU/L   Lipid panel    Collection Time: 03/30/18  8:58 AM   Result Value Ref Range    Cholesterol, Total 123 100 - 199 mg/dL    Triglycerides 108 0 - 149 mg/dL    HDL 48 >39 mg/dL    VLDL Cholesterol Calculated 22 5 - 40 mg/dL    LDL Direct 53 0 - 99 mg/dL    T  Chol/HDL Ratio 2 6 0 0 - 5 0 ratio units   Microalbumin / creatinine urine ratio    Collection Time: 03/30/18  8:58 AM   Result Value Ref Range    Creatinine, Urine 111 8 Not Estab  mg/dL    Microalbum  ,U,Random 19 5 Not Estab  ug/mL    Microalb/Creat Ratio 17 4 0 0 - 30 0 mg/g creat   HEMOGLOBIN A1C W/ EAG ESTIMATION Collection Time: 03/30/18  8:58 AM   Result Value Ref Range    Hemoglobin A1C 8 0 (H) 4 8 - 5 6 %    Estimated Average Glucose 183 mg/dL   T4, free    Collection Time: 03/30/18  9:03 AM   Result Value Ref Range    Free t4 1 18 0 82 - 1 77 ng/dL   TSH, 3rd generation    Collection Time: 03/30/18  9:03 AM   Result Value Ref Range    TSH 4 090 0 450 - 4 500 uIU/mL   Hm Diabetes Eye Exam    Collection Time: 07/09/18  2:16 PM   Result Value Ref Range    Severity Normal     Right Eye Diabetic Retinopathy None     Left Eye Diabetic Retinopathy None    HEMOGLOBIN A1C W/ EAG ESTIMATION Lab Collect    Collection Time: 07/24/18  8:05 AM   Result Value Ref Range    Hemoglobin A1C 7 5 (H) 4 8 - 5 6 %    Estimated Average Glucose 169 mg/dL   Comprehensive metabolic panel Lab Collect    Collection Time: 07/24/18  8:05 AM   Result Value Ref Range    Glucose, Random 143 (H) 65 - 99 mg/dL    BUN 14 8 - 27 mg/dL    Creatinine 0 93 0 76 - 1 27 mg/dL    eGFR Non  86 >59 mL/min/1 73    eGFR African American 100 >59 mL/min/1 73    SL AMB BUN/CREATININE RATIO 15 10 - 24    Sodium 144 134 - 144 mmol/L    Potassium 4 2 3 5 - 5 2 mmol/L    Chloride 99 96 - 106 mmol/L    CO2 29 20 - 29 mmol/L    CALCIUM 9 3 8 6 - 10 2 mg/dL    Protein, Total 6 8 6 0 - 8 5 g/dL    Albumin 4 3 3 6 - 4 8 g/dL    Globulin, Total 2 5 1 5 - 4 5 g/dL    Albumin/Globulin Ratio 1 7 1 2 - 2 2    TOTAL BILIRUBIN 0 7 0 0 - 1 2 mg/dL    Alk Phos Isoenzymes 77 39 - 117 IU/L    AST 21 0 - 40 IU/L    ALT 24 0 - 44 IU/L   Blood culture    Collection Time: 07/30/18 12:03 PM   Result Value Ref Range    Blood Culture, Routine Final report    Result    Collection Time: 07/30/18 12:03 PM   Result Value Ref Range    Result 1 Comment    CBC and differential    Collection Time: 07/30/18 12:10 PM   Result Value Ref Range    White Blood Cell Count 8 4 3 4 - 10 8 x10E3/uL    Red Blood Cell Count 4 42 4 14 - 5 80 x10E6/uL    Hemoglobin 13 1 13 0 - 17 7 g/dL    HCT 38 7 37 5 - 51 0 %    MCV 88 79 - 97 fL    MCH 29 6 26 6 - 33 0 pg    MCHC 33 9 31 5 - 35 7 g/dL    RDW 14 7 12 3 - 15 4 %    Platelet Count 772 203 - 379 x10E3/uL    Neutrophils 67 Not Estab  %    Lymphocytes 18 Not Estab  %    Monocytes 11 Not Estab  %    Eosinophils 3 Not Estab  %    Basophils PCT 1 Not Estab  %    Neutrophils (Absolute) 5 6 1 4 - 7 0 x10E3/uL    Lymphocytes (Absolute) 1 5 0 7 - 3 1 x10E3/uL    Monocytes (Absolute) 0 9 0 1 - 0 9 x10E3/uL    Eosinophils (Absolute) 0 2 0 0 - 0 4 x10E3/uL    Basophils ABS 0 0 0 0 - 0 2 x10E3/uL    Immature Granulocytes 0 Not Estab  %    Immature Granulocytes (Absolute) 0 0 0 0 - 0 1 x10E3/uL   Lipid Panel with Direct LDL reflex    Collection Time: 11/02/18 10:29 AM   Result Value Ref Range    Cholesterol, Total 120 100 - 199 mg/dL    Triglycerides 106 0 - 149 mg/dL    HDL 47 >39 mg/dL    VLDL Cholesterol Calculated 21 5 - 40 mg/dL    LDL Direct 52 0 - 99 mg/dL    LDl/HDL Ratio 1 1 0 0 - 3 6 ratio   Comprehensive metabolic panel    Collection Time: 11/02/18 10:35 AM   Result Value Ref Range    Glucose, Random 172 (H) 65 - 99 mg/dL    BUN 15 8 - 27 mg/dL    Creatinine 0 87 0 76 - 1 27 mg/dL    eGFR Non African American 91 >59 mL/min/1 73    eGFR  105 >59 mL/min/1 73    SL AMB BUN/CREATININE RATIO 17 10 - 24    Sodium 143 134 - 144 mmol/L    Potassium 4 5 3 5 - 5 2 mmol/L    Chloride 102 96 - 106 mmol/L    CO2 25 20 - 29 mmol/L    CALCIUM 9 6 8 6 - 10 2 mg/dL    Protein, Total 6 9 6 0 - 8 5 g/dL    Albumin 4 3 3 6 - 4 8 g/dL    Globulin, Total 2 6 1 5 - 4 5 g/dL    Albumin/Globulin Ratio 1 7 1 2 - 2 2    TOTAL BILIRUBIN 0 6 0 0 - 1 2 mg/dL    Alk Phos Isoenzymes 71 39 - 117 IU/L    AST 19 0 - 40 IU/L    ALT 21 0 - 44 IU/L   Hemoglobin A1C    Collection Time: 11/02/18 10:35 AM   Result Value Ref Range    Hemoglobin A1C 8 4 (H) 4 8 - 5 6 %    Estimated Average Glucose 194 mg/dL   Specimen Status Report    Collection Time: 11/02/18 10:35 AM   Result Value Ref Range    Comment Comment          No future appointments  Portions of the record may have been created with voice recognition software  Occasional wrong word or "sound a like" substitutions may have occurred due to the inherent limitations of voice recognition software  Read the chart carefully and recognize, using context, where substitutions have occurred

## 2019-02-25 DIAGNOSIS — I10 ESSENTIAL HYPERTENSION: ICD-10-CM

## 2019-02-25 RX ORDER — QUINAPRIL 40 MG/1
40 TABLET ORAL
Qty: 90 TABLET | Refills: 3 | Status: CANCELLED | OUTPATIENT
Start: 2019-02-25

## 2019-02-27 ENCOUNTER — TELEPHONE (OUTPATIENT)
Dept: ENDOCRINOLOGY | Facility: HOSPITAL | Age: 64
End: 2019-02-27

## 2019-03-01 ENCOUNTER — TELEPHONE (OUTPATIENT)
Dept: ENDOCRINOLOGY | Facility: HOSPITAL | Age: 64
End: 2019-03-01

## 2019-03-06 DIAGNOSIS — J45.909 MILD ASTHMA WITHOUT COMPLICATION, UNSPECIFIED WHETHER PERSISTENT: ICD-10-CM

## 2019-03-06 RX ORDER — MONTELUKAST SODIUM 10 MG/1
TABLET ORAL
Qty: 30 TABLET | Refills: 5 | Status: SHIPPED | OUTPATIENT
Start: 2019-03-06 | End: 2021-01-01 | Stop reason: HOSPADM

## 2019-03-07 ENCOUNTER — TELEPHONE (OUTPATIENT)
Dept: ENDOCRINOLOGY | Facility: HOSPITAL | Age: 64
End: 2019-03-07

## 2019-03-07 NOTE — TELEPHONE ENCOUNTER
Received shipment of Apidra from [employer]  Received 12 boxes, each box containing 5 pens  Patient informed

## 2019-03-25 DIAGNOSIS — K21.9 GASTROESOPHAGEAL REFLUX DISEASE WITHOUT ESOPHAGITIS: ICD-10-CM

## 2019-03-25 RX ORDER — RANITIDINE 150 MG/1
TABLET ORAL
Qty: 60 TABLET | Refills: 5 | Status: SHIPPED | OUTPATIENT
Start: 2019-03-25 | End: 2020-02-17 | Stop reason: ALTCHOICE

## 2019-03-31 DIAGNOSIS — R52 PAIN: ICD-10-CM

## 2019-04-01 RX ORDER — GABAPENTIN 300 MG/1
CAPSULE ORAL
Qty: 60 CAPSULE | Refills: 5 | Status: SHIPPED | OUTPATIENT
Start: 2019-04-01 | End: 2021-01-01 | Stop reason: HOSPADM

## 2019-05-17 DIAGNOSIS — I48.0 PAROXYSMAL ATRIAL FIBRILLATION (HCC): ICD-10-CM

## 2019-05-17 RX ORDER — METOPROLOL SUCCINATE 25 MG/1
25 TABLET, EXTENDED RELEASE ORAL DAILY
Qty: 30 TABLET | Refills: 6 | Status: SHIPPED | OUTPATIENT
Start: 2019-05-17 | End: 2019-12-16 | Stop reason: SDUPTHER

## 2019-05-20 ENCOUNTER — TELEPHONE (OUTPATIENT)
Dept: ENDOCRINOLOGY | Facility: HOSPITAL | Age: 64
End: 2019-05-20

## 2019-05-21 LAB
ALBUMIN SERPL-MCNC: 4.5 G/DL (ref 3.6–4.8)
ALBUMIN/CREAT UR: 22.9 MG/G CREAT (ref 0–30)
ALBUMIN/GLOB SERPL: 1.9 {RATIO} (ref 1.2–2.2)
ALP SERPL-CCNC: 58 IU/L (ref 39–117)
ALT SERPL-CCNC: 23 IU/L (ref 0–44)
AST SERPL-CCNC: 18 IU/L (ref 0–40)
BASOPHILS # BLD AUTO: 0 X10E3/UL (ref 0–0.2)
BASOPHILS NFR BLD AUTO: 0 %
BILIRUB SERPL-MCNC: 0.5 MG/DL (ref 0–1.2)
BUN SERPL-MCNC: 19 MG/DL (ref 8–27)
BUN/CREAT SERPL: 21 (ref 10–24)
CALCIUM SERPL-MCNC: 9.3 MG/DL (ref 8.6–10.2)
CHLORIDE SERPL-SCNC: 104 MMOL/L (ref 96–106)
CHOLEST SERPL-MCNC: 131 MG/DL (ref 100–199)
CO2 SERPL-SCNC: 26 MMOL/L (ref 20–29)
CREAT SERPL-MCNC: 0.91 MG/DL (ref 0.76–1.27)
CREAT UR-MCNC: 103 MG/DL
EOSINOPHIL # BLD AUTO: 0.1 X10E3/UL (ref 0–0.4)
EOSINOPHIL NFR BLD AUTO: 2 %
ERYTHROCYTE [DISTWIDTH] IN BLOOD BY AUTOMATED COUNT: 14.7 % (ref 12.3–15.4)
EST. AVERAGE GLUCOSE BLD GHB EST-MCNC: 169 MG/DL
GLOBULIN SER-MCNC: 2.4 G/DL (ref 1.5–4.5)
GLUCOSE SERPL-MCNC: 97 MG/DL (ref 65–99)
HBA1C MFR BLD: 7.5 % (ref 4.8–5.6)
HCT VFR BLD AUTO: 41 % (ref 37.5–51)
HDLC SERPL-MCNC: 53 MG/DL
HGB BLD-MCNC: 13.6 G/DL (ref 13–17.7)
IMM GRANULOCYTES # BLD: 0 X10E3/UL (ref 0–0.1)
IMM GRANULOCYTES NFR BLD: 0 %
LABCORP COMMENT: NORMAL
LDLC SERPL CALC-MCNC: 56 MG/DL (ref 0–99)
LYMPHOCYTES # BLD AUTO: 2.2 X10E3/UL (ref 0.7–3.1)
LYMPHOCYTES NFR BLD AUTO: 29 %
MCH RBC QN AUTO: 30.6 PG (ref 26.6–33)
MCHC RBC AUTO-ENTMCNC: 33.2 G/DL (ref 31.5–35.7)
MCV RBC AUTO: 92 FL (ref 79–97)
MICROALBUMIN UR-MCNC: 23.6 UG/ML
MONOCYTES # BLD AUTO: 0.7 X10E3/UL (ref 0.1–0.9)
MONOCYTES NFR BLD AUTO: 9 %
NEUTROPHILS # BLD AUTO: 4.6 X10E3/UL (ref 1.4–7)
NEUTROPHILS NFR BLD AUTO: 60 %
PLATELET # BLD AUTO: 221 X10E3/UL (ref 150–450)
POTASSIUM SERPL-SCNC: 3.8 MMOL/L (ref 3.5–5.2)
PROT SERPL-MCNC: 6.9 G/DL (ref 6–8.5)
RBC # BLD AUTO: 4.44 X10E6/UL (ref 4.14–5.8)
SL AMB EGFR AFRICAN AMERICAN: 103 ML/MIN/1.73
SL AMB EGFR NON AFRICAN AMERICAN: 89 ML/MIN/1.73
SODIUM SERPL-SCNC: 144 MMOL/L (ref 134–144)
TRIGL SERPL-MCNC: 109 MG/DL (ref 0–149)
TSH SERPL DL<=0.005 MIU/L-ACNC: 2.19 UIU/ML (ref 0.45–4.5)
WBC # BLD AUTO: 7.6 X10E3/UL (ref 3.4–10.8)

## 2019-05-22 ENCOUNTER — TELEPHONE (OUTPATIENT)
Dept: ENDOCRINOLOGY | Facility: HOSPITAL | Age: 64
End: 2019-05-22

## 2019-05-22 ENCOUNTER — OFFICE VISIT (OUTPATIENT)
Dept: ENDOCRINOLOGY | Facility: HOSPITAL | Age: 64
End: 2019-05-22
Payer: COMMERCIAL

## 2019-05-22 VITALS
DIASTOLIC BLOOD PRESSURE: 68 MMHG | SYSTOLIC BLOOD PRESSURE: 150 MMHG | HEIGHT: 70 IN | WEIGHT: 315 LBS | BODY MASS INDEX: 45.1 KG/M2 | HEART RATE: 60 BPM

## 2019-05-22 DIAGNOSIS — E78.5 HYPERLIPIDEMIA, UNSPECIFIED HYPERLIPIDEMIA TYPE: ICD-10-CM

## 2019-05-22 DIAGNOSIS — I10 ESSENTIAL HYPERTENSION: ICD-10-CM

## 2019-05-22 DIAGNOSIS — E11.8 CONTROLLED TYPE 2 DIABETES MELLITUS WITH COMPLICATION, WITH LONG-TERM CURRENT USE OF INSULIN (HCC): Primary | ICD-10-CM

## 2019-05-22 DIAGNOSIS — Z79.4 CONTROLLED TYPE 2 DIABETES MELLITUS WITH COMPLICATION, WITH LONG-TERM CURRENT USE OF INSULIN (HCC): Primary | ICD-10-CM

## 2019-05-22 PROCEDURE — 99214 OFFICE O/P EST MOD 30 MIN: CPT | Performed by: INTERNAL MEDICINE

## 2019-05-22 RX ORDER — PEN NEEDLE, DIABETIC 31 GX5/16"
NEEDLE, DISPOSABLE MISCELLANEOUS
COMMUNITY
Start: 2019-05-07

## 2019-05-22 RX ORDER — GLUCOSAM/CHON-MSM1/C/MANG/BOSW 500-416.6
TABLET ORAL
COMMUNITY
Start: 2019-05-07

## 2019-05-22 RX ORDER — CALCIUM CITRATE/VITAMIN D3 200MG-6.25
TABLET ORAL
COMMUNITY
Start: 2019-05-07 | End: 2021-01-01 | Stop reason: HOSPADM

## 2019-07-05 ENCOUNTER — TELEPHONE (OUTPATIENT)
Dept: ENDOCRINOLOGY | Facility: HOSPITAL | Age: 64
End: 2019-07-05

## 2019-07-08 ENCOUNTER — OFFICE VISIT (OUTPATIENT)
Dept: CARDIOLOGY CLINIC | Facility: CLINIC | Age: 64
End: 2019-07-08
Payer: COMMERCIAL

## 2019-07-08 VITALS
WEIGHT: 315 LBS | HEART RATE: 57 BPM | SYSTOLIC BLOOD PRESSURE: 162 MMHG | DIASTOLIC BLOOD PRESSURE: 82 MMHG | BODY MASS INDEX: 45.1 KG/M2 | HEIGHT: 70 IN

## 2019-07-08 DIAGNOSIS — I48.0 PAROXYSMAL ATRIAL FIBRILLATION (HCC): Primary | ICD-10-CM

## 2019-07-08 PROCEDURE — 99214 OFFICE O/P EST MOD 30 MIN: CPT | Performed by: INTERNAL MEDICINE

## 2019-07-08 PROCEDURE — 93000 ELECTROCARDIOGRAM COMPLETE: CPT | Performed by: INTERNAL MEDICINE

## 2019-07-08 RX ORDER — CETIRIZINE HYDROCHLORIDE 10 MG/1
10 TABLET ORAL DAILY
COMMUNITY
Start: 2019-06-11 | End: 2019-12-20 | Stop reason: ALTCHOICE

## 2019-07-08 NOTE — PROGRESS NOTES
Cardiology Follow Up    [address] [facility] [place]   1955  [id number]  [address] [facility] [address] [address] [phone]    1  Paroxysmal atrial fibrillation (HCC)  POCT ECG       Interval History: Followup PAF    Doing well  No chest pain  No dyspnea  No issues with his medical therapy  Problem List     Atrial fibrillation [facility])    Dextrocardia    Cerebrovascular accident (CVA) with involvement of left side of body ([facility] [address] ) (Chronic)    Essential hypertension    Cardiac disease    Hyperlipidemia    Morbid obesity due to excess calories ([facility] )    Diabetes mellitus type 2, controlled ([place] [address] )    Lab Results   Component Value Date    HGBA1C 7 5 (H) 05/20/2019       No results for input(s): POCGLU in the last 72 hours  Blood Sugar Average: Last 72 hrs:          Type 2 diabetes mellitus, uncontrolled ([place] [address] )    Lab Results   Component Value Date    HGBA1C 7 5 (H) 05/20/2019       No results for input(s): POCGLU in the last 72 hours  Blood Sugar Average: Last 72 hrs:          Obstructive sleep apnea    Type 2 diabetes mellitus with complication (HCC)    Lab Results   Component Value Date    HGBA1C 7 5 (H) 05/20/2019       No results for input(s): POCGLU in the last 72 hours      Blood Sugar Average: Last 72 hrs:          Restrictive lung disease    Class 3 severe obesity due to excess calories with serious comorbidity and body mass index (BMI) of 45 0 to 49 9 in adult [facility])        Past Medical History:   Diagnosis Date    Arthritis     Asthma     BPH (benign prostatic hyperplasia)     Cardiac arrhythmia     resolved [id number]    Cardiac disease     Chronic a-fib (HCC)     Chronic pain     CVA (cerebral vascular accident) ([facility] [address] ) 11/2015    Dextrocardia     Diabetes mellitus ([facility]     GERD (gastroesophageal reflux disease)     Hyperlipidemia     Hypertension     Sleep apnea     Stroke (cerebrum) ([facility] ) Social History     Socioeconomic History    Marital status:      Spouse name: Not on file    Number of children: Not on file    Years of education: 15    Highest education level: Not on file   Occupational History    Occupation: Not working   Social Needs    Financial resource strain: Not on file    Food insecurity:     Worry: Not on file     Inability: Not on file   GMI Ratings needs:     Medical: Not on file     Non-medical: Not on file   Tobacco Use    Smoking status: Never Smoker    Smokeless tobacco: Never Used    Tobacco comment: Occasionally smoked a pipe while fishing   Substance and Sexual Activity    Alcohol use:  Yes     Alcohol/week: 1 0 standard drinks     Types: 1 Glasses of wine per week     Comment: 2-3 drinks a week, cherry liquer or beer     Drug use: No    Sexual activity: Not on file   Lifestyle    Physical activity:     Days per week: Not on file     Minutes per session: Not on file    Stress: Not on file   Relationships    Social connections:     Talks on phone: Not on file     Gets together: Not on file     Attends Shinto service: Not on file     Active member of club or organization: Not on file     Attends meetings of clubs or organizations: Not on file     Relationship status: Not on file    Intimate partner violence:     Fear of current or ex partner: Not on file     Emotionally abused: Not on file     Physically abused: Not on file     Forced sexual activity: Not on file   Other Topics Concern    Not on file   Social History Narrative    Daily caffeine consumption, 2-3 servings a day    Dental care, regularly    Exercise, cycling    Lives with parents    Supportive and safe    No advance directives      Family History   Problem Relation Age of Onset    Coronary artery disease Father     Hodgkin's lymphoma Father     Diabetes Father     Cancer Father     Diabetes type I Mother     Cancer Maternal Grandfather     Diabetes Paternal Grandmother    Abdelrahman Edgar Emphysema Paternal Grandmother     Heart attack Paternal Grandfather     Hypertension Family     Neuropathy Family      Past Surgical History:   Procedure Laterality Date    CHOLECYSTECTOMY      EXPLORATORY LAPAROTOMY  1971    GALLBLADDER SURGERY      MO COLONOSCOPY FLX DX W/COLLJ SPEC WHEN PFRMD N/A 2/21/2018    Procedure: COLONOSCOPY;  Surgeon: Mazin Landaverde MD;  Location: [facility];  Service: Gastroenterology    UMBILICAL HERNIA REPAIR  2007       Current Outpatient Medications:     amLODIPine (NORVASC) 10 mg tablet, Take 1 tablet (10 mg total) by mouth daily, Disp: 90 tablet, Rfl: 3    atorvastatin (LIPITOR) 40 mg tablet, Take 1 tablet (40 mg total) by mouth daily, Disp: 90 tablet, Rfl: 3    B-D UF III MINI PEN NEEDLES 31G X 5 MM MISC, , Disp: , Rfl:     cetirizine (ZYRTEC ALLERGY) 10 mg tablet, Take 10 mg by mouth daily, Disp: , Rfl:     Cholecalciferol (VITAMIN D) 2000 units CAPS, Take 5,000 Units by mouth daily , Disp: , Rfl:     ELIQUIS 5 MG, TAKE ONE TABLET BY MOUTH TWICE DAILY, Disp: 60 tablet, Rfl: 6    flecainide (TAMBOCOR) 50 mg tablet, Take 1 tablet (50 mg total) by mouth every 12 (twelve) hours for 90 days, Disp: 90 tablet, Rfl: 3    fluticasone (FLONASE) 50 mcg/act nasal spray, 1 spray into each nostril daily, Disp: , Rfl:     gabapentin (NEURONTIN) 300 mg capsule, TAKE 1 CAPSULE BY MOUTH TWICE DAILY, Disp: 60 capsule, Rfl: 5    insulin glulisine (APIDRA SOLOSTAR) 100 units/mL injection pen, Inject 60 Units under the skin 3 (three) times a day with meals, Disp: , Rfl:     LANTUS SOLOSTAR 100 units/mL injection pen, INJECT 55 UNITS UNDER THE SKIN TWICE DAILY (Patient taking differently: INJECT 60 UNITS UNDER THE SKIN TWICE DAILY), Disp: 5 pen, Rfl: 5    metFORMIN (GLUCOPHAGE) 500 mg tablet, TAKE ONE TABLET BY MOUTH TWICE DAILY WITH FOOD, Disp: 60 tablet, Rfl: 11    metoprolol succinate (TOPROL-XL) 25 mg 24 hr tablet, Take 1 tablet (25 mg total) by mouth daily, Disp: 30 tablet, Rfl: 6    montelukast (SINGULAIR) 10 mg tablet, TAKE 1 TABLET BY MOUTH ONCE DAILY, Disp: 30 tablet, Rfl: 5    Multiple Vitamins-Minerals (CENTRUM SILVER ULTRA MENS) TABS, Take 1 tablet by mouth daily, Disp: , Rfl:     Omega-3 Fatty Acids (FISH OIL) 1,000 mg, Take 1,000 mg by mouth daily, Disp: , Rfl:     quinapril (ACCUPRIL) 40 MG tablet, Take 1 tablet (40 mg total) by mouth daily at bedtime, Disp: 30 tablet, Rfl: 6    ranitidine (ZANTAC) 150 mg tablet, TAKE 1 TABLET BY MOUTH EVERY 12 HOURS, Disp: 60 tablet, Rfl: 5    tamsulosin (FLOMAX) 0 4 mg, Take by mouth daily with dinner  , Disp: , Rfl:     traMADol (ULTRAM) 50 mg tablet, Take 1 tablet (50 mg total) by mouth every 8 (eight) hours as needed for moderate pain or severe pain, Disp: 90 tablet, Rfl: 0    TRUE METRIX BLOOD GLUCOSE TEST test strip, , Disp: , Rfl:     TRUEPLUS LANCETS 33G MISC, , Disp: , Rfl:   Allergies   Allergen Reactions    Banana Shortness Of Breath    Fruit Extracts Anaphylaxis and Throat Swelling     Annotation - [id number]: Bananas    Clotrimazole     Codeine Itching     Other reaction(s): Itching    Hydrocodone-Acetaminophen     Oxycodone Rash and Other (See Comments)     Other reaction(s): Pruritis    Oxycodone-Acetaminophen Rash     Other reaction(s): Pruritis       Labs:     Chemistry        Component Value Date/Time     09/19/2017 0704    K 3 8 05/20/2019 0906     05/20/2019 0906    CO2 26 05/20/2019 0906    BUN 19 05/20/2019 0906    CREATININE 0 91 05/20/2019 0906    CREATININE 0 83 09/19/2017 0704        Component Value Date/Time    CALCIUM 8 9 09/19/2017 0704    ALKPHOS 62 09/19/2017 0704    AST 18 05/20/2019 0906    ALT 23 05/20/2019 0906    BILITOT 0 6 09/19/2017 0704            Lab Results   Component Value Date    CHOL 133 09/19/2017    CHOL 134 11/09/2015    CHOL 156 06/14/2014     Lab Results   Component Value Date    HDL 53 05/20/2019    HDL 47 11/02/2018    HDL 48 03/30/2018     Lab Results Component Value Date    LDLCALC 63 09/19/2017    LDLCALC 48 04/29/2016    LDLCALC 44 11/09/2015     Lab Results   Component Value Date    TRIG 109 05/20/2019    TRIG 106 11/02/2018    TRIG 108 03/30/2018     Lab Results   Component Value Date    CHOLHDL 2 6 03/30/2018       Imaging: No results found  EKG: NSr  Normal ECG    Review of Systems   Constitution: Negative  HENT: Negative  Eyes: Negative  Cardiovascular: Negative  Respiratory: Negative  Endocrine: Negative  Hematologic/Lymphatic: Negative  Skin: Negative  Musculoskeletal: Negative  Gastrointestinal: Negative  Genitourinary: Negative  Neurological: Negative  Psychiatric/Behavioral: Negative  Allergic/Immunologic: Negative  Vitals:    07/08/19 1429   BP: 162/82   Pulse: 57           Physical Exam   Constitutional: He is oriented to person, place, and time  No distress  HENT:   Mouth/Throat: No oropharyngeal exudate  Eyes: No scleral icterus  Neck: No JVD present  Cardiovascular: Normal rate and regular rhythm  Pulmonary/Chest: Effort normal and breath sounds normal  No stridor  No respiratory distress  He has no wheezes  Abdominal: Soft  Bowel sounds are normal  He exhibits no distension  There is no tenderness  There is no guarding  Musculoskeletal: He exhibits edema  Neurological: He is alert and oriented to person, place, and time  Skin: Skin is warm and dry  He is not diaphoretic  Psychiatric: He has a normal mood and affect  His behavior is normal        Discussion/Summary:    Paroxysmal Atrial Fibrillation: HE is in NSR today  Overall doing well  Continue with current medical therapy       HTN: HE checks BP at home  BP running 140/60s  Continue with medical therapy       Dextrocardia  The patient was counseled regarding diagnostic results, instructions for management, risk factor reductions, impressions   total time of encounter was 25 minutes and 15 minutes was spent counseling

## 2019-07-10 LAB
LEFT EYE DIABETIC RETINOPATHY: NORMAL
RIGHT EYE DIABETIC RETINOPATHY: NORMAL

## 2019-07-11 NOTE — TELEPHONE ENCOUNTER
Received shipment of Lantus (9 boxes) from [facility] [employer] on 7-10-19  Spoke to patient  Will  insulin sometime today

## 2019-07-14 DIAGNOSIS — E78.5 DYSLIPIDEMIA: ICD-10-CM

## 2019-07-15 RX ORDER — ATORVASTATIN CALCIUM 40 MG/1
TABLET, FILM COATED ORAL
Qty: 90 TABLET | Refills: 3 | Status: SHIPPED | OUTPATIENT
Start: 2019-07-15 | End: 2020-07-17

## 2019-07-26 DIAGNOSIS — Z79.4 CONTROLLED TYPE 2 DIABETES MELLITUS WITH COMPLICATION, WITH LONG-TERM CURRENT USE OF INSULIN (HCC): ICD-10-CM

## 2019-07-26 DIAGNOSIS — E11.8 CONTROLLED TYPE 2 DIABETES MELLITUS WITH COMPLICATION, WITH LONG-TERM CURRENT USE OF INSULIN (HCC): ICD-10-CM

## 2019-07-26 RX ORDER — INSULIN GLARGINE 100 [IU]/ML
60 INJECTION, SOLUTION SUBCUTANEOUS EVERY 12 HOURS SCHEDULED
Qty: 40 ML | Refills: 5 | Status: SHIPPED | OUTPATIENT
Start: 2019-07-26 | End: 2021-01-01

## 2019-07-31 DIAGNOSIS — I10 ESSENTIAL HYPERTENSION: ICD-10-CM

## 2019-08-01 RX ORDER — AMLODIPINE BESYLATE 10 MG/1
TABLET ORAL
Qty: 90 TABLET | Refills: 3 | Status: SHIPPED | OUTPATIENT
Start: 2019-08-01 | End: 2021-01-01

## 2019-08-08 ENCOUNTER — TELEPHONE (OUTPATIENT)
Dept: ENDOCRINOLOGY | Facility: HOSPITAL | Age: 64
End: 2019-08-08

## 2019-08-25 LAB
ALBUMIN SERPL-MCNC: 4.4 G/DL (ref 3.6–4.8)
ALBUMIN/GLOB SERPL: 2 {RATIO} (ref 1.2–2.2)
ALP SERPL-CCNC: 61 IU/L (ref 39–117)
ALT SERPL-CCNC: 23 IU/L (ref 0–44)
AST SERPL-CCNC: 21 IU/L (ref 0–40)
BILIRUB SERPL-MCNC: 0.6 MG/DL (ref 0–1.2)
BUN SERPL-MCNC: 15 MG/DL (ref 8–27)
BUN/CREAT SERPL: 18 (ref 10–24)
CALCIUM SERPL-MCNC: 9.1 MG/DL (ref 8.6–10.2)
CHLORIDE SERPL-SCNC: 102 MMOL/L (ref 96–106)
CO2 SERPL-SCNC: 27 MMOL/L (ref 20–29)
CREAT SERPL-MCNC: 0.83 MG/DL (ref 0.76–1.27)
EST. AVERAGE GLUCOSE BLD GHB EST-MCNC: 171 MG/DL
GLOBULIN SER-MCNC: 2.2 G/DL (ref 1.5–4.5)
GLUCOSE SERPL-MCNC: 140 MG/DL (ref 65–99)
HBA1C MFR BLD: 7.6 % (ref 4.8–5.6)
POTASSIUM SERPL-SCNC: 4.1 MMOL/L (ref 3.5–5.2)
PROT SERPL-MCNC: 6.6 G/DL (ref 6–8.5)
SL AMB EGFR AFRICAN AMERICAN: 107 ML/MIN/1.73
SL AMB EGFR NON AFRICAN AMERICAN: 92 ML/MIN/1.73
SODIUM SERPL-SCNC: 144 MMOL/L (ref 134–144)

## 2019-08-27 ENCOUNTER — OFFICE VISIT (OUTPATIENT)
Dept: ENDOCRINOLOGY | Facility: HOSPITAL | Age: 64
End: 2019-08-27
Payer: COMMERCIAL

## 2019-08-27 VITALS
WEIGHT: 315 LBS | DIASTOLIC BLOOD PRESSURE: 60 MMHG | HEART RATE: 60 BPM | HEIGHT: 70 IN | SYSTOLIC BLOOD PRESSURE: 142 MMHG | BODY MASS INDEX: 45.1 KG/M2

## 2019-08-27 DIAGNOSIS — E11.8 TYPE 2 DIABETES MELLITUS WITH COMPLICATION, WITH LONG-TERM CURRENT USE OF INSULIN (HCC): Primary | ICD-10-CM

## 2019-08-27 DIAGNOSIS — E78.5 HYPERLIPIDEMIA, UNSPECIFIED HYPERLIPIDEMIA TYPE: ICD-10-CM

## 2019-08-27 DIAGNOSIS — Z79.4 TYPE 2 DIABETES MELLITUS WITH COMPLICATION, WITH LONG-TERM CURRENT USE OF INSULIN (HCC): Primary | ICD-10-CM

## 2019-08-27 DIAGNOSIS — I10 ESSENTIAL HYPERTENSION: ICD-10-CM

## 2019-08-27 PROBLEM — E11.9 DIABETES MELLITUS TYPE 2, CONTROLLED (HCC): Status: RESOLVED | Noted: 2017-01-31 | Resolved: 2019-08-27

## 2019-08-27 PROCEDURE — 99214 OFFICE O/P EST MOD 30 MIN: CPT | Performed by: INTERNAL MEDICINE

## 2019-08-27 NOTE — PROGRESS NOTES
8/27/2019    Assessment/Plan      Diagnoses and all orders for this visit:    Type 2 diabetes mellitus with complication, with long-term current use of insulin ([facility])  -     Discontinue: insulin glulisine (APIDRA SOLOSTAR) 100 units/mL injection pen; 65 with each meal   -     Hemoglobin A1C; Future  -     Comprehensive metabolic panel; Future  -     CBC and differential; Future  -     Microalbumin / creatinine urine ratio; Future  -     TSH, 3rd generation; Future  -     insulin glulisine (APIDRA SOLOSTAR) 100 units/mL injection pen; 60 with each meal     Essential hypertension    Hyperlipidemia, unspecified hyperlipidemia type  -     Lipid Panel with Direct LDL reflex; Future        Assessment/Plan:  1  Type 2 diabetes with long-term insulin use:  A1c is reasonable but still above goal   Discussed that we could increase his Apidra, but he does note he can make some better choices in terms of diet  For now we will focus on dietary intervention and follow-up in 3 months labs as ordered above just prior  Continue current regimen for now  I do think he would benefit from switching from basal bolus insulin to U500 insulin a c /hs  He will look into this to see if it would be able to be covered under his patient assistance as that is what his insulin is covered under now  2  Hypertension:  Continue current regimen for now  3  Hyperlipidemia:  Check lipids before next appointment  CC: Diabetes Consult    History of Present Illness     HPI: Tim Ordaz  is a 71y o  year old male with type 2 diabetes for over 20 years  He is on oral agents and insulin at home and takes Apidra 60 units with meals, Lantus 60 units b i d , metformin 500 mg twice a day  He denies any polyuria, polydipsia, nocturia and blurry vision  He denies nephropathy and retinopathy but does admit to neuropathy  Hypoglycemic episodes: No      The patient's last eye exam was in July 2019 per patient    Follows with [facility] regularly for foot care  Blood Sugar/Glucometer/Pump/CGM review:  Blood sugars from the past 2 weeks show fasting sugars are variable but generally close to goal   Blood sugars are highest later in the day such as before lunch and before dinner  No hypoglycemia recorded  He is maintained on quinapril 40 mg daily as well as metoprolol and amlodipine 10 mg daily for hypertension  For hyperlipidemia is maintained on atorvastatin 40 mg daily  Review of Systems   Constitutional: Negative for chills and fatigue  HENT: Negative for trouble swallowing and voice change  Eyes: Negative for visual disturbance  Respiratory: Negative for shortness of breath  Cardiovascular: Negative for palpitations and leg swelling  Gastrointestinal: Negative for abdominal pain, nausea and vomiting  Endocrine: Negative for polydipsia and polyuria  Musculoskeletal: Negative for arthralgias and myalgias  Skin: Negative for rash  Neurological: Negative for dizziness, tremors and weakness  Hematological: Negative for adenopathy  Psychiatric/Behavioral: Negative for agitation and confusion         Historical Information   Past Medical History:   Diagnosis Date    Arthritis     Asthma     BPH (benign prostatic hyperplasia)     Cardiac arrhythmia     resolved [id number]    Cardiac disease     Chronic a-fib (HCC)     Chronic pain     CVA [facility]cerebral vascular accident) ([facility] [address] ) 11/2015    Dextrocardia     Diabetes mellitus ([facility] [address] )     GERD (gastroesophageal reflux disease)     Hyperlipidemia     Hypertension     Sleep apnea     Stroke (cerebrum) [facility])      Past Surgical History:   Procedure Laterality Date    CHOLECYSTECTOMY      EXPLORATORY LAPAROTOMY  1971    GALLBLADDER SURGERY      WI COLONOSCOPY FLX DX W/COLLJ SPEC WHEN PFRMD N/A 2/21/2018    Procedure: COLONOSCOPY;  Surgeon: Michaela Goss MD;  Location:  MAIN OR;  Service: Gastroenterology    UMBILICAL HERNIA REPAIR  2007 Social History   Social History     Substance and Sexual Activity   Alcohol Use Yes    Alcohol/week: 1 0 standard drinks    Types: 1 Glasses of wine per week    Comment: 2-3 drinks a week, cherry liquer or beer      Social History     Substance and Sexual Activity   Drug Use No     Social History     Tobacco Use   Smoking Status Never Smoker   Smokeless Tobacco Never Used   Tobacco Comment    Occasionally smoked a pipe while fishing     Family History:   Family History   Problem Relation Age of Onset    Coronary artery disease Father     Hodgkin's lymphoma Father     Diabetes Father     Cancer Father     Diabetes type I Mother     Cancer Maternal Grandfather     Diabetes Paternal Grandmother     Emphysema Paternal Grandmother     Heart attack Paternal Grandfather     Hypertension Family     Neuropathy Family        Meds/Allergies   Current Outpatient Medications   Medication Sig Dispense Refill    amLODIPine (NORVASC) 10 mg tablet TAKE 1 TABLET BY MOUTH ONCE DAILY 90 tablet 3    atorvastatin (LIPITOR) 40 mg tablet TAKE 1 TABLET BY MOUTH ONCE DAILY 90 tablet 3    B-D UF III MINI PEN NEEDLES 31G X 5 MM MISC       cetirizine (ZYRTEC ALLERGY) 10 mg tablet Take 10 mg by mouth daily      Cholecalciferol (VITAMIN D) 2000 units CAPS Take 5,000 Units by mouth daily       ELIQUIS 5 MG TAKE ONE TABLET BY MOUTH TWICE DAILY 60 tablet 6    flecainide (TAMBOCOR) 50 mg tablet Take 1 tablet (50 mg total) by mouth every 12 (twelve) hours for 90 days 90 tablet 3    fluticasone (FLONASE) 50 mcg/act nasal spray 1 spray into each nostril daily      gabapentin (NEURONTIN) 300 mg capsule TAKE 1 CAPSULE BY MOUTH TWICE DAILY 60 capsule 5    insulin glulisine (APIDRA SOLOSTAR) 100 units/mL injection pen 60 with each meal  5 pen     LANTUS SOLOSTAR 100 units/mL injection pen Inject 60 Units under the skin every 12 (twelve) hours 40 mL 5    metFORMIN (GLUCOPHAGE) 500 mg tablet TAKE ONE TABLET BY MOUTH TWICE DAILY WITH FOOD 60 tablet 11    metoprolol succinate (TOPROL-XL) 25 mg 24 hr tablet Take 1 tablet (25 mg total) by mouth daily 30 tablet 6    montelukast (SINGULAIR) 10 mg tablet TAKE 1 TABLET BY MOUTH ONCE DAILY 30 tablet 5    Multiple Vitamins-Minerals (CENTRUM SILVER ULTRA MENS) TABS Take 1 tablet by mouth daily      quinapril (ACCUPRIL) 40 MG tablet Take 1 tablet (40 mg total) by mouth daily at bedtime 30 tablet 6    ranitidine (ZANTAC) 150 mg tablet TAKE 1 TABLET BY MOUTH EVERY 12 HOURS 60 tablet 5    tamsulosin (FLOMAX) 0 4 mg Take by mouth daily with dinner        traMADol (ULTRAM) 50 mg tablet Take 1 tablet (50 mg total) by mouth every 8 (eight) hours as needed for moderate pain or severe pain 90 tablet 0    TRUE METRIX BLOOD GLUCOSE TEST test strip       TRUEPLUS LANCETS 33G MISC       Omega-3 Fatty Acids (FISH OIL) 1,000 mg Take 1,000 mg by mouth daily       No current facility-administered medications for this visit  Allergies   Allergen Reactions    Banana Shortness Of Breath    Fruit Extracts Anaphylaxis and Throat Swelling     [facility] - [id number]: Bananas    Clotrimazole     Codeine Itching     Other reaction(s): Itching    Hydrocodone-Acetaminophen     Oxycodone Rash and Other (See Comments)     Other reaction(s): Pruritis    Oxycodone-Acetaminophen Rash     Other reaction(s): Pruritis       Objective   Vitals: Blood pressure 142/60, pulse 60, height 5' 10" (1 778 m), weight (!) 165 kg (363 lb)  Invasive Devices     None                 Physical Exam   Constitutional: He is oriented to person, place, and time  He appears well-developed and well-nourished  No distress  HENT:   Head: Normocephalic and atraumatic  Neck: Normal range of motion  Neck supple  No thyromegaly present  Cardiovascular: Normal rate and regular rhythm  Pulmonary/Chest: Effort normal and breath sounds normal  No respiratory distress  Abdominal: Soft   Bowel sounds are normal    Musculoskeletal: Normal range of motion  He exhibits no edema  Neurological: He is alert and oriented to person, place, and time  He exhibits normal muscle tone  Skin: Skin is warm and dry  No rash noted  He is not diaphoretic  Psychiatric: He has a normal mood and affect  His behavior is normal    Vitals reviewed  The history was obtained from the review of the chart and from the patient      Lab Results:    Most recent Alc is  Lab Results   Component Value Date    HGBA1C 7 6 (H) 08/24/2019           No components found for: HA1C  No components found for: GLU    Lab Results   Component Value Date    CREATININE 0 83 08/24/2019    CREATININE 0 91 05/20/2019    CREATININE 0 87 11/02/2018    BUN 15 08/24/2019     09/19/2017    K 4 1 08/24/2019     08/24/2019    CO2 27 08/24/2019     eGFR   Date Value Ref Range Status   02/03/2017 >60 0 ml/min/1 73sq m Final     No components found for: [facility]    Lab Results   Component Value Date    CHOL 133 09/19/2017    HDL 53 05/20/2019    TRIG 109 05/20/2019    CHOLHDL 2 6 03/30/2018       Lab Results   Component Value Date    ALT 23 08/24/2019    AST 21 08/24/2019    ALKPHOS 62 09/19/2017    BILITOT 0 6 09/19/2017       Lab Results   Component Value Date    TSH 2 190 05/20/2019    FREET4 1 18 03/30/2018       Recent Results (from the past 56182 hour(s))    Diabetes Eye Exam    Collection Time: 07/09/18  2:16 PM   Result Value Ref Range    Severity Normal     Right Eye Diabetic Retinopathy None     Left Eye Diabetic Retinopathy None    HEMOGLOBIN A1C W/ EAG ESTIMATION Lab Collect    Collection Time: 07/24/18  8:05 AM   Result Value Ref Range    Hemoglobin A1C 7 5 (H) 4 8 - 5 6 %    Estimated Average Glucose 169 mg/dL   Comprehensive metabolic panel Lab Collect    Collection Time: 07/24/18  8:05 AM   Result Value Ref Range    Glucose, Random 143 (H) 65 - 99 mg/dL    BUN 14 8 - 27 mg/dL    Creatinine 0 93 0 76 - 1 27 mg/dL    eGFR Non  86 >59 mL/min/1 73    eGFR  100 >59 mL/min/1 73    SL AMB BUN/CREATININE RATIO 15 10 - 24    Sodium 144 134 - 144 mmol/L    Potassium 4 2 3 5 - 5 2 mmol/L    Chloride 99 96 - 106 mmol/L    CO2 29 20 - 29 mmol/L    CALCIUM 9 3 8 6 - 10 2 mg/dL    Protein, Total 6 8 6 0 - 8 5 g/dL    Albumin 4 3 3 6 - 4 8 g/dL    Globulin, Total 2 5 1 5 - 4 5 g/dL    Albumin/Globulin Ratio 1 7 1 2 - 2 2    TOTAL BILIRUBIN 0 7 0 0 - 1 2 mg/dL    Alk Phos Isoenzymes 77 39 - 117 IU/L    AST 21 0 - 40 IU/L    ALT 24 0 - 44 IU/L   Blood culture    Collection Time: 07/30/18 12:03 PM   Result Value Ref Range    Blood Culture, Routine Final report    Result    Collection Time: 07/30/18 12:03 PM   Result Value Ref Range    Result 1 Comment    CBC and differential    Collection Time: 07/30/18 12:10 PM   Result Value Ref Range    White Blood Cell Count 8 4 3 4 - 10 8 x10E3/uL    Red Blood Cell Count 4 42 4 14 - 5 80 x10E6/uL    Hemoglobin 13 1 13 0 - 17 7 g/dL    HCT 38 7 37 5 - 51 0 %    MCV 88 79 - 97 fL    MCH 29 6 26 6 - 33 0 pg    MCHC 33 9 31 5 - 35 7 g/dL    RDW 14 7 12 3 - 15 4 %    Platelet Count 025 886 - 379 x10E3/uL    Neutrophils 67 Not Estab  %    Lymphocytes 18 Not Estab  %    Monocytes 11 Not Estab  %    Eosinophils 3 Not Estab  %    Basophils PCT 1 Not Estab  %    Neutrophils (Absolute) 5 6 1 4 - 7 0 x10E3/uL    Lymphocytes (Absolute) 1 5 0 7 - 3 1 x10E3/uL    Monocytes (Absolute) 0 9 0 1 - 0 9 x10E3/uL    Eosinophils (Absolute) 0 2 0 0 - 0 4 x10E3/uL    Basophils ABS 0 0 0 0 - 0 2 x10E3/uL    Immature Granulocytes 0 Not Estab  %    Immature Granulocytes (Absolute) 0 0 0 0 - 0 1 x10E3/uL   Lipid Panel with Direct LDL reflex    Collection Time: 11/02/18 10:29 AM   Result Value Ref Range    Cholesterol, Total 120 100 - 199 mg/dL    Triglycerides 106 0 - 149 mg/dL    HDL 47 >39 mg/dL    VLDL Cholesterol Calculated 21 5 - 40 mg/dL    LDL Direct 52 0 - 99 mg/dL    LDl/HDL Ratio 1 1 0 0 - 3 6 ratio   Comprehensive metabolic panel    Collection Time: 11/02/18 10:35 AM   Result Value Ref Range    Glucose, Random 172 (H) 65 - 99 mg/dL    BUN 15 8 - 27 mg/dL    Creatinine 0 87 0 76 - 1 27 mg/dL    eGFR Non African American 91 >59 mL/min/1 73    eGFR  105 >59 mL/min/1 73    SL AMB BUN/CREATININE RATIO 17 10 - 24    Sodium 143 134 - 144 mmol/L    Potassium 4 5 3 5 - 5 2 mmol/L    Chloride 102 96 - 106 mmol/L    CO2 25 20 - 29 mmol/L    CALCIUM 9 6 8 6 - 10 2 mg/dL    Protein, Total 6 9 6 0 - 8 5 g/dL    Albumin 4 3 3 6 - 4 8 g/dL    Globulin, Total 2 6 1 5 - 4 5 g/dL    Albumin/Globulin Ratio 1 7 1 2 - 2 2    TOTAL BILIRUBIN 0 6 0 0 - 1 2 mg/dL    Alk Phos Isoenzymes 71 39 - 117 IU/L    AST 19 0 - 40 IU/L    ALT 21 0 - 44 IU/L   Hemoglobin A1C    Collection Time: 11/02/18 10:35 AM   Result Value Ref Range    Hemoglobin A1C 8 4 (H) 4 8 - 5 6 %    Estimated Average Glucose 194 mg/dL   Specimen Status Report    Collection Time: 11/02/18 10:35 AM   Result Value Ref Range    Comment Comment    CBC and differential    Collection Time: 05/20/19  9:06 AM   Result Value Ref Range    White Blood Cell Count 7 6 3 4 - 10 8 x10E3/uL    Red Blood Cell Count 4 44 4 14 - 5 80 x10E6/uL    Hemoglobin 13 6 13 0 - 17 7 g/dL    HCT 41 0 37 5 - 51 0 %    MCV 92 79 - 97 fL    MCH 30 6 26 6 - 33 0 pg    MCHC 33 2 31 5 - 35 7 g/dL    RDW 14 7 12 3 - 15 4 %    Platelet Count 858 164 - 450 x10E3/uL    Neutrophils 60 Not Estab  %    Lymphocytes 29 Not Estab  %    Monocytes 9 Not Estab  %    Eosinophils 2 Not Estab  %    Basophils PCT 0 Not Estab  %    Neutrophils (Absolute) 4 6 1 4 - 7 0 x10E3/uL    Lymphocytes (Absolute) 2 2 0 7 - 3 1 x10E3/uL    Monocytes (Absolute) 0 7 0 1 - 0 9 x10E3/uL    Eosinophils (Absolute) 0 1 0 0 - 0 4 x10E3/uL    Basophils ABS 0 0 0 0 - 0 2 x10E3/uL    Immature Granulocytes 0 Not Estab  %    Immature Granulocytes (Absolute) 0 0 0 0 - 0 1 x10E3/uL   Comprehensive metabolic panel    Collection Time: 05/20/19  9:06 AM   Result Value Ref Range Glucose, Random 97 65 - 99 mg/dL    BUN 19 8 - 27 mg/dL    Creatinine 0 91 0 76 - 1 27 mg/dL    eGFR Non  89 >59 mL/min/1 73    eGFR  103 >59 mL/min/1 73    SL AMB BUN/CREATININE RATIO 21 10 - 24    Sodium 144 134 - 144 mmol/L    Potassium 3 8 3 5 - 5 2 mmol/L    Chloride 104 96 - 106 mmol/L    CO2 26 20 - 29 mmol/L    CALCIUM 9 3 8 6 - 10 2 mg/dL    Protein, Total 6 9 6 0 - 8 5 g/dL    Albumin 4 5 3 6 - 4 8 g/dL    Globulin, Total 2 4 1 5 - 4 5 g/dL    Albumin/Globulin Ratio 1 9 1 2 - 2 2    TOTAL BILIRUBIN 0 5 0 0 - 1 2 mg/dL    Alk Phos Isoenzymes 58 39 - 117 IU/L    AST 18 0 - 40 IU/L    ALT 23 0 - 44 IU/L   Lipid panel    Collection Time: 05/20/19  9:06 AM   Result Value Ref Range    Cholesterol, Total 131 100 - 199 mg/dL    Triglycerides 109 0 - 149 mg/dL    HDL 53 >39 mg/dL    LDL Direct 56 0 - 99 mg/dL   Microalbumin / creatinine urine ratio    Collection Time: 05/20/19  9:06 AM   Result Value Ref Range    Creatinine, Urine 103 0 Not Estab  mg/dL    Microalbum  ,U,Random 23 6 Not Estab  ug/mL    Microalb/Creat Ratio 22 9 0 0 - 30 0 mg/g creat   Hemoglobin A1C    Collection Time: 05/20/19  9:06 AM   Result Value Ref Range    Hemoglobin A1C 7 5 (H) 4 8 - 5 6 %    Estimated Average Glucose 169 mg/dL   TSH, 3rd generation    Collection Time: 05/20/19  9:06 AM   Result Value Ref Range    TSH 2 190 0 450 - 4 500 uIU/mL   Specimen Status Report    Collection Time: 05/20/19  9:06 AM   Result Value Ref Range    Comment Comment    Comprehensive metabolic panel    Collection Time: 08/24/19  8:34 AM   Result Value Ref Range    Glucose, Random 140 (H) 65 - 99 mg/dL    BUN 15 8 - 27 mg/dL    Creatinine 0 83 0 76 - 1 27 mg/dL    eGFR Non  92 >59 mL/min/1 73    eGFR  107 >59 mL/min/1 73    SL AMB BUN/CREATININE RATIO 18 10 - 24    Sodium 144 134 - 144 mmol/L    Potassium 4 1 3 5 - 5 2 mmol/L    Chloride 102 96 - 106 mmol/L    CO2 27 20 - 29 mmol/L    CALCIUM 9 1 8 6 - 10 2 mg/dL    Protein, Total 6 6 6 0 - 8 5 g/dL    Albumin 4 4 3 6 - 4 8 g/dL    Globulin, Total 2 2 1 5 - 4 5 g/dL    Albumin/Globulin Ratio 2 0 1 2 - 2 2    TOTAL BILIRUBIN 0 6 0 0 - 1 2 mg/dL    Alk Phos Isoenzymes 61 39 - 117 IU/L    AST 21 0 - 40 IU/L    ALT 23 0 - 44 IU/L   Hemoglobin A1C    Collection Time: 08/24/19  8:34 AM   Result Value Ref Range    Hemoglobin A1C 7 6 (H) 4 8 - 5 6 %    Estimated Average Glucose 171 mg/dL         No future appointments  Portions of the record may have been created with voice recognition software  Occasional wrong word or "sound a like" substitutions may have occurred due to the inherent limitations of voice recognition software  Read the chart carefully and recognize, using context, where substitutions have occurred

## 2019-09-04 DIAGNOSIS — E11.9 TYPE 2 DIABETES MELLITUS WITHOUT COMPLICATION, WITHOUT LONG-TERM CURRENT USE OF INSULIN (HCC): ICD-10-CM

## 2019-09-04 NOTE — TELEPHONE ENCOUNTER
Pt called for a refill of his Metformin  He saw Maritza Cazares last month and has a follow up in December

## 2019-09-24 ENCOUNTER — TELEPHONE (OUTPATIENT)
Dept: ENDOCRINOLOGY | Facility: HOSPITAL | Age: 64
End: 2019-09-24

## 2019-09-24 DIAGNOSIS — I48.0 PAROXYSMAL ATRIAL FIBRILLATION (HCC): ICD-10-CM

## 2019-09-24 RX ORDER — FLECAINIDE ACETATE 50 MG/1
50 TABLET ORAL EVERY 12 HOURS
Qty: 180 TABLET | Refills: 3 | Status: SHIPPED | OUTPATIENT
Start: 2019-09-24 | End: 2020-01-01

## 2019-10-15 ENCOUNTER — TELEPHONE (OUTPATIENT)
Dept: ENDOCRINOLOGY | Facility: HOSPITAL | Age: 64
End: 2019-10-15

## 2019-10-15 NOTE — TELEPHONE ENCOUNTER
Received lantus from patient assistance program  Called and spoke to patient to inform   He will  10/16

## 2019-10-18 ENCOUNTER — TELEPHONE (OUTPATIENT)
Dept: ENDOCRINOLOGY | Facility: HOSPITAL | Age: 64
End: 2019-10-18

## 2019-10-23 ENCOUNTER — TELEPHONE (OUTPATIENT)
Dept: ENDOCRINOLOGY | Facility: HOSPITAL | Age: 64
End: 2019-10-23

## 2019-12-01 LAB
ALBUMIN SERPL-MCNC: 4.7 G/DL (ref 3.6–4.8)
ALBUMIN/GLOB SERPL: 2.2 {RATIO} (ref 1.2–2.2)
ALP SERPL-CCNC: 75 IU/L (ref 39–117)
ALT SERPL-CCNC: 25 IU/L (ref 0–44)
AST SERPL-CCNC: 21 IU/L (ref 0–40)
BILIRUB SERPL-MCNC: 0.5 MG/DL (ref 0–1.2)
BUN SERPL-MCNC: 15 MG/DL (ref 8–27)
BUN/CREAT SERPL: 17 (ref 10–24)
CALCIUM SERPL-MCNC: 9.5 MG/DL (ref 8.6–10.2)
CHLORIDE SERPL-SCNC: 99 MMOL/L (ref 96–106)
CO2 SERPL-SCNC: 27 MMOL/L (ref 20–29)
CREAT SERPL-MCNC: 0.89 MG/DL (ref 0.76–1.27)
EST. AVERAGE GLUCOSE BLD GHB EST-MCNC: 177 MG/DL
GLOBULIN SER-MCNC: 2.1 G/DL (ref 1.5–4.5)
GLUCOSE SERPL-MCNC: 184 MG/DL (ref 65–99)
HBA1C MFR BLD: 7.8 % (ref 4.8–5.6)
POTASSIUM SERPL-SCNC: 4.2 MMOL/L (ref 3.5–5.2)
PROT SERPL-MCNC: 6.8 G/DL (ref 6–8.5)
SL AMB EGFR AFRICAN AMERICAN: 104 ML/MIN/1.73
SL AMB EGFR NON AFRICAN AMERICAN: 90 ML/MIN/1.73
SODIUM SERPL-SCNC: 141 MMOL/L (ref 134–144)

## 2019-12-11 ENCOUNTER — OFFICE VISIT (OUTPATIENT)
Dept: ENDOCRINOLOGY | Facility: HOSPITAL | Age: 64
End: 2019-12-11
Payer: COMMERCIAL

## 2019-12-11 VITALS
SYSTOLIC BLOOD PRESSURE: 128 MMHG | HEART RATE: 76 BPM | DIASTOLIC BLOOD PRESSURE: 64 MMHG | WEIGHT: 315 LBS | BODY MASS INDEX: 45.1 KG/M2 | HEIGHT: 70 IN

## 2019-12-11 DIAGNOSIS — I10 ESSENTIAL HYPERTENSION: ICD-10-CM

## 2019-12-11 DIAGNOSIS — E78.5 HYPERLIPIDEMIA, UNSPECIFIED HYPERLIPIDEMIA TYPE: ICD-10-CM

## 2019-12-11 DIAGNOSIS — E11.8 TYPE 2 DIABETES MELLITUS WITH COMPLICATION (HCC): Primary | ICD-10-CM

## 2019-12-11 PROCEDURE — 3074F SYST BP LT 130 MM HG: CPT | Performed by: INTERNAL MEDICINE

## 2019-12-11 PROCEDURE — 3078F DIAST BP <80 MM HG: CPT | Performed by: INTERNAL MEDICINE

## 2019-12-11 PROCEDURE — 99214 OFFICE O/P EST MOD 30 MIN: CPT | Performed by: INTERNAL MEDICINE

## 2019-12-11 RX ORDER — ACETAMINOPHEN 500 MG
500 TABLET ORAL EVERY 6 HOURS PRN
COMMUNITY
End: 2021-01-01 | Stop reason: HOSPADM

## 2019-12-12 ENCOUNTER — VBI (OUTPATIENT)
Dept: ENDOCRINOLOGY | Facility: HOSPITAL | Age: 64
End: 2019-12-12

## 2019-12-12 NOTE — TELEPHONE ENCOUNTER
Rachel Larios MA  P [facility]             12/11/19 11:32 AM     Hello, our patient Yamile Hargrove  has had a Diabetic Eye Exam completed/performed  Please assist in obtaining the exclusion documentation by ZONIA Rajput The date of service is July 9th, 2019       Thank you,   Rachel Larios MA   [facility] Grimes to [facility] [address] [phone] [place] [facility] 12/12/19 9:43 AM     Resulted 7/10/19 Diabetic eye exam Melinda Yun MA 12/17/19 9:26 AM

## 2019-12-12 NOTE — TELEPHONE ENCOUNTER
Elvira Barr MA  P [address]             12/11/19 11:34 AM     Hello, our patient Mariano Sarmiento  has had Diabetic Foot Exam completed/performed  Please assist in obtaining the exclusion documentation by [facility] center The date of service is 12/10/19       Thank you,   Elvira Barr MA   [facility] Kwan Grade to [facility] [address] [place] [address] 12/12/19 9:47 AM     12/10/2019 Diabetic Foot exam resulted Melinda Yun MA 12/17/19 8:26 AM

## 2019-12-12 NOTE — LETTER
Diabetic Foot Exam Form    Date Requested: 19  Patient: Mireya Blankenship  Patient : 1955   Referring Provider: Ciara Perez,     Diabetic Foot Exam Performed        Yes         No     Date of Diabetic Foot Exam ______________________________  Left Foot       Visual Inspection         Monofilament Testing Sensory Exam        Pedal Pulses         Additional Comments         Right Foot      Visual Inspection         Monofilament Testing Sensory Exam       Pedal Pulses         Additional Comments         Comments __________________________________________________________    Practice Providing Exam ______________________________________________    Exam Performed By (print name) _______________________________________      Provider Signature ___________________________________________________      These reports are needed for  compliance    Please fax this completed form and a copy of the Diabetic Foot Exam report to our office as soon as possible to [phone] attention Melinda: Phone [phone]    We thank you for your assistance in treating our mutual patient     (sent to [facility])

## 2019-12-12 NOTE — LETTER
Diabetic Eye Exam Form    Date Requested: 19  Patient: Surendra Ngo  Patient : 1955   Referring Provider: Rebecca Fairly, DO    Dilated Retinal Exam        Yes         No     Date of Diabetic Eye Exam ______________________________  Left Eye      Exam did show retinopathy    Exam did not show retinopathy         Mild       Moderate       None       Proliferative       Severe     Right Eye     Exam did show retinopathy    Exam did not show retinopathy         Mild       Moderate       None       Proliferative       Severe     Comments __________________________________________________________    Practice Providing Exam ______________________________________________    Exam Performed By (print name) _______________________________________      Provider Signature ___________________________________________________      These reports are needed for  compliance    Please fax this completed form and a copy of the Diabetic Eye Exam report to our office as soon as possible to [phone] attention Melinda: Phone [phone]    We thank you for your assistance in treating our mutual patient     (Sent to [facility]otson)

## 2019-12-16 DIAGNOSIS — I48.0 PAROXYSMAL ATRIAL FIBRILLATION (HCC): ICD-10-CM

## 2019-12-16 RX ORDER — METOPROLOL SUCCINATE 25 MG/1
25 TABLET, EXTENDED RELEASE ORAL DAILY
Qty: 90 TABLET | Refills: 3 | Status: SHIPPED | OUTPATIENT
Start: 2019-12-16 | End: 2020-01-01

## 2019-12-17 ENCOUNTER — TELEPHONE (OUTPATIENT)
Dept: ENDOCRINOLOGY | Facility: HOSPITAL | Age: 64
End: 2019-12-17

## 2019-12-17 NOTE — TELEPHONE ENCOUNTER
Patient needs to complete [facility] patient assistance form for the [place] [address]  Left message for patient  Form in front bin for

## 2019-12-20 ENCOUNTER — APPOINTMENT (EMERGENCY)
Dept: CT IMAGING | Facility: HOSPITAL | Age: 64
End: 2019-12-20
Payer: COMMERCIAL

## 2019-12-20 ENCOUNTER — HOSPITAL ENCOUNTER (EMERGENCY)
Facility: HOSPITAL | Age: 64
Discharge: HOME | End: 2019-12-21
Attending: EMERGENCY MEDICINE | Admitting: EMERGENCY MEDICINE
Payer: COMMERCIAL

## 2019-12-20 DIAGNOSIS — R10.9 FLANK PAIN: ICD-10-CM

## 2019-12-20 DIAGNOSIS — N20.1 URETEROLITHIASIS: Primary | ICD-10-CM

## 2019-12-20 LAB
ALBUMIN SERPL BCP-MCNC: 4 G/DL (ref 3.5–5)
ALP SERPL-CCNC: 79 U/L (ref 46–116)
ALT SERPL W P-5'-P-CCNC: 35 U/L (ref 12–78)
ANION GAP SERPL CALCULATED.3IONS-SCNC: 9 MMOL/L (ref 4–13)
AST SERPL W P-5'-P-CCNC: 22 U/L (ref 5–45)
BASOPHILS # BLD AUTO: 0.04 THOUSANDS/ΜL (ref 0–0.1)
BASOPHILS NFR BLD AUTO: 0 % (ref 0–1)
BILIRUB SERPL-MCNC: 0.5 MG/DL (ref 0.2–1)
BUN SERPL-MCNC: 15 MG/DL (ref 5–25)
CALCIUM SERPL-MCNC: 8.8 MG/DL (ref 8.3–10.1)
CHLORIDE SERPL-SCNC: 104 MMOL/L (ref 100–108)
CO2 SERPL-SCNC: 29 MMOL/L (ref 21–32)
CREAT SERPL-MCNC: 1.1 MG/DL (ref 0.6–1.3)
EOSINOPHIL # BLD AUTO: 0.06 THOUSAND/ΜL (ref 0–0.61)
EOSINOPHIL NFR BLD AUTO: 1 % (ref 0–6)
ERYTHROCYTE [DISTWIDTH] IN BLOOD BY AUTOMATED COUNT: 13.5 % (ref 11.6–15.1)
GFR SERPL CREATININE-BSD FRML MDRD: 70 ML/MIN/1.73SQ M
GLUCOSE SERPL-MCNC: 205 MG/DL (ref 65–140)
HCT VFR BLD AUTO: 40.6 % (ref 36.5–49.3)
HGB BLD-MCNC: 13.6 G/DL (ref 12–17)
IMM GRANULOCYTES # BLD AUTO: 0.04 THOUSAND/UL (ref 0–0.2)
IMM GRANULOCYTES NFR BLD AUTO: 0 % (ref 0–2)
LIPASE SERPL-CCNC: 86 U/L (ref 73–393)
LYMPHOCYTES # BLD AUTO: 1.2 THOUSANDS/ΜL (ref 0.6–4.47)
LYMPHOCYTES NFR BLD AUTO: 11 % (ref 14–44)
MCH RBC QN AUTO: 31.1 PG (ref 26.8–34.3)
MCHC RBC AUTO-ENTMCNC: 33.5 G/DL (ref 31.4–37.4)
MCV RBC AUTO: 93 FL (ref 82–98)
MONOCYTES # BLD AUTO: 1.07 THOUSAND/ΜL (ref 0.17–1.22)
MONOCYTES NFR BLD AUTO: 10 % (ref 4–12)
NEUTROPHILS # BLD AUTO: 8.32 THOUSANDS/ΜL (ref 1.85–7.62)
NEUTS SEG NFR BLD AUTO: 78 % (ref 43–75)
NRBC BLD AUTO-RTO: 0 /100 WBCS
PLATELET # BLD AUTO: 189 THOUSANDS/UL (ref 149–390)
PMV BLD AUTO: 10.5 FL (ref 8.9–12.7)
POTASSIUM SERPL-SCNC: 4.2 MMOL/L (ref 3.5–5.3)
PROT SERPL-MCNC: 7.3 G/DL (ref 6.4–8.2)
RBC # BLD AUTO: 4.38 MILLION/UL (ref 3.88–5.62)
SODIUM SERPL-SCNC: 142 MMOL/L (ref 136–145)
WBC # BLD AUTO: 10.73 THOUSAND/UL (ref 4.31–10.16)

## 2019-12-20 PROCEDURE — 74177 CT ABD & PELVIS W/CONTRAST: CPT

## 2019-12-20 PROCEDURE — 83690 ASSAY OF LIPASE: CPT | Performed by: EMERGENCY MEDICINE

## 2019-12-20 PROCEDURE — 80053 COMPREHEN METABOLIC PANEL: CPT | Performed by: EMERGENCY MEDICINE

## 2019-12-20 PROCEDURE — 85025 COMPLETE CBC W/AUTO DIFF WBC: CPT | Performed by: EMERGENCY MEDICINE

## 2019-12-20 PROCEDURE — 96374 THER/PROPH/DIAG INJ IV PUSH: CPT

## 2019-12-20 PROCEDURE — 99285 EMERGENCY DEPT VISIT HI MDM: CPT

## 2019-12-20 PROCEDURE — 36415 COLL VENOUS BLD VENIPUNCTURE: CPT | Performed by: EMERGENCY MEDICINE

## 2019-12-20 PROCEDURE — 99285 EMERGENCY DEPT VISIT HI MDM: CPT | Performed by: EMERGENCY MEDICINE

## 2019-12-20 PROCEDURE — 96361 HYDRATE IV INFUSION ADD-ON: CPT

## 2019-12-20 RX ORDER — FENTANYL CITRATE 50 UG/ML
50 INJECTION, SOLUTION INTRAMUSCULAR; INTRAVENOUS ONCE
Status: COMPLETED | OUTPATIENT
Start: 2019-12-20 | End: 2019-12-20

## 2019-12-20 RX ADMIN — IOHEXOL 100 ML: 350 INJECTION, SOLUTION INTRAVENOUS at 23:41

## 2019-12-20 RX ADMIN — SODIUM CHLORIDE 1000 ML: 0.9 INJECTION, SOLUTION INTRAVENOUS at 22:21

## 2019-12-20 RX ADMIN — FENTANYL CITRATE 50 MCG: 50 INJECTION, SOLUTION INTRAMUSCULAR; INTRAVENOUS at 22:22

## 2019-12-21 VITALS
BODY MASS INDEX: 51.37 KG/M2 | HEART RATE: 60 BPM | WEIGHT: 315 LBS | OXYGEN SATURATION: 93 % | TEMPERATURE: 97.4 F | SYSTOLIC BLOOD PRESSURE: 142 MMHG | DIASTOLIC BLOOD PRESSURE: 66 MMHG | RESPIRATION RATE: 18 BRPM

## 2019-12-21 LAB
CLARITY, POC: ABNORMAL
COLOR, POC: ABNORMAL
EXT BILIRUBIN, UA: ABNORMAL
EXT BLOOD URINE: ABNORMAL
EXT GLUCOSE, UA: ABNORMAL
EXT KETONES: ABNORMAL
EXT NITRITE, UA: ABNORMAL
EXT PH, UA: 6.5
EXT PROTEIN, UA: ABNORMAL
EXT SPECIFIC GRAVITY, UA: 1.01
EXT UROBILINOGEN: 0.2
WBC # BLD EST: ABNORMAL 10*3/UL

## 2019-12-21 PROCEDURE — 96375 TX/PRO/DX INJ NEW DRUG ADDON: CPT

## 2019-12-21 PROCEDURE — 96361 HYDRATE IV INFUSION ADD-ON: CPT

## 2019-12-21 RX ORDER — NAPROXEN 500 MG/1
500 TABLET ORAL 2 TIMES DAILY WITH MEALS
Qty: 14 TABLET | Refills: 0 | Status: SHIPPED | OUTPATIENT
Start: 2019-12-21 | End: 2020-02-17 | Stop reason: ALTCHOICE

## 2019-12-21 RX ORDER — TAMSULOSIN HYDROCHLORIDE 0.4 MG/1
0.4 CAPSULE ORAL
Qty: 7 CAPSULE | Refills: 0 | Status: SHIPPED | OUTPATIENT
Start: 2019-12-21 | End: 2020-02-17 | Stop reason: SDUPTHER

## 2019-12-21 RX ORDER — KETOROLAC TROMETHAMINE 30 MG/ML
15 INJECTION, SOLUTION INTRAMUSCULAR; INTRAVENOUS ONCE
Status: COMPLETED | OUTPATIENT
Start: 2019-12-21 | End: 2019-12-21

## 2019-12-21 RX ADMIN — KETOROLAC TROMETHAMINE 15 MG: 30 INJECTION, SOLUTION INTRAMUSCULAR; INTRAVENOUS at 01:05

## 2019-12-21 NOTE — ED PROVIDER NOTES
History  Chief Complaint   Patient presents with    Flank Pain     Left flank/hip pain; states it feels like kidney stone that he had before  72 y/o, history of atrial fibrillation on Eliquis, previous CVA, diabetes type 2, w/ 1 day of L flank pain/lower back pain that started around 5 pm  Pain comes and goes, nonradiating , associated with nausea , 1 episode of nb/nb vomiting  Pain is not relieved by position  Similar to previous history of kidney stones for which he was admitted at [facility] several years ago  No instrumentation at that time and stone passed on its own  Did try taking Tylenol and tramadol at 14:00 with minimal relief  Otherwise no fevers no chills, no chest pain or shortness of breath, no diarrhea, no urinary symptoms  Patient does have a history of situs inversus, apparently had an exploratory laparotomy in the past, and no other abdominal surgeries, as far as he knows he does not have a gallbladder as per the exploratory laparoscopy  Also patient did have a colonoscopy approximately 1 year ago without any obvious abnormalities          Prior to Admission Medications   Prescriptions Last Dose Informant Patient Reported? Taking?    B-D UF III MINI PEN NEEDLES 31G X 5 MM MISC  Self Yes No   Cholecalciferol (VITAMIN D) 2000 units CAPS  Self Yes No   Sig: Take 5,000 Units by mouth daily    ELIQUIS 5 MG  Self No No   Sig: TAKE ONE TABLET BY MOUTH TWICE DAILY   LANTUS SOLOSTAR 100 units/mL injection pen  Self No No   Sig: Inject 60 Units under the skin every 12 (twelve) hours   Multiple Vitamins-Minerals (CENTRUM SILVER ULTRA MENS) TABS  Self Yes No   Sig: Take 1 tablet by mouth daily   Omega-3 Fatty Acids (FISH OIL) 1,000 mg  Self Yes No   Sig: Take 1,000 mg by mouth daily   TRUE METRIX BLOOD GLUCOSE TEST test strip  Self Yes No   TRUEPLUS LANCETS 33G MISC  Self Yes No   acetaminophen (TYLENOL) 500 mg tablet  Self Yes No   Sig: Take 500 mg by mouth every 6 (six) hours as needed for mild pain   amLODIPine (NORVASC) 10 mg tablet  Self No No   Sig: TAKE 1 TABLET BY MOUTH ONCE DAILY   atorvastatin (LIPITOR) 40 mg tablet  Self No No   Sig: TAKE 1 TABLET BY MOUTH ONCE DAILY   flecainide (TAMBOCOR) 50 mg tablet  Self No No   Sig: Take 1 tablet (50 mg total) by mouth every 12 (twelve) hours   fluticasone (FLONASE) 50 mcg/act nasal spray  Self Yes No   Si spray into each nostril daily   gabapentin (NEURONTIN) 300 mg capsule  Self No No   Sig: TAKE 1 CAPSULE BY MOUTH TWICE DAILY   insulin glulisine (APIDRA SOLOSTAR) 100 units/mL injection pen  Self No No   Si with each meal    metFORMIN (GLUCOPHAGE) 500 mg tablet  Self No No   Sig: Take 1 tablet (500 mg total) by mouth 2 (two) times a day with meals   metoprolol succinate (TOPROL-XL) 25 mg 24 hr tablet   No No   Sig: Take 1 tablet (25 mg total) by mouth daily   montelukast (SINGULAIR) 10 mg tablet  Self No No   Sig: TAKE 1 TABLET BY MOUTH ONCE DAILY   quinapril (ACCUPRIL) 40 MG tablet  Self No No   Sig: Take 1 tablet (40 mg total) by mouth daily at bedtime   ranitidine (ZANTAC) 150 mg tablet  Self No No   Sig: TAKE 1 TABLET BY MOUTH EVERY 12 HOURS   Patient not taking: Reported on 2019   tamsulosin (FLOMAX) 0 4 mg  Self Yes No   Sig: Take by mouth daily with dinner     traMADol (ULTRAM) 50 mg tablet  Self No No   Sig: Take 1 tablet (50 mg total) by mouth every 8 (eight) hours as needed for moderate pain or severe pain      Facility-Administered Medications: None       Past Medical History:   Diagnosis Date    Arthritis     Asthma     BPH (benign prostatic hyperplasia)     Cardiac arrhythmia     resolved [id number]    Cardiac disease     Chronic a-fib     Chronic pain     [facility]    Dextrocardia     Diabetes mellitus (HCC)     GERD (gastroesophageal reflux disease)     Hyperlipidemia     Hypertension     Renal disorder     kidney stone    Sleep apnea     St[facility]ce[facility]brum) ([facility] ) Past Surgical History:   Procedure Laterality Date    CHOLECYSTECTOMY      EXPLORATORY LAPAROTOMY  1971    GALLBLADDER SURGERY      ME COLONOSCOPY FLX DX W/COLLJ SPEC WHEN PFRMD N/A 2/21/2018    Procedure: COLONOSCOPY;  Surgeon: Prasanth Koenig MD;  Location: [facility];  Service: Gastroenterology    UMBILICAL HERNIA REPAIR  2007       Family History   Problem Relation Age of Onset    Coronary artery disease Father     Hodgkin's lymphoma Father     Diabetes Father     Cancer Father     Diabetes type I Mother     Cancer Maternal Grandfather     Diabetes Paternal Grandmother     Emphysema Paternal Grandmother     Heart attack Paternal Grandfather     Hypertension Family     Neuropathy Family      I have reviewed and agree with the history as documented  Social History     Tobacco Use    Smoking status: Never Smoker    Smokeless tobacco: Never Used    Tobacco comment: Occasionally smoked a pipe while fishing   Substance Use Topics    Alcohol use: Yes     Alcohol/week: 1 0 standard drinks     Types: 1 Glasses of wine per week     Comment: 2-3 drinks a week, cherry liquer or beer     Drug use: No        Review of Systems   Constitutional: Negative for appetite change, chills and fever  HENT: Negative for rhinorrhea and sore throat  Eyes: Negative for photophobia and visual disturbance  Respiratory: Negative for cough and shortness of breath  Cardiovascular: Negative for chest pain and palpitations  Gastrointestinal: Negative for abdominal pain and diarrhea  Genitourinary: Positive for flank pain  Negative for dysuria, frequency and urgency  Skin: Negative for rash  Neurological: Negative for dizziness and weakness  All other systems reviewed and are negative  Physical Exam  Physical Exam   Constitutional: He is oriented to person, place, and time  He appears well-developed and well-nourished  HENT:   Head: Normocephalic and atraumatic     Right Ear: External ear normal    Left Ear: External ear normal    Mouth/Throat: Oropharynx is clear and moist    Eyes: Pupils are equal, round, and reactive to light  Conjunctivae and EOM are normal    Neck: Normal range of motion  Neck supple  No JVD present  No tracheal deviation present  Cardiovascular: Normal rate, regular rhythm and normal heart sounds  Exam reveals no gallop and no friction rub  No murmur heard  Pulmonary/Chest: Effort normal  No stridor  No respiratory distress  He has no wheezes  He has no rales  Abdominal: Soft  He exhibits no distension and no mass  There is no tenderness  There is no rebound and no guarding  No overlying skin lesions   Musculoskeletal: Normal range of motion  He exhibits edema  Pitting edema bilateral lower extremities with chronic stasis venous changes   Neurological: He is alert and oriented to person, place, and time  No cranial nerve deficit  Skin: Skin is warm and dry  No rash noted  No erythema  No pallor  Psychiatric: He has a normal mood and affect  Nursing note and vitals reviewed        Vital Signs  ED Triage Vitals   Temperature Pulse Respirations Blood Pressure SpO2   12/20/19 2143 12/20/19 2143 12/20/19 2143 12/20/19 2143 12/20/19 2143   (!) 97 4 °F (36 3 °C) 72 17 (!) 185/79 97 %      Temp src Heart Rate Source Patient Position - Orthostatic VS BP Location FiO2 (%)   -- 12/20/19 2143 12/21/19 0130 12/21/19 0130 --    Monitor Lying Left arm       Pain Score       12/20/19 2143       7           Vitals:    12/20/19 2143 12/21/19 0130 12/21/19 0400   BP: (!) 185/79 142/64 142/66   Pulse: 72 72 60   Patient Position - Orthostatic VS:  Lying Lying         Visual Acuity      ED Medications  Medications   sodium chloride 0 9 % bolus 1,000 mL (0 mL Intravenous Stopped 12/21/19 0146)   fentanyl citrate (PF) 100 MCG/2ML 50 mcg (50 mcg Intravenous Given 12/20/19 2222)   iohexol (OMNIPAQUE) 350 MG/ML injection (MULTI-DOSE) 100 mL (100 mL Intravenous Given 12/20/19 2341) ketorolac (TORADOL) injection 15 mg (15 mg Intravenous Given 12/21/19 0105)       Diagnostic Studies  Results Reviewed     Procedure Component Value Units Date/Time    POCT urinalysis dipstick [[id number]]  (Abnormal) Resulted:  12/21/19 0115    Lab Status:  Final result Updated:  12/21/19 0147     Color, UA jaqui     Clarity, UA hazy     Glucose, UA (Ref: Negative) neg     Bilirubin, UA (Ref: Negative) neg     Ketones, UA (Ref: Negative) neg     Spec Grav, UA (Ref:1 003-1 030) 1 015     Blood, UA (Ref: Negative) large     pH, UA (Ref: 4 5-8 0) 6 5     Protein, UA (Ref: Negative) neg     Urobilinogen, UA (Ref: 0 2- 1 0) 0 2      Leukocytes, UA (Ref: Negative) NEG     Nitrite, UA (Ref: Negative) neg    Comprehensive metabolic panel [[id number]]  (Abnormal) Collected:  12/20/19 2222    Lab Status:  Final result Specimen:  Blood from Arm, Right Updated:  12/20/19 2251     Sodium 142 mmol/L      Potassium 4 2 mmol/L      Chloride 104 mmol/L      CO2 29 mmol/L      ANION GAP 9 mmol/L      BUN 15 mg/dL      Creatinine 1 10 mg/dL      Glucose 205 mg/dL      Calcium 8 8 mg/dL      AST 22 U/L      ALT 35 U/L      Alkaline Phosphatase 79 U/L      Total Protein 7 3 g/dL      Albumin 4 0 g/dL      Total Bilirubin 0 50 mg/dL      eGFR 70 ml/min/1 73sq m     Narrative:       Manish guidelines for Chronic Kidney Disease (CKD):     Stage 1 with normal or high GFR (GFR > 90 mL/min/1 73 square meters)    Stage 2 Mild CKD (GFR = 60-89 mL/min/1 73 square meters)    Stage 3A Moderate CKD (GFR = 45-59 mL/min/1 73 square meters)    Stage 3B Moderate CKD (GFR = 30-44 mL/min/1 73 square meters)    Stage 4 Severe CKD (GFR = 15-29 mL/min/1 73 square meters)    Stage 5 End Stage CKD (GFR <15 mL/min/1 73 square meters)  Note: GFR calculation is accurate only with a steady state creatinine    Lipase [[id number]]  (Normal) Collected:  12/20/19 2222    Lab Status:  Final result Specimen:  Blood from Arm, Right Updated:  12/20/19 2251     Lipase 86 u/L     CBC and differential [[id number]]  (Abnormal) Collected:  12/20/19 2222    Lab Status:  Final result Specimen:  Blood from Arm, Right Updated:  12/20/19 2228     WBC 10 73 Thousand/uL      RBC 4 38 Million/uL      Hemoglobin 13 6 g/dL      Hematocrit 40 6 %      MCV 93 fL      MCH 31 1 pg      MCHC 33 5 g/dL      RDW 13 5 %      MPV 10 5 fL      Platelets 926 Thousands/uL      nRBC 0 /100 WBCs      Neutrophils Relative 78 %      Immat GRANS % 0 %      Lymphocytes Relative 11 %      Monocytes Relative 10 %      Eosinophils Relative 1 %      Basophils Relative 0 %      Neutrophils Absolute 8 32 Thousands/µL      Immature Grans Absolute 0 04 Thousand/uL      Lymphocytes Absolute 1 20 Thousands/µL      Monocytes Absolute 1 07 Thousand/µL      Eosinophils Absolute 0 06 Thousand/µL      Basophils Absolute 0 04 Thousands/µL                  CT abdomen pelvis with contrast   Final Result by Kevan Rosales MD (12/21 0029)      1   4 mm obstructing calculus at the left UVJ causing mild left hydroureter and moderate hydronephrosis  2   Punctate nonobstructing left renal calculi  Probable punctate bladder calculi  3   Fat stranding in the central mesentery with multiple nonenlarged mesenteric nodes in the affected region  The findings are nonspecific but can be seen with mesenteric panniculitis  4   Prostatomegaly  5   Situs inversus with dextrocardia  The study was marked in [facility] for immediate notification        Workstation performed: [id number]                    Procedures  Procedures         ED Course  ED Course as of Dec 21 0618   Fri Dec 20, 2019   2212 Procedure Note: EKG  Date/Time: 12/20/19 10:12 PM   Performed by: Lindy London  Authorized by: Lindy London  Indications / Diagnosis: Flank pain  ECG reviewed by me, the ED Provider: yes   The EKG demonstrates:  Rhythm: normal sinus  Intervals: normal intervals  Axis: normal axis  QRS/Blocks: normal QRS  ST Changes: No acute ST Changes, no STD/VERONICA  Sat Dec 21, 2019   6122 Patient with continued flank pain, even with fentanyl, saturating 88% while sleeping, 92% on room air when awake      0147 Sleeping comfortably      0150 Patient sleeping comfortably, no acute complaints, pain is improved, patient does saturate mid 80s 88-86% when sleeping, improves to 93-94 when awake  Wakes up easily  Patient did drive himself to the hospital, currently has no ride back      0216 Patient does have a history of sleep apnea      0248 Resting comfortably, no acute complaints                                  MDM  Number of Diagnoses or Management Options  Diagnosis management comments: 69-year-old male with multiple chronic medical conditions presents for evaluation of left flank pain will obtain blood work, urinalysis, will treat symptomatically and re-evaluate        Disposition  Final diagnoses:   Ureterolithiasis   Flank pain     Time reflects when diagnosis was documented in both MDM as applicable and the Disposition within this note     Time User Action Codes Description Comment    12/21/2019  2:51 AM Hilario Lopez [N20 1] Ureterolithiasis     12/21/2019  2:51 AM Hilario Lopez [R10 9] Flank pain       ED Disposition     ED Disposition Condition Date/Time Comment    Discharge Stable Sat Dec 21, 2019  2:51 AM Flakita Sheikh  discharge to home/self care              Follow-up Information     Follow up With Specialties Details Why Contact Info Additional Information     [address] Emergency Department Emergency Medicine  If symptoms worsen [address] [place],[address] [address]  [phone]  ED, [address] [place] [address] [address]    [facility] Urology Schedule an appointment as soon as possible for a visit   [address] [address] [address] [facility] [address]  8  [facility], [facility], [facility] [place], [address]          Discharge Medication List as of 12/21/2019  2:54 AM      START taking these medications    Details   naproxen (NAPROSYN) 500 mg tablet Take 1 tablet (500 mg total) by mouth 2 (two) times a day with meals, Starting Sat 12/21/2019, Print      !! tamsulosin (FLOMAX) 0 4 mg Take 1 capsule (0 4 mg total) by mouth daily with dinner, Starting Sat 12/21/2019, Print       !! - Potential duplicate medications found  Please discuss with provider        CONTINUE these medications which have NOT CHANGED    Details   acetaminophen (TYLENOL) 500 mg tablet Take 500 mg by mouth every 6 (six) hours as needed for mild pain, Historical Med      amLODIPine (NORVASC) 10 mg tablet TAKE 1 TABLET BY MOUTH ONCE DAILY, Normal      atorvastatin (LIPITOR) 40 mg tablet TAKE 1 TABLET BY MOUTH ONCE DAILY, Normal      B-D UF III MINI PEN NEEDLES 31G X 5 MM MISC Starting Tue 5/7/2019, Historical Med      Cholecalciferol (VITAMIN D) 2000 units CAPS Take 5,000 Units by mouth daily , Historical Med      ELIQUIS 5 MG TAKE ONE TABLET BY MOUTH TWICE DAILY, Normal      flecainide (TAMBOCOR) 50 mg tablet Take 1 tablet (50 mg total) by mouth every 12 (twelve) hours, Starting Tue 9/24/2019, Until Mon 12/23/2019, Normal      fluticasone (FLONASE) 50 mcg/act nasal spray 1 spray into each nostril daily, Historical Med      gabapentin (NEURONTIN) 300 mg capsule TAKE 1 CAPSULE BY MOUTH TWICE DAILY, Normal      insulin glulisine (APIDRA SOLOSTAR) 100 units/mL injection pen 60 with each meal , No Print      LANTUS SOLOSTAR 100 units/mL injection pen Inject 60 Units under the skin every 12 (twelve) hours, Starting Fri 7/26/2019, Normal      metFORMIN (GLUCOPHAGE) 500 mg tablet Take 1 tablet (500 mg total) by mouth 2 (two) times a day with meals, Starting Thu 9/5/2019, Normal      metoprolol succinate (TOPROL-XL) 25 mg 24 hr tablet Take 1 tablet (25 mg total) by mouth daily, Starting Mon 12/16/2019, Normal      montelukast (SINGULAIR) 10 mg tablet TAKE 1 TABLET BY MOUTH ONCE DAILY, Normal      Multiple Vitamins-Minerals (CENTRUM SILVER ULTRA MENS) TABS Take 1 tablet by mouth daily, Historical Med      Omega-3 Fatty Acids (FISH OIL) 1,000 mg Take 1,000 mg by mouth daily, Historical Med      quinapril (ACCUPRIL) 40 MG tablet Take 1 tablet (40 mg total) by mouth daily at bedtime, Starting Mon 9/10/2018, Normal      ranitidine (ZANTAC) 150 mg tablet TAKE 1 TABLET BY MOUTH EVERY 12 HOURS, Normal      !! tamsulosin (FLOMAX) 0 4 mg Take by mouth daily with dinner  , Starting Tue 8/16/2016, Historical Med      traMADol (ULTRAM) 50 mg tablet Take 1 tablet (50 mg total) by mouth every 8 (eight) hours as needed for moderate pain or severe pain, Starting Mon 8/6/2018, Print      TRUE METRIX BLOOD GLUCOSE TEST test strip Historical Med      TRUEPLUS LANCETS 33G MISC Starting Tue 5/7/2019, Historical Med       !! - Potential duplicate medications found  Please discuss with provider  No discharge procedures on file      ED Provider  Electronically Signed by           Georgina Wesley MD  12/21/19 6915

## 2019-12-21 NOTE — DISCHARGE INSTRUCTIONS
Please follow-up with Urology for further care, if symptoms worsen you developed fevers, chills, nausea vomiting or worsening abdominal pain please return to the emergency department further evaluation

## 2019-12-27 ENCOUNTER — TELEPHONE (OUTPATIENT)
Dept: ENDOCRINOLOGY | Facility: HOSPITAL | Age: 64
End: 2019-12-27

## 2019-12-27 NOTE — TELEPHONE ENCOUNTER
Received lantus through patient assistance for patient, patient was called and came into office this afternoon to   He also received his new patient assistance papaerdariel from pick up bin

## 2020-01-01 ENCOUNTER — OFFICE VISIT (OUTPATIENT)
Dept: ENDOCRINOLOGY | Facility: HOSPITAL | Age: 65
End: 2020-01-01
Payer: COMMERCIAL

## 2020-01-01 ENCOUNTER — DOCUMENTATION (OUTPATIENT)
Dept: ENDOCRINOLOGY | Facility: HOSPITAL | Age: 65
End: 2020-01-01

## 2020-01-01 VITALS
BODY MASS INDEX: 45.1 KG/M2 | WEIGHT: 315 LBS | SYSTOLIC BLOOD PRESSURE: 160 MMHG | TEMPERATURE: 98.4 F | HEIGHT: 70 IN | DIASTOLIC BLOOD PRESSURE: 80 MMHG | HEART RATE: 70 BPM

## 2020-01-01 DIAGNOSIS — E78.5 DYSLIPIDEMIA: ICD-10-CM

## 2020-01-01 DIAGNOSIS — E78.5 HYPERLIPIDEMIA, UNSPECIFIED HYPERLIPIDEMIA TYPE: ICD-10-CM

## 2020-01-01 DIAGNOSIS — I10 ESSENTIAL HYPERTENSION: ICD-10-CM

## 2020-01-01 DIAGNOSIS — E11.9 TYPE 2 DIABETES MELLITUS WITHOUT COMPLICATION, WITHOUT LONG-TERM CURRENT USE OF INSULIN (HCC): ICD-10-CM

## 2020-01-01 DIAGNOSIS — IMO0002 UNCONTROLLED TYPE 2 DIABETES MELLITUS WITH COMPLICATION, WITH LONG-TERM CURRENT USE OF INSULIN: Primary | ICD-10-CM

## 2020-01-01 DIAGNOSIS — G47.33 OBSTRUCTIVE SLEEP APNEA: ICD-10-CM

## 2020-01-01 DIAGNOSIS — E55.9 VITAMIN D DEFICIENCY: ICD-10-CM

## 2020-01-01 DIAGNOSIS — I48.0 PAROXYSMAL ATRIAL FIBRILLATION (HCC): ICD-10-CM

## 2020-01-01 LAB
ALBUMIN SERPL-MCNC: 4.5 G/DL (ref 3.8–4.8)
ALBUMIN/GLOB SERPL: 1.8 {RATIO} (ref 1.2–2.2)
ALP SERPL-CCNC: 74 IU/L (ref 39–117)
ALT SERPL-CCNC: 23 IU/L (ref 0–44)
AST SERPL-CCNC: 20 IU/L (ref 0–40)
BILIRUB SERPL-MCNC: 0.6 MG/DL (ref 0–1.2)
BUN SERPL-MCNC: 20 MG/DL (ref 8–27)
BUN/CREAT SERPL: 23 (ref 10–24)
CALCIUM SERPL-MCNC: 9.2 MG/DL (ref 8.6–10.2)
CHLORIDE SERPL-SCNC: 101 MMOL/L (ref 96–106)
CHOLEST SERPL-MCNC: 150 MG/DL (ref 100–199)
CO2 SERPL-SCNC: 28 MMOL/L (ref 20–29)
CREAT SERPL-MCNC: 0.87 MG/DL (ref 0.76–1.27)
EST. AVERAGE GLUCOSE BLD GHB EST-MCNC: 154 MG/DL
GLOBULIN SER-MCNC: 2.5 G/DL (ref 1.5–4.5)
GLUCOSE SERPL-MCNC: 195 MG/DL (ref 65–99)
HBA1C MFR BLD: 7 % (ref 4.8–5.6)
HDLC SERPL-MCNC: 52 MG/DL
LDLC SERPL CALC-MCNC: 75 MG/DL (ref 0–99)
POTASSIUM SERPL-SCNC: 4.3 MMOL/L (ref 3.5–5.2)
PROT SERPL-MCNC: 7 G/DL (ref 6–8.5)
SL AMB EGFR AFRICAN AMERICAN: 104 ML/MIN/1.73
SL AMB EGFR NON AFRICAN AMERICAN: 90 ML/MIN/1.73
SL AMB VLDL CHOLESTEROL CALC: 23 MG/DL (ref 5–40)
SODIUM SERPL-SCNC: 142 MMOL/L (ref 134–144)
TRIGL SERPL-MCNC: 131 MG/DL (ref 0–149)
TSH SERPL DL<=0.005 MIU/L-ACNC: 2.64 UIU/ML (ref 0.45–4.5)

## 2020-01-01 PROCEDURE — 99214 OFFICE O/P EST MOD 30 MIN: CPT | Performed by: NURSE PRACTITIONER

## 2020-01-01 PROCEDURE — 1036F TOBACCO NON-USER: CPT | Performed by: NURSE PRACTITIONER

## 2020-01-01 PROCEDURE — 3051F HG A1C>EQUAL 7.0%<8.0%: CPT | Performed by: NURSE PRACTITIONER

## 2020-01-01 PROCEDURE — 1160F RVW MEDS BY RX/DR IN RCRD: CPT | Performed by: NURSE PRACTITIONER

## 2020-01-01 PROCEDURE — 3077F SYST BP >= 140 MM HG: CPT | Performed by: NURSE PRACTITIONER

## 2020-01-01 PROCEDURE — 3079F DIAST BP 80-89 MM HG: CPT | Performed by: NURSE PRACTITIONER

## 2020-01-01 RX ORDER — FLECAINIDE ACETATE 50 MG/1
TABLET ORAL
Qty: 180 TABLET | Refills: 0 | Status: SHIPPED | OUTPATIENT
Start: 2020-01-01 | End: 2021-01-01

## 2020-01-01 RX ORDER — ATORVASTATIN CALCIUM 40 MG/1
TABLET, FILM COATED ORAL
Qty: 90 TABLET | Refills: 0 | Status: SHIPPED | OUTPATIENT
Start: 2020-01-01 | End: 2021-01-01

## 2020-01-01 RX ORDER — METOPROLOL SUCCINATE 25 MG/1
TABLET, EXTENDED RELEASE ORAL
Qty: 90 TABLET | Refills: 0 | Status: SHIPPED | OUTPATIENT
Start: 2020-01-01 | End: 2021-01-01

## 2020-01-01 RX ORDER — OMEPRAZOLE 40 MG/1
40 CAPSULE, DELAYED RELEASE ORAL DAILY
COMMUNITY
Start: 2020-01-01 | End: 2021-01-01 | Stop reason: HOSPADM

## 2020-01-01 RX ORDER — AMLODIPINE BESYLATE 10 MG/1
TABLET ORAL
Qty: 90 TABLET | Refills: 0 | OUTPATIENT
Start: 2020-01-01

## 2020-01-03 ENCOUNTER — DOCUMENTATION (OUTPATIENT)
Dept: ENDOCRINOLOGY | Facility: HOSPITAL | Age: 65
End: 2020-01-03

## 2020-02-03 DIAGNOSIS — I48.0 PAROXYSMAL ATRIAL FIBRILLATION (HCC): ICD-10-CM

## 2020-02-05 DIAGNOSIS — E11.9 TYPE 2 DIABETES MELLITUS WITHOUT COMPLICATION, WITHOUT LONG-TERM CURRENT USE OF INSULIN (HCC): Primary | ICD-10-CM

## 2020-02-05 NOTE — TELEPHONE ENCOUNTER
Pt needs a 90 day refill of his Metformin please  Pt saw you in December and has a follow up in March

## 2020-02-17 ENCOUNTER — OFFICE VISIT (OUTPATIENT)
Dept: CARDIOLOGY CLINIC | Facility: CLINIC | Age: 65
End: 2020-02-17
Payer: COMMERCIAL

## 2020-02-17 VITALS
DIASTOLIC BLOOD PRESSURE: 68 MMHG | SYSTOLIC BLOOD PRESSURE: 138 MMHG | WEIGHT: 315 LBS | HEIGHT: 70 IN | HEART RATE: 63 BPM | BODY MASS INDEX: 45.1 KG/M2

## 2020-02-17 DIAGNOSIS — I48.0 PAROXYSMAL ATRIAL FIBRILLATION (HCC): Primary | ICD-10-CM

## 2020-02-17 PROCEDURE — 4040F PNEUMOC VAC/ADMIN/RCVD: CPT | Performed by: INTERNAL MEDICINE

## 2020-02-17 PROCEDURE — 1036F TOBACCO NON-USER: CPT | Performed by: INTERNAL MEDICINE

## 2020-02-17 PROCEDURE — 3008F BODY MASS INDEX DOCD: CPT | Performed by: INTERNAL MEDICINE

## 2020-02-17 PROCEDURE — 93000 ELECTROCARDIOGRAM COMPLETE: CPT | Performed by: INTERNAL MEDICINE

## 2020-02-17 PROCEDURE — 3078F DIAST BP <80 MM HG: CPT | Performed by: INTERNAL MEDICINE

## 2020-02-17 PROCEDURE — 2022F DILAT RTA XM EVC RTNOPTHY: CPT | Performed by: INTERNAL MEDICINE

## 2020-02-17 PROCEDURE — 99214 OFFICE O/P EST MOD 30 MIN: CPT | Performed by: INTERNAL MEDICINE

## 2020-02-17 PROCEDURE — 3075F SYST BP GE 130 - 139MM HG: CPT | Performed by: INTERNAL MEDICINE

## 2020-02-17 RX ORDER — FAMOTIDINE 40 MG/1
40 TABLET, FILM COATED ORAL DAILY
COMMUNITY
Start: 2020-01-27 | End: 2020-01-01 | Stop reason: ALTCHOICE

## 2020-02-17 NOTE — PROGRESS NOTES
Cardiology Follow Up    [address] [facility]   1955  [id number]  [address] [facility] Jaci Justice  [address] [place] [id number] [phone]    1  Paroxysmal atrial fibrillation (HCC)  POCT ECG       Interval History: Followup for PAF    He has no chest pain, no dyspnea and no palpitations       Problem List     Atrial fibrillation [facility])    Dextrocardia    Cerebrovascular accident (CVA) with involvement of left side of body ([facility] ) (Chronic)    Essential hypertension    Cardiac disease    Hyperlipidemia    Morbid obesity due to excess calories ([facility] )    Obstructive sleep apnea    Type 2 diabetes mellitus with complication (HCC)      Lab Results   Component Value Date    HGBA1C 7 8 (H) 11/30/2019         Restrictive lung disease    Class 3 severe obesity due to excess calories with serious comorbidity and body mass index (BMI) of 45 0 to 49 9 in adult [facility])        Past Medical History:   Diagnosis Date    Arthritis     Asthma     BPH (benign prostatic hyperplasia)     Cardiac arrhythmia     resolved [id number]    Cardiac disease     Chronic a-fib     Chronic pain     CVA (cerebral vascular accident) ([facility] ) 11/2015    Dextrocardia     Diabetes mellitus ([facility] )     GERD (gastroesophageal reflux disease)     Hyperlipidemia     Hypertension     Renal disorder     kidney stone    Sleep apnea     Stroke (cerebrum) ([facility] )      Social History     Socioeconomic History    Marital status:      Spouse name: Not on file    Number of children: Not on file    Years of education: 15    Highest education level: Not on file   Occupational History    Occupation: Not working   Social Needs    Financial resource strain: Not on file    Food insecurity:     Worry: Not on file     Inability: Not on file   [employer] needs:     Medical: Not on file     Non-medical: Not on file   Tobacco Use    Smoking status: Never Smoker    Smokeless tobacco: Never Used    Tobacco comment: Occasionally smoked a pipe while fishing   Substance and Sexual Activity    Alcohol use:  Yes     Alcohol/week: 1 0 standard drinks     Types: 1 Glasses of wine per week     Comment: 2-3 drinks a week, cherry liquer or beer     Drug use: No    Sexual activity: Not on file   Lifestyle    Physical activity:     Days per week: Not on file     Minutes per session: Not on file    Stress: Not on file   Relationships    Social connections:     Talks on phone: Not on file     Gets together: Not on file     Attends Hinduism service: Not on file     Active member of club or organization: Not on file     Attends meetings of clubs or organizations: Not on file     Relationship status: Not on file    Intimate partner violence:     Fear of current or ex partner: Not on file     Emotionally abused: Not on file     Physically abused: Not on file     Forced sexual activity: Not on file   Other Topics Concern    Not on file   Social History Narrative    Not on file      Family History   Problem Relation Age of Onset    Coronary artery disease Father     Hodgkin's lymphoma Father     Diabetes Father     Cancer Father     Diabetes type I Mother     Cancer Maternal Grandfather     Diabetes Paternal Grandmother     Emphysema Paternal Grandmother     Heart attack Paternal Grandfather     Hypertension Family     Neuropathy Family      Past Surgical History:   Procedure Laterality Date    CHOLECYSTECTOMY      EXPLORATORY LAPAROTOMY  1971    GALLBLADDER SURGERY      ND COLONOSCOPY FLX DX W/COLLJ Lenore 1978 PFRMD N/A 2/21/2018    Procedure: COLONOSCOPY;  Surgeon: Eloina Preston MD;  Location:  MAIN OR;  Service: Gastroenterology    UMBILICAL HERNIA REPAIR  2007       Current Outpatient Medications:     acetaminophen (TYLENOL) 500 mg tablet, Take 500 mg by mouth every 6 (six) hours as needed for mild pain, Disp: , Rfl:     amLODIPine (NORVASC) 10 mg tablet, TAKE 1 TABLET BY MOUTH ONCE DAILY, Disp: 90 tablet, Rfl: 3    apixaban (ELIQUIS) 5 mg, Take 1 tablet (5 mg total) by mouth 2 (two) times a day, Disp: 180 tablet, Rfl: 3    atorvastatin (LIPITOR) 40 mg tablet, TAKE 1 TABLET BY MOUTH ONCE DAILY, Disp: 90 tablet, Rfl: 3    B-D UF III MINI PEN NEEDLES 31G X 5 MM MISC, , Disp: , Rfl:     Cholecalciferol (VITAMIN D) 2000 units CAPS, Take 5,000 Units by mouth daily , Disp: , Rfl:     famotidine (PEPCID) 40 MG tablet, Take 40 mg by mouth daily, Disp: , Rfl:     flecainide (TAMBOCOR) 50 mg tablet, Take 1 tablet (50 mg total) by mouth every 12 (twelve) hours, Disp: 180 tablet, Rfl: 3    fluticasone (FLONASE) 50 mcg/act nasal spray, 1 spray into each nostril daily, Disp: , Rfl:     gabapentin (NEURONTIN) 300 mg capsule, TAKE 1 CAPSULE BY MOUTH TWICE DAILY, Disp: 60 capsule, Rfl: 5    insulin glulisine (APIDRA SOLOSTAR) 100 units/mL injection pen, 60 with each meal , Disp: 5 pen, Rfl:     LANTUS SOLOSTAR 100 units/mL injection pen, Inject 60 Units under the skin every 12 (twelve) hours, Disp: 40 mL, Rfl: 5    metFORMIN (GLUCOPHAGE) 500 mg tablet, Take 1 tablet (500 mg total) by mouth 2 (two) times a day with meals, Disp: 180 tablet, Rfl: 1    metoprolol succinate (TOPROL-XL) 25 mg 24 hr tablet, Take 1 tablet (25 mg total) by mouth daily, Disp: 90 tablet, Rfl: 3    montelukast (SINGULAIR) 10 mg tablet, TAKE 1 TABLET BY MOUTH ONCE DAILY, Disp: 30 tablet, Rfl: 5    Multiple Vitamins-Minerals (CENTRUM SILVER ULTRA MENS) TABS, Take 1 tablet by mouth daily, Disp: , Rfl:     Omega-3 Fatty Acids (FISH OIL) 1,000 mg, Take 1,000 mg by mouth daily, Disp: , Rfl:     quinapril (ACCUPRIL) 40 MG tablet, Take 1 tablet (40 mg total) by mouth daily at bedtime, Disp: 30 tablet, Rfl: 6    tamsulosin (FLOMAX) 0 4 mg, Take by mouth daily with dinner  , Disp: , Rfl:     traMADol (ULTRAM) 50 mg tablet, Take 1 tablet (50 mg total) by mouth every 8 (eight) hours as needed for moderate pain or severe pain, Disp: 90 tablet, Rfl: 0    TRUE METRIX BLOOD GLUCOSE TEST test strip, , Disp: , Rfl:     TRUEPLUS LANCETS 33G MISC, , Disp: , Rfl:   Allergies   Allergen Reactions    Banana Shortness Of Breath    Fruit Extracts Anaphylaxis and Throat Swelling     Annotation - [id number]: Bananas    Clotrimazole     Codeine Itching     Other reaction(s): Itching    Hydrocodone-Acetaminophen     Oxycodone Rash and Other (See Comments)     Other reaction(s): Pruritis    Oxycodone-Acetaminophen Rash     Other reaction(s): Pruritis       Labs:     Chemistry        Component Value Date/Time     09/19/2017 0704    K 4 2 12/20/2019 2222    K 4 2 11/30/2019 0814     12/20/2019 2222    CL 99 11/30/2019 0814    CO2 29 12/20/2019 2222    CO2 27 11/30/2019 0814    BUN 15 12/20/2019 2222    BUN 15 11/30/2019 0814    CREATININE 1 10 12/20/2019 2222    CREATININE 0 83 09/19/2017 0704        Component Value Date/Time    CALCIUM 8 8 12/20/2019 2222    CALCIUM 8 9 09/19/2017 0704    ALKPHOS 79 12/20/2019 2222    ALKPHOS 62 09/19/2017 0704    AST 22 12/20/2019 2222    AST 21 11/30/2019 0814    ALT 35 12/20/2019 2222    ALT 25 11/30/2019 0814    BILITOT 0 6 09/19/2017 0704            Lab Results   Component Value Date    CHOL 133 09/19/2017    CHOL 134 11/09/2015    CHOL 156 06/14/2014     Lab Results   Component Value Date    HDL 53 05/20/2019    HDL 47 11/02/2018    HDL 48 03/30/2018     Lab Results   Component Value Date    LDLCALC 63 09/19/2017    LDLCALC 48 04/29/2016    LDLCALC 44 11/09/2015     Lab Results   Component Value Date    TRIG 109 05/20/2019    TRIG 106 11/02/2018    TRIG 108 03/30/2018     Lab Results   Component Value Date    CHOLHDL 2 6 03/30/2018       Imaging: No results found  EKG: NSR  Low Voltage  Review of Systems   Constitution: Negative  HENT: Negative  Eyes: Negative  Cardiovascular: Negative  Respiratory: Negative  Endocrine: Negative      Hematologic/Lymphatic: Negative  Skin: Negative  Musculoskeletal: Negative  Gastrointestinal: Negative  Genitourinary: Negative  Neurological: Negative  Psychiatric/Behavioral: Negative  Allergic/Immunologic: Negative  Vitals:    02/17/20 1040   BP: 138/68   Pulse: 63           Physical Exam   Constitutional: He is oriented to person, place, and time  No distress  HENT:   Mouth/Throat: No oropharyngeal exudate  Eyes: No scleral icterus  Neck: No JVD present  Cardiovascular: Normal rate and regular rhythm  Exam reveals no friction rub  No murmur heard  Pulmonary/Chest: Effort normal and breath sounds normal  No stridor  No respiratory distress  He has no wheezes  Abdominal: Soft  Bowel sounds are normal  He exhibits no distension  There is no tenderness  There is no guarding  Musculoskeletal: He exhibits no edema  Neurological: He is alert and oriented to person, place, and time  Skin: Skin is warm and dry  He is not diaphoretic  Psychiatric: He has a normal mood and affect  His behavior is normal        Discussion/Summary:       Paroxysmal Atrial Fibrillation: HE is in NSR today  Overall doing well  Continue with current medical therapy       HTN:His BP is currently stable  Continue with medical therapy  Dyslipidemia: Continue with medical therapy  Lipids r pending       Dextrocardia        The patient was counseled regarding diagnostic results, instructions for management, risk factor reductions, impressions  total time of encounter was 25 minutes and 15 minutes was spent counseling

## 2020-03-11 LAB
ALBUMIN SERPL-MCNC: 4.3 G/DL (ref 3.8–4.8)
ALBUMIN/CREAT UR: 13 MG/G CREAT (ref 0–29)
ALBUMIN/GLOB SERPL: 2 {RATIO} (ref 1.2–2.2)
ALP SERPL-CCNC: 58 IU/L (ref 39–117)
ALT SERPL-CCNC: 19 IU/L (ref 0–44)
AST SERPL-CCNC: 17 IU/L (ref 0–40)
BASOPHILS # BLD AUTO: 0 X10E3/UL (ref 0–0.2)
BASOPHILS NFR BLD AUTO: 0 %
BILIRUB SERPL-MCNC: 0.6 MG/DL (ref 0–1.2)
BUN SERPL-MCNC: 20 MG/DL (ref 8–27)
BUN/CREAT SERPL: 18 (ref 10–24)
CALCIUM SERPL-MCNC: 9.2 MG/DL (ref 8.6–10.2)
CHLORIDE SERPL-SCNC: 102 MMOL/L (ref 96–106)
CHOLEST SERPL-MCNC: 119 MG/DL (ref 100–199)
CO2 SERPL-SCNC: 28 MMOL/L (ref 20–29)
CREAT SERPL-MCNC: 1.1 MG/DL (ref 0.76–1.27)
CREAT UR-MCNC: 149.3 MG/DL
EOSINOPHIL # BLD AUTO: 0.2 X10E3/UL (ref 0–0.4)
EOSINOPHIL NFR BLD AUTO: 2 %
ERYTHROCYTE [DISTWIDTH] IN BLOOD BY AUTOMATED COUNT: 14.4 % (ref 11.6–15.4)
GLOBULIN SER-MCNC: 2.2 G/DL (ref 1.5–4.5)
GLUCOSE SERPL-MCNC: 148 MG/DL (ref 65–99)
HBA1C MFR BLD: 7.7 % (ref 4.8–5.6)
HCT VFR BLD AUTO: 39.8 % (ref 37.5–51)
HDLC SERPL-MCNC: 47 MG/DL
HGB BLD-MCNC: 13.1 G/DL (ref 13–17.7)
IMM GRANULOCYTES # BLD: 0 X10E3/UL (ref 0–0.1)
IMM GRANULOCYTES NFR BLD: 0 %
LDLC SERPL CALC-MCNC: 48 MG/DL (ref 0–99)
LDLC SERPL DIRECT ASSAY-MCNC: 55 MG/DL (ref 0–99)
LYMPHOCYTES # BLD AUTO: 2 X10E3/UL (ref 0.7–3.1)
LYMPHOCYTES NFR BLD AUTO: 28 %
MCH RBC QN AUTO: 31.1 PG (ref 26.6–33)
MCHC RBC AUTO-ENTMCNC: 32.9 G/DL (ref 31.5–35.7)
MCV RBC AUTO: 95 FL (ref 79–97)
MICROALBUMIN UR-MCNC: 19.8 UG/ML
MONOCYTES # BLD AUTO: 0.7 X10E3/UL (ref 0.1–0.9)
MONOCYTES NFR BLD AUTO: 9 %
NEUTROPHILS # BLD AUTO: 4.4 X10E3/UL (ref 1.4–7)
NEUTROPHILS NFR BLD AUTO: 61 %
PLATELET # BLD AUTO: 188 X10E3/UL (ref 150–450)
POTASSIUM SERPL-SCNC: 4.2 MMOL/L (ref 3.5–5.2)
PROT SERPL-MCNC: 6.5 G/DL (ref 6–8.5)
RBC # BLD AUTO: 4.21 X10E6/UL (ref 4.14–5.8)
SL AMB EGFR AFRICAN AMERICAN: 81 ML/MIN/1.73
SL AMB EGFR NON AFRICAN AMERICAN: 70 ML/MIN/1.73
SL AMB VLDL CHOLESTEROL CALC: 24 MG/DL (ref 5–40)
SODIUM SERPL-SCNC: 145 MMOL/L (ref 134–144)
TRIGL SERPL-MCNC: 118 MG/DL (ref 0–149)
TSH SERPL DL<=0.005 MIU/L-ACNC: 2.08 UIU/ML (ref 0.45–4.5)
WBC # BLD AUTO: 7.3 X10E3/UL (ref 3.4–10.8)

## 2020-03-11 PROCEDURE — 3061F NEG MICROALBUMINURIA REV: CPT | Performed by: NURSE PRACTITIONER

## 2020-03-17 ENCOUNTER — OFFICE VISIT (OUTPATIENT)
Dept: ENDOCRINOLOGY | Facility: HOSPITAL | Age: 65
End: 2020-03-17
Payer: COMMERCIAL

## 2020-03-17 VITALS
WEIGHT: 315 LBS | SYSTOLIC BLOOD PRESSURE: 162 MMHG | DIASTOLIC BLOOD PRESSURE: 90 MMHG | BODY MASS INDEX: 45.1 KG/M2 | HEART RATE: 66 BPM | HEIGHT: 70 IN

## 2020-03-17 DIAGNOSIS — G47.33 OBSTRUCTIVE SLEEP APNEA: ICD-10-CM

## 2020-03-17 DIAGNOSIS — E78.5 HYPERLIPIDEMIA, UNSPECIFIED HYPERLIPIDEMIA TYPE: ICD-10-CM

## 2020-03-17 DIAGNOSIS — E55.9 VITAMIN D DEFICIENCY: ICD-10-CM

## 2020-03-17 DIAGNOSIS — IMO0002 UNCONTROLLED TYPE 2 DIABETES MELLITUS WITH COMPLICATION, WITH LONG-TERM CURRENT USE OF INSULIN: Primary | ICD-10-CM

## 2020-03-17 DIAGNOSIS — I10 ESSENTIAL HYPERTENSION: ICD-10-CM

## 2020-03-17 PROCEDURE — 3080F DIAST BP >= 90 MM HG: CPT | Performed by: NURSE PRACTITIONER

## 2020-03-17 PROCEDURE — 3077F SYST BP >= 140 MM HG: CPT | Performed by: NURSE PRACTITIONER

## 2020-03-17 PROCEDURE — 3008F BODY MASS INDEX DOCD: CPT | Performed by: NURSE PRACTITIONER

## 2020-03-17 PROCEDURE — 1036F TOBACCO NON-USER: CPT | Performed by: NURSE PRACTITIONER

## 2020-03-17 PROCEDURE — 2022F DILAT RTA XM EVC RTNOPTHY: CPT | Performed by: NURSE PRACTITIONER

## 2020-03-17 PROCEDURE — 99214 OFFICE O/P EST MOD 30 MIN: CPT | Performed by: NURSE PRACTITIONER

## 2020-03-17 PROCEDURE — 4040F PNEUMOC VAC/ADMIN/RCVD: CPT | Performed by: NURSE PRACTITIONER

## 2020-03-17 NOTE — PATIENT INSTRUCTIONS
Continue Lantus dose to 60 units in AM and increase to 65 units PM       For now, continue Apidra at current dose  We will complete Dalia documentation to start Humulin  insulin      Check blood sugars 3-4 times daily and send record to the office in 2 weeks for review       Contact the office with any consistent episodes of hypoglycemia       Keep your skin moisturized, as discussed       Continue your antihypertensive medications       Use your CPAP consistently      Continue to supplement with 4000 units of vitamin D3 daily

## 2020-03-17 NOTE — PROGRESS NOTES
Josep Montalvo  71 y o  male MRN: [id number]    Encounter: [id number]      Assessment/Plan     Assessment: This is a 71y o -year-old male with uncontrolled type 2 diabetes with hypertension, hyperlipidemia and obstructive sleep apnea  Plan:  1   Uncontrolled type 2 diabetes with hyperglycemia and long-term insulin use:   His most recent hemoglobin A1c has improved slightly to 7 7  Review of his recent blood sugars reveals hyperglycemia fairly consistent in the morning  I have asked him to increase his Lantus at bedtime to 65 units and continue 60 units in the morning  He will continue Apidra at 60 units at meals  Discussed transition to Humulin  insulin  Will complete patient assistance program paperwork through [employer] asked him to check his blood sugars 4 times daily and provide a record to the office in 2 weeks for review so that adjustments may be made to his regimen   Discussed the impact of diet and weight loss on the treatment of his diabetes  Check hemoglobin A1c prior to next visit      2   Hypertension:  His blood pressure was reassessed to 142/86 at the end of the office visit today  For now, continue current medication regimen   Continue to monitor at home  [facility] comprehensive metabolic panel prior to next visit      3   Hyperlipidemia:  Continue atorvastatin and fish oil       4   Obstructive sleep apnea:  Discussed utilizing his CPAP on a regular basis to improve his fatigue and glycemic control      5   Vitamin-D deficiency:  Continue supplementation with vitamin D3 daily  CC:  Type 2 Diabetes follow-up    History of Present Illness     HPI:  71 y  o  male presents in the office today for follow up of Type 2 Diabetes  he states he has been diagnosed with Type 2 Diabetes for approximately 22 years and is checking blood glucose readings fairly regularly   He denies episodes of hypoglycemia, polydipsia, polyphagia and polyuria     Current Diabetic medication regimen is as follows:  Metformin 500 mg twice daily  Lantus 60 units twice daily  Apidra 60 units at meals  His most recent hemoglobin A1c from March 10, 2020 is 7 7  He states he is up to date with his annual diabetic eye exam, most recent was July 2019, and denies retinopathy  Sherryle Elbe states he does have cataracts and glaucoma   He complains about numbness, tingling and dryness to his feet and follows [facility] pod[facility] for regular diabetic foot care every 12 weeks      For his hypertension, he takes amlodipine 10 mg daily, furosemide 20 mg daily, quinapril 40 mg daily and metoprolol 50 mg twice daily  Sherryle Elbe states he checks his blood pressure at home      His hyperlipidemia is treated with atorvastatin 80 mg daily and fish oil 1 g daily      For his obstructive sleep apnea, he is utilizing CPAP more regularly recently than in the past      For his vitamin-D deficiency, he supplements with 4000 units of vitamin D3 daily  Review of Systems   Constitutional: Positive for fatigue  Negative for chills and fever  HENT: Negative  Negative for trouble swallowing and voice change  Eyes: Negative for photophobia, pain, discharge, redness, itching and visual disturbance  Respiratory: Positive for shortness of breath (Dyspnea on exertion)  Negative for cough  Cardiovascular: Negative for chest pain and palpitations  Gastrointestinal: Negative for abdominal pain, constipation, diarrhea, nausea and vomiting  Endocrine: Negative for cold intolerance, heat intolerance, polydipsia, polyphagia and polyuria  Genitourinary: Negative  Musculoskeletal: Positive for arthralgias, back pain and joint swelling  Skin: Negative  Allergic/Immunologic: Negative  Neurological: Negative for dizziness, syncope, light-headedness and headaches  Hematological: Negative  Psychiatric/Behavioral: Negative  All other systems reviewed and are negative        Historical Information   Past Medical History:   Diagnosis Date    Arthritis     Asthma     BPH (benign prostatic hyperplasia)     Cardiac arrhythmia     resolved [id number]    Cardiac disease     Chronic a-fib     Chronic pain     [facility]A [facility]cere[facility] vascular accident) ([facility] ) 11/2015    Dextrocardia     Diabetes mellitus ([facility] )     GERD (gastroesophageal reflux disease)     Hyperlipidemia     Hypertension     Renal disorder     kidney stone    Sleep apnea     Stroke (cerebrum) ([facility] )      Past Surgical History:   Procedure Laterality Date    CHOLECYSTECTOMY      EXPLORATORY LAPAROTOMY  1971    GALLBLADDER SURGERY      [place] COLONOSCOPY FLX DX W/COLLJ SPEC WHEN PFRMD N/A 2/21/2018    Procedure: COLONOSCOPY;  Surgeon: Brianna Mustafa MD;  Location:  MAIN OR;  Service: Gastroenterology    UMBILICAL HERNIA REPAIR  2007     Social History   Social History     Substance and Sexual Activity   Alcohol Use Yes    Alcohol/week: 1 0 standard drinks    Types: 1 Glasses of wine per week    Comment: 2-3 drinks a week, cherry liquer or beer      Social History     Substance and Sexual Activity   Drug Use No     Social History     Tobacco Use   Smoking Status Never Smoker   Smokeless Tobacco Never Used   Tobacco Comment    Occasionally smoked a pipe while fishing     Family History:   Family History   Problem Relation Age of Onset    Coronary artery disease Father     Hodgkin's lymphoma Father     Diabetes Father     Cancer Father     Diabetes type I Mother     Cancer Maternal Grandfather     Diabetes Paternal Grandmother     Emphysema Paternal Grandmother     Heart attack Paternal Grandfather     Hypertension Family     Neuropathy Family        Meds/Allergies   Current Outpatient Medications   Medication Sig Dispense Refill    acetaminophen (TYLENOL) 500 mg tablet Take 500 mg by mouth every 6 (six) hours as needed for mild pain      amLODIPine (NORVASC) 10 mg tablet TAKE 1 TABLET BY MOUTH ONCE DAILY 90 tablet 3    apixaban (ELIQUIS) 5 mg Take 1 tablet (5 mg total) by mouth 2 (two) times a day 180 tablet 3    atorvastatin (LIPITOR) 40 mg tablet TAKE 1 TABLET BY MOUTH ONCE DAILY 90 tablet 3    B-D UF III MINI PEN NEEDLES 31G X 5 MM MISC       Cholecalciferol (VITAMIN D) 2000 units CAPS Take 5,000 Units by mouth daily       famotidine (PEPCID) 40 MG tablet Take 40 mg by mouth daily      flecainide (TAMBOCOR) 50 mg tablet Take 1 tablet (50 mg total) by mouth every 12 (twelve) hours 180 tablet 3    fluticasone (FLONASE) 50 mcg/act nasal spray 1 spray into each nostril daily      gabapentin (NEURONTIN) 300 mg capsule TAKE 1 CAPSULE BY MOUTH TWICE DAILY 60 capsule 5    insulin glulisine (APIDRA SOLOSTAR) 100 units/mL injection pen 60 with each meal  5 pen     LANTUS SOLOSTAR 100 units/mL injection pen Inject 60 Units under the skin every 12 (twelve) hours 40 mL 5    metFORMIN (GLUCOPHAGE) 500 mg tablet Take 1 tablet (500 mg total) by mouth 2 (two) times a day with meals 180 tablet 1    metoprolol succinate (TOPROL-XL) 25 mg 24 hr tablet Take 1 tablet (25 mg total) by mouth daily 90 tablet 3    montelukast (SINGULAIR) 10 mg tablet TAKE 1 TABLET BY MOUTH ONCE DAILY 30 tablet 5    Multiple Vitamins-Minerals (CENTRUM SILVER ULTRA MENS) TABS Take 1 tablet by mouth daily      Omega-3 Fatty Acids (FISH OIL) 1,000 mg Take 1,000 mg by mouth daily      quinapril (ACCUPRIL) 40 MG tablet Take 1 tablet (40 mg total) by mouth daily at bedtime 30 tablet 6    tamsulosin (FLOMAX) 0 4 mg Take by mouth daily with dinner        traMADol (ULTRAM) 50 mg tablet Take 1 tablet (50 mg total) by mouth every 8 (eight) hours as needed for moderate pain or severe pain 90 tablet 0    TRUE METRIX BLOOD GLUCOSE TEST test strip       TRUEPLUS LANCETS 33G MISC        No current facility-administered medications for this visit        Allergies   Allergen Reactions    Banana Shortness Of Breath    Fruit Extracts Anaphylaxis and Throat Swelling     [place] [id number]: Bananas    Clotrimazole     Codeine Itching     Other reaction(s): Itching    Hydrocodone-Acetaminophen     Oxycodone Rash and Other (See Comments)     Other reaction(s): Pruritis    Oxycodone-Acetaminophen Rash     Other reaction(s): Pruritis       Objective   Vitals: There were no vitals taken for this visit  Physical Exam   Constitutional: He is oriented to person, place, and time  He appears well-developed and well-nourished  Obese   HENT:   Head: Normocephalic and atraumatic  Nose: Nose normal    Mouth/Throat: Oropharynx is clear and moist    Eyes: Pupils are equal, round, and reactive to light  Conjunctivae and EOM are normal    Wears glasses   Neck: Normal range of motion  Neck supple  Cardiovascular: Normal rate, regular rhythm, normal heart sounds and intact distal pulses  Pulmonary/Chest: Effort normal and breath sounds normal    Abdominal: Soft  Bowel sounds are normal    Musculoskeletal: Normal range of motion  He exhibits edema (3+ pedal edema)  Neurological: He is alert and oriented to person, place, and time  Skin: Skin is warm and dry  Dry cracked skin to legs and feet  Psychiatric: He has a normal mood and affect  His behavior is normal  Judgment and thought content normal    Vitals reviewed      Lab Results:   Lab Results   Component Value Date/Time    Hemoglobin A1C 7 7 (H) 03/10/2020 09:53 AM    Hemoglobin A1C 7 8 (H) 11/30/2019 08:14 AM    Hemoglobin A1C 7 6 (H) 08/24/2019 08:34 AM    WBC 10 73 (H) 12/20/2019 10:22 PM    White Blood Cell Count 7 3 03/10/2020 09:53 AM    White Blood Cell Count 7 6 05/20/2019 09:06 AM    Hemoglobin 13 1 03/10/2020 09:53 AM    Hemoglobin 13 6 12/20/2019 10:22 PM    Hemoglobin 13 6 05/20/2019 09:06 AM    HCT 39 8 03/10/2020 09:53 AM    HCT 41 0 05/20/2019 09:06 AM    Hematocrit 40 6 12/20/2019 10:22 PM    MCV 95 03/10/2020 09:53 AM    MCV 93 12/20/2019 10:22 PM    MCV 92 05/20/2019 09:06 AM    Platelet Count 395 66/86/9858 09:53 AM    Platelet Count 809 04/10/8038 09:06 AM Platelets 240 99/67/6279 10:22 PM    BUN 20 03/10/2020 09:53 AM    BUN 15 12/20/2019 10:22 PM    BUN 15 11/30/2019 08:14 AM    BUN 15 08/24/2019 08:34 AM    Potassium 4 2 03/10/2020 09:53 AM    Potassium 4 2 12/20/2019 10:22 PM    Potassium 4 2 11/30/2019 08:14 AM    Potassium 4 1 08/24/2019 08:34 AM    Chloride 102 03/10/2020 09:53 AM    Chloride 104 12/20/2019 10:22 PM    Chloride 99 11/30/2019 08:14 AM    Chloride 102 08/24/2019 08:34 AM    CO2 28 03/10/2020 09:53 AM    CO2 29 12/20/2019 10:22 PM    CO2 27 11/30/2019 08:14 AM    CO2 27 08/24/2019 08:34 AM    Creatinine 1 10 03/10/2020 09:53 AM    Creatinine 1 10 12/20/2019 10:22 PM    Creatinine 0 89 11/30/2019 08:14 AM    Creatinine 0 83 08/24/2019 08:34 AM    AST 17 03/10/2020 09:53 AM    AST 22 12/20/2019 10:22 PM    AST 21 11/30/2019 08:14 AM    AST 21 08/24/2019 08:34 AM    ALT 19 03/10/2020 09:53 AM    ALT 35 12/20/2019 10:22 PM    ALT 25 11/30/2019 08:14 AM    ALT 23 08/24/2019 08:34 AM    Albumin 4 3 03/10/2020 09:53 AM    Albumin 4 0 12/20/2019 10:22 PM    Albumin 4 7 11/30/2019 08:14 AM    Albumin 4 4 08/24/2019 08:34 AM    Globulin, Total 2 2 03/10/2020 09:53 AM    Globulin, Total 2 1 11/30/2019 08:14 AM    Globulin, Total 2 2 08/24/2019 08:34 AM    HDL 47 03/10/2020 09:53 AM    HDL 53 05/20/2019 09:06 AM    Triglycerides 118 03/10/2020 09:53 AM    Triglycerides 109 05/20/2019 09:06 AM       Portions of the record may have been created with voice recognition software  Occasional wrong word or "sound a like" substitutions may have occurred due to the inherent limitations of voice recognition software  Read the chart carefully and recognize, using context, where substitutions have occurred

## 2020-03-27 ENCOUNTER — TELEPHONE (OUTPATIENT)
Dept: ENDOCRINOLOGY | Facility: HOSPITAL | Age: 65
End: 2020-03-27

## 2020-03-27 NOTE — TELEPHONE ENCOUNTER
For now let's do 100 units at meals as a starting dose for his Humulin     Then we can titrate from there

## 2020-03-27 NOTE — TELEPHONE ENCOUNTER
We need clarification for the Humulin R U 500 dosage  The application says 182 units before meals, but the patient says he was told 70 untis before meals plus a dose with bedtime  We need to print a script and fax to [phone] ASAP  Can you clarify and I will have Dr Strickland Pack order? Cover sheet should include name,  and address  Patient's insurance is faxing over new sheet to change the ordering physician

## 2020-04-10 ENCOUNTER — DOCUMENTATION (OUTPATIENT)
Dept: ENDOCRINOLOGY | Facility: HOSPITAL | Age: 65
End: 2020-04-10

## 2020-06-28 LAB
ALBUMIN SERPL-MCNC: 4.4 G/DL (ref 3.8–4.8)
ALBUMIN/GLOB SERPL: 1.8 {RATIO} (ref 1.2–2.2)
ALP SERPL-CCNC: 58 IU/L (ref 39–117)
ALT SERPL-CCNC: 22 IU/L (ref 0–44)
AST SERPL-CCNC: 17 IU/L (ref 0–40)
BILIRUB SERPL-MCNC: 0.4 MG/DL (ref 0–1.2)
BUN SERPL-MCNC: 18 MG/DL (ref 8–27)
BUN/CREAT SERPL: 17 (ref 10–24)
CALCIUM SERPL-MCNC: 8.9 MG/DL (ref 8.6–10.2)
CHLORIDE SERPL-SCNC: 104 MMOL/L (ref 96–106)
CO2 SERPL-SCNC: 26 MMOL/L (ref 20–29)
CREAT SERPL-MCNC: 1.04 MG/DL (ref 0.76–1.27)
EST. AVERAGE GLUCOSE BLD GHB EST-MCNC: 157 MG/DL
GLOBULIN SER-MCNC: 2.4 G/DL (ref 1.5–4.5)
GLUCOSE SERPL-MCNC: 90 MG/DL (ref 65–99)
HBA1C MFR BLD: 7.1 % (ref 4.8–5.6)
POTASSIUM SERPL-SCNC: 4 MMOL/L (ref 3.5–5.2)
PROT SERPL-MCNC: 6.8 G/DL (ref 6–8.5)
SL AMB EGFR AFRICAN AMERICAN: 87 ML/MIN/1.73
SL AMB EGFR NON AFRICAN AMERICAN: 75 ML/MIN/1.73
SODIUM SERPL-SCNC: 145 MMOL/L (ref 134–144)

## 2020-06-28 PROCEDURE — 3051F HG A1C>EQUAL 7.0%<8.0%: CPT | Performed by: NURSE PRACTITIONER

## 2020-07-01 ENCOUNTER — TELEPHONE (OUTPATIENT)
Dept: ENDOCRINOLOGY | Facility: HOSPITAL | Age: 65
End: 2020-07-01

## 2020-07-01 ENCOUNTER — TELEMEDICINE (OUTPATIENT)
Dept: ENDOCRINOLOGY | Facility: HOSPITAL | Age: 65
End: 2020-07-01
Payer: COMMERCIAL

## 2020-07-01 DIAGNOSIS — E78.5 HYPERLIPIDEMIA, UNSPECIFIED HYPERLIPIDEMIA TYPE: ICD-10-CM

## 2020-07-01 DIAGNOSIS — E55.9 VITAMIN D DEFICIENCY: ICD-10-CM

## 2020-07-01 DIAGNOSIS — IMO0002 UNCONTROLLED TYPE 2 DIABETES MELLITUS WITH COMPLICATION, WITH LONG-TERM CURRENT USE OF INSULIN: ICD-10-CM

## 2020-07-01 DIAGNOSIS — I10 ESSENTIAL HYPERTENSION: Primary | ICD-10-CM

## 2020-07-01 PROCEDURE — 3080F DIAST BP >= 90 MM HG: CPT | Performed by: NURSE PRACTITIONER

## 2020-07-01 PROCEDURE — 1036F TOBACCO NON-USER: CPT | Performed by: NURSE PRACTITIONER

## 2020-07-01 PROCEDURE — 2022F DILAT RTA XM EVC RTNOPTHY: CPT | Performed by: NURSE PRACTITIONER

## 2020-07-01 PROCEDURE — 99214 OFFICE O/P EST MOD 30 MIN: CPT | Performed by: NURSE PRACTITIONER

## 2020-07-01 PROCEDURE — 4040F PNEUMOC VAC/ADMIN/RCVD: CPT | Performed by: NURSE PRACTITIONER

## 2020-07-01 PROCEDURE — 1160F RVW MEDS BY RX/DR IN RCRD: CPT | Performed by: NURSE PRACTITIONER

## 2020-07-01 PROCEDURE — 3077F SYST BP >= 140 MM HG: CPT | Performed by: NURSE PRACTITIONER

## 2020-07-01 NOTE — PATIENT INSTRUCTIONS
Continue Humulin  insulin at current dose      Check blood sugars 3-4 times daily and send record to the office in 2 weeks for review       Contact the office with any consistent episodes of hypoglycemia       Continue your antihypertensive medications       Use your CPAP consistently      Continue to supplement with 4000 units of vitamin D3 daily

## 2020-07-01 NOTE — PROGRESS NOTES
Virtual Regular Visit      Assessment/Plan:    Problem List Items Addressed This Visit        Cardiovascular and Mediastinum    Essential hypertension - Primary       Other    Hyperlipidemia      Other Visit Diagnoses     Vitamin D deficiency        Uncontrolled type 2 diabetes mellitus with complication, with long-term current use of insulin ([address] )        Relevant Medications    insulin regular (HumuLIN R,NovoLIN R) 100 units/mL injection               Reason for visit is uncontrolled type 2 diabetes with hypertension, hyperlipidemia and obstructive sleep apnea  Encounter provider NIXON Griffin    Provider located at [address] [place] [address] [address] [place] [address]      Recent Visits  No visits were found meeting these conditions  Showing recent visits within past 7 days and meeting all other requirements     Today's Visits  Date Type Provider Dept   07/01/20 Telemedicine NIXON [facility]   Showing today's visits and meeting all other requirements     Future Appointments  No visits were found meeting these conditions  Showing future appointments within next 150 days and meeting all other requirements        The patient was identified by name and date of birth  Kieran Crow  was informed that this is a telemedicine visit and that the visit is being conducted through telephone  My office door was closed  No one else was in the room  He acknowledged consent and understanding of privacy and security of the video platform  The patient has agreed to participate and understands they can discontinue the visit at any time  Patient is aware this is a billable service  Subjective  Kieran Crow  is a 71 y o  male for follow up of Type 2 Diabetes   He states he has been diagnosed with Type 2 Diabetes for approximately 22 years and is checking blood glucose readings fairly regularly  He denies episodes of hypoglycemia, polydipsia, polyphagia and polyuria     Current Diabetic medication regimen is as follows:  Metformin 500 mg twice daily  Humulin  - 100 units at meals  His most recent hemoglobin A1c from June 27, 2020 is 7  1    He states he is up to date with his annual diabetic eye exam, most recent was July 2019, and denies retinopathy  Wilmer Rodriguez states he does have cataracts and glaucoma   He complains about numbness, tingling and dryness to his feet and follows [place] pod[facility] for regular diabetic foot care every 12 weeks      For his hypertension, he takes amlodipine 10 mg daily, furosemide 20 mg daily, quinapril 40 mg daily and metoprolol 50 mg twice daily  Wilmer Rodriguez states he checks his blood pressure at home      His hyperlipidemia is treated with atorvastatin 80 mg daily and fish oil 1 g daily      For his obstructive sleep apnea, he is utilizing CPAP more regularly recently than in the past      For his vitamin-D deficiency, he supplements with 4000 units of vitamin D3 daily  Jenna HERNANDEZ     Past Medical History:   Diagnosis Date    Arthritis     Asthma     BPH (benign prostatic hyperplasia)     Cardiac arrhythmia     resolved [id number]    Cardiac disease     Chronic a-fib (HCC)     Chronic pain     CVA [facility]cere[facility] ) 11/2015    Dextrocardia     Diabetes [facility]     GERD (gastroesophageal reflux disease)     Hyperlipidemia     Hypertension     Renal disorder     kidney stone    Sleep apnea     Stroke [facility]cerebrum[facility])        Past Surgical History:   Procedure Laterality Date    CHOLECYSTECTOMY      EXPLORATORY LAPAROTOMY  1971    GALLBLADDER SURGERY      [place] COLONOSCOPY FLX DX W/COLLJ SPEC WHEN PFRMD N/A 2/21/2018    Procedure: COLONOSCOPY;  Surgeon: Jaya Porter MD;  Location:  MAIN OR;  Service: Gastroenterology    UMBILICAL HERNIA REPAIR  2007       Current Outpatient Medications   Medication Sig Dispense Refill    acetaminophen (TYLENOL) 500 mg tablet Take 500 mg by mouth every 6 (six) hours as needed for mild pain      amLODIPine (NORVASC) 10 mg tablet TAKE 1 TABLET BY MOUTH ONCE DAILY 90 tablet 3    apixaban (ELIQUIS) 5 mg Take 1 tablet (5 mg total) by mouth 2 (two) times a day 180 tablet 3    atorvastatin (LIPITOR) 40 mg tablet TAKE 1 TABLET BY MOUTH ONCE DAILY 90 tablet 3    B-D UF III MINI PEN NEEDLES 31G X 5 MM MISC       Cholecalciferol (VITAMIN D) 2000 units CAPS Take 5,000 Units by mouth daily       famotidine (PEPCID) 40 MG tablet Take 40 mg by mouth daily      flecainide (TAMBOCOR) 50 mg tablet Take 1 tablet (50 mg total) by mouth every 12 (twelve) hours 180 tablet 3    fluticasone (FLONASE) 50 mcg/act nasal spray 1 spray into each nostril daily      gabapentin (NEURONTIN) 300 mg capsule TAKE 1 CAPSULE BY MOUTH TWICE DAILY 60 capsule 5    insulin regular (HumuLIN R,NovoLIN R) 100 units/mL injection Inject under the skin once 100 units with each meal       metFORMIN (GLUCOPHAGE) 500 mg tablet Take 1 tablet (500 mg total) by mouth 2 (two) times a day with meals 180 tablet 1    metoprolol succinate (TOPROL-XL) 25 mg 24 hr tablet Take 1 tablet (25 mg total) by mouth daily 90 tablet 3    montelukast (SINGULAIR) 10 mg tablet TAKE 1 TABLET BY MOUTH ONCE DAILY 30 tablet 5    Multiple Vitamins-Minerals (CENTRUM SILVER ULTRA MENS) TABS Take 1 tablet by mouth daily      Omega-3 Fatty Acids (FISH OIL) 1,000 mg Take 1,000 mg by mouth daily      quinapril (ACCUPRIL) 40 MG tablet Take 1 tablet (40 mg total) by mouth daily at bedtime 30 tablet 6    tamsulosin (FLOMAX) 0 4 mg Take by mouth daily with dinner        traMADol (ULTRAM) 50 mg tablet Take 1 tablet (50 mg total) by mouth every 8 (eight) hours as needed for moderate pain or severe pain 90 tablet 0    TRUE METRIX BLOOD GLUCOSE TEST test strip       TRUEPLUS LANCETS 33G MISC       insulin glulisine (APIDRA SOLOSTAR) 100 units/mL injection pen 60 with each meal  (Patient not taking: Reported on 7/1/2020) 5 pen     LANTUS SOLOSTAR 100 units/mL injection pen Inject 60 Units under the skin every 12 (twelve) hours (Patient not taking: Reported on 7/1/2020) 40 mL 5     No current facility-administered medications for this visit  Allergies   Allergen Reactions    Banana Shortness Of Breath    Fruit Extracts Anaphylaxis and Throat Swelling     [facility] - [id number]: Bananas    Clotrimazole     Codeine Itching     Other reaction(s): Itching    Hydrocodone-Acetaminophen     Oxycodone Rash and Other (See Comments)     Other reaction(s): Pruritis    Oxycodone-Acetaminophen Rash     Other reaction(s): Pruritis       Review of Systems   Constitutional: Positive for fatigue (At times)  Negative for chills and fever  HENT: Negative  Negative for trouble swallowing and voice change  Eyes: Positive for visual disturbance  Negative for photophobia (floater in right eye), pain, discharge, redness and itching  Respiratory: Negative for cough and shortness of breath  Cardiovascular: Negative for chest pain and palpitations  Gastrointestinal: Negative for abdominal pain, constipation, diarrhea, nausea and vomiting  Endocrine: Negative for cold intolerance, heat intolerance, polydipsia, polyphagia and polyuria  Genitourinary: Negative  Musculoskeletal: Positive for arthralgias, back pain and joint swelling  Skin: Negative  Allergic/Immunologic: Negative  Neurological: Negative for dizziness, syncope, light-headedness and headaches  Hematological: Negative  Psychiatric/Behavioral: Negative  All other systems reviewed and are negative  Video Exam  It was my intent to perform this visit via video technology but the patient was not able to do a video connection so the visit was completed via audio telephone only  There were no vitals filed for this visit      Physical Exam (not available-phone visit)    Plan:  1   Uncontrolled type 2 diabetes with hyperglycemia and long-term insulin use:   His most recent hemoglobin A1c has improved to 7  1   Isa Urias asked him to check his blood sugars 4 times daily and provide a record to the office in 2 weeks for review so that adjustments may be made to his regimen   Discussed the impact of diet and weight loss on the treatment of his diabetes  Check hemoglobin A1c prior to next visit      2   Hypertension: For now, continue current medication regimen   Continue to monitor at home  [facility] comprehensive metabolic panel prior to next visit      3   Hyperlipidemia:  Continue atorvastatin and fish oil       4   Obstructive sleep apnea:  Continue CPAP on a regular basis      5   Vitamin-D deficiency:  Continue supplementation with vitamin D3 daily  As a result of this visit, I have not referred the patient for further respiratory evaluation  I spent 30 minutes directly with the patient during this visit      VIRTUAL VISIT DISCLAIMER    Santana Nickerson  acknowledges that he has consented to an online visit or consultation  He understands that the online visit is based solely on information provided by him, and that, in the absence of a face-to-face physical evaluation by the physician, the diagnosis he receives is both limited and provisional in terms of accuracy and completeness  This is not intended to replace a full medical face-to-face evaluation by the physician  Santana Nickerson  understands and accepts these terms  Established outpatient follow-up [id number]

## 2020-07-07 DIAGNOSIS — IMO0002 UNCONTROLLED TYPE 2 DIABETES MELLITUS WITH COMPLICATION, WITH LONG-TERM CURRENT USE OF INSULIN: Primary | ICD-10-CM

## 2020-07-17 DIAGNOSIS — E78.5 DYSLIPIDEMIA: ICD-10-CM

## 2020-07-17 RX ORDER — ATORVASTATIN CALCIUM 40 MG/1
TABLET, FILM COATED ORAL
Qty: 90 TABLET | Refills: 0 | Status: SHIPPED | OUTPATIENT
Start: 2020-07-17 | End: 2020-01-01

## 2021-01-01 ENCOUNTER — APPOINTMENT (INPATIENT)
Dept: NON INVASIVE DIAGNOSTICS | Facility: HOSPITAL | Age: 66
DRG: 871 | End: 2021-01-01
Payer: COMMERCIAL

## 2021-01-01 ENCOUNTER — APPOINTMENT (INPATIENT)
Dept: RADIOLOGY | Facility: HOSPITAL | Age: 66
DRG: 871 | End: 2021-01-01
Payer: COMMERCIAL

## 2021-01-01 ENCOUNTER — HOSPITAL ENCOUNTER (INPATIENT)
Facility: HOSPITAL | Age: 66
LOS: 4 days | DRG: 871 | End: 2021-08-25
Attending: EMERGENCY MEDICINE
Payer: COMMERCIAL

## 2021-01-01 ENCOUNTER — OFFICE VISIT (OUTPATIENT)
Dept: ENDOCRINOLOGY | Facility: HOSPITAL | Age: 66
End: 2021-01-01
Payer: COMMERCIAL

## 2021-01-01 ENCOUNTER — TELEPHONE (OUTPATIENT)
Dept: FAMILY MEDICINE CLINIC | Facility: CLINIC | Age: 66
End: 2021-01-01

## 2021-01-01 ENCOUNTER — TELEPHONE (OUTPATIENT)
Dept: CARDIOLOGY CLINIC | Facility: CLINIC | Age: 66
End: 2021-01-01

## 2021-01-01 ENCOUNTER — DOCUMENTATION (OUTPATIENT)
Dept: ENDOCRINOLOGY | Facility: HOSPITAL | Age: 66
End: 2021-01-01

## 2021-01-01 ENCOUNTER — APPOINTMENT (INPATIENT)
Dept: CT IMAGING | Facility: HOSPITAL | Age: 66
DRG: 871 | End: 2021-01-01
Payer: COMMERCIAL

## 2021-01-01 ENCOUNTER — TELEPHONE (OUTPATIENT)
Dept: ENDOCRINOLOGY | Facility: HOSPITAL | Age: 66
End: 2021-01-01

## 2021-01-01 ENCOUNTER — HOSPITAL ENCOUNTER (OUTPATIENT)
Dept: NON INVASIVE DIAGNOSTICS | Facility: HOSPITAL | Age: 66
Discharge: HOME | End: 2021-02-22
Payer: COMMERCIAL

## 2021-01-01 ENCOUNTER — APPOINTMENT (INPATIENT)
Dept: NEUROLOGY | Facility: HOSPITAL | Age: 66
DRG: 871 | End: 2021-01-01
Payer: COMMERCIAL

## 2021-01-01 ENCOUNTER — TELEMEDICINE (OUTPATIENT)
Dept: ENDOCRINOLOGY | Facility: HOSPITAL | Age: 66
End: 2021-01-01
Payer: COMMERCIAL

## 2021-01-01 ENCOUNTER — OFFICE VISIT (OUTPATIENT)
Dept: CARDIOLOGY CLINIC | Facility: CLINIC | Age: 66
End: 2021-01-01
Payer: COMMERCIAL

## 2021-01-01 VITALS
DIASTOLIC BLOOD PRESSURE: 68 MMHG | BODY MASS INDEX: 45.1 KG/M2 | SYSTOLIC BLOOD PRESSURE: 170 MMHG | WEIGHT: 315 LBS | HEART RATE: 85 BPM | HEIGHT: 70 IN

## 2021-01-01 VITALS
WEIGHT: 315 LBS | SYSTOLIC BLOOD PRESSURE: 156 MMHG | HEART RATE: 80 BPM | BODY MASS INDEX: 45.1 KG/M2 | DIASTOLIC BLOOD PRESSURE: 70 MMHG | HEIGHT: 70 IN

## 2021-01-01 VITALS
OXYGEN SATURATION: 97 % | SYSTOLIC BLOOD PRESSURE: 131 MMHG | HEART RATE: 68 BPM | TEMPERATURE: 100.3 F | HEIGHT: 70 IN | RESPIRATION RATE: 14 BRPM | WEIGHT: 315 LBS | BODY MASS INDEX: 45.1 KG/M2 | DIASTOLIC BLOOD PRESSURE: 59 MMHG

## 2021-01-01 DIAGNOSIS — E11.8 TYPE 2 DIABETES MELLITUS WITH COMPLICATION, WITHOUT LONG-TERM CURRENT USE OF INSULIN (HCC): Primary | ICD-10-CM

## 2021-01-01 DIAGNOSIS — I50.9 ACUTE DECOMPENSATED HEART FAILURE (HCC): ICD-10-CM

## 2021-01-01 DIAGNOSIS — I48.0 PAROXYSMAL ATRIAL FIBRILLATION (HCC): Primary | ICD-10-CM

## 2021-01-01 DIAGNOSIS — E78.5 DYSLIPIDEMIA: ICD-10-CM

## 2021-01-01 DIAGNOSIS — I48.0 PAROXYSMAL ATRIAL FIBRILLATION (HCC): ICD-10-CM

## 2021-01-01 DIAGNOSIS — I10 ESSENTIAL HYPERTENSION: ICD-10-CM

## 2021-01-01 DIAGNOSIS — IMO0002 UNCONTROLLED TYPE 2 DIABETES MELLITUS WITH COMPLICATION, WITH LONG-TERM CURRENT USE OF INSULIN: Primary | ICD-10-CM

## 2021-01-01 DIAGNOSIS — G47.33 OBSTRUCTIVE SLEEP APNEA: ICD-10-CM

## 2021-01-01 DIAGNOSIS — E66.01 MORBID OBESITY DUE TO EXCESS CALORIES (HCC): ICD-10-CM

## 2021-01-01 DIAGNOSIS — I46.9 CARDIAC ARREST (HCC): ICD-10-CM

## 2021-01-01 DIAGNOSIS — E11.9 TYPE 2 DIABETES MELLITUS WITHOUT COMPLICATION, WITHOUT LONG-TERM CURRENT USE OF INSULIN (HCC): ICD-10-CM

## 2021-01-01 DIAGNOSIS — E55.9 VITAMIN D DEFICIENCY: ICD-10-CM

## 2021-01-01 DIAGNOSIS — L03.90 CELLULITIS: Primary | ICD-10-CM

## 2021-01-01 DIAGNOSIS — R77.8 ELEVATED TROPONIN: ICD-10-CM

## 2021-01-01 DIAGNOSIS — E78.5 HYPERLIPIDEMIA, UNSPECIFIED HYPERLIPIDEMIA TYPE: ICD-10-CM

## 2021-01-01 DIAGNOSIS — D72.829 LEUKOCYTOSIS: ICD-10-CM

## 2021-01-01 LAB
ALBUMIN SERPL BCP-MCNC: 2.2 G/DL (ref 3.5–5)
ALBUMIN SERPL BCP-MCNC: 3.1 G/DL (ref 3.5–5)
ALBUMIN SERPL BCP-MCNC: 3.7 G/DL (ref 3.5–5)
ALBUMIN SERPL-MCNC: 4.4 G/DL (ref 3.8–4.8)
ALBUMIN SERPL-MCNC: 4.5 G/DL (ref 3.8–4.8)
ALBUMIN/GLOB SERPL: 1.9 {RATIO} (ref 1.2–2.2)
ALBUMIN/GLOB SERPL: 2 {RATIO} (ref 1.2–2.2)
ALP SERPL-CCNC: 56 U/L (ref 46–116)
ALP SERPL-CCNC: 62 U/L (ref 46–116)
ALP SERPL-CCNC: 64 IU/L (ref 48–121)
ALP SERPL-CCNC: 70 IU/L (ref 39–117)
ALP SERPL-CCNC: 77 U/L (ref 46–116)
ALT SERPL W P-5'-P-CCNC: 33 U/L (ref 12–78)
ALT SERPL W P-5'-P-CCNC: 57 U/L (ref 12–78)
ALT SERPL W P-5'-P-CCNC: 70 U/L (ref 12–78)
ALT SERPL-CCNC: 19 IU/L (ref 0–44)
ALT SERPL-CCNC: 23 IU/L (ref 0–44)
ANION GAP SERPL CALCULATED.3IONS-SCNC: 10 MMOL/L (ref 4–13)
ANION GAP SERPL CALCULATED.3IONS-SCNC: 10 MMOL/L (ref 4–13)
ANION GAP SERPL CALCULATED.3IONS-SCNC: 11 MMOL/L (ref 4–13)
ANION GAP SERPL CALCULATED.3IONS-SCNC: 3 MMOL/L (ref 4–13)
ANION GAP SERPL CALCULATED.3IONS-SCNC: 5 MMOL/L (ref 4–13)
ANION GAP SERPL CALCULATED.3IONS-SCNC: 6 MMOL/L (ref 4–13)
ANION GAP SERPL CALCULATED.3IONS-SCNC: 7 MMOL/L (ref 4–13)
ANION GAP SERPL CALCULATED.3IONS-SCNC: 8 MMOL/L (ref 4–13)
APTT PPP: 32 SECONDS (ref 23–37)
APTT PPP: 39 SECONDS (ref 23–37)
APTT PPP: 49 SECONDS (ref 23–37)
APTT PPP: 54 SECONDS (ref 23–37)
APTT PPP: 54 SECONDS (ref 23–37)
APTT PPP: 58 SECONDS (ref 23–37)
APTT PPP: 63 SECONDS (ref 23–37)
APTT PPP: 68 SECONDS (ref 23–37)
APTT PPP: 69 SECONDS (ref 23–37)
ARTERIAL PATENCY WRIST A: 1
ARTERIAL PATENCY WRIST A: 1
AST SERPL W P-5'-P-CCNC: 102 U/L (ref 5–45)
AST SERPL W P-5'-P-CCNC: 18 U/L (ref 5–45)
AST SERPL W P-5'-P-CCNC: 99 U/L (ref 5–45)
AST SERPL-CCNC: 19 IU/L (ref 0–40)
AST SERPL-CCNC: 22 IU/L (ref 0–40)
ATRIAL RATE: 49 BPM
ATRIAL RATE: 70 BPM
ATRIAL RATE: 77 BPM
ATRIAL RATE: 78 BPM
ATRIAL RATE: 90 BPM
BASE EXCESS BLDA CALC-SCNC: 1 MMOL/L (ref -2–3)
BASE EXCESS BLDA CALC-SCNC: 1 MMOL/L (ref -2–3)
BASE EXCESS BLDA CALC-SCNC: 3 MMOL/L (ref -2–3)
BASOPHILS # BLD AUTO: 0.03 THOUSANDS/ΜL (ref 0–0.1)
BASOPHILS # BLD AUTO: 0.03 THOUSANDS/ΜL (ref 0–0.1)
BASOPHILS # BLD AUTO: 0.04 THOUSANDS/ΜL (ref 0–0.1)
BASOPHILS # BLD AUTO: 0.07 THOUSANDS/ΜL (ref 0–0.1)
BASOPHILS # BLD MANUAL: 0 THOUSAND/UL (ref 0–0.1)
BASOPHILS NFR BLD AUTO: 0 % (ref 0–1)
BASOPHILS NFR BLD AUTO: 1 % (ref 0–1)
BASOPHILS NFR MAR MANUAL: 0 % (ref 0–1)
BILIRUB SERPL-MCNC: 0.5 MG/DL (ref 0–1.2)
BILIRUB SERPL-MCNC: 0.7 MG/DL (ref 0–1.2)
BILIRUB SERPL-MCNC: 1 MG/DL (ref 0.2–1)
BILIRUB SERPL-MCNC: 1.1 MG/DL (ref 0.2–1)
BILIRUB SERPL-MCNC: 1.2 MG/DL (ref 0.2–1)
BUN SERPL-MCNC: 16 MG/DL (ref 5–25)
BUN SERPL-MCNC: 16 MG/DL (ref 5–25)
BUN SERPL-MCNC: 16 MG/DL (ref 8–27)
BUN SERPL-MCNC: 16 MG/DL (ref 8–27)
BUN SERPL-MCNC: 21 MG/DL (ref 5–25)
BUN SERPL-MCNC: 21 MG/DL (ref 5–25)
BUN SERPL-MCNC: 26 MG/DL (ref 5–25)
BUN SERPL-MCNC: 27 MG/DL (ref 5–25)
BUN SERPL-MCNC: 31 MG/DL (ref 5–25)
BUN SERPL-MCNC: 34 MG/DL (ref 5–25)
BUN SERPL-MCNC: 36 MG/DL (ref 5–25)
BUN SERPL-MCNC: 40 MG/DL (ref 5–25)
BUN/CREAT SERPL: 16 (ref 10–24)
BUN/CREAT SERPL: 17 (ref 10–24)
CA-I BLD-SCNC: 2.14 MMOL/L (ref 1.12–1.32)
CALCIUM ALBUM COR SERPL-MCNC: 22 MG/DL (ref 8.3–10.1)
CALCIUM ALBUM COR SERPL-MCNC: 8.9 MG/DL (ref 8.3–10.1)
CALCIUM SERPL-MCNC: 20.6 MG/DL (ref 8.3–10.1)
CALCIUM SERPL-MCNC: 7.4 MG/DL (ref 8.3–10.1)
CALCIUM SERPL-MCNC: 7.5 MG/DL (ref 8.3–10.1)
CALCIUM SERPL-MCNC: 7.6 MG/DL (ref 8.3–10.1)
CALCIUM SERPL-MCNC: 7.6 MG/DL (ref 8.3–10.1)
CALCIUM SERPL-MCNC: 7.8 MG/DL (ref 8.3–10.1)
CALCIUM SERPL-MCNC: 7.9 MG/DL (ref 8.3–10.1)
CALCIUM SERPL-MCNC: 8 MG/DL (ref 8.3–10.1)
CALCIUM SERPL-MCNC: 8.2 MG/DL (ref 8.3–10.1)
CALCIUM SERPL-MCNC: 8.3 MG/DL (ref 8.3–10.1)
CALCIUM SERPL-MCNC: 8.8 MG/DL (ref 8.6–10.2)
CALCIUM SERPL-MCNC: 9.2 MG/DL (ref 8.6–10.2)
CHLORIDE SERPL-SCNC: 101 MMOL/L (ref 100–108)
CHLORIDE SERPL-SCNC: 102 MMOL/L (ref 100–108)
CHLORIDE SERPL-SCNC: 103 MMOL/L (ref 100–108)
CHLORIDE SERPL-SCNC: 103 MMOL/L (ref 96–106)
CHLORIDE SERPL-SCNC: 117 MMOL/L (ref 100–108)
CHLORIDE SERPL-SCNC: 99 MMOL/L (ref 96–106)
CHOLEST SERPL-MCNC: 133 MG/DL (ref 100–199)
CO2 SERPL-SCNC: 26 MMOL/L (ref 21–32)
CO2 SERPL-SCNC: 26 MMOL/L (ref 21–32)
CO2 SERPL-SCNC: 27 MMOL/L (ref 21–32)
CO2 SERPL-SCNC: 28 MMOL/L (ref 20–29)
CO2 SERPL-SCNC: 28 MMOL/L (ref 20–29)
CO2 SERPL-SCNC: 28 MMOL/L (ref 21–32)
CO2 SERPL-SCNC: 28 MMOL/L (ref 21–32)
CO2 SERPL-SCNC: 30 MMOL/L (ref 21–32)
CO2 SERPL-SCNC: 31 MMOL/L (ref 21–32)
CO2 SERPL-SCNC: 32 MMOL/L (ref 21–32)
CREAT SERPL-MCNC: 0.94 MG/DL (ref 0.76–1.27)
CREAT SERPL-MCNC: 0.97 MG/DL (ref 0.76–1.27)
CREAT SERPL-MCNC: 0.99 MG/DL (ref 0.6–1.3)
CREAT SERPL-MCNC: 1.02 MG/DL (ref 0.6–1.3)
CREAT SERPL-MCNC: 1.08 MG/DL (ref 0.6–1.3)
CREAT SERPL-MCNC: 1.46 MG/DL (ref 0.6–1.3)
CREAT SERPL-MCNC: 1.49 MG/DL (ref 0.6–1.3)
CREAT SERPL-MCNC: 1.66 MG/DL (ref 0.6–1.3)
CREAT SERPL-MCNC: 1.71 MG/DL (ref 0.6–1.3)
CREAT SERPL-MCNC: 1.73 MG/DL (ref 0.6–1.3)
CREAT SERPL-MCNC: 1.78 MG/DL (ref 0.6–1.3)
CREAT SERPL-MCNC: 2.07 MG/DL (ref 0.6–1.3)
DS:DELIVERY SYSTEM: 528
EOSINOPHIL # BLD AUTO: 0.01 THOUSAND/ΜL (ref 0–0.61)
EOSINOPHIL # BLD AUTO: 0.02 THOUSAND/ΜL (ref 0–0.61)
EOSINOPHIL # BLD AUTO: 0.02 THOUSAND/ΜL (ref 0–0.61)
EOSINOPHIL # BLD AUTO: 0.17 THOUSAND/ΜL (ref 0–0.61)
EOSINOPHIL # BLD MANUAL: 0.11 THOUSAND/UL (ref 0–0.4)
EOSINOPHIL NFR BLD AUTO: 0 % (ref 0–6)
EOSINOPHIL NFR BLD AUTO: 2 % (ref 0–6)
EOSINOPHIL NFR BLD MANUAL: 1 % (ref 0–6)
ERYTHROCYTE [DISTWIDTH] IN BLOOD BY AUTOMATED COUNT: 13.8 % (ref 11.6–15.1)
ERYTHROCYTE [DISTWIDTH] IN BLOOD BY AUTOMATED COUNT: 13.9 % (ref 11.6–15.1)
ERYTHROCYTE [DISTWIDTH] IN BLOOD BY AUTOMATED COUNT: 14 % (ref 11.6–15.1)
ERYTHROCYTE [DISTWIDTH] IN BLOOD BY AUTOMATED COUNT: 14 % (ref 11.6–15.1)
ERYTHROCYTE [DISTWIDTH] IN BLOOD BY AUTOMATED COUNT: 14.1 % (ref 11.6–15.1)
ERYTHROCYTE [DISTWIDTH] IN BLOOD BY AUTOMATED COUNT: 14.1 % (ref 11.6–15.1)
EST. AVERAGE GLUCOSE BLD GHB EST-MCNC: 166 MG/DL
EST. AVERAGE GLUCOSE BLD GHB EST-MCNC: 214 MG/DL
FIO2 GAS DIL.REBREATH: 100 L
FIO2 GAS DIL.REBREATH: 70 L
FIO2 GAS DIL.REBREATH: 90 L
GFR SERPL CREATININE-BSD FRML MDRD: 32 ML/MIN/1.73SQ M
GFR SERPL CREATININE-BSD FRML MDRD: 39 ML/MIN/1.73SQ M
GFR SERPL CREATININE-BSD FRML MDRD: 40 ML/MIN/1.73SQ M
GFR SERPL CREATININE-BSD FRML MDRD: 41 ML/MIN/1.73SQ M
GFR SERPL CREATININE-BSD FRML MDRD: 42 ML/MIN/1.73SQ M
GFR SERPL CREATININE-BSD FRML MDRD: 48 ML/MIN/1.73SQ M
GFR SERPL CREATININE-BSD FRML MDRD: 49 ML/MIN/1.73SQ M
GFR SERPL CREATININE-BSD FRML MDRD: 71 ML/MIN/1.73SQ M
GFR SERPL CREATININE-BSD FRML MDRD: 76 ML/MIN/1.73SQ M
GFR SERPL CREATININE-BSD FRML MDRD: 78 ML/MIN/1.73SQ M
GLOBULIN SER-MCNC: 2.2 G/DL (ref 1.5–4.5)
GLOBULIN SER-MCNC: 2.4 G/DL (ref 1.5–4.5)
GLUCOSE SERPL-MCNC: 101 MG/DL (ref 65–140)
GLUCOSE SERPL-MCNC: 113 MG/DL (ref 65–99)
GLUCOSE SERPL-MCNC: 143 MG/DL (ref 65–99)
GLUCOSE SERPL-MCNC: 149 MG/DL (ref 65–140)
GLUCOSE SERPL-MCNC: 150 MG/DL (ref 65–140)
GLUCOSE SERPL-MCNC: 152 MG/DL (ref 65–140)
GLUCOSE SERPL-MCNC: 154 MG/DL (ref 65–140)
GLUCOSE SERPL-MCNC: 157 MG/DL (ref 65–140)
GLUCOSE SERPL-MCNC: 163 MG/DL (ref 65–140)
GLUCOSE SERPL-MCNC: 164 MG/DL (ref 65–140)
GLUCOSE SERPL-MCNC: 168 MG/DL (ref 65–140)
GLUCOSE SERPL-MCNC: 171 MG/DL (ref 65–140)
GLUCOSE SERPL-MCNC: 180 MG/DL (ref 65–140)
GLUCOSE SERPL-MCNC: 185 MG/DL (ref 65–140)
GLUCOSE SERPL-MCNC: 192 MG/DL (ref 65–140)
GLUCOSE SERPL-MCNC: 201 MG/DL (ref 65–140)
GLUCOSE SERPL-MCNC: 216 MG/DL (ref 65–140)
GLUCOSE SERPL-MCNC: 231 MG/DL (ref 65–140)
GLUCOSE SERPL-MCNC: 238 MG/DL (ref 65–140)
GLUCOSE SERPL-MCNC: 267 MG/DL (ref 65–140)
GLUCOSE SERPL-MCNC: 268 MG/DL (ref 65–140)
GLUCOSE SERPL-MCNC: 271 MG/DL (ref 65–140)
GLUCOSE SERPL-MCNC: 281 MG/DL (ref 65–140)
GLUCOSE SERPL-MCNC: 287 MG/DL (ref 65–140)
GLUCOSE SERPL-MCNC: 293 MG/DL (ref 65–140)
GLUCOSE SERPL-MCNC: 293 MG/DL (ref 65–140)
GLUCOSE SERPL-MCNC: 315 MG/DL (ref 65–140)
GLUCOSE SERPL-MCNC: 322 MG/DL (ref 65–140)
GLUCOSE SERPL-MCNC: 333 MG/DL (ref 65–140)
GLUCOSE SERPL-MCNC: 334 MG/DL (ref 65–140)
GLUCOSE SERPL-MCNC: 350 MG/DL (ref 65–140)
GLUCOSE SERPL-MCNC: 358 MG/DL (ref 65–140)
GLUCOSE SERPL-MCNC: 367 MG/DL (ref 65–140)
GLUCOSE SERPL-MCNC: 373 MG/DL (ref 65–140)
HBA1C MFR BLD: 7.4 % (ref 4.8–5.6)
HBA1C MFR BLD: 9.1 % (ref 4.8–5.6)
HCO3 BLDA-SCNC: 26.5 MMOL/L (ref 24–30)
HCO3 BLDA-SCNC: 27.6 MMOL/L (ref 24–30)
HCO3 BLDA-SCNC: 30.6 MMOL/L (ref 22–28)
HCT VFR BLD AUTO: 35.8 % (ref 36.5–49.3)
HCT VFR BLD AUTO: 36.9 % (ref 36.5–49.3)
HCT VFR BLD AUTO: 37.8 % (ref 36.5–49.3)
HCT VFR BLD AUTO: 39.9 % (ref 36.5–49.3)
HCT VFR BLD AUTO: 40.4 % (ref 36.5–49.3)
HCT VFR BLD AUTO: 40.5 % (ref 36.5–49.3)
HCT VFR BLD CALC: 33 % (ref 36.5–49.3)
HCT VFR BLD CALC: 37 % (ref 36.5–49.3)
HCT VFR BLD CALC: 41 % (ref 36.5–49.3)
HDLC SERPL-MCNC: 52 MG/DL
HGB BLD-MCNC: 11.2 G/DL (ref 12–17)
HGB BLD-MCNC: 11.8 G/DL (ref 12–17)
HGB BLD-MCNC: 12.4 G/DL (ref 12–17)
HGB BLD-MCNC: 13.2 G/DL (ref 12–17)
HGB BLD-MCNC: 13.2 G/DL (ref 12–17)
HGB BLD-MCNC: 13.3 G/DL (ref 12–17)
HGB BLDA-MCNC: 11.2 G/DL (ref 12–17)
HGB BLDA-MCNC: 12.6 G/DL (ref 12–17)
HGB BLDA-MCNC: 13.9 G/DL (ref 12–17)
IMM GRANULOCYTES # BLD AUTO: 0.06 THOUSAND/UL (ref 0–0.2)
IMM GRANULOCYTES # BLD AUTO: 0.09 THOUSAND/UL (ref 0–0.2)
IMM GRANULOCYTES # BLD AUTO: 0.13 THOUSAND/UL (ref 0–0.2)
IMM GRANULOCYTES # BLD AUTO: 0.16 THOUSAND/UL (ref 0–0.2)
IMM GRANULOCYTES NFR BLD AUTO: 0 % (ref 0–2)
IMM GRANULOCYTES NFR BLD AUTO: 1 % (ref 0–2)
IMM GRANULOCYTES NFR BLD AUTO: 1 % (ref 0–2)
IMM GRANULOCYTES NFR BLD AUTO: 2 % (ref 0–2)
INR PPP: 1.23 (ref 0.84–1.19)
INR PPP: 1.59 (ref 0.84–1.19)
LACTATE SERPL-SCNC: 1.2 MMOL/L (ref 0.5–2)
LACTATE SERPL-SCNC: 1.9 MMOL/L (ref 0.5–2)
LACTATE SERPL-SCNC: 2.3 MMOL/L (ref 0.5–2)
LACTATE SERPL-SCNC: 2.7 MMOL/L (ref 0.5–2)
LACTATE SERPL-SCNC: 2.8 MMOL/L (ref 0.5–2)
LACTATE SERPL-SCNC: 8.2 MMOL/L (ref 0.5–2)
LDLC SERPL CALC-MCNC: 63 MG/DL (ref 0–99)
LIPASE SERPL-CCNC: 60 U/L (ref 73–393)
LYMPHOCYTES # BLD AUTO: 1.09 THOUSANDS/ΜL (ref 0.6–4.47)
LYMPHOCYTES # BLD AUTO: 1.09 THOUSANDS/ΜL (ref 0.6–4.47)
LYMPHOCYTES # BLD AUTO: 1.1 THOUSANDS/ΜL (ref 0.6–4.47)
LYMPHOCYTES # BLD AUTO: 1.17 THOUSANDS/ΜL (ref 0.6–4.47)
LYMPHOCYTES # BLD AUTO: 4.5 THOUSAND/UL (ref 0.6–4.47)
LYMPHOCYTES # BLD AUTO: 42 % (ref 14–44)
LYMPHOCYTES NFR BLD AUTO: 10 % (ref 14–44)
LYMPHOCYTES NFR BLD AUTO: 7 % (ref 14–44)
LYMPHOCYTES NFR BLD AUTO: 8 % (ref 14–44)
LYMPHOCYTES NFR BLD AUTO: 9 % (ref 14–44)
MAGNESIUM SERPL-MCNC: 1.4 MG/DL (ref 1.6–2.6)
MAGNESIUM SERPL-MCNC: 1.7 MG/DL (ref 1.6–2.6)
MAGNESIUM SERPL-MCNC: 2 MG/DL (ref 1.6–2.6)
MAGNESIUM SERPL-MCNC: 2.2 MG/DL (ref 1.6–2.6)
MAGNESIUM SERPL-MCNC: 2.3 MG/DL (ref 1.6–2.6)
MCH RBC QN AUTO: 30.3 PG (ref 26.8–34.3)
MCH RBC QN AUTO: 30.3 PG (ref 26.8–34.3)
MCH RBC QN AUTO: 30.5 PG (ref 26.8–34.3)
MCH RBC QN AUTO: 30.8 PG (ref 26.8–34.3)
MCH RBC QN AUTO: 30.9 PG (ref 26.8–34.3)
MCH RBC QN AUTO: 32 PG (ref 26.8–34.3)
MCHC RBC AUTO-ENTMCNC: 31.3 G/DL (ref 31.4–37.4)
MCHC RBC AUTO-ENTMCNC: 32 G/DL (ref 31.4–37.4)
MCHC RBC AUTO-ENTMCNC: 32.7 G/DL (ref 31.4–37.4)
MCHC RBC AUTO-ENTMCNC: 32.8 G/DL (ref 31.4–37.4)
MCHC RBC AUTO-ENTMCNC: 32.8 G/DL (ref 31.4–37.4)
MCHC RBC AUTO-ENTMCNC: 33.1 G/DL (ref 31.4–37.4)
MCV RBC AUTO: 93 FL (ref 82–98)
MCV RBC AUTO: 94 FL (ref 82–98)
MCV RBC AUTO: 95 FL (ref 82–98)
MCV RBC AUTO: 95 FL (ref 82–98)
MCV RBC AUTO: 97 FL (ref 82–98)
MCV RBC AUTO: 97 FL (ref 82–98)
METAMYELOCYTES NFR BLD MANUAL: 6 % (ref 0–1)
MONOCYTES # BLD AUTO: 0.82 THOUSAND/ΜL (ref 0.17–1.22)
MONOCYTES # BLD AUTO: 0.86 THOUSAND/UL (ref 0–1.22)
MONOCYTES # BLD AUTO: 1.06 THOUSAND/ΜL (ref 0.17–1.22)
MONOCYTES # BLD AUTO: 1.16 THOUSAND/ΜL (ref 0.17–1.22)
MONOCYTES # BLD AUTO: 1.39 THOUSAND/ΜL (ref 0.17–1.22)
MONOCYTES NFR BLD AUTO: 10 % (ref 4–12)
MONOCYTES NFR BLD AUTO: 11 % (ref 4–12)
MONOCYTES NFR BLD AUTO: 5 % (ref 4–12)
MONOCYTES NFR BLD AUTO: 8 % (ref 4–12)
MONOCYTES NFR BLD: 8 % (ref 4–12)
MYELOCYTES NFR BLD MANUAL: 1 % (ref 0–1)
NEUTROPHILS # BLD AUTO: 10.5 THOUSANDS/ΜL (ref 1.85–7.62)
NEUTROPHILS # BLD AUTO: 11.83 THOUSANDS/ΜL (ref 1.85–7.62)
NEUTROPHILS # BLD AUTO: 14.4 THOUSANDS/ΜL (ref 1.85–7.62)
NEUTROPHILS # BLD AUTO: 8.35 THOUSANDS/ΜL (ref 1.85–7.62)
NEUTROPHILS # BLD MANUAL: 4.5 THOUSAND/UL (ref 1.85–7.62)
NEUTS BAND NFR BLD MANUAL: 12 % (ref 0–8)
NEUTS SEG NFR BLD AUTO: 30 % (ref 43–75)
NEUTS SEG NFR BLD AUTO: 74 % (ref 43–75)
NEUTS SEG NFR BLD AUTO: 81 % (ref 43–75)
NEUTS SEG NFR BLD AUTO: 82 % (ref 43–75)
NEUTS SEG NFR BLD AUTO: 88 % (ref 43–75)
NRBC BLD AUTO-RTO: 0 /100 WBCS
NRBC BLD AUTO-RTO: 1 /100 WBC (ref 0–2)
NT-PROBNP SERPL-MCNC: 294 PG/ML
P AXIS: 207 DEGREES
P AXIS: 207 DEGREES
PCO2 BLD: 28 MMOL/L (ref 21–32)
PCO2 BLD: 29 MMOL/L (ref 21–32)
PCO2 BLD: 32 MMOL/L (ref 21–32)
PCO2 BLD: 43.8 MM HG (ref 42–50)
PCO2 BLD: 52.2 MM HG (ref 42–50)
PCO2 BLD: 63.7 MM HG (ref 36–44)
PH BLD: 7.29 [PH] (ref 7.35–7.45)
PH BLD: 7.33 [PH] (ref 7.3–7.4)
PH BLD: 7.39 [PH] (ref 7.3–7.4)
PHOSPHATE SERPL-MCNC: 3.1 MG/DL (ref 2.3–4.1)
PHOSPHATE SERPL-MCNC: 3.4 MG/DL (ref 2.3–4.1)
PHOSPHATE SERPL-MCNC: 4.2 MG/DL (ref 2.3–4.1)
PLATELET # BLD AUTO: 158 THOUSANDS/UL (ref 149–390)
PLATELET # BLD AUTO: 193 THOUSANDS/UL (ref 149–390)
PLATELET # BLD AUTO: 198 THOUSANDS/UL (ref 149–390)
PLATELET # BLD AUTO: 201 THOUSANDS/UL (ref 149–390)
PLATELET # BLD AUTO: 201 THOUSANDS/UL (ref 149–390)
PLATELET # BLD AUTO: 25 THOUSANDS/UL (ref 149–390)
PLATELET BLD QL SMEAR: ABNORMAL
PMV BLD AUTO: 10.6 FL (ref 8.9–12.7)
PMV BLD AUTO: 10.7 FL (ref 8.9–12.7)
PMV BLD AUTO: 10.8 FL (ref 8.9–12.7)
PMV BLD AUTO: 11 FL (ref 8.9–12.7)
PMV BLD AUTO: 11 FL (ref 8.9–12.7)
PMV BLD AUTO: 12.2 FL (ref 8.9–12.7)
PO2 BLD: 107 MM HG (ref 35–45)
PO2 BLD: 78 MM HG (ref 75–129)
PO2 BLD: 86 MM HG (ref 35–45)
POTASSIUM BLD-SCNC: 3 MMOL/L (ref 3.5–5.3)
POTASSIUM BLD-SCNC: 3.9 MMOL/L (ref 3.5–5.3)
POTASSIUM BLD-SCNC: 4.5 MMOL/L (ref 3.5–5.3)
POTASSIUM SERPL-SCNC: 3 MMOL/L (ref 3.5–5.3)
POTASSIUM SERPL-SCNC: 3.7 MMOL/L (ref 3.5–5.3)
POTASSIUM SERPL-SCNC: 3.9 MMOL/L (ref 3.5–5.3)
POTASSIUM SERPL-SCNC: 3.9 MMOL/L (ref 3.5–5.3)
POTASSIUM SERPL-SCNC: 4 MMOL/L (ref 3.5–5.2)
POTASSIUM SERPL-SCNC: 4.1 MMOL/L (ref 3.5–5.2)
POTASSIUM SERPL-SCNC: 4.1 MMOL/L (ref 3.5–5.3)
POTASSIUM SERPL-SCNC: 4.1 MMOL/L (ref 3.5–5.3)
POTASSIUM SERPL-SCNC: 4.3 MMOL/L (ref 3.5–5.3)
POTASSIUM SERPL-SCNC: 4.4 MMOL/L (ref 3.5–5.3)
POTASSIUM SERPL-SCNC: 4.4 MMOL/L (ref 3.5–5.3)
POTASSIUM SERPL-SCNC: 5.4 MMOL/L (ref 3.5–5.3)
PR INTERVAL: 160 MS
PR INTERVAL: 160 MS
PR INTERVAL: 200 MS
PR INTERVAL: 204 MS
PR INTERVAL: 216 MS
PROCALCITONIN SERPL-MCNC: 0.42 NG/ML
PROCALCITONIN SERPL-MCNC: 0.43 NG/ML
PROCALCITONIN SERPL-MCNC: 0.9 NG/ML
PROCALCITONIN SERPL-MCNC: 5.17 NG/ML
PROT SERPL-MCNC: 5.3 G/DL (ref 6.4–8.2)
PROT SERPL-MCNC: 6.4 G/DL (ref 6.4–8.2)
PROT SERPL-MCNC: 6.6 G/DL (ref 6–8.5)
PROT SERPL-MCNC: 6.9 G/DL (ref 6–8.5)
PROT SERPL-MCNC: 7.2 G/DL (ref 6.4–8.2)
PROTHROMBIN TIME: 15.5 SECONDS (ref 11.6–14.5)
PROTHROMBIN TIME: 18.9 SECONDS (ref 11.6–14.5)
QRS AXIS: 172 DEGREES
QRS AXIS: 186 DEGREES
QRS AXIS: 211 DEGREES
QRS AXIS: 211 DEGREES
QRS AXIS: 223 DEGREES
QRSD INTERVAL: 62 MS
QRSD INTERVAL: 74 MS
QRSD INTERVAL: 86 MS
QRSD INTERVAL: 86 MS
QRSD INTERVAL: 92 MS
QT INTERVAL: 302 MS
QT INTERVAL: 348 MS
QT INTERVAL: 360 MS
QT INTERVAL: 374 MS
QT INTERVAL: 438 MS
QTC INTERVAL: 369 MS
QTC INTERVAL: 388 MS
QTC INTERVAL: 395 MS
QTC INTERVAL: 396 MS
QTC INTERVAL: 423 MS
RBC # BLD AUTO: 3.7 MILLION/UL (ref 3.88–5.62)
RBC # BLD AUTO: 3.89 MILLION/UL (ref 3.88–5.62)
RBC # BLD AUTO: 4.02 MILLION/UL (ref 3.88–5.62)
RBC # BLD AUTO: 4.13 MILLION/UL (ref 3.88–5.62)
RBC # BLD AUTO: 4.27 MILLION/UL (ref 3.88–5.62)
RBC # BLD AUTO: 4.36 MILLION/UL (ref 3.88–5.62)
RBC MORPH BLD: NORMAL
SAO2 % BLD FROM PO2: 93 % (ref 60–85)
SAO2 % BLD FROM PO2: 96 % (ref 60–85)
SAO2 % BLD FROM PO2: 98 % (ref 60–85)
SL AMB EGFR AFRICAN AMERICAN: 94 ML/MIN/1.73
SL AMB EGFR AFRICAN AMERICAN: 97 ML/MIN/1.73
SL AMB EGFR NON AFRICAN AMERICAN: 81 ML/MIN/1.73
SL AMB EGFR NON AFRICAN AMERICAN: 84 ML/MIN/1.73
SL AMB VLDL CHOLESTEROL CALC: 18 MG/DL (ref 5–40)
SODIUM BLD-SCNC: 137 MMOL/L (ref 136–145)
SODIUM BLD-SCNC: 137 MMOL/L (ref 136–145)
SODIUM BLD-SCNC: 140 MMOL/L (ref 136–145)
SODIUM SERPL-SCNC: 137 MMOL/L (ref 136–145)
SODIUM SERPL-SCNC: 138 MMOL/L (ref 136–145)
SODIUM SERPL-SCNC: 139 MMOL/L (ref 136–145)
SODIUM SERPL-SCNC: 140 MMOL/L (ref 136–145)
SODIUM SERPL-SCNC: 142 MMOL/L (ref 134–144)
SODIUM SERPL-SCNC: 143 MMOL/L (ref 136–145)
SODIUM SERPL-SCNC: 145 MMOL/L (ref 134–144)
SODIUM SERPL-SCNC: 152 MMOL/L (ref 136–145)
SPECIMEN SOURCE: ABNORMAL
T WAVE AXIS: 101 DEGREES
T WAVE AXIS: 102 DEGREES
T WAVE AXIS: 119 DEGREES
T WAVE AXIS: 94 DEGREES
T WAVE AXIS: 96 DEGREES
TRIGL SERPL-MCNC: 96 MG/DL (ref 0–149)
TROPONIN I SERPL-MCNC: 0.34 NG/ML
TROPONIN I SERPL-MCNC: 0.36 NG/ML
TROPONIN I SERPL-MCNC: 0.58 NG/ML
TROPONIN I SERPL-MCNC: 2.67 NG/ML
TROPONIN I SERPL-MCNC: 6.22 NG/ML
TROPONIN I SERPL-MCNC: 6.64 NG/ML
TSH SERPL DL<=0.005 MIU/L-ACNC: 2.51 UIU/ML (ref 0.45–4.5)
TSH SERPL DL<=0.05 MIU/L-ACNC: 1.3 UIU/ML (ref 0.36–3.74)
VANCOMYCIN SERPL-MCNC: 15.9 UG/ML
VANCOMYCIN SERPL-MCNC: 20.1 UG/ML
VANCOMYCIN TROUGH SERPL-MCNC: 31.1 UG/ML (ref 10–20)
VANCOMYCIN TROUGH SERPL-MCNC: 5.8 UG/ML (ref 10–20)
VENTRICULAR RATE: 49 BPM
VENTRICULAR RATE: 70 BPM
VENTRICULAR RATE: 77 BPM
VENTRICULAR RATE: 78 BPM
VENTRICULAR RATE: 90 BPM
WBC # BLD AUTO: 10.72 THOUSAND/UL (ref 4.31–10.16)
WBC # BLD AUTO: 11.01 THOUSAND/UL (ref 4.31–10.16)
WBC # BLD AUTO: 12.79 THOUSAND/UL (ref 4.31–10.16)
WBC # BLD AUTO: 14.5 THOUSAND/UL (ref 4.31–10.16)
WBC # BLD AUTO: 16.49 THOUSAND/UL (ref 4.31–10.16)
WBC # BLD AUTO: 17.78 THOUSAND/UL (ref 4.31–10.16)

## 2021-01-01 PROCEDURE — 93010 ELECTROCARDIOGRAM REPORT: CPT | Performed by: INTERNAL MEDICINE

## 2021-01-01 PROCEDURE — 94640 AIRWAY INHALATION TREATMENT: CPT

## 2021-01-01 PROCEDURE — 84443 ASSAY THYROID STIM HORMONE: CPT | Performed by: NURSE PRACTITIONER

## 2021-01-01 PROCEDURE — 82803 BLOOD GASES ANY COMBINATION: CPT

## 2021-01-01 PROCEDURE — 93306 TTE W/DOPPLER COMPLETE: CPT

## 2021-01-01 PROCEDURE — 82948 REAGENT STRIP/BLOOD GLUCOSE: CPT

## 2021-01-01 PROCEDURE — 07HT33Z INSERTION OF INFUSION DEVICE INTO BONE MARROW, PERCUTANEOUS APPROACH: ICD-10-PCS | Performed by: INTERNAL MEDICINE

## 2021-01-01 PROCEDURE — 85027 COMPLETE CBC AUTOMATED: CPT | Performed by: NURSE PRACTITIONER

## 2021-01-01 PROCEDURE — 84132 ASSAY OF SERUM POTASSIUM: CPT

## 2021-01-01 PROCEDURE — 71045 X-RAY EXAM CHEST 1 VIEW: CPT

## 2021-01-01 PROCEDURE — 83605 ASSAY OF LACTIC ACID: CPT | Performed by: NURSE PRACTITIONER

## 2021-01-01 PROCEDURE — 85014 HEMATOCRIT: CPT

## 2021-01-01 PROCEDURE — 94760 N-INVAS EAR/PLS OXIMETRY 1: CPT

## 2021-01-01 PROCEDURE — 96361 HYDRATE IV INFUSION ADD-ON: CPT

## 2021-01-01 PROCEDURE — 99291 CRITICAL CARE FIRST HOUR: CPT | Performed by: NURSE PRACTITIONER

## 2021-01-01 PROCEDURE — 83605 ASSAY OF LACTIC ACID: CPT | Performed by: INTERNAL MEDICINE

## 2021-01-01 PROCEDURE — 95816 EEG AWAKE AND DROWSY: CPT

## 2021-01-01 PROCEDURE — 99292 CRITICAL CARE ADDL 30 MIN: CPT | Performed by: NURSE PRACTITIONER

## 2021-01-01 PROCEDURE — 94002 VENT MGMT INPAT INIT DAY: CPT

## 2021-01-01 PROCEDURE — 84295 ASSAY OF SERUM SODIUM: CPT

## 2021-01-01 PROCEDURE — 99214 OFFICE O/P EST MOD 30 MIN: CPT | Performed by: NURSE PRACTITIONER

## 2021-01-01 PROCEDURE — 84484 ASSAY OF TROPONIN QUANT: CPT | Performed by: INTERNAL MEDICINE

## 2021-01-01 PROCEDURE — 85025 COMPLETE CBC W/AUTO DIFF WBC: CPT | Performed by: PHYSICIAN ASSISTANT

## 2021-01-01 PROCEDURE — 95816 EEG AWAKE AND DROWSY: CPT | Performed by: PSYCHIATRY & NEUROLOGY

## 2021-01-01 PROCEDURE — 94664 DEMO&/EVAL PT USE INHALER: CPT

## 2021-01-01 PROCEDURE — 85730 THROMBOPLASTIN TIME PARTIAL: CPT | Performed by: INTERNAL MEDICINE

## 2021-01-01 PROCEDURE — 99223 1ST HOSP IP/OBS HIGH 75: CPT | Performed by: INTERNAL MEDICINE

## 2021-01-01 PROCEDURE — 99233 SBSQ HOSP IP/OBS HIGH 50: CPT | Performed by: INTERNAL MEDICINE

## 2021-01-01 PROCEDURE — 80048 BASIC METABOLIC PNL TOTAL CA: CPT | Performed by: NURSE PRACTITIONER

## 2021-01-01 PROCEDURE — 36415 COLL VENOUS BLD VENIPUNCTURE: CPT | Performed by: EMERGENCY MEDICINE

## 2021-01-01 PROCEDURE — 70450 CT HEAD/BRAIN W/O DYE: CPT

## 2021-01-01 PROCEDURE — 83735 ASSAY OF MAGNESIUM: CPT | Performed by: PHYSICIAN ASSISTANT

## 2021-01-01 PROCEDURE — 80202 ASSAY OF VANCOMYCIN: CPT | Performed by: INTERNAL MEDICINE

## 2021-01-01 PROCEDURE — 83735 ASSAY OF MAGNESIUM: CPT | Performed by: NURSE PRACTITIONER

## 2021-01-01 PROCEDURE — C9113 INJ PANTOPRAZOLE SODIUM, VIA: HCPCS | Performed by: NURSE PRACTITIONER

## 2021-01-01 PROCEDURE — 96365 THER/PROPH/DIAG IV INF INIT: CPT

## 2021-01-01 PROCEDURE — 93970 EXTREMITY STUDY: CPT

## 2021-01-01 PROCEDURE — 83519 RIA NONANTIBODY: CPT | Performed by: PHYSICIAN ASSISTANT

## 2021-01-01 PROCEDURE — 4A133B1 MONITORING OF ARTERIAL PRESSURE, PERIPHERAL, PERCUTANEOUS APPROACH: ICD-10-PCS | Performed by: INTERNAL MEDICINE

## 2021-01-01 PROCEDURE — 84100 ASSAY OF PHOSPHORUS: CPT | Performed by: PHYSICIAN ASSISTANT

## 2021-01-01 PROCEDURE — 87040 BLOOD CULTURE FOR BACTERIA: CPT | Performed by: EMERGENCY MEDICINE

## 2021-01-01 PROCEDURE — G1004 CDSM NDSC: HCPCS

## 2021-01-01 PROCEDURE — 93321 DOPPLER ECHO F-UP/LMTD STD: CPT | Performed by: INTERNAL MEDICINE

## 2021-01-01 PROCEDURE — 80053 COMPREHEN METABOLIC PANEL: CPT | Performed by: EMERGENCY MEDICINE

## 2021-01-01 PROCEDURE — 80048 BASIC METABOLIC PNL TOTAL CA: CPT | Performed by: PHYSICIAN ASSISTANT

## 2021-01-01 PROCEDURE — 99284 EMERGENCY DEPT VISIT MOD MDM: CPT

## 2021-01-01 PROCEDURE — 85730 THROMBOPLASTIN TIME PARTIAL: CPT | Performed by: NURSE PRACTITIONER

## 2021-01-01 PROCEDURE — 85730 THROMBOPLASTIN TIME PARTIAL: CPT | Performed by: EMERGENCY MEDICINE

## 2021-01-01 PROCEDURE — 4A133J1 MONITORING OF ARTERIAL PULSE, PERIPHERAL, PERCUTANEOUS APPROACH: ICD-10-PCS | Performed by: INTERNAL MEDICINE

## 2021-01-01 PROCEDURE — 93000 ELECTROCARDIOGRAM COMPLETE: CPT | Performed by: INTERNAL MEDICINE

## 2021-01-01 PROCEDURE — 80202 ASSAY OF VANCOMYCIN: CPT | Performed by: NURSE PRACTITIONER

## 2021-01-01 PROCEDURE — 93306 TTE W/DOPPLER COMPLETE: CPT | Performed by: INTERNAL MEDICINE

## 2021-01-01 PROCEDURE — 82947 ASSAY GLUCOSE BLOOD QUANT: CPT

## 2021-01-01 PROCEDURE — 84100 ASSAY OF PHOSPHORUS: CPT | Performed by: NURSE PRACTITIONER

## 2021-01-01 PROCEDURE — 84484 ASSAY OF TROPONIN QUANT: CPT | Performed by: EMERGENCY MEDICINE

## 2021-01-01 PROCEDURE — 36680 INSERT NEEDLE BONE CAVITY: CPT | Performed by: NURSE PRACTITIONER

## 2021-01-01 PROCEDURE — 85007 BL SMEAR W/DIFF WBC COUNT: CPT | Performed by: NURSE PRACTITIONER

## 2021-01-01 PROCEDURE — 93005 ELECTROCARDIOGRAM TRACING: CPT

## 2021-01-01 PROCEDURE — 83880 ASSAY OF NATRIURETIC PEPTIDE: CPT | Performed by: EMERGENCY MEDICINE

## 2021-01-01 PROCEDURE — 99291 CRITICAL CARE FIRST HOUR: CPT | Performed by: INTERNAL MEDICINE

## 2021-01-01 PROCEDURE — 85730 THROMBOPLASTIN TIME PARTIAL: CPT | Performed by: PHYSICIAN ASSISTANT

## 2021-01-01 PROCEDURE — 85610 PROTHROMBIN TIME: CPT | Performed by: EMERGENCY MEDICINE

## 2021-01-01 PROCEDURE — 84484 ASSAY OF TROPONIN QUANT: CPT | Performed by: PHYSICIAN ASSISTANT

## 2021-01-01 PROCEDURE — 5A1945Z RESPIRATORY VENTILATION, 24-96 CONSECUTIVE HOURS: ICD-10-PCS | Performed by: INTERNAL MEDICINE

## 2021-01-01 PROCEDURE — 94003 VENT MGMT INPAT SUBQ DAY: CPT

## 2021-01-01 PROCEDURE — 99214 OFFICE O/P EST MOD 30 MIN: CPT | Performed by: INTERNAL MEDICINE

## 2021-01-01 PROCEDURE — 84145 PROCALCITONIN (PCT): CPT | Performed by: EMERGENCY MEDICINE

## 2021-01-01 PROCEDURE — 93970 EXTREMITY STUDY: CPT | Performed by: SURGERY

## 2021-01-01 PROCEDURE — 84145 PROCALCITONIN (PCT): CPT | Performed by: NURSE PRACTITIONER

## 2021-01-01 PROCEDURE — NC001 PR NO CHARGE: Performed by: PHYSICIAN ASSISTANT

## 2021-01-01 PROCEDURE — NC001 PR NO CHARGE: Performed by: NURSE PRACTITIONER

## 2021-01-01 PROCEDURE — 93325 DOPPLER ECHO COLOR FLOW MAPG: CPT | Performed by: INTERNAL MEDICINE

## 2021-01-01 PROCEDURE — 80048 BASIC METABOLIC PNL TOTAL CA: CPT | Performed by: INTERNAL MEDICINE

## 2021-01-01 PROCEDURE — 85610 PROTHROMBIN TIME: CPT | Performed by: PHYSICIAN ASSISTANT

## 2021-01-01 PROCEDURE — 93308 TTE F-UP OR LMTD: CPT

## 2021-01-01 PROCEDURE — 03HY32Z INSERTION OF MONITORING DEVICE INTO UPPER ARTERY, PERCUTANEOUS APPROACH: ICD-10-PCS | Performed by: INTERNAL MEDICINE

## 2021-01-01 PROCEDURE — 0BH17EZ INSERTION OF ENDOTRACHEAL AIRWAY INTO TRACHEA, VIA NATURAL OR ARTIFICIAL OPENING: ICD-10-PCS | Performed by: INTERNAL MEDICINE

## 2021-01-01 PROCEDURE — 85025 COMPLETE CBC W/AUTO DIFF WBC: CPT | Performed by: EMERGENCY MEDICINE

## 2021-01-01 PROCEDURE — 36600 WITHDRAWAL OF ARTERIAL BLOOD: CPT

## 2021-01-01 PROCEDURE — 99232 SBSQ HOSP IP/OBS MODERATE 35: CPT | Performed by: INTERNAL MEDICINE

## 2021-01-01 PROCEDURE — 83605 ASSAY OF LACTIC ACID: CPT | Performed by: EMERGENCY MEDICINE

## 2021-01-01 PROCEDURE — 80053 COMPREHEN METABOLIC PANEL: CPT | Performed by: NURSE PRACTITIONER

## 2021-01-01 PROCEDURE — 85025 COMPLETE CBC W/AUTO DIFF WBC: CPT | Performed by: INTERNAL MEDICINE

## 2021-01-01 PROCEDURE — 83690 ASSAY OF LIPASE: CPT | Performed by: NURSE PRACTITIONER

## 2021-01-01 PROCEDURE — 93308 TTE F-UP OR LMTD: CPT | Performed by: INTERNAL MEDICINE

## 2021-01-01 PROCEDURE — 5A12012 PERFORMANCE OF CARDIAC OUTPUT, SINGLE, MANUAL: ICD-10-PCS | Performed by: INTERNAL MEDICINE

## 2021-01-01 PROCEDURE — 31500 INSERT EMERGENCY AIRWAY: CPT | Performed by: NURSE PRACTITIONER

## 2021-01-01 PROCEDURE — 87081 CULTURE SCREEN ONLY: CPT | Performed by: PHYSICIAN ASSISTANT

## 2021-01-01 PROCEDURE — 82330 ASSAY OF CALCIUM: CPT | Performed by: NURSE PRACTITIONER

## 2021-01-01 PROCEDURE — 84484 ASSAY OF TROPONIN QUANT: CPT | Performed by: NURSE PRACTITIONER

## 2021-01-01 PROCEDURE — 99285 EMERGENCY DEPT VISIT HI MDM: CPT | Performed by: EMERGENCY MEDICINE

## 2021-01-01 RX ORDER — FLECAINIDE ACETATE 100 MG/1
100 TABLET ORAL EVERY 12 HOURS SCHEDULED
Status: DISCONTINUED | OUTPATIENT
Start: 2021-01-01 | End: 2021-01-01

## 2021-01-01 RX ORDER — AMIODARONE HYDROCHLORIDE 50 MG/ML
INJECTION, SOLUTION INTRAVENOUS CODE/TRAUMA/SEDATION MEDICATION
Status: COMPLETED | OUTPATIENT
Start: 2021-01-01 | End: 2021-01-01

## 2021-01-01 RX ORDER — PANTOPRAZOLE SODIUM 40 MG/1
40 INJECTION, POWDER, FOR SOLUTION INTRAVENOUS
Status: DISCONTINUED | OUTPATIENT
Start: 2021-01-01 | End: 2021-01-01

## 2021-01-01 RX ORDER — ATORVASTATIN CALCIUM 40 MG/1
TABLET, FILM COATED ORAL
Qty: 90 TABLET | Refills: 0 | Status: SHIPPED | OUTPATIENT
Start: 2021-01-01 | End: 2021-01-01

## 2021-01-01 RX ORDER — ASPIRIN 300 MG/1
300 SUPPOSITORY RECTAL ONCE
Status: DISCONTINUED | OUTPATIENT
Start: 2021-01-01 | End: 2021-01-01

## 2021-01-01 RX ORDER — ACETAMINOPHEN 650 MG/1
650 SUPPOSITORY RECTAL EVERY 4 HOURS PRN
Status: DISCONTINUED | OUTPATIENT
Start: 2021-01-01 | End: 2021-01-01

## 2021-01-01 RX ORDER — LISINOPRIL 20 MG/1
40 TABLET ORAL DAILY
Status: DISCONTINUED | OUTPATIENT
Start: 2021-01-01 | End: 2021-01-01

## 2021-01-01 RX ORDER — SODIUM BICARBONATE 84 MG/ML
INJECTION, SOLUTION INTRAVENOUS CODE/TRAUMA/SEDATION MEDICATION
Status: COMPLETED | OUTPATIENT
Start: 2021-01-01 | End: 2021-01-01

## 2021-01-01 RX ORDER — MAGNESIUM SULFATE HEPTAHYDRATE 40 MG/ML
2 INJECTION, SOLUTION INTRAVENOUS ONCE
Status: COMPLETED | OUTPATIENT
Start: 2021-01-01 | End: 2021-01-01

## 2021-01-01 RX ORDER — LORAZEPAM 2 MG/ML
1 INJECTION INTRAMUSCULAR
Status: DISCONTINUED | OUTPATIENT
Start: 2021-01-01 | End: 2021-01-01

## 2021-01-01 RX ORDER — FENTANYL CITRATE 50 UG/ML
50 INJECTION, SOLUTION INTRAMUSCULAR; INTRAVENOUS
Status: DISCONTINUED | OUTPATIENT
Start: 2021-01-01 | End: 2021-01-01

## 2021-01-01 RX ORDER — POTASSIUM CHLORIDE 20MEQ/15ML
40 LIQUID (ML) ORAL ONCE
Status: COMPLETED | OUTPATIENT
Start: 2021-01-01 | End: 2021-01-01

## 2021-01-01 RX ORDER — METOPROLOL SUCCINATE 25 MG/1
TABLET, EXTENDED RELEASE ORAL
Qty: 90 TABLET | Refills: 0 | Status: SHIPPED | OUTPATIENT
Start: 2021-01-01 | End: 2021-01-01 | Stop reason: HOSPADM

## 2021-01-01 RX ORDER — GABAPENTIN 300 MG/1
300 CAPSULE ORAL 2 TIMES DAILY
Status: DISCONTINUED | OUTPATIENT
Start: 2021-01-01 | End: 2021-01-01

## 2021-01-01 RX ORDER — GLYCOPYRROLATE 0.2 MG/ML
0.1 INJECTION INTRAMUSCULAR; INTRAVENOUS EVERY 4 HOURS PRN
Status: DISCONTINUED | OUTPATIENT
Start: 2021-01-01 | End: 2021-01-01

## 2021-01-01 RX ORDER — POTASSIUM CHLORIDE 14.9 MG/ML
20 INJECTION INTRAVENOUS
Status: COMPLETED | OUTPATIENT
Start: 2021-01-01 | End: 2021-01-01

## 2021-01-01 RX ORDER — HYDROMORPHONE HCL/PF 1 MG/ML
2 SYRINGE (ML) INJECTION
Status: DISCONTINUED | OUTPATIENT
Start: 2021-01-01 | End: 2021-01-01

## 2021-01-01 RX ORDER — ACETAMINOPHEN 160 MG/5ML
640 SUSPENSION, ORAL (FINAL DOSE FORM) ORAL EVERY 4 HOURS PRN
Status: DISCONTINUED | OUTPATIENT
Start: 2021-01-01 | End: 2021-01-01

## 2021-01-01 RX ORDER — HYDROMORPHONE HCL/PF 1 MG/ML
2 SYRINGE (ML) INJECTION
Status: DISCONTINUED | OUTPATIENT
Start: 2021-01-01 | End: 2021-08-26 | Stop reason: HOSPADM

## 2021-01-01 RX ORDER — FENTANYL CITRATE 50 UG/ML
100 INJECTION, SOLUTION INTRAMUSCULAR; INTRAVENOUS
Status: DISCONTINUED | OUTPATIENT
Start: 2021-01-01 | End: 2021-01-01

## 2021-01-01 RX ORDER — AMLODIPINE BESYLATE 5 MG/1
10 TABLET ORAL DAILY
Status: DISCONTINUED | OUTPATIENT
Start: 2021-01-01 | End: 2021-01-01

## 2021-01-01 RX ORDER — SODIUM CHLORIDE, SODIUM GLUCONATE, SODIUM ACETATE, POTASSIUM CHLORIDE, MAGNESIUM CHLORIDE, SODIUM PHOSPHATE, DIBASIC, AND POTASSIUM PHOSPHATE .53; .5; .37; .037; .03; .012; .00082 G/100ML; G/100ML; G/100ML; G/100ML; G/100ML; G/100ML; G/100ML
500 INJECTION, SOLUTION INTRAVENOUS ONCE
Status: COMPLETED | OUTPATIENT
Start: 2021-01-01 | End: 2021-01-01

## 2021-01-01 RX ORDER — FLUTICASONE PROPIONATE 50 MCG
1 SPRAY, SUSPENSION (ML) NASAL DAILY
Status: DISCONTINUED | OUTPATIENT
Start: 2021-01-01 | End: 2021-01-01

## 2021-01-01 RX ORDER — FLECAINIDE ACETATE 100 MG/1
TABLET ORAL
Qty: 60 TABLET | Refills: 0 | Status: SHIPPED | OUTPATIENT
Start: 2021-01-01 | End: 2021-01-01

## 2021-01-01 RX ORDER — CALCIUM CHLORIDE 100 MG/ML
SYRINGE (ML) INTRAVENOUS CODE/TRAUMA/SEDATION MEDICATION
Status: COMPLETED | OUTPATIENT
Start: 2021-01-01 | End: 2021-01-01

## 2021-01-01 RX ORDER — MULTIVITAMIN/IRON/FOLIC ACID 18MG-0.4MG
1 TABLET ORAL DAILY
Status: DISCONTINUED | OUTPATIENT
Start: 2021-01-01 | End: 2021-01-01

## 2021-01-01 RX ORDER — NITROGLYCERIN 0.4 MG/1
0.4 TABLET SUBLINGUAL
Status: DISCONTINUED | OUTPATIENT
Start: 2021-01-01 | End: 2021-01-01

## 2021-01-01 RX ORDER — TAMSULOSIN HYDROCHLORIDE 0.4 MG/1
0.4 CAPSULE ORAL
Status: DISCONTINUED | OUTPATIENT
Start: 2021-01-01 | End: 2021-01-01

## 2021-01-01 RX ORDER — GLYCOPYRROLATE 0.2 MG/ML
0.2 INJECTION INTRAMUSCULAR; INTRAVENOUS
Status: DISCONTINUED | OUTPATIENT
Start: 2021-01-01 | End: 2021-08-26 | Stop reason: HOSPADM

## 2021-01-01 RX ORDER — METOPROLOL SUCCINATE 25 MG/1
25 TABLET, EXTENDED RELEASE ORAL DAILY
Status: DISCONTINUED | OUTPATIENT
Start: 2021-01-01 | End: 2021-01-01

## 2021-01-01 RX ORDER — FUROSEMIDE 10 MG/ML
40 INJECTION INTRAMUSCULAR; INTRAVENOUS ONCE
Status: COMPLETED | OUTPATIENT
Start: 2021-01-01 | End: 2021-01-01

## 2021-01-01 RX ORDER — FENTANYL CITRATE-0.9 % NACL/PF 10 MCG/ML
100 PLASTIC BAG, INJECTION (ML) INTRAVENOUS CONTINUOUS
Status: DISCONTINUED | OUTPATIENT
Start: 2021-01-01 | End: 2021-01-01

## 2021-01-01 RX ORDER — POTASSIUM CHLORIDE 14.9 MG/ML
20 INJECTION INTRAVENOUS ONCE
Status: COMPLETED | OUTPATIENT
Start: 2021-01-01 | End: 2021-01-01

## 2021-01-01 RX ORDER — FLECAINIDE ACETATE 100 MG/1
TABLET ORAL
Qty: 60 TABLET | Refills: 0 | Status: SHIPPED | OUTPATIENT
Start: 2021-01-01 | End: 2021-01-01 | Stop reason: HOSPADM

## 2021-01-01 RX ORDER — PROPOFOL 10 MG/ML
5-50 INJECTION, EMULSION INTRAVENOUS
Status: DISCONTINUED | OUTPATIENT
Start: 2021-01-01 | End: 2021-01-01

## 2021-01-01 RX ORDER — POTASSIUM CHLORIDE 20MEQ/15ML
40 LIQUID (ML) ORAL ONCE
Status: DISCONTINUED | OUTPATIENT
Start: 2021-01-01 | End: 2021-01-01

## 2021-01-01 RX ORDER — FENTANYL CITRATE 50 UG/ML
100 INJECTION, SOLUTION INTRAMUSCULAR; INTRAVENOUS
Status: DISCONTINUED | OUTPATIENT
Start: 2021-01-01 | End: 2021-08-26 | Stop reason: HOSPADM

## 2021-01-01 RX ORDER — PANTOPRAZOLE SODIUM 40 MG/1
40 TABLET, DELAYED RELEASE ORAL
Status: DISCONTINUED | OUTPATIENT
Start: 2021-01-01 | End: 2021-01-01

## 2021-01-01 RX ORDER — ASPIRIN 81 MG/1
324 TABLET ORAL ONCE
Status: DISCONTINUED | OUTPATIENT
Start: 2021-01-01 | End: 2021-01-01

## 2021-01-01 RX ORDER — FUROSEMIDE 10 MG/ML
40 INJECTION INTRAMUSCULAR; INTRAVENOUS
Status: DISCONTINUED | OUTPATIENT
Start: 2021-01-01 | End: 2021-01-01

## 2021-01-01 RX ORDER — MONTELUKAST SODIUM 10 MG/1
10 TABLET ORAL DAILY
Status: DISCONTINUED | OUTPATIENT
Start: 2021-01-01 | End: 2021-01-01

## 2021-01-01 RX ORDER — HEPARIN SODIUM 10000 [USP'U]/100ML
3-20 INJECTION, SOLUTION INTRAVENOUS
Status: DISCONTINUED | OUTPATIENT
Start: 2021-01-01 | End: 2021-01-01

## 2021-01-01 RX ORDER — LABETALOL 20 MG/4 ML (5 MG/ML) INTRAVENOUS SYRINGE
10 ONCE
Status: COMPLETED | OUTPATIENT
Start: 2021-01-01 | End: 2021-01-01

## 2021-01-01 RX ORDER — POTASSIUM CHLORIDE 20MEQ/15ML
20 LIQUID (ML) ORAL ONCE
Status: COMPLETED | OUTPATIENT
Start: 2021-01-01 | End: 2021-01-01

## 2021-01-01 RX ORDER — HYDRALAZINE HYDROCHLORIDE 20 MG/ML
10 INJECTION INTRAMUSCULAR; INTRAVENOUS ONCE
Status: COMPLETED | OUTPATIENT
Start: 2021-01-01 | End: 2021-01-01

## 2021-01-01 RX ORDER — AMOXICILLIN 250 MG
1 CAPSULE ORAL
Status: DISCONTINUED | OUTPATIENT
Start: 2021-01-01 | End: 2021-01-01

## 2021-01-01 RX ORDER — FLECAINIDE ACETATE 50 MG/1
TABLET ORAL
Qty: 180 TABLET | Refills: 0 | Status: SHIPPED | OUTPATIENT
Start: 2021-01-01 | End: 2021-01-01

## 2021-01-01 RX ORDER — CHLORHEXIDINE GLUCONATE 0.12 MG/ML
15 RINSE ORAL EVERY 12 HOURS SCHEDULED
Status: DISCONTINUED | OUTPATIENT
Start: 2021-01-01 | End: 2021-01-01

## 2021-01-01 RX ORDER — TRAMADOL HYDROCHLORIDE 50 MG/1
50 TABLET ORAL EVERY 8 HOURS PRN
Status: DISCONTINUED | OUTPATIENT
Start: 2021-01-01 | End: 2021-01-01

## 2021-01-01 RX ORDER — METOPROLOL SUCCINATE 25 MG/1
TABLET, EXTENDED RELEASE ORAL
Qty: 90 TABLET | Refills: 0 | Status: SHIPPED | OUTPATIENT
Start: 2021-01-01 | End: 2021-01-01

## 2021-01-01 RX ORDER — LORAZEPAM 2 MG/ML
2 INJECTION INTRAMUSCULAR
Status: DISCONTINUED | OUTPATIENT
Start: 2021-01-01 | End: 2021-08-26 | Stop reason: HOSPADM

## 2021-01-01 RX ORDER — EPINEPHRINE 0.1 MG/ML
SYRINGE (ML) INJECTION CODE/TRAUMA/SEDATION MEDICATION
Status: COMPLETED | OUTPATIENT
Start: 2021-01-01 | End: 2021-01-01

## 2021-01-01 RX ORDER — CEFEPIME HYDROCHLORIDE 2 G/50ML
2000 INJECTION, SOLUTION INTRAVENOUS ONCE
Status: COMPLETED | OUTPATIENT
Start: 2021-01-01 | End: 2021-01-01

## 2021-01-01 RX ORDER — LEVALBUTEROL 1.25 MG/.5ML
1.25 SOLUTION, CONCENTRATE RESPIRATORY (INHALATION)
Status: DISCONTINUED | OUTPATIENT
Start: 2021-01-01 | End: 2021-01-01

## 2021-01-01 RX ORDER — ACETAMINOPHEN 325 MG/1
650 TABLET ORAL EVERY 6 HOURS PRN
Status: DISCONTINUED | OUTPATIENT
Start: 2021-01-01 | End: 2021-01-01

## 2021-01-01 RX ORDER — ATORVASTATIN CALCIUM 40 MG/1
40 TABLET, FILM COATED ORAL DAILY
Status: DISCONTINUED | OUTPATIENT
Start: 2021-01-01 | End: 2021-01-01

## 2021-01-01 RX ORDER — CHLORAL HYDRATE 500 MG
1000 CAPSULE ORAL DAILY
Status: DISCONTINUED | OUTPATIENT
Start: 2021-01-01 | End: 2021-01-01

## 2021-01-01 RX ORDER — AMLODIPINE BESYLATE 10 MG/1
TABLET ORAL
Qty: 90 TABLET | Refills: 0 | Status: SHIPPED | OUTPATIENT
Start: 2021-01-01 | End: 2021-01-01 | Stop reason: HOSPADM

## 2021-01-01 RX ORDER — CEFEPIME HYDROCHLORIDE 2 G/50ML
2000 INJECTION, SOLUTION INTRAVENOUS EVERY 12 HOURS
Status: DISCONTINUED | OUTPATIENT
Start: 2021-01-01 | End: 2021-01-01

## 2021-01-01 RX ORDER — ATORVASTATIN CALCIUM 40 MG/1
TABLET, FILM COATED ORAL
Qty: 90 TABLET | Refills: 0 | Status: SHIPPED | OUTPATIENT
Start: 2021-01-01 | End: 2021-01-01 | Stop reason: HOSPADM

## 2021-01-01 RX ADMIN — FENTANYL CITRATE 100 MCG: 50 INJECTION INTRAMUSCULAR; INTRAVENOUS at 02:58

## 2021-01-01 RX ADMIN — EPINEPHRINE 1 MG: 0.1 INJECTION INTRACARDIAC; INTRAVENOUS at 20:16

## 2021-01-01 RX ADMIN — EPINEPHRINE 1 MG: 0.1 INJECTION INTRACARDIAC; INTRAVENOUS at 19:58

## 2021-01-01 RX ADMIN — POTASSIUM CHLORIDE 40 MEQ: 20 SOLUTION ORAL at 03:10

## 2021-01-01 RX ADMIN — PROPOFOL 50 MCG/KG/MIN: 10 INJECTION, EMULSION INTRAVENOUS at 05:24

## 2021-01-01 RX ADMIN — LEVALBUTEROL HYDROCHLORIDE 1.25 MG: 1.25 SOLUTION, CONCENTRATE RESPIRATORY (INHALATION) at 19:55

## 2021-01-01 RX ADMIN — FENTANYL CITRATE 100 MCG: 50 INJECTION INTRAMUSCULAR; INTRAVENOUS at 21:59

## 2021-01-01 RX ADMIN — INSULIN HUMAN 100 UNITS: 500 INJECTION, SOLUTION SUBCUTANEOUS at 17:00

## 2021-01-01 RX ADMIN — INSULIN LISPRO 6 UNITS: 100 INJECTION, SOLUTION INTRAVENOUS; SUBCUTANEOUS at 06:30

## 2021-01-01 RX ADMIN — AMLODIPINE BESYLATE 10 MG: 5 TABLET ORAL at 08:56

## 2021-01-01 RX ADMIN — ATORVASTATIN CALCIUM 40 MG: 40 TABLET, FILM COATED ORAL at 09:29

## 2021-01-01 RX ADMIN — LEVALBUTEROL HYDROCHLORIDE 1.25 MG: 1.25 SOLUTION, CONCENTRATE RESPIRATORY (INHALATION) at 20:02

## 2021-01-01 RX ADMIN — AMIODARONE HYDROCHLORIDE 1 MG/MIN: 50 INJECTION, SOLUTION INTRAVENOUS at 21:02

## 2021-01-01 RX ADMIN — LEVALBUTEROL HYDROCHLORIDE 1.25 MG: 1.25 SOLUTION, CONCENTRATE RESPIRATORY (INHALATION) at 07:35

## 2021-01-01 RX ADMIN — FENTANYL CITRATE 100 MCG: 50 INJECTION INTRAMUSCULAR; INTRAVENOUS at 04:52

## 2021-01-01 RX ADMIN — DEXMEDETOMIDINE HYDROCHLORIDE 0.2 MCG/KG/HR: 100 INJECTION, SOLUTION INTRAVENOUS at 20:53

## 2021-01-01 RX ADMIN — CEFEPIME HYDROCHLORIDE 2000 MG: 2 INJECTION, SOLUTION INTRAVENOUS at 18:01

## 2021-01-01 RX ADMIN — PROPOFOL 40 MCG/KG/MIN: 10 INJECTION, EMULSION INTRAVENOUS at 17:42

## 2021-01-01 RX ADMIN — METRONIDAZOLE 500 MG: 500 INJECTION, SOLUTION INTRAVENOUS at 10:19

## 2021-01-01 RX ADMIN — INSULIN LISPRO 2 UNITS: 100 INJECTION, SOLUTION INTRAVENOUS; SUBCUTANEOUS at 12:31

## 2021-01-01 RX ADMIN — PROPOFOL 40 MCG/KG/MIN: 10 INJECTION, EMULSION INTRAVENOUS at 16:00

## 2021-01-01 RX ADMIN — METRONIDAZOLE 500 MG: 500 INJECTION, SOLUTION INTRAVENOUS at 10:43

## 2021-01-01 RX ADMIN — POTASSIUM CHLORIDE 20 MEQ: 20 SOLUTION ORAL at 17:50

## 2021-01-01 RX ADMIN — PROPOFOL 50 MCG/KG/MIN: 10 INJECTION, EMULSION INTRAVENOUS at 09:29

## 2021-01-01 RX ADMIN — IPRATROPIUM BROMIDE 0.5 MG: 0.5 SOLUTION RESPIRATORY (INHALATION) at 10:44

## 2021-01-01 RX ADMIN — HEPARIN SODIUM 11.1 UNITS/KG/HR: 10000 INJECTION, SOLUTION INTRAVENOUS at 10:46

## 2021-01-01 RX ADMIN — GABAPENTIN 300 MG: 300 CAPSULE ORAL at 17:50

## 2021-01-01 RX ADMIN — PROPOFOL 20 MCG/KG/MIN: 10 INJECTION, EMULSION INTRAVENOUS at 22:27

## 2021-01-01 RX ADMIN — FENTANYL CITRATE 100 MCG: 50 INJECTION INTRAMUSCULAR; INTRAVENOUS at 19:58

## 2021-01-01 RX ADMIN — EPINEPHRINE 1 MG: 0.1 INJECTION INTRACARDIAC; INTRAVENOUS at 20:04

## 2021-01-01 RX ADMIN — MONTELUKAST 10 MG: 10 TABLET, FILM COATED ORAL at 08:56

## 2021-01-01 RX ADMIN — VANCOMYCIN HYDROCHLORIDE 2000 MG: 1 INJECTION, POWDER, LYOPHILIZED, FOR SOLUTION INTRAVENOUS at 20:21

## 2021-01-01 RX ADMIN — FUROSEMIDE 40 MG: 10 INJECTION, SOLUTION INTRAMUSCULAR; INTRAVENOUS at 18:40

## 2021-01-01 RX ADMIN — PROPOFOL 20 MCG/KG/MIN: 10 INJECTION, EMULSION INTRAVENOUS at 13:41

## 2021-01-01 RX ADMIN — APIXABAN 5 MG: 5 TABLET, FILM COATED ORAL at 17:49

## 2021-01-01 RX ADMIN — HYDRALAZINE HYDROCHLORIDE 10 MG: 20 INJECTION INTRAMUSCULAR; INTRAVENOUS at 01:42

## 2021-01-01 RX ADMIN — PROPOFOL 5 MCG/KG/MIN: 10 INJECTION, EMULSION INTRAVENOUS at 02:13

## 2021-01-01 RX ADMIN — PROPOFOL 40 MCG/KG/MIN: 10 INJECTION, EMULSION INTRAVENOUS at 13:04

## 2021-01-01 RX ADMIN — OMEGA-3 FATTY ACIDS CAP 1000 MG 1000 MG: 1000 CAP at 08:05

## 2021-01-01 RX ADMIN — VANCOMYCIN HYDROCHLORIDE 2000 MG: 5 INJECTION, POWDER, LYOPHILIZED, FOR SOLUTION INTRAVENOUS at 08:32

## 2021-01-01 RX ADMIN — CHLORHEXIDINE GLUCONATE 0.12% ORAL RINSE 15 ML: 1.2 LIQUID ORAL at 20:21

## 2021-01-01 RX ADMIN — INSULIN LISPRO 3 UNITS: 100 INJECTION, SOLUTION INTRAVENOUS; SUBCUTANEOUS at 08:55

## 2021-01-01 RX ADMIN — PANTOPRAZOLE SODIUM 40 MG: 40 INJECTION, POWDER, FOR SOLUTION INTRAVENOUS at 09:26

## 2021-01-01 RX ADMIN — INSULIN HUMAN 100 UNITS: 500 INJECTION, SOLUTION SUBCUTANEOUS at 11:34

## 2021-01-01 RX ADMIN — VANCOMYCIN HYDROCHLORIDE 2000 MG: 1 INJECTION, POWDER, LYOPHILIZED, FOR SOLUTION INTRAVENOUS at 00:19

## 2021-01-01 RX ADMIN — MULTIPLE VITAMINS W/ MINERALS TAB 1 TABLET: TAB ORAL at 08:05

## 2021-01-01 RX ADMIN — EPINEPHRINE 1 MG: 0.1 INJECTION INTRACARDIAC; INTRAVENOUS at 20:18

## 2021-01-01 RX ADMIN — SODIUM CHLORIDE 1000 ML: 0.9 INJECTION, SOLUTION INTRAVENOUS at 18:36

## 2021-01-01 RX ADMIN — PROPOFOL 40 MCG/KG/MIN: 10 INJECTION, EMULSION INTRAVENOUS at 23:17

## 2021-01-01 RX ADMIN — CEFEPIME HYDROCHLORIDE 2000 MG: 2 INJECTION, SOLUTION INTRAVENOUS at 18:39

## 2021-01-01 RX ADMIN — FENTANYL CITRATE 100 MCG: 50 INJECTION INTRAMUSCULAR; INTRAVENOUS at 23:00

## 2021-01-01 RX ADMIN — FUROSEMIDE 40 MG: 10 INJECTION, SOLUTION INTRAMUSCULAR; INTRAVENOUS at 07:24

## 2021-01-01 RX ADMIN — INSULIN LISPRO 12 UNITS: 100 INJECTION, SOLUTION INTRAVENOUS; SUBCUTANEOUS at 06:05

## 2021-01-01 RX ADMIN — GABAPENTIN 300 MG: 300 CAPSULE ORAL at 08:56

## 2021-01-01 RX ADMIN — PROPOFOL 50 MCG/KG/MIN: 10 INJECTION, EMULSION INTRAVENOUS at 03:14

## 2021-01-01 RX ADMIN — IPRATROPIUM BROMIDE 0.5 MG: 0.5 SOLUTION RESPIRATORY (INHALATION) at 20:02

## 2021-01-01 RX ADMIN — INSULIN LISPRO 3 UNITS: 100 INJECTION, SOLUTION INTRAVENOUS; SUBCUTANEOUS at 11:34

## 2021-01-01 RX ADMIN — LORAZEPAM 2 MG: 2 INJECTION INTRAMUSCULAR; INTRAVENOUS at 17:56

## 2021-01-01 RX ADMIN — FENTANYL CITRATE 100 MCG: 50 INJECTION INTRAMUSCULAR; INTRAVENOUS at 10:25

## 2021-01-01 RX ADMIN — PANTOPRAZOLE SODIUM 40 MG: 40 INJECTION, POWDER, FOR SOLUTION INTRAVENOUS at 08:32

## 2021-01-01 RX ADMIN — ACETAMINOPHEN 640 MG: 650 SUSPENSION ORAL at 00:53

## 2021-01-01 RX ADMIN — PROPOFOL 35 MCG/KG/MIN: 10 INJECTION, EMULSION INTRAVENOUS at 02:15

## 2021-01-01 RX ADMIN — INSULIN LISPRO 4 UNITS: 100 INJECTION, SOLUTION INTRAVENOUS; SUBCUTANEOUS at 00:18

## 2021-01-01 RX ADMIN — INSULIN HUMAN 100 UNITS: 500 INJECTION, SOLUTION SUBCUTANEOUS at 08:44

## 2021-01-01 RX ADMIN — Medication 50 MCG/HR: at 22:58

## 2021-01-01 RX ADMIN — ATORVASTATIN CALCIUM 40 MG: 40 TABLET, FILM COATED ORAL at 08:56

## 2021-01-01 RX ADMIN — FENTANYL CITRATE 100 MCG: 50 INJECTION INTRAMUSCULAR; INTRAVENOUS at 00:19

## 2021-01-01 RX ADMIN — INSULIN LISPRO 16 UNITS: 100 INJECTION, SOLUTION INTRAVENOUS; SUBCUTANEOUS at 00:39

## 2021-01-01 RX ADMIN — POTASSIUM CHLORIDE 20 MEQ: 14.9 INJECTION, SOLUTION INTRAVENOUS at 06:24

## 2021-01-01 RX ADMIN — CHLORHEXIDINE GLUCONATE 0.12% ORAL RINSE 15 ML: 1.2 LIQUID ORAL at 23:31

## 2021-01-01 RX ADMIN — METRONIDAZOLE 500 MG: 500 INJECTION, SOLUTION INTRAVENOUS at 01:42

## 2021-01-01 RX ADMIN — IPRATROPIUM BROMIDE 0.5 MG: 0.5 SOLUTION RESPIRATORY (INHALATION) at 19:55

## 2021-01-01 RX ADMIN — MULTIPLE VITAMINS W/ MINERALS TAB 1 TABLET: TAB ORAL at 08:56

## 2021-01-01 RX ADMIN — METFORMIN HYDROCHLORIDE 500 MG: 500 TABLET ORAL at 17:00

## 2021-01-01 RX ADMIN — INSULIN LISPRO 12 UNITS: 100 INJECTION, SOLUTION INTRAVENOUS; SUBCUTANEOUS at 17:29

## 2021-01-01 RX ADMIN — CEFEPIME HYDROCHLORIDE 2000 MG: 2 INJECTION, SOLUTION INTRAVENOUS at 06:06

## 2021-01-01 RX ADMIN — CEFEPIME HYDROCHLORIDE 2000 MG: 2 INJECTION, SOLUTION INTRAVENOUS at 18:54

## 2021-01-01 RX ADMIN — PANTOPRAZOLE SODIUM 40 MG: 40 INJECTION, POWDER, FOR SOLUTION INTRAVENOUS at 08:05

## 2021-01-01 RX ADMIN — FUROSEMIDE 40 MG: 10 INJECTION, SOLUTION INTRAMUSCULAR; INTRAVENOUS at 09:28

## 2021-01-01 RX ADMIN — AMIODARONE HYDROCHLORIDE 0.5 MG/MIN: 50 INJECTION, SOLUTION INTRAVENOUS at 02:23

## 2021-01-01 RX ADMIN — MAGNESIUM SULFATE HEPTAHYDRATE 2 G: 40 INJECTION, SOLUTION INTRAVENOUS at 06:24

## 2021-01-01 RX ADMIN — VANCOMYCIN HYDROCHLORIDE 2000 MG: 5 INJECTION, POWDER, LYOPHILIZED, FOR SOLUTION INTRAVENOUS at 08:44

## 2021-01-01 RX ADMIN — PROPOFOL 50 MCG/KG/MIN: 10 INJECTION, EMULSION INTRAVENOUS at 07:14

## 2021-01-01 RX ADMIN — CHLORHEXIDINE GLUCONATE 0.12% ORAL RINSE 15 ML: 1.2 LIQUID ORAL at 21:25

## 2021-01-01 RX ADMIN — INSULIN LISPRO 2 UNITS: 100 INJECTION, SOLUTION INTRAVENOUS; SUBCUTANEOUS at 02:08

## 2021-01-01 RX ADMIN — PROPOFOL 35 MCG/KG/MIN: 10 INJECTION, EMULSION INTRAVENOUS at 10:19

## 2021-01-01 RX ADMIN — CHLORHEXIDINE GLUCONATE 0.12% ORAL RINSE 15 ML: 1.2 LIQUID ORAL at 08:05

## 2021-01-01 RX ADMIN — INSULIN LISPRO 16 UNITS: 100 INJECTION, SOLUTION INTRAVENOUS; SUBCUTANEOUS at 12:31

## 2021-01-01 RX ADMIN — TAMSULOSIN HYDROCHLORIDE 0.4 MG: 0.4 CAPSULE ORAL at 17:00

## 2021-01-01 RX ADMIN — EPINEPHRINE 1 MG: 0.1 INJECTION INTRACARDIAC; INTRAVENOUS at 20:11

## 2021-01-01 RX ADMIN — GABAPENTIN 300 MG: 300 CAPSULE ORAL at 22:31

## 2021-01-01 RX ADMIN — FENTANYL CITRATE 100 MCG: 50 INJECTION INTRAMUSCULAR; INTRAVENOUS at 20:51

## 2021-01-01 RX ADMIN — AMIODARONE HYDROCHLORIDE 0.5 MG/MIN: 50 INJECTION, SOLUTION INTRAVENOUS at 21:04

## 2021-01-01 RX ADMIN — PROPOFOL 40 MCG/KG/MIN: 10 INJECTION, EMULSION INTRAVENOUS at 20:16

## 2021-01-01 RX ADMIN — HEPARIN SODIUM 19.1 UNITS/KG/HR: 10000 INJECTION, SOLUTION INTRAVENOUS at 00:53

## 2021-01-01 RX ADMIN — AMIODARONE HYDROCHLORIDE 0.5 MG/MIN: 50 INJECTION, SOLUTION INTRAVENOUS at 03:10

## 2021-01-01 RX ADMIN — CHLORHEXIDINE GLUCONATE 0.12% ORAL RINSE 15 ML: 1.2 LIQUID ORAL at 08:32

## 2021-01-01 RX ADMIN — SODIUM CHLORIDE 1000 ML: 0.9 INJECTION, SOLUTION INTRAVENOUS at 00:30

## 2021-01-01 RX ADMIN — APIXABAN 5 MG: 5 TABLET, FILM COATED ORAL at 22:31

## 2021-01-01 RX ADMIN — METRONIDAZOLE 500 MG: 500 INJECTION, SOLUTION INTRAVENOUS at 17:50

## 2021-01-01 RX ADMIN — APIXABAN 5 MG: 5 TABLET, FILM COATED ORAL at 08:56

## 2021-01-01 RX ADMIN — SODIUM BICARBONATE 50 MEQ: 84 INJECTION, SOLUTION INTRAVENOUS at 20:15

## 2021-01-01 RX ADMIN — EPINEPHRINE 1 MG: 0.1 INJECTION INTRACARDIAC; INTRAVENOUS at 20:01

## 2021-01-01 RX ADMIN — NOREPINEPHRINE BITARTRATE 14 MCG/MIN: 1 INJECTION, SOLUTION, CONCENTRATE INTRAVENOUS at 01:06

## 2021-01-01 RX ADMIN — PROPOFOL 50 MCG/KG/MIN: 10 INJECTION, EMULSION INTRAVENOUS at 11:36

## 2021-01-01 RX ADMIN — CEFEPIME HYDROCHLORIDE 2000 MG: 2 INJECTION, SOLUTION INTRAVENOUS at 06:26

## 2021-01-01 RX ADMIN — AMLODIPINE BESYLATE 10 MG: 5 TABLET ORAL at 09:28

## 2021-01-01 RX ADMIN — FLECAINIDE ACETATE 100 MG: 100 TABLET ORAL at 23:11

## 2021-01-01 RX ADMIN — HEPARIN SODIUM 15.1 UNITS/KG/HR: 10000 INJECTION, SOLUTION INTRAVENOUS at 07:24

## 2021-01-01 RX ADMIN — FENTANYL CITRATE 100 MCG: 50 INJECTION INTRAMUSCULAR; INTRAVENOUS at 17:50

## 2021-01-01 RX ADMIN — LEVALBUTEROL HYDROCHLORIDE 1.25 MG: 1.25 SOLUTION, CONCENTRATE RESPIRATORY (INHALATION) at 07:53

## 2021-01-01 RX ADMIN — FENTANYL CITRATE 100 MCG: 50 INJECTION, SOLUTION INTRAMUSCULAR; INTRAVENOUS at 17:56

## 2021-01-01 RX ADMIN — CEFEPIME HYDROCHLORIDE 2000 MG: 2 INJECTION, SOLUTION INTRAVENOUS at 19:11

## 2021-01-01 RX ADMIN — MAGNESIUM SULFATE HEPTAHYDRATE 2 G: 40 INJECTION, SOLUTION INTRAVENOUS at 22:05

## 2021-01-01 RX ADMIN — INSULIN LISPRO 2 UNITS: 100 INJECTION, SOLUTION INTRAVENOUS; SUBCUTANEOUS at 22:34

## 2021-01-01 RX ADMIN — SODIUM BICARBONATE 50 MEQ: 84 INJECTION, SOLUTION INTRAVENOUS at 20:13

## 2021-01-01 RX ADMIN — IPRATROPIUM BROMIDE 0.5 MG: 0.5 SOLUTION RESPIRATORY (INHALATION) at 07:53

## 2021-01-01 RX ADMIN — CEFEPIME HYDROCHLORIDE 2000 MG: 2 INJECTION, SOLUTION INTRAVENOUS at 06:30

## 2021-01-01 RX ADMIN — METRONIDAZOLE 500 MG: 500 INJECTION, SOLUTION INTRAVENOUS at 17:43

## 2021-01-01 RX ADMIN — FENTANYL CITRATE 100 MCG: 50 INJECTION INTRAMUSCULAR; INTRAVENOUS at 16:20

## 2021-01-01 RX ADMIN — LEVALBUTEROL HYDROCHLORIDE 1.25 MG: 1.25 SOLUTION, CONCENTRATE RESPIRATORY (INHALATION) at 13:38

## 2021-01-01 RX ADMIN — FENTANYL CITRATE 100 MCG: 50 INJECTION INTRAMUSCULAR; INTRAVENOUS at 09:22

## 2021-01-01 RX ADMIN — EPINEPHRINE 1 MG: 0.1 INJECTION INTRACARDIAC; INTRAVENOUS at 20:10

## 2021-01-01 RX ADMIN — IPRATROPIUM BROMIDE 0.5 MG: 0.5 SOLUTION RESPIRATORY (INHALATION) at 07:35

## 2021-01-01 RX ADMIN — METRONIDAZOLE 500 MG: 500 INJECTION, SOLUTION INTRAVENOUS at 02:48

## 2021-01-01 RX ADMIN — METRONIDAZOLE 500 MG: 500 INJECTION, SOLUTION INTRAVENOUS at 10:00

## 2021-01-01 RX ADMIN — FLECAINIDE ACETATE 100 MG: 100 TABLET ORAL at 08:56

## 2021-01-01 RX ADMIN — SODIUM CHLORIDE 8 UNITS/HR: 9 INJECTION, SOLUTION INTRAVENOUS at 10:40

## 2021-01-01 RX ADMIN — HYDROMORPHONE HYDROCHLORIDE 2 MG: 1 INJECTION, SOLUTION INTRAMUSCULAR; INTRAVENOUS; SUBCUTANEOUS at 18:46

## 2021-01-01 RX ADMIN — CEFEPIME HYDROCHLORIDE 2000 MG: 2 INJECTION, SOLUTION INTRAVENOUS at 06:34

## 2021-01-01 RX ADMIN — METOPROLOL SUCCINATE 25 MG: 25 TABLET, FILM COATED, EXTENDED RELEASE ORAL at 08:55

## 2021-01-01 RX ADMIN — DOCUSATE SODIUM AND SENNOSIDES 1 TABLET: 8.6; 5 TABLET ORAL at 00:38

## 2021-01-01 RX ADMIN — VANCOMYCIN HYDROCHLORIDE 2000 MG: 1 INJECTION, POWDER, LYOPHILIZED, FOR SOLUTION INTRAVENOUS at 16:01

## 2021-01-01 RX ADMIN — AMIODARONE HYDROCHLORIDE 300 MG: 50 INJECTION, SOLUTION INTRAVENOUS at 20:01

## 2021-01-01 RX ADMIN — AMLODIPINE BESYLATE 10 MG: 5 TABLET ORAL at 00:39

## 2021-01-01 RX ADMIN — VANCOMYCIN HYDROCHLORIDE 2000 MG: 5 INJECTION, POWDER, LYOPHILIZED, FOR SOLUTION INTRAVENOUS at 19:10

## 2021-01-01 RX ADMIN — INSULIN LISPRO 2 UNITS: 100 INJECTION, SOLUTION INTRAVENOUS; SUBCUTANEOUS at 06:34

## 2021-01-01 RX ADMIN — POTASSIUM CHLORIDE 20 MEQ: 14.9 INJECTION, SOLUTION INTRAVENOUS at 04:20

## 2021-01-01 RX ADMIN — IPRATROPIUM BROMIDE 0.5 MG: 0.5 SOLUTION RESPIRATORY (INHALATION) at 13:25

## 2021-01-01 RX ADMIN — FUROSEMIDE 40 MG: 10 INJECTION, SOLUTION INTRAVENOUS at 17:00

## 2021-01-01 RX ADMIN — MULTIPLE VITAMINS W/ MINERALS TAB 1 TABLET: TAB ORAL at 09:26

## 2021-01-01 RX ADMIN — OMEGA-3 FATTY ACIDS CAP 1000 MG 1000 MG: 1000 CAP at 08:56

## 2021-01-01 RX ADMIN — GLYCOPYRROLATE 0.2 MG: 0.2 INJECTION, SOLUTION INTRAMUSCULAR; INTRAVENOUS at 18:46

## 2021-01-01 RX ADMIN — LABETALOL 20 MG/4 ML (5 MG/ML) INTRAVENOUS SYRINGE 10 MG: at 03:43

## 2021-01-01 RX ADMIN — PROPOFOL 20 MCG/KG/MIN: 10 INJECTION, EMULSION INTRAVENOUS at 18:14

## 2021-01-01 RX ADMIN — FENTANYL CITRATE 100 MCG: 50 INJECTION INTRAMUSCULAR; INTRAVENOUS at 06:35

## 2021-01-01 RX ADMIN — MAGNESIUM SULFATE HEPTAHYDRATE 2 G: 40 INJECTION, SOLUTION INTRAVENOUS at 17:35

## 2021-01-01 RX ADMIN — PROPOFOL 35 MCG/KG/MIN: 10 INJECTION, EMULSION INTRAVENOUS at 07:11

## 2021-01-01 RX ADMIN — POTASSIUM CHLORIDE 20 MEQ: 14.9 INJECTION, SOLUTION INTRAVENOUS at 02:07

## 2021-01-01 RX ADMIN — SODIUM CHLORIDE, SODIUM GLUCONATE, SODIUM ACETATE, POTASSIUM CHLORIDE, MAGNESIUM CHLORIDE, SODIUM PHOSPHATE, DIBASIC, AND POTASSIUM PHOSPHATE 500 ML: .53; .5; .37; .037; .03; .012; .00082 INJECTION, SOLUTION INTRAVENOUS at 22:44

## 2021-01-01 RX ADMIN — PROPOFOL 40 MCG/KG/MIN: 10 INJECTION, EMULSION INTRAVENOUS at 16:24

## 2021-01-01 RX ADMIN — METOPROLOL TARTRATE 25 MG: 25 TABLET, FILM COATED ORAL at 09:28

## 2021-01-01 RX ADMIN — METFORMIN HYDROCHLORIDE 500 MG: 500 TABLET ORAL at 08:56

## 2021-01-01 RX ADMIN — ACETAMINOPHEN 650 MG: 650 SUPPOSITORY RECTAL at 22:05

## 2021-01-01 RX ADMIN — ATORVASTATIN CALCIUM 40 MG: 40 TABLET, FILM COATED ORAL at 08:05

## 2021-01-01 RX ADMIN — LEVALBUTEROL HYDROCHLORIDE 1.25 MG: 1.25 SOLUTION, CONCENTRATE RESPIRATORY (INHALATION) at 10:45

## 2021-01-01 RX ADMIN — IPRATROPIUM BROMIDE 0.5 MG: 0.5 SOLUTION RESPIRATORY (INHALATION) at 13:38

## 2021-01-01 RX ADMIN — IPRATROPIUM BROMIDE 0.5 MG: 0.5 SOLUTION RESPIRATORY (INHALATION) at 13:07

## 2021-01-01 RX ADMIN — FENTANYL CITRATE 100 MCG: 50 INJECTION INTRAMUSCULAR; INTRAVENOUS at 17:43

## 2021-01-01 RX ADMIN — GLYCOPYRROLATE 0.1 MG: 0.2 INJECTION, SOLUTION INTRAMUSCULAR; INTRAVENOUS at 17:56

## 2021-01-01 RX ADMIN — FENTANYL CITRATE 100 MCG: 50 INJECTION INTRAMUSCULAR; INTRAVENOUS at 08:37

## 2021-01-01 RX ADMIN — NOREPINEPHRINE BITARTRATE 2 MCG/MIN: 1 INJECTION, SOLUTION, CONCENTRATE INTRAVENOUS at 21:00

## 2021-01-01 RX ADMIN — CALCIUM CHLORIDE 1 G: 100 INJECTION PARENTERAL at 20:15

## 2021-01-01 RX ADMIN — EPINEPHRINE 1 MG: 0.1 INJECTION INTRACARDIAC; INTRAVENOUS at 20:15

## 2021-01-01 RX ADMIN — PROPOFOL 40 MCG/KG/MIN: 10 INJECTION, EMULSION INTRAVENOUS at 00:53

## 2021-01-01 RX ADMIN — OMEGA-3 FATTY ACIDS CAP 1000 MG 1000 MG: 1000 CAP at 09:26

## 2021-01-01 RX ADMIN — PANTOPRAZOLE SODIUM 40 MG: 40 TABLET, DELAYED RELEASE ORAL at 06:26

## 2021-01-01 RX ADMIN — ACETAMINOPHEN 650 MG: 325 TABLET, FILM COATED ORAL at 14:29

## 2021-01-01 RX ADMIN — Medication 100 MCG/HR: at 07:13

## 2021-01-01 RX ADMIN — PROPOFOL 20 MCG/KG/MIN: 10 INJECTION, EMULSION INTRAVENOUS at 09:00

## 2021-01-01 RX ADMIN — VANCOMYCIN HYDROCHLORIDE 1750 MG: 1 INJECTION, POWDER, LYOPHILIZED, FOR SOLUTION INTRAVENOUS at 06:49

## 2021-01-01 RX ADMIN — PROPOFOL 35 MCG/KG/MIN: 10 INJECTION, EMULSION INTRAVENOUS at 05:11

## 2021-01-01 RX ADMIN — LISINOPRIL 40 MG: 20 TABLET ORAL at 08:56

## 2021-01-01 RX ADMIN — LEVALBUTEROL HYDROCHLORIDE 1.25 MG: 1.25 SOLUTION, CONCENTRATE RESPIRATORY (INHALATION) at 13:24

## 2021-01-01 RX ADMIN — FUROSEMIDE 40 MG: 10 INJECTION, SOLUTION INTRAMUSCULAR; INTRAVENOUS at 10:44

## 2021-01-01 RX ADMIN — INSULIN LISPRO 2 UNITS: 100 INJECTION, SOLUTION INTRAVENOUS; SUBCUTANEOUS at 17:53

## 2021-01-01 RX ADMIN — PROPOFOL 40 MCG/KG/MIN: 10 INJECTION, EMULSION INTRAVENOUS at 13:56

## 2021-01-01 RX ADMIN — LEVALBUTEROL HYDROCHLORIDE 1.25 MG: 1.25 SOLUTION, CONCENTRATE RESPIRATORY (INHALATION) at 13:07

## 2021-01-01 RX ADMIN — ACETAMINOPHEN 640 MG: 650 SUSPENSION ORAL at 05:42

## 2021-01-01 RX ADMIN — CHLORHEXIDINE GLUCONATE 0.12% ORAL RINSE 15 ML: 1.2 LIQUID ORAL at 09:26

## 2021-02-05 NOTE — PROGRESS NOTES
Cardiology Follow Up    [address] [facility]   1955  [id number]  [facility] [address] [address] [phone]    1  Paroxysmal atrial fibrillation (HCC)  POCT ECG       Interval History: Followup PAF    He has a LLE infection  He has been having severe back pain and his BP is high today  He is in sinus rhythm today       Problem List     Atrial fibrillation [facility])    Dextrocardia    Cerebrovascular accident (CVA) with involvement of left side of body ([address] ) (Chronic)    Essential hypertension    Cardiac disease    Hyperlipidemia    Morbid obesity due to excess calories ([facility] )    Obstructive sleep apnea    Type 2 diabetes mellitus with complication (HCC)      Lab Results   Component Value Date    HGBA1C 7 0 (H) 10/14/2020         Restrictive lung disease    Class 3 severe obesity due to excess calories with serious comorbidity and body mass index (BMI) of 45 0 to 49 9 in adult [facility])        Past Medical History:   Diagnosis Date    Arthritis     Asthma     BPH (benign prostatic hyperplasia)     Cardiac arrhythmia     resolved [id number]    Cardiac disease     Chronic a-fib (HCC)     Chronic pain     CVA [facility]cere[facility]) [facility] ) 11/2015    Dextrocardia     Diabetes mellitus ([facility] )     GERD (gastroesophageal reflux disease)     Hyperlipidemia     Hypertension     Renal disorder     kidney stone    Sleep apnea     Stroke (cerebrum) ([facility] )      Social History     Socioeconomic History    Marital status:      Spouse name: Not on file    Number of children: Not on file    Years of education: 15    Highest education level: Not on file   Occupational History    Occupation: Not working   Social Needs    Financial resource strain: Not on file    Food insecurity     Worry: Not on file     Inability: Not on file   [employer] needs     Medical: Not on file     Non-medical: Not on file   Tobacco Use    Smoking status: Never Smoker    Smokeless tobacco: Never Used    Tobacco comment: Occasionally smoked a pipe while fishing   Substance and Sexual Activity    Alcohol use:  Yes     Alcohol/week: 1 0 standard drinks     Types: 1 Glasses of wine per week     Comment: 2-3 drinks a week, cherry liquer or beer     Drug use: No    Sexual activity: Not on file   Lifestyle    Physical activity     Days per week: Not on file     Minutes per session: Not on file    Stress: Not on file   Relationships    Social connections     Talks on phone: Not on file     Gets together: Not on file     Attends [employer] service: Not on file     Active member of club or organization: Not on file     Attends meetings of clubs or organizations: Not on file     Relationship status: Not on file    Intimate partner violence     Fear of current or ex partner: Not on file     Emotionally abused: Not on file     Physically abused: Not on file     Forced sexual activity: Not on file   Other Topics Concern    Not on file   Social History Narrative    Not on file      Family History   Problem Relation Age of Onset    Coronary artery disease Father     Hodgkin's lymphoma Father     Diabetes Father     Cancer Father     Diabetes type I Mother     Cancer Maternal Grandfather     Diabetes Paternal Grandmother     Emphysema Paternal Grandmother     Heart attack Paternal Grandfather     Hypertension Family     Neuropathy Family      Past Surgical History:   Procedure Laterality Date    CHOLECYSTECTOMY      EXPLORATORY LAPAROTOMY  1971    GALLBLADDER SURGERY      [place] COLONOSCOPY FLX DX W/COLLJ Lenore 1978 PFRMD N/A 2/21/2018    Procedure: COLONOSCOPY;  Surgeon: Miesha Rome MD;  Location:  MAIN OR;  Service: Gastroenterology    UMBILICAL HERNIA REPAIR  2007       Current Outpatient Medications:     acetaminophen (TYLENOL) 500 mg tablet, Take 500 mg by mouth every 6 (six) hours as needed for mild pain, Disp: , Rfl:     amLODIPine (NORVASC) 10 mg tablet, Take 1 tablet by mouth once daily, Disp: 90 tablet, Rfl: 0    apixaban (ELIQUIS) 5 mg, Take 1 tablet (5 mg total) by mouth 2 (two) times a day, Disp: 180 tablet, Rfl: 3    atorvastatin (LIPITOR) 40 mg tablet, Take 1 tablet by mouth once daily, Disp: 90 tablet, Rfl: 0    B-D UF III MINI PEN NEEDLES 31G X 5 MM MISC, , Disp: , Rfl:     Cholecalciferol (VITAMIN D) 2000 units CAPS, Take 5,000 Units by mouth daily , Disp: , Rfl:     flecainide (TAMBOCOR) 50 mg tablet, TAKE 1 TABLET BY MOUTH EVERY 12 HOURS, Disp: 180 tablet, Rfl: 0    fluticasone (FLONASE) 50 mcg/act nasal spray, 1 spray into each nostril daily, Disp: , Rfl:     gabapentin (NEURONTIN) 300 mg capsule, TAKE 1 CAPSULE BY MOUTH TWICE DAILY, Disp: 60 capsule, Rfl: 5    Insulin Regular Human, Conc, (HumuLIN R U-500 KwikPen) 500 units/mL CONCENTRATED U-500 injection pen, Inject 100 units with meal 3 times daily (Patient taking differently: Inject 90 units with meal 3 times daily), Disp: 48 pen, Rfl: 3    metFORMIN (GLUCOPHAGE) 500 mg tablet, TAKE 1 TABLET BY MOUTH TWICE DAILY WITH MEALS, Disp: 180 tablet, Rfl: 0    metoprolol succinate (TOPROL-XL) 25 mg 24 hr tablet, Take 1 tablet by mouth once daily, Disp: 90 tablet, Rfl: 0    montelukast (SINGULAIR) 10 mg tablet, TAKE 1 TABLET BY MOUTH ONCE DAILY, Disp: 30 tablet, Rfl: 5    Multiple Vitamins-Minerals (CENTRUM SILVER ULTRA MENS) TABS, Take 1 tablet by mouth daily, Disp: , Rfl:     Multiple Vitamins-Minerals (OCUVITE PRESERVISION PO), Take by mouth daily, Disp: , Rfl:     Omega-3 Fatty Acids (FISH OIL) 1,000 mg, Take 1,000 mg by mouth daily, Disp: , Rfl:     omeprazole (PriLOSEC) 40 MG capsule, Take 40 mg by mouth daily, Disp: , Rfl:     quinapril (ACCUPRIL) 40 MG tablet, Take 1 tablet (40 mg total) by mouth daily at bedtime, Disp: 30 tablet, Rfl: 6    tamsulosin (FLOMAX) 0 4 mg, Take by mouth daily with dinner  , Disp: , Rfl:     TRUE METRIX BLOOD GLUCOSE TEST test strip, , Disp: , Rfl:     TRUEPLUS LANCETS 33G MISC, , Disp: , Rfl:     insulin glulisine (APIDRA SOLOSTAR) 100 units/mL injection pen, 60 with each meal  (Patient not taking: Reported on 7/1/2020), Disp: 5 pen, Rfl:     insulin regular (HumuLIN R,NovoLIN R) 100 units/mL injection, Inject under the skin once 100 units with each meal , Disp: , Rfl:     LANTUS SOLOSTAR 100 units/mL injection pen, Inject 60 Units under the skin every 12 (twelve) hours (Patient not taking: Reported on 7/1/2020), Disp: 40 mL, Rfl: 5    traMADol (ULTRAM) 50 mg tablet, Take 1 tablet (50 mg total) by mouth every 8 (eight) hours as needed for moderate pain or severe pain, Disp: 90 tablet, Rfl: 0  Allergies   Allergen Reactions    Banana Shortness Of Breath    Fruit Extracts Anaphylaxis and Throat Swelling     Annotation - [id number]: Bananas    Clotrimazole     Codeine Itching     Other reaction(s): Itching    Hydrocodone-Acetaminophen     Oxycodone Rash and Other (See Comments)     Other reaction(s): Pruritis    Oxycodone-Acetaminophen Rash     Other reaction(s): Pruritis       Labs:     Chemistry        Component Value Date/Time     09/19/2017 0704    K 4 3 10/14/2020 0847     10/14/2020 0847    CO2 28 10/14/2020 0847    BUN 20 10/14/2020 0847    CREATININE 0 87 10/14/2020 0847    CREATININE 1 10 12/20/2019 2222    CREATININE 0 83 09/19/2017 0704        Component Value Date/Time    CALCIUM 8 8 12/20/2019 2222    CALCIUM 8 9 09/19/2017 0704    ALKPHOS 79 12/20/2019 2222    ALKPHOS 62 09/19/2017 0704    AST 20 10/14/2020 0847    ALT 23 10/14/2020 0847    BILITOT 0 6 09/19/2017 0704            Lab Results   Component Value Date    CHOL 133 09/19/2017    CHOL 134 11/09/2015    CHOL 156 06/14/2014     Lab Results   Component Value Date    HDL 52 10/14/2020    HDL 47 03/10/2020    HDL 53 05/20/2019     Lab Results   Component Value Date    LDLCALC 75 10/14/2020    LDLCALC 48 03/10/2020    LDLCALC 56 05/20/2019     Lab Results   Component Value Date    TRIG 131 10/14/2020    TRIG 118 03/10/2020    TRIG 109 05/20/2019     Lab Results   Component Value Date    CHOLHDL 2 6 03/30/2018       Imaging: No results found  EKG: NSR     Review of Systems   Constitution: Negative  HENT: Negative  Eyes: Negative  Cardiovascular: Negative  Respiratory: Negative  Endocrine: Negative  Hematologic/Lymphatic: Negative  Skin: Negative  Musculoskeletal: Negative  Gastrointestinal: Negative  Genitourinary: Negative  Neurological: Negative  Psychiatric/Behavioral: Negative  Allergic/Immunologic: Negative  Vitals:    02/05/21 1535   BP: 170/68   Pulse: 85           Physical Exam  Vitals signs reviewed  Constitutional:       Appearance: Normal appearance  HENT:      Head: Normocephalic  Nose: Nose normal       Mouth/Throat:      Mouth: Mucous membranes are moist       Pharynx: Oropharynx is clear  Eyes:      General: No scleral icterus  Conjunctiva/sclera: Conjunctivae normal    Neck:      Musculoskeletal: Normal range of motion and neck supple  Cardiovascular:      Rate and Rhythm: Normal rate and regular rhythm  Heart sounds: No murmur  No friction rub  No gallop  Pulmonary:      Effort: Pulmonary effort is normal  No respiratory distress  Breath sounds: No wheezing or rales  Abdominal:      General: Abdomen is flat  Bowel sounds are normal  There is no distension  Palpations: Abdomen is soft  Tenderness: There is no abdominal tenderness  There is no guarding  Musculoskeletal:      Right lower leg: Edema present  Left lower leg: Edema present  Skin:     General: Skin is warm and dry  Neurological:      General: No focal deficit present  Mental Status: He is alert and oriented to person, place, and time     Psychiatric:         Mood and Affect: Mood normal          Behavior: Behavior normal  Discussion/Summary:    Paroxysmal Atrial Fibrillation: HE is in NSR today  Overall doing well  Continue with current medical therapy       HTN:His BP is up today  He checks at home and has been okay       Dyslipidemia: Continue with medical therapy  Last LDL was 75       Dextrocardia           The patient was counseled regarding diagnostic results, instructions for management, risk factor reductions, impressions  total time of encounter was 25 minutes and 15 minutes was spent counseling

## 2021-02-17 NOTE — TELEPHONE ENCOUNTER
Pt dropped of paperwork & proof of income for [employer] patient assistance   He does want to make us aware that he has recently been using 110 units of Humulin R U-500

## 2021-02-18 NOTE — PATIENT INSTRUCTIONS
Be mindful of diet  Stay hydrated  For now, continue dose of Humulin  at 110 units consistently      Check blood sugars 3-4 times daily and send record to the office in 1-2 weeks for review       Contact the office with any consistent episodes of hypoglycemia       Correct hypoglycemia with 4 oz of juice or soda (glucose tablets) and recheck your blood sugar in about 15 minutes  You may repeat the process, if needed  Once you reach , follow with a protein and carbohydrate snack such as a peanut butter sandwich      Continue your antihypertensive medications       Use your CPAP consistently      Continue to supplement with 4000 units of vitamin D3 daily

## 2021-02-18 NOTE — TELEPHONE ENCOUNTER
These are signed and patient is aware  Stella Augustine has one page that has to be signed so he is going to stop in when he can to sign it and then we can fax it for him

## 2021-02-18 NOTE — PROGRESS NOTES
Virtual Regular Visit    Problem List Items Addressed This Visit        Respiratory    Obstructive sleep apnea       Cardiovascular and Mediastinum    Essential hypertension       Other    Hyperlipidemia      Other Visit Diagnoses     Uncontrolled type 2 diabetes mellitus with complication, with long-term current use of insulin ([facility] [address] )    -  Primary    Vitamin D deficiency            Reason for visit is uncontrolled type 2 diabetes with hypertension, hyperlipidemia and obstructive sleep apnea        Encounter provider NIXON Fisher    Provider located at [address] [place] [address] [address] [place] [address]      Recent Visits  Date Type Provider Dept   02/17/21 Telephone [facility] [place]   Showing recent visits within past 7 days and meeting all other requirements     Today's Visits  Date Type Provider Dept   02/18/21 Telemedicine [facility] [place] today's visits and meeting all other requirements     Future Appointments  No visits were found meeting these conditions  Showing future appointments within next 150 days and meeting all other requirements        The patient was identified by name and date of birth  Flakita Sheikh  was informed that this is a telemedicine visit and that the visit is being conducted through telephone  My office door was closed  No one else was in the room  He acknowledged consent and understanding of privacy and security of the video platform  The patient has agreed to participate and understands they can discontinue the visit at any time  Patient is aware this is a billable service  Subjective  Flakita Sheikh  is a 76 y o  male withType 2 Diabetes presents for  virtual follow up  He states he has been diagnosed with Type 2 Diabetes for approximately 22 years    He denies polydipsia, polyphagia and polyuria     Current Diabetic medication regimen is as follows:  Metformin 500 mg twice daily  Humulin  - 110 units at meals  (This dose was increased on his own approximately 2 weeks ago)  No blood sugar records available for review  His most recent hemoglobin A1c from February 10, 2021 is 9 1   He states he is up to date with his annual diabetic eye exam, most recent was July 10, 2020 and denies retinopathy  Ludy Torres states he does have cataracts and glaucoma   He complains about numbness, tingling and dryness to his feet and follows [place] [facility] for regular diabetic foot care every 12 weeks   He was treated for an infection in his left leg over the past few months      For his hypertension, he takes amlodipine 10 mg daily, furosemide 20 mg daily, quinapril 40 mg daily and metoprolol 50 mg twice daily  Ludy Torres states he checks his blood pressure at home      His hyperlipidemia is treated with atorvastatin 80 mg daily and fish oil 1 g daily      For his obstructive sleep apnea, he is utilizing CPAP more regularly recently than in the past      For his vitamin-D deficiency, he supplements with 4000 units of vitamin D3 daily       HPI     Past Medical History:   Diagnosis Date    Arthritis     Asthma     BPH (benign prostatic hyperplasia)     Cardiac arrhythmia     resolved [id number]    Cardiac disease     [facility]fib [facility] [address] )     Chronic pain     CVA (cerebral vascular accident) ([facility] [address] ) 11/2015    Dextrocardia     Diabetes mellitus ([facility] [address] )     GERD (gastroesophageal reflux disease)     Hyperlipidemia     Hypertension     Renal disorder     kidney stone    Sleep apnea     [facility]rebrum[facility])        Past Surgical History:   Procedure Laterality Date    CHOLECYSTECTOMY      EXPLORATORY LAPAROTOMY  1971    GALLBLADDER SURGERY      OK COLONOSCOPY FLX DX W/COLLJ SPEC WHEN PFRMD N/A 2/21/2018    Procedure: COLONOSCOPY;  Surgeon: Carlos Cummings MD;  Location: [facility] OR;  Service: Gastroenterology    UMBILICAL HERNIA REPAIR  2007       Current Outpatient Medications   Medication Sig Dispense Refill    acetaminophen (TYLENOL) 500 mg tablet Take 500 mg by mouth every 6 (six) hours as needed for mild pain      amLODIPine (NORVASC) 10 mg tablet Take 1 tablet by mouth once daily 90 tablet 0    apixaban (Eliquis) 5 mg Take 1 tablet (5 mg total) by mouth 2 (two) times a day 180 tablet 3    atorvastatin (LIPITOR) 40 mg tablet Take 1 tablet by mouth once daily 90 tablet 0    B-D UF III MINI PEN NEEDLES 31G X 5 MM MISC       Cholecalciferol (VITAMIN D) 2000 units CAPS Take 5,000 Units by mouth daily       flecainide (TAMBOCOR) 50 mg tablet TAKE 1 TABLET BY MOUTH EVERY 12 HOURS 180 tablet 0    fluticasone (FLONASE) 50 mcg/act nasal spray 1 spray into each nostril daily      gabapentin (NEURONTIN) 300 mg capsule TAKE 1 CAPSULE BY MOUTH TWICE DAILY 60 capsule 5    insulin regular (HumuLIN R,NovoLIN R) 100 units/mL injection Inject under the skin once 100 units with each meal       Insulin Regular Human, Conc, (HumuLIN R U-500 KwikPen) 500 units/mL CONCENTRATED U-500 injection pen Inject 100 units with meal 3 times daily (Patient taking differently: Inject 110 units with meal 3 times daily) 48 pen 3    metFORMIN (GLUCOPHAGE) 500 mg tablet TAKE 1 TABLET BY MOUTH TWICE DAILY WITH MEALS 180 tablet 0    metoprolol succinate (TOPROL-XL) 25 mg 24 hr tablet Take 1 tablet by mouth once daily 90 tablet 0    montelukast (SINGULAIR) 10 mg tablet TAKE 1 TABLET BY MOUTH ONCE DAILY 30 tablet 5    Multiple Vitamins-Minerals (CENTRUM SILVER ULTRA MENS) TABS Take 1 tablet by mouth daily      Multiple Vitamins-Minerals (OCUVITE PRESERVISION PO) Take by mouth daily      Omega-3 Fatty Acids (FISH OIL) 1,000 mg Take 1,000 mg by mouth daily      omeprazole (PriLOSEC) 40 MG capsule Take 40 mg by mouth daily      quinapril (ACCUPRIL) 40 MG tablet Take 1 tablet (40 mg total) by mouth daily at bedtime 30 tablet 6    tamsulosin (FLOMAX) 0 4 mg Take by mouth daily with dinner        traMADol (ULTRAM) 50 mg tablet Take 1 tablet (50 mg total) by mouth every 8 (eight) hours as needed for moderate pain or severe pain 90 tablet 0    TRUE METRIX BLOOD GLUCOSE TEST test strip       TRUEPLUS LANCETS 33G MISC       insulin glulisine (APIDRA SOLOSTAR) 100 units/mL injection pen 60 with each meal  (Patient not taking: Reported on 7/1/2020) 5 pen     LANTUS SOLOSTAR 100 units/mL injection pen Inject 60 Units under the skin every 12 (twelve) hours (Patient not taking: Reported on 7/1/2020) 40 mL 5     No current facility-administered medications for this visit  Allergies   Allergen Reactions    Banana Shortness Of Breath    Fruit Extracts Anaphylaxis and Throat Swelling     [facility] - [id number]: Bananas    Clotrimazole     Codeine Itching     Other reaction(s): Itching    Hydrocodone-Acetaminophen     Oxycodone Rash and Other (See Comments)     Other reaction(s): Pruritis    Oxycodone-Acetaminophen Rash     Other reaction(s): Pruritis       Review of Systems   Constitutional: Positive for fatigue  Negative for chills and fever  HENT: Negative  Negative for trouble swallowing and voice change  Eyes: Positive for visual disturbance ( right eye)  Negative for photophobia, pain, discharge, redness and itching  Respiratory: Negative for cough and shortness of breath  Cardiovascular: Positive for leg swelling  Negative for chest pain and palpitations  Gastrointestinal: Negative for abdominal pain, constipation, diarrhea, nausea and vomiting  Endocrine: Positive for polyuria ( 2-3 times per night nocturia)  Negative for cold intolerance, heat intolerance, polydipsia and polyphagia  Genitourinary: Negative  Musculoskeletal: Positive for arthralgias ( chronic) and back pain ( chronic)  Skin: Negative  Allergic/Immunologic: Negative      Neurological: Negative for syncope, light-headedness and headaches  Hematological: Negative  Psychiatric/Behavioral: Negative  All other systems reviewed and are negative  Video Exam  It was my intent to perform this visit via video technology but the patient was not able to do a video connection so the visit was completed via audio telephone only  There were no vitals filed for this visit  Physical Exam (  Not available-phone visit )    Plan:  1   Uncontrolled type 2 diabetes with hyperglycemia and long-term insulin use:   His most recent hemoglobin A1c has elevated to 9 1  He has been treated for a infection to his leg with recurrence over the past few months  He states that he has been varying his dose of Humulin  to help treat his hyperglycemia  For now, I have asked him to continue his current dose of 110 units of Humulin  consistently and asked him to check his blood sugars 4 times daily and provide a record to the office in 1-2 weeks for review so that adjustments may be made to his regimen  Reviewed recognition and proper treatment of hypoglycemic episodes as he has been over compensating for his hypoglycemia recently   Discussed the impact of diet and weight loss on the treatment of his diabetes   Check hemoglobin A1c prior to next visit      2   Hypertension: For now, continue current medication regimen   Continue to monitor at home  [facility] comprehensive metabolic panel prior to next visit      3   Hyperlipidemia: Continue atorvastatin and fish oil  Check fasting lipid panel prior to next visit      4   Obstructive sleep apnea:  Continue CPAP on a regular basis      5   Vitamin-D deficiency:  Continue supplementation with vitamin D3 daily  I spent 30 minutes directly with the patient during this visit      VIRTUAL VISIT DISCLAIMER    Mary Bacon  acknowledges that he has consented to an online visit or consultation   He understands that the online visit is based solely on information provided by him, and that, in the absence of a face-to-face physical evaluation by the physician, the diagnosis he receives is both limited and provisional in terms of accuracy and completeness  This is not intended to replace a full medical face-to-face evaluation by the physician  Mariano Sarmiento  understands and accepts these terms

## 2021-04-15 NOTE — RESULT NOTES
Verified Results  (1) HEMOGLOBIN A1C [id number] 12:02PM Ambika Hernandez     Test Name Result Flag Reference   Hemoglobin A1c 7 2 % H 4 8-5 6   Pre-diabetes: 5 7 - 6 4           Diabetes: >6 4           Glycemic control for adults with diabetes: <7 0
5

## 2021-04-22 NOTE — TELEPHONE ENCOUNTER
Patient has changed PCP to Warden Irving GEORGE at [facility]  Please change PCP

## 2021-05-26 PROBLEM — I50.9 ACUTE DECOMPENSATED HEART FAILURE (HCC): Status: ACTIVE | Noted: 2021-01-01

## 2021-05-26 NOTE — PROGRESS NOTES
Patrick Berman  76 y o  male MRN: [id number]    Encounter: [id number]      Assessment/Plan     Assessment: This is a 76y o -year-old male with uncontrolled type 2 diabetes with hypertension, hyperlipidemia and obstructive sleep apnea       Plan:  1   Uncontrolled type 2 diabetes with hyperglycemia and long-term insulin use:   His most recent hemoglobin A1c has improved to 7 4  He states that he has been varying his dose of Humulin  to help treat his hyperglycemia  Review of his recent blood sugars reveals that he has consistent hyperglycemia greater than 200 mg/dl in the morning  For now, I have asked him to continue his current dose of 120 units of Humulin  consistently with breakfast and lunch and increase to 130 with dinner  I asked him to check his blood sugars 4 times daily and provide a record to the office in 1-2 weeks for review so that adjustments may be made to his regimen   Reviewed recognition and proper treatment of hypoglycemic episodes as he has been over compensating for his hypoglycemia recently   Discussed the impact of diet and weight loss on the treatment of his diabetes   Check hemoglobin A1c prior to next visit      2   Hypertension: For now, continue current medication regimen   Continue to monitor at home  [facility] comprehensive metabolic panel prior to next visit      3   Hyperlipidemia: Continue atorvastatin and fish oil   Check fasting lipid panel prior to next visit      4   Obstructive sleep apnea:  Continue CPAP on a regular basis      5   Vitamin-D deficiency:  Continue supplementation with vitamin D3 daily  CC: Type 2 Diabetes follow up    History of Present Illness     HPI:  76 y o  male withType 2 Diabetes presents for follow up  He states he has been diagnosed with Type 2 Diabetes for approximately 22 years    He denies polydipsia, polyphagia and polyuria     Current Diabetic medication regimen is as follows:  Metformin 500 mg twice daily  Humulin RU 500 - 120-130 units at meals  (He will vary his dose according to his blood sugars at times)  No blood sugar records available for review  His most recent hemoglobin A1c from May 25, 2021 is 7 4   He states he is up to date with his annual diabetic eye exam, most recent was July 10, 2020 and denies retinopathy  [facility] states he does have cataracts and glaucoma   He complains about numbness, tingling and dryness to his feet and follows [place] pod[facility] for regular diabetic foot care every 12 weeks  He was treated for an infection in his left leg over the past few months but is virtually healed      For his hypertension, he takes amlodipine 10 mg daily, furosemide 20 mg daily, quinapril 40 mg daily and metoprolol 50 mg twice daily  [facility] states he checks his blood pressure at home      His hyperlipidemia is treated with atorvastatin 80 mg daily and fish oil 1 g daily      For his obstructive sleep apnea, he is utilizing CPAP more regularly recently than in the past      For his vitamin-D deficiency, he supplements with 4000 units of vitamin D3 daily  Review of Systems   Constitutional: Negative  Negative for chills, fatigue and fever  HENT: Negative  Negative for trouble swallowing and voice change  Eyes: Positive for visual disturbance ( right eye at times)  Negative for photophobia, pain, discharge, redness and itching  Respiratory: Negative for cough and shortness of breath  Cardiovascular: Positive for leg swelling  Negative for chest pain and palpitations  Gastrointestinal: Negative for abdominal pain, constipation, diarrhea, nausea and vomiting  Endocrine: Positive for polyuria ( 2-3 times per night nocturia)  Negative for cold intolerance, heat intolerance, polydipsia and polyphagia  Genitourinary: Negative  Musculoskeletal: Positive for arthralgias, back pain ( chronic) and myalgias  Skin: Negative  Allergic/Immunologic: Negative      Neurological: Negative for dizziness, syncope, light-headedness and headaches  Hematological: Negative  Psychiatric/Behavioral: Negative  All other systems reviewed and are negative        Historical Information   Past Medical History:   Diagnosis Date    Arthritis     Asthma     BPH (benign prostatic hyperplasia)     Cardiac arrhythmia     resolved [id number]    Cardiac disease     Chronic a-fib (HCC)     Chronic pain     CVA [facility]cerebral vascular accident) ([facility] [address] ) 11/2015    Dextrocardia     Diabetes mellitus ([facility] )     GERD (gastroesophageal reflux disease)     Hyperlipidemia     Hypertension     Renal disorder     kidney stone    Sleep apnea     [facility]ce[facility]brum) ([facility])      Past Surgical History:   Procedure Laterality Date    CHOLECYSTECTOMY      EXPLORATORY LAPAROTOMY  1971    GALLBLADDER SURGERY      [place] COLONOSCOPY FLX DX W/COLLJ SPEC WHEN PFRMD N/A 2/21/2018    Procedure: COLONOSCOPY;  Surgeon: Michelle Mohan MD;  Location:  MAIN OR;  Service: Gastroenterology    UMBILICAL HERNIA REPAIR  2007     Social History   Social History     Substance and Sexual Activity   Alcohol Use Yes    Alcohol/week: 1 0 standard drinks    Types: 1 Glasses of wine per week    Comment: 2-3 drinks a week, cherry liquer or beer      Social History     Substance and Sexual Activity   Drug Use No     Social History     Tobacco Use   Smoking Status Never Smoker   Smokeless Tobacco Never Used   Tobacco Comment    Occasionally smoked a pipe while fishing     Family History:   Family History   Problem Relation Age of Onset    Coronary artery disease Father     Hodgkin's lymphoma Father     Diabetes Father     Cancer Father     Diabetes type I Mother     Cancer Maternal Grandfather     Diabetes Paternal Grandmother     Emphysema Paternal Grandmother     Heart attack Paternal Grandfather     Hypertension Family     Neuropathy Family        Meds/Allergies   Current Outpatient Medications   Medication Sig Dispense Refill    acetaminophen (TYLENOL) 500 mg tablet Take 500 mg by mouth every 6 (six) hours as needed for mild pain      amLODIPine (NORVASC) 10 mg tablet Take 1 tablet by mouth once daily 90 tablet 0    apixaban (Eliquis) 5 mg Take 1 tablet (5 mg total) by mouth 2 (two) times a day 180 tablet 3    atorvastatin (LIPITOR) 40 mg tablet Take 1 tablet by mouth once daily 90 tablet 0    B-D UF III MINI PEN NEEDLES 31G X 5 MM MISC       Cholecalciferol (VITAMIN D) 2000 units CAPS Take 5,000 Units by mouth daily       flecainide (TAMBOCOR) 50 mg tablet TAKE 1 TABLET BY MOUTH EVERY 12 HOURS 180 tablet 0    fluticasone (FLONASE) 50 mcg/act nasal spray 1 spray into each nostril daily      gabapentin (NEURONTIN) 300 mg capsule TAKE 1 CAPSULE BY MOUTH TWICE DAILY 60 capsule 5    Insulin Regular Human, Conc, (HumuLIN R U-500 KwikPen) 500 units/mL CONCENTRATED U-500 injection pen Inject 100 units with meal 3 times daily (Patient taking differently: Inject 120-130 units with meal 3 times daily) 48 pen 3    metFORMIN (GLUCOPHAGE) 500 mg tablet TAKE 1 TABLET BY MOUTH TWICE DAILY WITH MEALS 180 tablet 0    metoprolol succinate (TOPROL-XL) 25 mg 24 hr tablet Take 1 tablet by mouth once daily 90 tablet 0    montelukast (SINGULAIR) 10 mg tablet TAKE 1 TABLET BY MOUTH ONCE DAILY 30 tablet 5    Multiple Vitamins-Minerals (CENTRUM SILVER ULTRA MENS) TABS Take 1 tablet by mouth daily      Multiple Vitamins-Minerals (OCUVITE PRESERVISION PO) Take by mouth daily      Omega-3 Fatty Acids (FISH OIL) 1,000 mg Take 1,000 mg by mouth daily      omeprazole (PriLOSEC) 40 MG capsule Take 40 mg by mouth daily      quinapril (ACCUPRIL) 40 MG tablet Take 1 tablet (40 mg total) by mouth daily at bedtime 30 tablet 6    tamsulosin (FLOMAX) 0 4 mg Take by mouth daily with dinner        traMADol (ULTRAM) 50 mg tablet Take 1 tablet (50 mg total) by mouth every 8 (eight) hours as needed for moderate pain or severe pain 90 tablet 0    TRUE METRIX BLOOD GLUCOSE TEST test strip  TRUEPLUS LANCETS 33G MISC       flecainide (TAMBOCOR) 100 mg tablet TAKE 1 TABLET BY MOUTH EVERY 12 HOURS DAILY (Patient not taking: Reported on 5/26/2021) 60 tablet 0    insulin glulisine (APIDRA SOLOSTAR) 100 units/mL injection pen 60 with each meal  (Patient not taking: Reported on 7/1/2020) 5 pen     insulin regular (HumuLIN R,NovoLIN R) 100 units/mL injection Inject under the skin once 120-130 units with each meal       LANTUS SOLOSTAR 100 units/mL injection pen Inject 60 Units under the skin every 12 (twelve) hours (Patient not taking: Reported on 7/1/2020) 40 mL 5     No current facility-administered medications for this visit  Allergies   Allergen Reactions    Banana - Food Allergy Shortness Of Breath    Fruit Extracts Anaphylaxis and Throat Swelling     Annotation - [id number]: Bananas    Clotrimazole     Codeine Itching     Other reaction(s): Itching    Hydrocodone-Acetaminophen     Oxycodone Rash and Other (See Comments)     Other reaction(s): Pruritis    Oxycodone-Acetaminophen Rash     Other reaction(s): Pruritis       Objective   Vitals: Blood pressure 156/70, pulse 80, height 5' 10" (1 778 m), weight (!) 170 kg (374 lb)  Physical Exam  Vitals signs reviewed  Constitutional:       Appearance: He is well-developed  He is obese  HENT:      Head: Normocephalic and atraumatic  Nose: Nose normal    Eyes:      Conjunctiva/sclera: Conjunctivae normal       Pupils: Pupils are equal, round, and reactive to light  Neck:      Musculoskeletal: Normal range of motion and neck supple  Cardiovascular:      Rate and Rhythm: Normal rate and regular rhythm  Heart sounds: Normal heart sounds  Pulmonary:      Effort: Pulmonary effort is normal       Breath sounds: Normal breath sounds  Abdominal:      General: Bowel sounds are normal       Palpations: Abdomen is soft  Musculoskeletal: Normal range of motion  Right lower leg: Edema present        Left lower leg: Edema present  Skin:     General: Skin is warm and dry  Findings: Rash (lower leg stasis rash) present  Neurological:      Mental Status: He is alert and oriented to person, place, and time  Psychiatric:         Behavior: Behavior normal          Thought Content: Thought content normal          Judgment: Judgment normal        Lab Results:   Lab Results   Component Value Date/Time    Hemoglobin A1C 9 1 (H) 02/10/2021 08:12 AM    Hemoglobin A1C 7 0 (H) 10/14/2020 08:47 AM    Hemoglobin A1C 7 1 (H) 06/27/2020 08:33 AM    BUN 16 02/10/2021 08:12 AM    BUN 20 10/14/2020 08:47 AM    BUN 18 06/27/2020 08:33 AM    Potassium 4 0 02/10/2021 08:12 AM    Potassium 4 3 10/14/2020 08:47 AM    Potassium 4 0 06/27/2020 08:33 AM    Chloride 99 02/10/2021 08:12 AM    Chloride 101 10/14/2020 08:47 AM    Chloride 104 06/27/2020 08:33 AM    CO2 28 02/10/2021 08:12 AM    CO2 28 10/14/2020 08:47 AM    CO2 26 06/27/2020 08:33 AM    Creatinine 0 97 02/10/2021 08:12 AM    Creatinine 0 87 10/14/2020 08:47 AM    Creatinine 1 04 06/27/2020 08:33 AM    AST 22 02/10/2021 08:12 AM    AST 20 10/14/2020 08:47 AM    AST 17 06/27/2020 08:33 AM    ALT 23 02/10/2021 08:12 AM    ALT 23 10/14/2020 08:47 AM    ALT 22 06/27/2020 08:33 AM    Albumin 4 5 02/10/2021 08:12 AM    Albumin 4 5 10/14/2020 08:47 AM    Albumin 4 4 06/27/2020 08:33 AM    Globulin, Total 2 4 02/10/2021 08:12 AM    Globulin, Total 2 5 10/14/2020 08:47 AM    Globulin, Total 2 4 06/27/2020 08:33 AM    HDL 52 10/14/2020 08:47 AM    Triglycerides 131 10/14/2020 08:47 AM     Portions of the record may have been created with voice recognition software  Occasional wrong word or "sound a like" substitutions may have occurred due to the inherent limitations of voice recognition software  Read the chart carefully and recognize, using context, where substitutions have occurred

## 2021-05-26 NOTE — PATIENT INSTRUCTIONS
Your Hemoglobin A1c is improved to 7 4  Keep up the good work !!! Be mindful of diet      Stay hydrated      For now, continue dose of Humulin  at breakfast and lunch but increase to 130 units at dinner consistently to help with higher blood sugar readings in the morning      Check blood sugars 3-4 times daily and send record to the office in 1-2 weeks for review       Contact the office with any consistent episodes of hypoglycemia       Correct hypoglycemia with 4 oz of juice or soda (glucose tablets) and recheck your blood sugar in about 15 minutes   You may repeat the process, if needed   Once you reach , follow with a protein and carbohydrate snack such as a peanut butter sandwich      Continue your antihypertensive medications       Use your CPAP consistently      Continue to supplement with 4000 units of vitamin D3 daily

## 2021-06-09 NOTE — TELEPHONE ENCOUNTER
Patients PCP is Dr Kee Gomez at [facility], please remove Dr Annia Rubalcava from patients PCP

## 2021-06-18 NOTE — TELEPHONE ENCOUNTER
Patient lost bottle of Eliquis pt needed  a over ride- spoke with [facility]-ride

## 2021-06-21 NOTE — TELEPHONE ENCOUNTER
Very good, pt should have samples enough to carry through until mail order arrives  Will verify w/ pt he has tried to order since you spoke to [facility] for over ride  (Pt has prior stroke)  Thank you!

## 2021-06-21 NOTE — TELEPHONE ENCOUNTER
Pt called office Friday afternoon - states he has not had Eliquis x2 weeks  One month of samples provided to pt by Dr Smita Ludwig on Friday  Pt states [employer] told him they will not send med - is this correct?

## 2021-06-23 NOTE — TELEPHONE ENCOUNTER
Called, spoke to pt  He will call for refill  He understands to call the office if he has any issues reordering or receiving his medication

## 2021-08-21 PROBLEM — A41.9 SEPSIS (HCC): Status: ACTIVE | Noted: 2021-01-01

## 2021-08-21 PROBLEM — I87.2 VENOUS STASIS DERMATITIS OF BOTH LOWER EXTREMITIES: Status: ACTIVE | Noted: 2021-01-01

## 2021-08-21 PROBLEM — R77.8 ELEVATED TROPONIN: Status: ACTIVE | Noted: 2021-01-01

## 2021-08-21 PROBLEM — L03.115 CELLULITIS OF RIGHT LOWER EXTREMITY: Status: ACTIVE | Noted: 2021-01-01

## 2021-08-22 PROBLEM — G93.40 ENCEPHALOPATHY: Status: ACTIVE | Noted: 2021-01-01

## 2021-08-22 PROBLEM — I47.2 VENTRICULAR TACHYCARDIA (HCC): Status: ACTIVE | Noted: 2021-01-01

## 2021-08-22 PROBLEM — I46.9 CARDIAC ARREST (HCC): Status: ACTIVE | Noted: 2021-01-01

## 2021-08-22 PROBLEM — R57.9 SHOCK (HCC): Status: ACTIVE | Noted: 2021-01-01

## 2021-08-22 PROBLEM — E87.2 LACTIC ACIDOSIS: Status: ACTIVE | Noted: 2021-01-01

## 2021-08-22 NOTE — ASSESSMENT & PLAN NOTE
· Meeting sepsis due to tachycardia, elevated WBC (16 49) and source of infection cellulitis  · Lactic elevated at 2 3  · Started on vancomycin and cefepime in the ED, continue  · Received 1 L bolus of fluids  · Lactate is 1 9  · Follow-up Blood culture   · Trend WBC and fever curve

## 2021-08-22 NOTE — PLAN OF CARE
Problem: PAIN - ADULT  Goal: Verbalizes/displays adequate comfort level or baseline comfort level  Description: Interventions:  - Encourage patient to monitor pain and request assistance  - Assess pain using appropriate pain scale  - Administer analgesics based on type and severity of pain and evaluate response  - Implement non-pharmacological measures as appropriate and evaluate response  - Consider cultural and social influences on pain and pain management  - Notify physician/advanced practitioner if interventions unsuccessful or patient reports new pain  Outcome: Progressing     Problem: INFECTION - ADULT  Goal: Absence or prevention of progression during hospitalization  Description: INTERVENTIONS:  - Assess and monitor for signs and symptoms of infection  - Monitor lab/diagnostic results  - Monitor all insertion sites, i e  indwelling lines, tubes, and drains  - Monitor endotracheal if appropriate and nasal secretions for changes in amount and color  - [place] appropriate cooling/warming therapies per order  - Administer medications as ordered  - Instruct and encourage patient and family to use good hand hygiene technique  - Identify and instruct in appropriate isolation precautions for identified infection/condition  Outcome: Progressing  Goal: Absence of fever/infection during neutropenic period  Description: INTERVENTIONS:  - Monitor WBC    Outcome: Progressing

## 2021-08-22 NOTE — PROGRESS NOTES
Vancomycin Assessment    Angelica Ring  is a 78 y o  male who  received vancomycin 2000 mg x 1 dose IV (dose given at 2021 hrs in ED) then 2000 mg q12h (max 2 gm per dose) for skin-soft tissue infection     Relevant clinical data and objective history reviewed:  Creatinine   Date Value Ref Range Status   08/21/2021 1 08 0 60 - 1 30 mg/dL Final     Comment:     Standardized to IDMS reference method   05/25/2021 0 94 0 76 - 1 27 mg/dL Final   02/10/2021 0 97 0 76 - 1 27 mg/dL Final   10/14/2020 0 87 0 76 - 1 27 mg/dL Final   12/20/2019 1 10 0 60 - 1 30 mg/dL Final     Comment:     Standardized to IDMS reference method   09/19/2017 0 83 0 76 - 1 27 mg/dL Final   06/08/2017 0 92 0 76 - 1 27 mg/dL Final   02/03/2017 0 94 0 60 - 1 30 mg/dL Final     Comment:     Standardized to IDMS reference method   12/20/2016 0 89 0 76 - 1 27 mg/dL Final     /63 (BP Location: Right arm)   Pulse 79   Temp 99 5 °F (37 5 °C) (Oral)   Resp 20   Wt (!) 170 kg (374 lb)   SpO2 95%   BMI 53 66 kg/m²   No intake/output data recorded  Lab Results   Component Value Date/Time    BUN 21 08/21/2021 06:35 PM    BUN 16 05/25/2021 09:52 AM    WBC 16 49 (H) 08/21/2021 06:35 PM    WBC 6 98 11/30/2015 06:13 AM    HGB 13 3 08/21/2021 06:35 PM    HGB 13 9 11/30/2015 06:13 AM    HCT 40 5 08/21/2021 06:35 PM    HCT 40 8 11/30/2015 06:13 AM    MCV 93 08/21/2021 06:35 PM    MCV 90 11/30/2015 06:13 AM     08/21/2021 06:35 PM     11/30/2015 06:13 AM     Temp Readings from Last 3 Encounters:   08/21/21 99 5 °F (37 5 °C) (Oral)   10/20/20 98 4 °F (36 9 °C)   12/20/19 (!) 97 4 °F (36 3 °C)     Vancomycin Days of Therapy: 1    Assessment/Plan  The patient is currently on vancomycin utilizing scheduled dosing  Baseline risks associated with therapy include: advanced age    The patient received vanco 2000 mg x 1 dose IV (dose given at 2021 hrs in ED) then 2000 mg q12h (max 2 gm per dose) to start at 0800 hrs on 8/22/21with the most recent vancomycin level being not at steady-state and sub-therapeutic based on a goal of 15-20 (appropriate for most indications) ; therefore, is clinically appropriate and dose will be continued   Pharmacy will continue to follow closely for s/sx of nephrotoxicity, infusion reactions, and appropriateness of therapy  BMP and CBC will be ordered per protocol  Plan for trough as patient approaches steady state, prior to the 4th  dose at approximately 0730 hrs on 8/23/21  Pharmacy will continue to follow the patients culture results and clinical progress daily      Chela France, Pharmacist

## 2021-08-22 NOTE — ASSESSMENT & PLAN NOTE
Lab Results   Component Value Date    HGBA1C 7 4 (H) 05/25/2021       Recent Labs     08/21/21  2234 08/22/21  0710 08/22/21  1054   POCGLU 192* 238* 268*       Blood Sugar Average: Last 72 hrs:  · (P) 499 [id number] regimen:  Humulin  130 units t i d  with meals  · Patient reports blood pressure drastically changes from day-to-day sometimes in the 60s  · While admitted 100 units t i d  with meals  · Start sliding scale insulin

## 2021-08-22 NOTE — ASSESSMENT & PLAN NOTE
· Meeting sepsis due to tachycardia, elevated WBC (16 49) and source of infection cellulitis  · Lactic elevated at 2 3  · Started on vancomycin and cefepime in the ED, continue  · Received 1 L bolus of fluids  · Lactate is 1 9  · Follow-up Blood culture   · Trend WBC and fever curve  · Will order chest x-ray and start on Lasix

## 2021-08-22 NOTE — PLAN OF CARE
Problem: PAIN - ADULT  Goal: Verbalizes/displays adequate comfort level or baseline comfort level  Description: Interventions:  - Encourage patient to monitor pain and request assistance  - Assess pain using appropriate pain scale  - Administer analgesics based on type and severity of pain and evaluate response  - Implement non-pharmacological measures as appropriate and evaluate response  - Consider cultural and social influences on pain and pain management  - Notify physician/advanced practitioner if interventions unsuccessful or patient reports new pain  Outcome: Progressing     Problem: INFECTION - ADULT  Goal: Absence or prevention of progression during hospitalization  Description: INTERVENTIONS:  - Assess and monitor for signs and symptoms of infection  - Monitor lab/diagnostic results  - Monitor all insertion sites, i e  indwelling lines, tubes, and drains  - Monitor endotracheal if appropriate and nasal secretions for changes in amount and color  - [place] appropriate cooling/warming therapies per order  - Administer medications as ordered  - Instruct and encourage patient and family to use good hand hygiene technique  - Identify and instruct in appropriate isolation precautions for identified infection/condition  Outcome: Progressing  Goal: Absence of fever/infection during neutropenic period  Description: INTERVENTIONS:  - Monitor WBC    Outcome: Progressing     Problem: SAFETY ADULT  Goal: Patient will remain free of falls  Description: INTERVENTIONS:  - Educate patient/family on patient safety including physical limitations  - Instruct patient to call for assistance with activity   - Consult OT/PT to assist with strengthening/mobility   - Keep Call bell within reach  - Keep bed low and locked with side rails adjusted as appropriate  - Keep care items and personal belongings within reach  - Initiate and maintain comfort rounds  - Make Fall Risk Sign visible to staff  - Offer Toileting every  Hours, in advance of need  - Initiate/Maintain alarm  - Obtain necessary fall risk management equipment:   - Apply yellow socks and bracelet for high fall risk patients  - Consider moving patient to room near nurses station  Outcome: Progressing  Goal: Maintain or return to baseline ADL function  Description: INTERVENTIONS:  -  Assess patient's ability to carry out ADLs; assess patient's baseline for ADL function and identify physical deficits which impact ability to perform ADLs (bathing, care of mouth/teeth, toileting, grooming, dressing, etc )  - Assess/evaluate cause of self-care deficits   - Assess range of motion  - Assess patient's mobility; develop plan if impaired  - Assess patient's need for assistive devices and provide as appropriate  - Encourage maximum independence but intervene and supervise when necessary  - Involve family in performance of ADLs  - Assess for home care needs following discharge   - Consider OT consult to assist with ADL evaluation and planning for discharge  - Provide patient education as appropriate  Outcome: Progressing  Goal: Maintains/Returns to pre admission functional level  Description: INTERVENTIONS:  - Perform BMAT or MOVE assessment daily    - Set and communicate daily mobility goal to care team and patient/family/caregiver  - Collaborate with rehabilitation services on mobility goals if consulted  - Perform Range of Motion  times a day  - Reposition patient every  hours    - Dangle patient  times a day  - Stand patient  times a day  - Ambulate patient  times a day  - Out of bed to chair  times a day   - Out of bed for armando times a day  - Out of bed for toileting  - Record patient progress and toleration of activity level   Outcome: Progressing     Problem: DISCHARGE PLANNING  Goal: Discharge to home or other facility with appropriate resources  Description: INTERVENTIONS:  - Identify barriers to discharge w/patient and caregiver  - Arrange for needed discharge resources and transportation as appropriate  - Identify discharge learning needs (meds, wound care, etc )  - Arrange for interpretive services to assist at discharge as needed  - Refer to Case Management Department for coordinating discharge planning if the patient needs post-hospital services based on physician/advanced practitioner order or complex needs related to functional status, cognitive ability, or social support system  Outcome: Progressing

## 2021-08-22 NOTE — ASSESSMENT & PLAN NOTE
Lab Results   Component Value Date    HGBA1C 7 4 (H) 05/25/2021       No results for input(s): POCGLU in the last 72 hours      Blood Sugar Average: Last 72 hrs:  · Home regimen:  Humulin  130 units t i d  with meals  · Patient reports blood pressure drastically changes from day-to-day sometimes in the 60s  · While admitted 100 units t i d  with meals  · Start sliding scale insulin

## 2021-08-22 NOTE — H&P
[facility]  H&P- 145 [facility] [place]  1955, 78 y o  male MRN: [id number]  Unit/Bed#: -01 Encounter: [id number]  Primary Care Provider: Emelia Boxer, DO   Date and time admitted to hospital: 8/21/2021  5:57 PM    * Cellulitis of right lower extremity  Assessment & Plan  · Erythema/inflammation of right lower extremity x1 day  · Elevated WBC, tachycardia, meeting sepsis criteria   · Received fluids  · Started on cefepime and vancomycin, continue      Sepsis ([facility] )  Assessment & Plan  · Meeting sepsis due to tachycardia, elevated WBC (16 49) and source of infection cellulitis  · Lactic elevated at 2 3, 2 hour pending  · Started on vancomycin and cefepime in the ED, continue  · Received 1 L bolus of fluids  · Blood culture and procalcitonin pending    Elevated troponin  Assessment & Plan  · Troponin elevated at 0 36   · Continue to trend   · Patient denies chest pain  · EKG:  Normal sinus rhythm, 77 beats per minute  · Normal echo in February 2021  · Elevation most likely due to cellulitis/sepsis presentation   · Placed on telemetry   · Consult cardiology    Venous stasis dermatitis of both lower extremities  Assessment & Plan  · Chronic leg swelling with dermatitis rash    Obstructive sleep apnea  Assessment & Plan  · Order CPAP HS    Type 2 diabetes mellitus with hyperglycemia ([facility] )  Assessment & Plan  Lab Results   Component Value Date    HGBA1C 7 4 (H) 05/25/2021       No results for input(s): POCGLU in the last 72 hours  Blood Sugar Average: Last 72 hrs:  · Home regimen:  Humulin  130 units t i d  with meals  · Patient reports blood pressure drastically changes from day-to-day sometimes in the 60s  · While admitted 100 units t i d  with meals  · Start sliding scale insulin    Morbid obesity (HCC)  Assessment & Plan  · Body mass index is 53 66 kg/m²    · Encouraged lifestyle changes  · Consult Nutrition    Hyperlipidemia  Assessment & Plan  · Continue atorvastatin 40 mg daily    Essential hypertension  Assessment & Plan  · Continue Norvasc 10 mg daily, metoprolol succinate 25 mg daily quinapril 40 mg at night  · Normotensive in the ED   · Continue    VTE Pharmacologic Prophylaxis: VTE Score: 6 High Risk (Score >/= 5) - Pharmacological DVT Prophylaxis Ordered: apixaban (Eliquis)  Sequential Compression Devices Ordered  Code Status: Level 1 - Full Code   Discussion with family: Patient declined call to   Anticipated Length of Stay: Patient will be admitted on an inpatient basis with an anticipated length of stay of greater than 2 midnights secondary to Cellulitis, sepsis, elevated troponin  Total Time for Visit, including Counseling / Coordination of Care: 60 minutes Greater than 50% of this total time spent on direct patient counseling and coordination of care  Chief Complaint:  Right leg redness    History of Present Illness:  Luis Car  is a 78 y o  male with a PMH of type 2 diabetes, morbid obesity, venous stasis dermatitis, NATHALY, hyperlipidemia, hypertension who presents with erythema and inflammation of his right lower extremity that he noticed this morning  Has chronic bilateral lower leg edema with venous stasis dermatitis  Patient denies injury to either leg  He is having discomfort with ambulation due to the increased swelling/redness  Had chills at home  Denies fevers, chest pain, shortness of breath, abdominal discomfort, nausea, vomiting or diarrhea  Has been compliant with his home medications  Does not smoke and drinks socially  Review of Systems:  Review of Systems   Constitutional: Positive for chills  Negative for fatigue and fever  HENT: Negative for sore throat  Respiratory: Negative for cough, chest tightness and shortness of breath  Cardiovascular: Positive for leg swelling  Negative for chest pain  Gastrointestinal: Negative for abdominal distention, abdominal pain, diarrhea, nausea and vomiting  Genitourinary: Negative for difficulty urinating  Musculoskeletal: Negative for arthralgias  Skin: Positive for color change and rash  Neurological: Negative for weakness and headaches  Psychiatric/Behavioral: Negative for agitation and behavioral problems  All other systems reviewed and are negative  Past Medical and Surgical History:   Past Medical History:   Diagnosis Date    Arthritis     Asthma     BPH (benign prostatic hyperplasia)     Cardiac arrhythmia     resolved [id number]    Cardiac disease     Chronic a-fib (HCC)     Chronic pain     CVA [facility]cerebral vascular accident) ([facility] ) 11/2015    Dextrocardia     Diabetes mellitus ([facility] )     GERD (gastroesophageal reflux disease)     Hyperlipidemia     Hypertension     Renal disorder     kidney stone    Sleep apnea     Stroke (cerebrum) ([facility] )        Past Surgical History:   Procedure Laterality Date    CHOLECYSTECTOMY      EXPLORATORY LAPAROTOMY  1971    GALLBLADDER SURGERY      NH COLONOSCOPY FLX DX W/COLLJ SPEC WHEN PFRMD N/A 2/21/2018    Procedure: COLONOSCOPY;  Surgeon: Francisca Tilley MD;  Location:  MAIN OR;  Service: Gastroenterology    UMBILICAL HERNIA REPAIR  2007       Meds/Allergies:  Prior to Admission medications    Medication Sig Start Date End Date Taking?  Authorizing Provider   acetaminophen (TYLENOL) 500 mg tablet Take 500 mg by mouth every 6 (six) hours as needed for mild pain    Historical Provider, MD   amLODIPine (NORVASC) 10 mg tablet Take 1 tablet by mouth once daily 1/7/21   Cedric Alexander PA-C   apixaban (Eliquis) 5 mg Take 1 tablet (5 mg total) by mouth 2 (two) times a day 2/17/21   Latosha Cuba MD   atorvastatin (LIPITOR) 40 mg tablet Take 1 tablet by mouth once daily 5/26/21   Latosha Cuba MD   B-D UF III MINI PEN NEEDLES 31G X 5 MM MISC  5/7/19   Historical Provider, MD   Cholecalciferol (VITAMIN D) 2000 units CAPS Take 5,000 Units by mouth daily     Historical Provider, MD   flecainide [facility]) 100 mg tablet TAKE 1 TABLET BY MOUTH EVERY 12 HOURS 8/18/21   Juan C Toney MD   flecainide (TAMBOCOR) 50 mg tablet TAKE 1 TABLET BY MOUTH EVERY 12 HOURS 1/26/21   Juan C Toney MD   fluticasone [facility]) 50 mcg/act nasal spray 1 spray into each nostril daily    Historical Provider, MD   gabapentin (NEURONTIN) 300 mg capsule TAKE 1 CAPSULE BY MOUTH TWICE DAILY 4/1/19   Joyce Hernadez MD   insulin glulisine (APIDRA SOLOSTAR) 100 units/mL injection pen 60 with each meal   Patient not taking: Reported on 7/1/2020 8/27/19   Kaylee Plummer DO   insulin regular (HumuLIN R,NovoLIN R) 100 units/mL injection Inject under the skin once 120-130 units with each meal     Historical Provider, MD   Insulin Regular Human, Conc, (HumuLIN R U-500 KwikPen) 500 units/mL CONCENTRATED U-500 injection pen Inject 100 units with meal 3 times daily  Patient taking differently: Inject 120-130 units with meal 3 times daily 7/8/20   NIXON Covarrubias   LANTUS SOLOSTAR 100 units/mL injection pen Inject 60 Units under the skin every 12 (twelve) hours  Patient not taking: Reported on 7/1/2020 7/26/19   Kaylee Plummer DO   metFORMIN (GLUCOPHAGE) 500 mg tablet TAKE 1 TABLET BY MOUTH TWICE DAILY WITH MEALS 6/18/21   Kaylee Plummer DO   metoprolol succinate (TOPROL-XL) 25 mg 24 hr tablet Take 1 tablet by mouth once daily 6/1/21   Juan C Toney MD   montelukast (SINGULAIR) 10 mg tablet TAKE 1 TABLET BY MOUTH ONCE DAILY 3/6/19   Joyce Hernadez MD   Multiple Vitamins-Minerals (CENTRUM SILVER ULTRA MENS) TABS Take 1 tablet by mouth daily    Historical Provider, MD   Multiple Vitamins-Minerals (OCUVITE PRESERVISION PO) Take by mouth daily    Historical Provider, MD   Omega-3 Fatty Acids (FISH OIL) 1,000 mg Take 1,000 mg by mouth daily    Historical Provider, MD   omeprazole (PriLOSEC) 40 MG capsule Take 40 mg by mouth daily 9/13/20   Historical Provider, MD   quinapril (ACCUPRIL) 40 MG tablet Take 1 tablet (40 mg total) by mouth daily at bedtime 9/10/18   Bard Tom MD   tamsulosin [facility]) 0 4 mg Take by mouth daily with dinner   8/16/16   Historical Provider, MD   traMADol (ULTRAM) 50 mg tablet Take 1 tablet (50 mg total) by mouth every 8 (eight) hours as needed for moderate pain or severe pain 8/6/18   Bard Tom MD   TRUE METRIX BLOOD GLUCOSE TEST test strip  5/7/19   Historical Provider, MD   TRUEPLUS LANCETS 33G [address] [facility]  5/7/19   Historical Provider, MD     I have reviewed home medications with patient personally  Allergies:    Allergies   Allergen Reactions    Banana - Food Allergy Shortness Of Breath    Fruit Extracts Anaphylaxis and Throat Swelling     Annotation - [id number]: Bananas    Clotrimazole     Codeine Itching     Other reaction(s): Itching    Hydrocodone-Acetaminophen     Oxycodone Rash and Other (See Comments)     Other reaction(s): Pruritis    Oxycodone-Acetaminophen Rash     Other reaction(s): Pruritis       Social History:  Marital Status:    Occupation:  Unknown  Patient Pre-hospital Living Situation: Home  Patient Pre-hospital Level of Mobility: walks  Patient Pre-hospital Diet Restrictions:  Diabetic  Substance Use History:   Social History     Substance and Sexual Activity   Alcohol Use Yes    Alcohol/week: 1 0 standard drinks    Types: 1 Glasses of wine per week    Comment: 2-3 drinks a week, cherry liquer or beer      Social History     Tobacco Use   Smoking Status Never Smoker   Smokeless Tobacco Never Used   Tobacco Comment    Occasionally smoked a pipe while fishing     Social History     Substance and Sexual Activity   Drug Use No       Family History:  Family History   Problem Relation Age of Onset    Coronary artery disease Father     Hodgkin's lymphoma Father     Diabetes Father     Cancer Father     Diabetes type I Mother     Cancer Maternal Grandfather     Diabetes Paternal Grandmother     Emphysema Paternal Grandmother     Heart attack Paternal Grandfather     Hypertension Family     Neuropathy Family        Physical Exam:     Vitals:   Blood Pressure: 141/63 (08/21/21 1900)  Pulse: 79 (08/21/21 1900)  Temperature: 99 5 °F (37 5 °C) (08/21/21 1759)  Temp Source: Oral (08/21/21 1759)  Respirations: 20 (08/21/21 1900)  Weight - Scale: (!) 170 kg (374 lb) (08/21/21 1759)  SpO2: 95 % (08/21/21 1900)    Physical Exam  Vitals and nursing note reviewed  Constitutional:       Appearance: Normal appearance  He is obese  HENT:      Head: Normocephalic  Eyes:      Extraocular Movements: Extraocular movements intact  Pupils: Pupils are equal, round, and reactive to light  Cardiovascular:      Rate and Rhythm: Normal rate and regular rhythm  Heart sounds: No murmur heard  No gallop  Pulmonary:      Effort: No respiratory distress  Breath sounds: Normal breath sounds  No wheezing  Abdominal:      General: Bowel sounds are normal  There is no distension  Tenderness: There is no abdominal tenderness  Musculoskeletal:         General: Normal range of motion  Cervical back: Normal range of motion  Right lower leg: Edema present  Left lower leg: Edema present  Skin:     General: Skin is warm  Findings: Erythema and rash present  Neurological:      General: No focal deficit present  Mental Status: He is alert and oriented to person, place, and time  Mental status is at baseline  Psychiatric:         Mood and Affect: Mood normal          Behavior: Behavior normal          Thought Content:  Thought content normal           Additional Data:     Lab Results:  Results from last 7 days   Lab Units 08/21/21  1835   WBC Thousand/uL 16 49*   HEMOGLOBIN g/dL 13 3   HEMATOCRIT % 40 5   PLATELETS Thousands/uL 198   NEUTROS PCT % 88*   LYMPHS PCT % 7*   MONOS PCT % 5   EOS PCT % 0     Results from last 7 days   Lab Units 08/21/21  1835   SODIUM mmol/L 143   POTASSIUM mmol/L 4 1   CHLORIDE mmol/L 103   CO2 mmol/L 30   BUN mg/dL 21   CREATININE mg/dL 1  08   ANION GAP mmol/L 10   CALCIUM mg/dL 8 3   ALBUMIN g/dL 3 7   TOTAL BILIRUBIN mg/dL 1 10*   ALK PHOS U/L 56   ALT U/L 33   AST U/L 18   GLUCOSE RANDOM mg/dL 231*     Results from last 7 days   Lab Units 08/21/21  1835   INR  1 23*     Results from last 7 days   Lab Units 08/21/21  2234   POC GLUCOSE mg/dl 192*         Results from last 7 days   Lab Units 08/21/21  2214 08/21/21  1835   LACTIC ACID mmol/L 2 7* 2 3*   PROCALCITONIN ng/ml  --  0 42*       Imaging: No pertinent imaging reviewed  No orders to display       EKG and Other Studies Reviewed on Admission:   · EKG: NSR  HR 77     ** Please Note: This note has been constructed using a voice recognition system   **

## 2021-08-22 NOTE — PROGRESS NOTES
Patient repeat troponin is 2 67  Patient continues to denies having chest pain  EKG is unchanged showing age indeterminate anteriolateral infarct  Continue on beta-blocker, lisinopril, statin and Eliquis  Discussed with cardiologist on-call Dr Pardeep Torres and he recommended continue medical management  Patient has non MI related troponin elevation and they will consult in a m

## 2021-08-22 NOTE — ASSESSMENT & PLAN NOTE
· Troponin elevated at 0 36   · Continue to trend   · Patient denies chest pain  · EKG:  Normal sinus rhythm, 77 beats per minute  · Normal echo in February 2021  · Elevation most likely non MI related troponin elevation due to cellulitis/sepsis presentation   · Troponin this morning is 0 58  · Placed on telemetry   · Consult cardiology  · Denies any chest pain or shortness of breath  · Continue on Eliquis, Lipitor, Toprol-XL, Zestril

## 2021-08-22 NOTE — ED PROVIDER NOTES
History  Chief Complaint   Patient presents with    Cellulitis     c/o right lower leg cellulitis starting this AM       60-year-old male presents for evaluation of right lower extremity redness and pain and that started yesterday  Patient has a history of bilateral lower extremity venous stasis and also a history of cellulitis  Patient reports worsening fever and chills today which prompted him to present to the emergency department  Patient does take anticoagulation  For chronic atrial fibrillation  Pain is worse with palpation  Prior to Admission Medications   Prescriptions Last Dose Informant Patient Reported? Taking?    B-D UF III MINI PEN NEEDLES 31G X 5 MM MISC  Self Yes No   Cholecalciferol (VITAMIN D) 2000 units CAPS  Self Yes No   Sig: Take 5,000 Units by mouth daily    Insulin Regular Human, Conc, (HumuLIN R U-500 KwikPen) 500 units/mL CONCENTRATED U-500 injection pen  Self No No   Sig: Inject 100 units with meal 3 times daily   Patient taking differently: Inject 120-130 units with meal 3 times daily   Multiple Vitamins-Minerals (CENTRUM SILVER ULTRA MENS) TABS  Self Yes No   Sig: Take 1 tablet by mouth daily   Multiple Vitamins-Minerals (OCUVITE PRESERVISION PO)  Self Yes No   Sig: Take by mouth daily   Omega-3 Fatty Acids (FISH OIL) 1,000 mg  Self Yes No   Sig: Take 1,000 mg by mouth daily   TRUE METRIX BLOOD GLUCOSE TEST test strip  Self Yes No   TRUEPLUS LANCETS 33G MISC  Self Yes No   acetaminophen (TYLENOL) 500 mg tablet  Self Yes No   Sig: Take 500 mg by mouth every 6 (six) hours as needed for mild pain   amLODIPine (NORVASC) 10 mg tablet  Self No No   Sig: Take 1 tablet by mouth once daily   apixaban (Eliquis) 5 mg  Self No No   Sig: Take 1 tablet (5 mg total) by mouth 2 (two) times a day   atorvastatin (LIPITOR) 40 mg tablet  Self No No   Sig: Take 1 tablet by mouth once daily   flecainide (TAMBOCOR) 100 mg tablet   No No   Sig: TAKE 1 TABLET BY MOUTH EVERY 12 HOURS   fluticasone (FLONASE) 50 mcg/act nasal spray  Self Yes No   Si spray into each nostril daily   gabapentin (NEURONTIN) 300 mg capsule  Self No No   Sig: TAKE 1 CAPSULE BY MOUTH TWICE DAILY   metFORMIN (GLUCOPHAGE) 500 mg tablet   No No   Sig: TAKE 1 TABLET BY MOUTH TWICE DAILY WITH MEALS   metoprolol succinate (TOPROL-XL) 25 mg 24 hr tablet   No No   Sig: Take 1 tablet by mouth once daily   montelukast (SINGULAIR) 10 mg tablet  Self No No   Sig: TAKE 1 TABLET BY MOUTH ONCE DAILY   omeprazole (PriLOSEC) 40 MG capsule  Self Yes No   Sig: Take 40 mg by mouth daily   quinapril (ACCUPRIL) 40 MG tablet  Self No No   Sig: Take 1 tablet (40 mg total) by mouth daily at bedtime   tamsulosin (FLOMAX) 0 4 mg  Self Yes No   Sig: Take by mouth daily with dinner     traMADol (ULTRAM) 50 mg tablet  Self No No   Sig: Take 1 tablet (50 mg total) by mouth every 8 (eight) hours as needed for moderate pain or severe pain      Facility-Administered Medications: None       Past Medical History:   Diagnosis Date    Arthritis     Asthma     BPH (benign prostatic hyperplasia)     Cardiac arrhythmia     resolved [id number]    Cardiac disease     Chronic a-fib (HCC)     Chronic pain     [facility]    Dextrocardia     Diabetes mellitus (HCC)     GERD (gastroesophageal reflux disease)     Hyperlipidemia     Hypertension     Renal disorder     kidney stone    Sleep apnea     Stroke (cerebrum) (HCC)        Past Surgical History:   Procedure Laterality Date    CHOLECYSTECTOMY      EXPLORATORY LAPAROTOMY      GALLBLADDER SURGERY      CA COLONOSCOPY FLX DX W/COLLJ SPEC WHEN PFRMD N/A 2018    Procedure: COLONOSCOPY;  Surgeon: Neela Butler MD;  Location:  MAIN OR;  Service: Gastroenterology    UMBILICAL HERNIA REPAIR         Family History   Problem Relation Age of Onset    Coronary artery disease Father     Hodgkin's lymphoma Father     Diabetes Father     Cancer Father     Diabetes type I Mother     Cancer Maternal Grandfather     Diabetes Paternal Grandmother     Emphysema Paternal Grandmother     Heart attack Paternal Grandfather     Hypertension Family     Neuropathy Family      I have reviewed and agree with the history as documented  E-Cigarette/Vaping    E-Cigarette Use Never User      E-Cigarette/Vaping Substances    Nicotine No     THC No     CBD No     Flavoring No     Other No     Unknown No      Social History     Tobacco Use    Smoking status: Never Smoker    Smokeless tobacco: Never Used    Tobacco comment: Occasionally smoked a pipe while fishing   Vaping Use    Vaping Use: Never used   Substance Use Topics    Alcohol use: Yes     Alcohol/week: 1 0 standard drinks     Types: 1 Glasses of wine per week     Comment: 2-3 drinks a week, cherry liquer or beer     Drug use: No       Review of Systems   Constitutional: Positive for chills and fever  Skin: Positive for color change  All other systems reviewed and are negative  Physical Exam  Physical Exam  Vitals and nursing note reviewed  Constitutional:       General: He is not in acute distress  Appearance: He is well-developed  HENT:      Head: Normocephalic and atraumatic  Right Ear: External ear normal       Left Ear: External ear normal       Nose: Nose normal    Eyes:      General: No scleral icterus  Pulmonary:      Effort: No respiratory distress  Comments: Conversational dyspnea  Abdominal:      General: There is no distension  Palpations: Abdomen is soft  Musculoskeletal:         General: No deformity  Normal range of motion  Cervical back: Normal range of motion and neck supple  Skin:     General: Skin is warm  Findings: No rash  Neurological:      General: No focal deficit present  Mental Status: He is alert        Gait: Gait normal    Psychiatric:         Mood and Affect: Mood normal          Vital Signs  ED Triage Vitals   Temperature Pulse Respirations Blood Pressure SpO2   08/21/21 1759 08/21/21 1759 08/21/21 1759 08/21/21 1759 08/21/21 1759   99 5 °F (37 5 °C) 90 20 169/91 95 %      Temp Source Heart Rate Source Patient Position - Orthostatic VS BP Location FiO2 (%)   08/21/21 1759 08/21/21 1759 08/21/21 1759 08/21/21 1759 08/23/21 0513   Oral Monitor Lying Right arm 90      Pain Score       08/21/21 1759       6           Vitals:    08/23/21 1300 08/23/21 1431 08/23/21 1445 08/23/21 1500   BP: 138/67 147/65 131/50 134/64   Pulse: 77 79 79 79   Patient Position - Orthostatic VS:  Sitting           Visual Acuity  Visual Acuity      Most Recent Value   L Pupil Size (mm)  2   R Pupil Size (mm)  2   L Pupil Shape  Round   R Pupil Shape  Round          ED Medications  Medications   atorvastatin (LIPITOR) tablet 40 mg (40 mg Oral Not Given 8/23/21 0842)   multivitamin-minerals (CENTRUM ADULTS) tablet 1 tablet (1 tablet Oral Not Given 8/23/21 0841)   fish oil capsule 1,000 mg (1,000 mg Oral Not Given 8/23/21 0841)   cefepime (MAXIPIME) IVPB (premix in dextrose) 2,000 mg 50 mL (0 mg Intravenous Stopped 8/23/21 0701)   amiodarone (CORDARONE) 900 mg in dextrose 5 % 500 mL infusion (0 mg/min Intravenous Stopped 8/23/21 0309)     Followed by   amiodarone (CORDARONE) 900 mg in dextrose 5 % 500 mL infusion (0 5 mg/min Intravenous New Bag 8/23/21 0310)   chlorhexidine (PERIDEX) 0 12 % oral rinse 15 mL (15 mL Mouth/Throat Given 8/23/21 0832)   fentanyl citrate (PF) 100 MCG/2ML 100 mcg (100 mcg Intravenous Given 8/23/21 0922)   acetaminophen (TYLENOL) rectal suppository 650 mg (650 mg Rectal Given 8/22/21 2205)   potassium chloride oral solution 40 mEq (40 mEq Oral Not Given 8/23/21 0207)   insulin lispro (HumaLOG) 100 units/mL subcutaneous injection 2-12 Units (2 Units Subcutaneous Given 8/23/21 1231)   propofol (DIPRIVAN) 1000 mg in 100 mL infusion (premix) (20 mcg/kg/min × 170 kg Intravenous New Bag 8/23/21 1341)   pantoprazole (PROTONIX) injection 40 mg (40 mg Intravenous Given 8/23/21 0832)   vancomycin (VANCOCIN) 2,000 mg in sodium chloride 0 9 % 500 mL IVPB (has no administration in time range)   heparin (porcine) 25,000 units in 0 45% NaCl 250 mL infusion (premix) (11 1 Units/kg/hr × 90 kg (Order-Specific) Intravenous New Bag 8/23/21 1046)   levalbuterol (XOPENEX) inhalation solution 1 25 mg (1 25 mg Nebulization Given 8/23/21 1307)   ipratropium (ATROVENT) 0 02 % inhalation solution 0 5 mg (0 5 mg Nebulization Given 8/23/21 1307)   senna-docusate sodium (SENOKOT S) 8 6-50 mg per tablet 1 tablet (has no administration in time range)   metroNIDAZOLE (FLAGYL) IVPB (premix) 500 mg 100 mL (0 mg Intravenous Stopped 8/23/21 1200)   sodium chloride 0 9 % bolus 1,000 mL (1,000 mL Intravenous New Bag 8/21/21 1836)   cefepime (MAXIPIME) IVPB (premix in dextrose) 2,000 mg 50 mL (0 mg Intravenous Stopped 8/21/21 2015)   vancomycin (VANCOCIN) 2,000 mg in sodium chloride 0 9 % 500 mL IVPB (2,000 mg Intravenous New Bag 8/21/21 2021)   sodium chloride 0 9 % bolus 1,000 mL (1,000 mL Intravenous New Bag 8/22/21 0030)   magnesium sulfate 2 g/50 mL IVPB (premix) 2 g (0 g Intravenous Stopped 8/23/21 0001)   multi-electrolyte (ISOLYTE-S PH 7 4) bolus 500 mL (0 mL Intravenous Stopped 8/23/21 0001)   potassium chloride 20 mEq IVPB (premix) (0 mEq Intravenous Stopped 8/23/21 0601)   EPINEPHrine (ADRENALIN) injection (1 mg Intravenous Given 8/22/21 2018)   amiodarone 150 mg/3 mL injection (300 mg Intravenous Given 8/22/21 2001)   sodium bicarbonate 50 mEq/50 mL injection (50 mEq Intravenous Given 8/22/21 2015)   calcium chloride 1 g/10 mL injection (1 g Intravenous Given 8/22/21 2015)   potassium chloride oral solution 40 mEq (40 mEq Oral Given 8/23/21 0310)   magnesium sulfate 2 g/50 mL IVPB (premix) 2 g (0 g Intravenous Stopped 8/23/21 1000)   potassium chloride 20 mEq IVPB (premix) (0 mEq Intravenous Stopped 8/23/21 0800)   furosemide (LASIX) injection 40 mg (40 mg Intravenous Given 8/23/21 1044)       Diagnostic Studies  Results Reviewed     Procedure Component Value Units Date/Time    Blood culture #1 [[id number]] Collected: 08/21/21 1835    Lab Status: Preliminary result Specimen: Blood from Arm, Right Updated: 08/22/21 2301     Blood Culture No Growth at 24 hrs  Blood culture #2 [[id number]] Collected: 08/21/21 1835    Lab Status: Preliminary result Specimen: Blood from Arm, Right Updated: 08/22/21 2301     Blood Culture No Growth at 24 hrs  Procalcitonin Reflex [[id number]]  (Abnormal) Collected: 08/22/21 0529    Lab Status: Final result Specimen: Blood from Arm, Right Updated: 08/22/21 1413     Procalcitonin 0 43 ng/ml     Troponin I [[id number]]  (Abnormal) Collected: 08/22/21 0523    Lab Status: Final result Specimen: Blood from Arm, Right Updated: 08/22/21 0643     Troponin I 0 58 ng/mL     Troponin I [[id number]]  (Abnormal) Collected: 08/22/21 0133    Lab Status: Final result Specimen: Blood from Arm, Left Updated: 08/22/21 0220     Troponin I 0 34 ng/mL     Lactic acid 2 Hours [[id number]]  (Abnormal) Collected: 08/21/21 2214    Lab Status: Final result Specimen: Blood from Arm, Left Updated: 08/21/21 2242     LACTIC ACID 2 7 mmol/L     Narrative:      Result may be elevated if tourniquet was used during collection      Fingerstick Glucose (POCT) [[id number]]  (Abnormal) Collected: 08/21/21 2234    Lab Status: Final result Updated: 08/21/21 2237     POC Glucose 192 mg/dl     Procalcitonin with AM Reflex [[id number]]  (Abnormal) Collected: 08/21/21 1835    Lab Status: Final result Specimen: Blood from Arm, Right Updated: 08/21/21 2210     Procalcitonin 0 42 ng/ml     Troponin I [[id number]]  (Abnormal) Collected: 08/21/21 2034    Lab Status: Final result Specimen: Blood from Arm, Left Updated: 08/21/21 2102     Troponin I 0 36 ng/mL     NT-BNP PRO [[id number]]  (Abnormal) Collected: 08/21/21 1835    Lab Status: Final result Specimen: Blood from Arm, Right Updated: 08/21/21 2035     NT-proBNP 294 pg/mL     Lactic Acid [[id number]]  (Abnormal) Collected: 08/21/21 1835    Lab Status: Final result Specimen: Blood from Arm, Right Updated: 08/21/21 2002     LACTIC ACID 2 3 mmol/L     Narrative:      Result may be elevated if tourniquet was used during collection      Comprehensive metabolic panel [[id number]]  (Abnormal) Collected: 08/21/21 1835    Lab Status: Final result Specimen: Blood from Arm, Right Updated: 08/21/21 1903     Sodium 143 mmol/L      Potassium 4 1 mmol/L      Chloride 103 mmol/L      CO2 30 mmol/L      ANION GAP 10 mmol/L      BUN 21 mg/dL      Creatinine 1 08 mg/dL      Glucose 231 mg/dL      Calcium 8 3 mg/dL      AST 18 U/L      ALT 33 U/L      Alkaline Phosphatase 56 U/L      Total Protein 7 2 g/dL      Albumin 3 7 g/dL      Total Bilirubin 1 10 mg/dL      eGFR 71 ml/min/1 73sq m     Narrative:      [place] guidelines for Chronic Kidney Disease (CKD):     Stage 1 with normal or high GFR (GFR > 90 mL/min/1 73 square meters)    Stage 2 Mild CKD (GFR = 60-89 mL/min/1 73 square meters)    Stage 3A Moderate CKD (GFR = 45-59 mL/min/1 73 square meters)    Stage 3B Moderate CKD (GFR = 30-44 mL/min/1 73 square meters)    Stage 4 Severe CKD (GFR = 15-29 mL/min/1 73 square meters)    Stage 5 End Stage CKD (GFR <15 mL/min/1 73 square meters)  Note: GFR calculation is accurate only with a steady state creatinine    Protime-INR [[id number]]  (Abnormal) Collected: 08/21/21 1835    Lab Status: Final result Specimen: Blood from Arm, Right Updated: 08/21/21 1859     Protime 15 5 seconds      INR 1 23    APTT [[id number]]  (Normal) Collected: 08/21/21 1835    Lab Status: Final result Specimen: Blood from Arm, Right Updated: 08/21/21 1859     PTT 32 seconds     CBC and differential [[id number]]  (Abnormal) Collected: 08/21/21 1835    Lab Status: Final result Specimen: Blood from Arm, Right Updated: 08/21/21 1845     WBC 16 49 Thousand/uL      RBC 4 36 Million/uL      Hemoglobin 13 3 g/dL      Hematocrit 40 5 %      MCV 93 fL      MCH 30 5 pg      MCHC 32 8 g/dL      RDW 13 8 %      MPV 10 6 fL      Platelets 083 Thousands/uL      nRBC 0 /100 WBCs      Neutrophils Relative 88 %      Immat GRANS % 0 %      Lymphocytes Relative 7 %      Monocytes Relative 5 %      Eosinophils Relative 0 %      Basophils Relative 0 %      Neutrophils Absolute 14 40 Thousands/µL      Immature Grans Absolute 0 06 Thousand/uL      Lymphocytes Absolute 1 17 Thousands/µL      Monocytes Absolute 0 82 Thousand/µL      Eosinophils Absolute 0 01 Thousand/µL      Basophils Absolute 0 03 Thousands/µL                  XR chest portable ICU   Final Result by Kayley Downing MD (08/23 0840)      ET tube 5 cm above the rena      Mild pulmonary venous congestion  Situs inversus  Workstation performed: [id number]         CT head wo contrast   Final Result by Aretha Cook MD (08/23 0222)      1  No acute intracranial hemorrhage  2   No definite findings to suggest anoxic brain injury  Short interval follow-up head CT may be considered  Workstation performed: [id number]         X-ray chest 1 view   Final Result by Trish Mcbride MD (08/23 1012)      1  Tip of endotracheal tube 4 cm above the rena  2   Pulmonary vascular congestion  3   Dextrocardia, consistent with the patient's known situs inversus                    Workstation performed: [id number]         XR chest portable    (Results Pending)   VAS lower limb venous duplex study, complete bilateral    (Results Pending)              Procedures  Procedures         ED Course  ED Course as of Aug 23 1601   Sat Aug 21, 2021   2217 Procedure Note: EKG  Date/Time: 08/21/21 10:18 PM   Performed by: Cassie Marc  Authorized by: Cassie Marc  ECG interpreted by me, the ED Provider: yes   The EKG demonstrates:  Rate 88  Rhythm afib  QTc 430  No ST elevations/depressions                              Initial Sepsis Screening     Row Name 08/21/21 2040                Is the patient's history suggestive of a new or worsening infection? (!) Yes (Proceed)  -LH        Suspected source of infection  soft tissue  -LH        Are two or more of the following signs & symptoms of infection both present and new to the patient? (!) Yes (Proceed)  -LH        Indicate SIRS criteria  Tachycardia > 90 bpm;Leukocytosis (WBC > 30366 IJL)  -        If the answer is yes to both questions, suspicion of sepsis is present  --        If severe sepsis is present AND tissue hypoperfusion perists in the hour after fluid resuscitation or lactate > 4, the patient meets criteria for SEPTIC SHOCK  --        Are any of the following organ dysfunction criteria present within 6 hours of suspected infection and SIRS criteria that are NOT considered to be chronic conditions? --        Organ dysfunction  --        Date of presentation of severe sepsis  --        Time of presentation of severe sepsis  --        Tissue hypoperfusion persists in the hour after crystalloid fluid administration, evidenced, by either:  --        Was hypotension present within one hour of the conclusion of crystalloid fluid administration?  --        Date of presentation of septic shock  --        Time of presentation of septic shock  --          User Key  (r) = Recorded By, (t) = Taken By, (c) = Cosigned By    [address] Name Provider Type     Nitesh Cline PA-C Physician Assistant                        MDM  Number of Diagnoses or Management Options  Cellulitis: new and requires workup  Leukocytosis: new and requires workup  Diagnosis management comments: 78 yom with worsening RLE redness concerning for skin infection  Obtain sepsis evaluation  Patient with some conversational dyspnea but denies any chest pain or shortness of breath  Patient states his conversational dyspnea is chronic and unchanged  Given apparent dyspnea, will also obtain ekg/troponin  Leukocytosis   Administer broad spectrum antibiotics for cellulitis  Admit for further evaluation and treatment  Amount and/or Complexity of Data Reviewed  Clinical lab tests: ordered and reviewed  Tests in the radiology section of CPT®: reviewed and ordered  Tests in the medicine section of CPT®: reviewed and ordered  Review and summarize past medical records: yes  Discuss the patient with other providers: yes  Independent visualization of images, tracings, or specimens: yes        Disposition  Final diagnoses:   Cellulitis   Leukocytosis     Time reflects when diagnosis was documented in both MDM as applicable and the Disposition within this note     Time User Action Codes Description Comment    8/21/2021  8:11 PM Sara Gene Add [L03 90] Cellulitis     8/21/2021  8:11 PM Sraa Gene Add [P94 [id number]] Leukocytopenia     8/21/2021  8:11 PM Synetta Salud [D72 [id number]] Leukocytopenia     8/21/2021  8:11 PM Tacos Garciaugh [[id number]] Leukocytosis     8/21/2021 10:02 PM Hurshel Barron Add [R77 [id number]] Elevated troponin     8/23/2021  1:04 AM Mya Spittle Add [I46 9] Cardiac arrest [facility])       ED Disposition     ED Disposition Condition Date/Time Comment    Admit Stable Sat Aug 21, 2021  8:11 PM Case was discussed with [facility] and the patient's admission status was agreed to be Admission Status: inpatient status to the service of Dr Swathi Walker           Follow-up Information    None         Current Discharge Medication List      CONTINUE these medications which have NOT CHANGED    Details   acetaminophen (TYLENOL) 500 mg tablet Take 500 mg by mouth every 6 (six) hours as needed for mild pain      amLODIPine (NORVASC) 10 mg tablet Take 1 tablet by mouth once daily  Qty: 90 tablet, Refills: 0    Associated Diagnoses: Essential hypertension      apixaban (Eliquis) 5 mg Take 1 tablet (5 mg total) by mouth 2 (two) times a day  Qty: 180 tablet, Refills: 3    Comments: Please consider 90 day supplies to promote better adherence  Associated Diagnoses: Paroxysmal atrial fibrillation (HCC)      atorvastatin (LIPITOR) 40 mg tablet Take 1 tablet by mouth once daily  Qty: 90 tablet, Refills: 0    Associated Diagnoses: Dyslipidemia      B-D UF III MINI PEN NEEDLES 31G X 5 MM MISC       Cholecalciferol (VITAMIN D) 2000 units CAPS Take 5,000 Units by mouth daily       flecainide (TAMBOCOR) 100 mg tablet TAKE 1 TABLET BY MOUTH EVERY 12 HOURS  Qty: 60 tablet, Refills: 0    Associated Diagnoses: Paroxysmal atrial fibrillation (HCC)      fluticasone (FLONASE) 50 mcg/act nasal spray 1 spray into each nostril daily      gabapentin (NEURONTIN) 300 mg capsule TAKE 1 CAPSULE BY MOUTH TWICE DAILY  Qty: 60 capsule, Refills: 5    Comments: Please consider 90 day supplies to promote better adherence  Associated Diagnoses: Pain      Insulin Regular Human, Conc, (HumuLIN R U-500 KwikPen) 500 units/mL CONCENTRATED U-500 injection pen Inject 100 units with meal 3 times daily  Qty: 48 pen, Refills: 3    Associated Diagnoses: Uncontrolled type 2 diabetes mellitus with complication, with long-term current use of insulin ([facility])      metFORMIN (GLUCOPHAGE) 500 mg tablet TAKE 1 TABLET BY MOUTH TWICE DAILY WITH MEALS  Qty: 180 tablet, Refills: 0    Associated Diagnoses: Type 2 diabetes mellitus without complication, without long-term current use of insulin ([facility])      metoprolol succinate (TOPROL-XL) 25 mg 24 hr tablet Take 1 tablet by mouth once daily  Qty: 90 tablet, Refills: 0    Associated Diagnoses: Paroxysmal atrial fibrillation (HCC)      montelukast (SINGULAIR) 10 mg tablet TAKE 1 TABLET BY MOUTH ONCE DAILY  Qty: 30 tablet, Refills: 5    Comments: Please consider 90 day supplies to promote better adherence  Associated Diagnoses: Mild asthma without complication, unspecified whether persistent      !! Multiple Vitamins-Minerals (CENTRUM SILVER ULTRA MENS) TABS Take 1 tablet by mouth daily      !!  Multiple Vitamins-Minerals (OCUVITE PRESERVISION PO) Take by mouth daily Omega-3 Fatty Acids (FISH OIL) 1,000 mg Take 1,000 mg by mouth daily      omeprazole (PriLOSEC) 40 MG capsule Take 40 mg by mouth daily      quinapril (ACCUPRIL) 40 MG tablet Take 1 tablet (40 mg total) by mouth daily at bedtime  Qty: 30 tablet, Refills: 6    Associated Diagnoses: Essential hypertension      tamsulosin (FLOMAX) 0 4 mg Take by mouth daily with dinner        traMADol (ULTRAM) 50 mg tablet Take 1 tablet (50 mg total) by mouth every 8 (eight) hours as needed for moderate pain or severe pain  Qty: 90 tablet, Refills: 0    Associated Diagnoses: Lumbar spondylolysis; Low back pain, unspecified back pain laterality, unspecified chronicity, with sciatica presence unspecified; Knee injury, initial encounter; Acute pain of right knee      TRUE METRIX BLOOD GLUCOSE TEST test strip       TRUEPLUS LANCETS 33G MISC        !! - Potential duplicate medications found  Please discuss with provider  No discharge procedures on file      PDMP Review     None          ED Provider  Electronically Signed by           Lam Wilder DO  08/23/21 4702

## 2021-08-22 NOTE — ASSESSMENT & PLAN NOTE
· Erythema/inflammation of right lower extremity x1 day  · Elevated WBC, tachycardia, meeting sepsis criteria   · Received fluids  · Started on cefepime and vancomycin, continue

## 2021-08-22 NOTE — ASSESSMENT & PLAN NOTE
· Continue Norvasc 10 mg daily, metoprolol succinate 25 mg daily quinapril 40 mg at night  · Normotensive in the ED   · Continue

## 2021-08-22 NOTE — ASSESSMENT & PLAN NOTE
· Meeting sepsis due to tachycardia, elevated WBC (16 49) and source of infection cellulitis  · Lactic elevated at 2 3, 2 hour pending  · Started on vancomycin and cefepime in the ED, continue  · Received 1 L bolus of fluids  · Blood culture and procalcitonin pending

## 2021-08-22 NOTE — CONSULTS
Consultation - Cardiology   Jadiel Cerrato  78 y o  male MRN: [id number]  Unit/Bed#: -01 Encounter: [id number]    Inpatient consult to Cardiology  Consult performed by: Aman Mark MD  Consult ordered by: Bi Hollis PA-C          History of Present Illness   Physician Requesting Consult: Ayesha Luis MD  Reason for Consult / Principal Problem: Elevated troponin    HPI: Jadiel Cerrato  is a 78y o  year old male with PAF, Dextrocardia, HTN, and dyslipidemia, who presented to [address] [place] [address] yesterday with erythemia of his RLE  Has chronic B LE edema with venous stasis  Had some chills at home  No chest pain or shortness of breath  Patient with fevers, tachycardia, and elevated WBC  ECG unremarkable, trop elevated from 0 36 to 2 71  Does have chronic dyspnea on exertion  Review of Systems   Constitutional: Positive for chills and malaise/fatigue  Negative for diaphoresis and fever  HENT: Negative  Eyes: Negative  Cardiovascular: Positive for leg swelling  Negative for chest pain, dyspnea on exertion and palpitations  Respiratory: Negative for cough, shortness of breath, snoring and wheezing  Endocrine: Negative  Hematologic/Lymphatic: Negative  Skin: Negative for rash  Musculoskeletal: Negative  Gastrointestinal: Negative for abdominal pain, constipation and diarrhea  Genitourinary: Negative for bladder incontinence, dysuria and frequency  Neurological: Negative for dizziness and light-headedness  Psychiatric/Behavioral: Negative  All other systems reviewed and are negative      Historical Information   Past Medical History:   Diagnosis Date    Arthritis     Asthma     BPH (benign prostatic hyperplasia)     Cardiac arrhythmia     resolved [id number]    Cardiac disease     Chronic a-fib (HCC)     Chronic pain     CVA [facility]cerebral vascular accident) [facility] [address] ) 11/2015    Dextrocardia     [facility]     GERD (gastroesophageal reflux disease)     Hyperlipidemia     Hypertension     Renal disorder     kidney stone    Sleep apnea     Stroke (cerebrum) ([facility] [address] )      Past Surgical History:   Procedure Laterality Date    CHOLECYSTECTOMY      EXPLORATORY LAPAROTOMY  1971    GALLBLADDER SURGERY      MS COLONOSCOPY FLX DX W/COLLJ SPEC WHEN PFRMD N/A 2/21/2018    Procedure: COLONOSCOPY;  Surgeon: Cassy Denis MD;  Location: [facility];  Service: Gastroenterology    UMBILICAL HERNIA REPAIR  2007     Social History     Substance and Sexual Activity   Alcohol Use Yes    Alcohol/week: 1 0 standard drinks    Types: 1 Glasses of wine per week    Comment: 2-3 drinks a week, cherry liquer or beer      Social History     Substance and Sexual Activity   Drug Use No     Social History     Tobacco Use   Smoking Status Never Smoker   Smokeless Tobacco Never Used   Tobacco Comment    Occasionally smoked a pipe while fishing     Family History: non-contributory    Meds/Allergies   Current Facility-Administered Medications   Medication Dose Route Frequency Provider Last Rate    acetaminophen  650 mg Oral Q6H PRN Ky Route, PA-C      amLODIPine  10 mg Oral Daily Ky Route, PA-C      apixaban  5 mg Oral BID Ky Route, PA-C      atorvastatin  40 mg Oral Daily Ky Route, PA-C      cefepime  2,000 mg Intravenous Q12H Ky Route, PA-C 2,000 mg (08/22/21 0626)    fish oil  1,000 mg Oral Daily Ky Route, PA-C      flecainide  100 mg Oral Q12H [address] Belgica Sifuentes PA-C      fluticasone  1 spray Nasal Daily Ky Route, PA-C      furosemide  40 mg Intravenous BID (diuretic) Angy Andrade MD      gabapentin  300 mg Oral BID Ky Route, EVERETTE      insulin lispro  1-6 Units Subcutaneous TID AC Belgica Sifuentes PA-C      insulin lispro  1-6 Units Subcutaneous HS Ky Route, PA-RADHA      insulin regular  100 Units Subcutaneous TID AC Belgica Sifuentes PA-C      lisinopril  40 mg Oral Daily Ky Route, PA-C      metFORMIN  500 mg Oral BID With Meals Ky Route, PA-RADHA      metoprolol succinate  25 mg Oral Daily Bi Hollis PA-C      montelukast  10 mg Oral Daily Bi Hollis PA-C      multivitamin-minerals  1 tablet Oral Daily Bi Hollis PA-C      nitroglycerin  0 4 mg Sublingual Q5 Min PRN Bi Hollis PA-C      pantoprazole  40 mg Oral Early Morning Bi Hollis PA-C      tamsulosin  0 4 mg Oral Daily With Dinner Bi Hollis PA-C      traMADol  50 mg Oral Q8H PRN Bi Hollis PA-C      vancomycin  2,000 mg Intravenous Q12H Bi Hollis PA-C 2,000 mg (08/22/21 0844)        Medications Prior to Admission   Medication    acetaminophen (TYLENOL) 500 mg tablet    amLODIPine (NORVASC) 10 mg tablet    apixaban (Eliquis) 5 mg    atorvastatin (LIPITOR) 40 mg tablet    B-D UF III MINI PEN NEEDLES 31G X 5 MM MISC    Cholecalciferol (VITAMIN D) 2000 units CAPS    flecainide (TAMBOCOR) 100 mg tablet    fluticasone (FLONASE) 50 mcg/act nasal spray    gabapentin (NEURONTIN) 300 mg capsule    Insulin Regular Human, Conc, (HumuLIN R U-500 KwikPen) 500 units/mL CONCENTRATED U-500 injection pen    metFORMIN (GLUCOPHAGE) 500 mg tablet    metoprolol succinate (TOPROL-XL) 25 mg 24 hr tablet    montelukast (SINGULAIR) 10 mg tablet    Multiple Vitamins-Minerals (CENTRUM SILVER ULTRA MENS) TABS    Multiple Vitamins-Minerals (OCUVITE PRESERVISION PO)    Omega-3 Fatty Acids (FISH OIL) 1,000 mg    omeprazole (PriLOSEC) 40 MG capsule    quinapril (ACCUPRIL) 40 MG tablet    tamsulosin (FLOMAX) 0 4 mg    traMADol (ULTRAM) 50 mg tablet    TRUE METRIX BLOOD GLUCOSE TEST test strip    TRUEPLUS LANCETS 33G MISC       Allergies   Allergen Reactions    Banana - Food Allergy Shortness Of Breath    Fruit Extracts Anaphylaxis and Throat Swelling     Annotation - [id number]: Bananas    Clotrimazole     Codeine Itching     Other reaction(s): Itching    Hydrocodone-Acetaminophen     Oxycodone Rash and Other (See Comments)     Other reaction(s): Pruritis    Oxycodone-Acetaminophen Rash     Other reaction(s): Pruritis       Objective   Vitals: Blood pressure 121/56, pulse 82, temperature (!) 101 7 °F (38 7 °C), resp  rate 20, height 5' 10" (1 778 m), weight (!) 170 kg (374 lb), SpO2 92 %  , Body mass index is 53 66 kg/m² ,   Orthostatic Blood Pressures      Most Recent Value   Blood Pressure  121/56 filed at 2021 1413   Patient Position - Orthostatic VS  Sitting filed at 2021 0264        Systolic (29DQB), TNC:902 , Min:121 , MOR:433     Diastolic (09QHG), XX, Min:56, Max:91      Intake/Output Summary (Last 24 hours) at 2021 1658  Last data filed at 2021 1001  Gross per 24 hour   Intake 830 ml   Output 1100 ml   Net -270 ml       Weight (last 2 days)     Date/Time   Weight    21 1759   (!) 170 (374)              Invasive Devices     Peripheral Intravenous Line            Peripheral IV 21 Right Antecubital <1 day                  Physical Exam  Constitutional:       Appearance: He is well-developed  He is obese  HENT:      Head: Normocephalic and atraumatic  Neck:      Vascular: No JVD  Cardiovascular:      Rate and Rhythm: Normal rate and regular rhythm  Heart sounds: Normal heart sounds  No murmur heard  No friction rub  No gallop  Pulmonary:      Effort: Pulmonary effort is normal       Breath sounds: Decreased breath sounds present  No wheezing or rales  Abdominal:      General: Bowel sounds are normal  There is no distension  Palpations: Abdomen is soft  Tenderness: There is no abdominal tenderness  Musculoskeletal:      Right lower le+ Edema present  Left lower le+ Edema present  Skin:     General: Skin is warm and dry  Findings: Erythema (BLE) present  No rash  Neurological:      Mental Status: He is alert and oriented to person, place, and time  Deep Tendon Reflexes: Reflexes are normal and symmetric               Laboratory Results:  Results from last 7 days   Lab Units 21  0884 21  3642 21  0133   TROPONIN I ng/mL 2 67* 0 58* 0 34*       CBC with diff:   Results from last 7 days   Lab Units 21  1412 21  1835   WBC Thousand/uL 12 79* 16 49*   HEMOGLOBIN g/dL 13 2 13 3   HEMATOCRIT % 40 4 40 5   MCV fL 95 93   PLATELETS Thousands/uL 158 198   MCH pg 30 9 30 5   MCHC g/dL 32 7 32 8   RDW % 13 9 13 8   MPV fL 10 7 10 6   NRBC AUTO /100 WBCs 0 0         CMP:  Results from last 7 days   Lab Units 21  1412 21  1835   POTASSIUM mmol/L 3 7 4 1   CHLORIDE mmol/L 101 103   CO2 mmol/L 28 30   BUN mg/dL 16 21   CREATININE mg/dL 1 02 1 08   CALCIUM mg/dL 8 0* 8 3   AST U/L  --  18   ALT U/L  --  33   ALK PHOS U/L  --  56   EGFR ml/min/1 73sq m 76 71         BMP:  Results from last 7 days   Lab Units 21  1412 21  1835   POTASSIUM mmol/L 3 7 4 1   CHLORIDE mmol/L 101 103   CO2 mmol/L 28 30   BUN mg/dL 16 21   CREATININE mg/dL 1 02 1 08   CALCIUM mg/dL 8 0* 8 3       BNP:     Magnesium:       Coags:   Results from last 7 days   Lab Units 21  1835   PTT seconds 32   INR  1 23*       TSH:       Lipid Profile:         Cardiac testing:   Results for orders placed during the hospital encounter of 21    Echo complete with contrast if indicated    Narrative  [facility] [address]  [address] [place] [address]    Transthoracic Echocardiogram  2D, M-mode, Doppler, and Color Doppler    Study date:  2021    Patient: Efren Taveras  MR number: [id number]  Account number: [de-identified]  : 1955  Age: 76 years  Gender: Male  Status: Outpatient  Location: [address]  Height: 70 in  Weight: 367 2 lb  BP: 170/ 68 mmHg    Indications: Paroxysmal Atrial Fibrillation    Diagnoses: I48 0 - Atrial fibrillation    Sonographer:  Marton Halsted, RDCS  Primary Physician:  Lambert Rocha  Referring Physician:  Mushtaq Bean MD  Group:  [facility] [facility] [id number] [facility]  Interpreting Physician:  Negar Lay MD    SUMMARY    PROCEDURE INFORMATION:  This was a technically difficult study  LEFT VENTRICLE:  Systolic function was normal  Ejection fraction was estimated to be 60 %  Although no diagnostic regional wall motion abnormality was identified, this possibility cannot be completely excluded on the basis of this study  Wall thickness was mildly to moderately increased  MITRAL VALVE:  There was trace regurgitation  HISTORY: PRIOR HISTORY: Cerebrovascular Accident, Cardiac Arrhythmia, Cardiac Disease, Chronic Atrial Fibrillation, Dextrocardia, Diabetes, Hyperlipidemia, Hypertension, Sleep Apnea, Stroke    PROCEDURE: The study was performed in the [address] [place] [address]  This was a routine study  The transthoracic approach was used  The study included complete 2D imaging, M-mode, complete spectral Doppler, and color Doppler  The heart rate was  84 bpm, at the start of the study  Images were obtained from the parasternal and apical acoustic windows  Images were not obtained from the subcostal or suprasternal notch acoustic windows  This was a technically difficult study  LEFT VENTRICLE: Size was normal  Systolic function was normal  Ejection fraction was estimated to be 60 %  Although no diagnostic regional wall motion abnormality was identified, this possibility cannot be completely excluded on the basis  of this study  Wall thickness was mildly to moderately increased  DOPPLER: Left ventricular diastolic function parameters were normal     RIGHT VENTRICLE: The size was normal  Systolic function was normal  Wall thickness was normal     LEFT ATRIUM: Size was normal     RIGHT ATRIUM: Size was normal  Not well visualized  MITRAL VALVE: Valve structure was normal  There was normal leaflet separation  DOPPLER: The transmitral velocity was within the normal range  There was no evidence for stenosis  There was trace regurgitation  AORTIC VALVE: The valve was trileaflet   Leaflets exhibited normal thickness, normal cuspal separation, and sclerosis  DOPPLER: Transaortic velocity was within the normal range  There was no evidence for stenosis  There was no significant  regurgitation  TRICUSPID VALVE: The valve structure was normal  There was normal leaflet separation  DOPPLER: The transtricuspid velocity was within the normal range  There was no evidence for stenosis  There was no significant regurgitation  PULMONIC VALVE: Not well visualized  DOPPLER: The transpulmonic velocity was within the normal range  There was no significant regurgitation  PERICARDIUM: A trace pericardial effusion was identified  The pericardium was normal in appearance  AORTA: The root exhibited normal size  SYSTEMIC VEINS: IVC: The inferior vena cava was normal in size  PULMONARY VEINS: DOPPLER: Doppler signals were not recordable in the pulmonary vein(s)      SYSTEM MEASUREMENT TABLES    2D  %FS: 39 19 %  Ao Diam: 3 73 cm  EDV(Teich): 97 23 ml  EF(Teich): 69 72 %  ESV(Teich): 29 44 ml  IVC: 20 15 mm  IVSd: 1 56 cm  LA Diam: 3 95 cm  LVEDV MOD A4C: 91 89 ml  LVEF MOD A4C: 67 26 %  LVESV MOD A4C: 30 08 ml  LVIDd: 4 6 cm  LVIDs: 2 8 cm  LVLd A4C: 8 02 cm  LVLs A4C: 7 02 cm  LVOT Diam: 2 15 cm  LVPWd: 1 46 cm  SV MOD A4C: 61 8 ml  SV(Teich): 67 79 ml    PW  E' Sept: 0 07 m/s  E/E' Sept: 14 57  MV A [place]: 0 93 m/s  MV Dec [place]: 4 15 m/s2  MV DecT: 242 84 ms  MV E [place]: 1 01 m/s  MV E/A Ratio: 1 09  MV PHT: 70 42 ms  MVA By PHT: 3 12 cm2    [employer] Accredited Echocardiography Laboratory    Prepared and electronically signed by    Malcolm Ramsay MD  Signed 2021 14:59:01    Results for orders placed during the hospital encounter of 17    CHERYL    Narrative  [address], [address] [facility]  [phone]    Transesophageal Echocardiogram  Limited 2D, Doppler, and Color Doppler    Study date:  2017    Patient: Zi Velasquez  MR number: [id number]  Account number: [de-identified]  : 1955  Age: 57 years  Gender: Male  Status: Inpatient  Location: Bedside  Height: 70 in  Weight: 353 lb  BP: 126/ 77 mmHg    Indications: Atrial fibrillation  Diagnoses: I48 0 - Atrial fibrillation    Sonographer:  Jerome Villanueva [facility] AE-PE  Primary Physician:  Lon Barajas MD  Referring Physician:  Monica Crespo MD  Group:  [id number] [facility]  Interpreting Physician:  Monica Crespo MD    SUMMARY    LEFT VENTRICLE:  Systolic function was mildly reduced by visual assessment  Ejection fraction was estimated to be 45 %  There was mild diffuse hypokinesis  LEFT ATRIUM:  The atrium was mildly dilated  ATRIAL SEPTUM:  No defect or patent foramen ovale was identified  MITRAL VALVE:  There was mild regurgitation  TRICUSPID VALVE:  There was mild regurgitation  HISTORY: PRIOR HISTORY: Risk factors: diabetes, hypercholesterolemia, and morbid obesity  Remote stroke  PROCEDURE: The procedure was performed at the bedside  This was a routine study  The transesophageal approach was used  The study included limited 2D imaging, limited spectral Doppler, and color Doppler  The heart rate was 95 bpm, at the  start of the study  An adult omniplane probe was inserted by the attending cardiologist  Intubated with ease  One intubation attempt(s)  There was no blood detected on the probe  Image quality was good  MEDICATIONS: Propofol, 100 mg  Anesthesia administered by anesthesia team     LEFT VENTRICLE: Size was normal  Systolic function was mildly reduced by visual assessment  Ejection fraction was estimated to be 45 %  There was mild diffuse hypokinesis  Wall thickness was normal     RIGHT VENTRICLE: The size was normal  Systolic function was normal     LEFT ATRIUM: The atrium was mildly dilated  APPENDAGE: The size was normal  No thrombus was identified  DOPPLER: The function was mildly reduced (mildly reduced emptying velocity)      ATRIAL SEPTUM: No defect or patent foramen ovale was identified  RIGHT ATRIUM: Size was normal     MITRAL VALVE: Valve structure was normal  There was normal leaflet separation  There was no echocardiographic evidence of vegetation  DOPPLER: There was mild regurgitation  AORTIC VALVE: The valve was trileaflet  Leaflets exhibited normal thickness and normal cuspal separation  There was no echocardiographic evidence of vegetation  DOPPLER: There was no regurgitation  TRICUSPID VALVE: The valve structure was normal  There was normal leaflet separation  There was no echocardiographic evidence of vegetation  DOPPLER: There was mild regurgitation  PULMONIC VALVE: Leaflets exhibited normal thickness, no calcification, and normal cuspal separation  There was no echocardiographic evidence of vegetation  PERICARDIUM: There was no pericardial effusion  AORTA: The root exhibited normal size  There was no atheroma  There was no evidence for dissection  There was no evidence for aneurysm  [address] Accredited Echocardiography Laboratory    Prepared and electronically signed by    Raeann Prader, MD  Signed [id number] 16:49:21    No results found for this or any previous visit  No results found for this or any previous visit  EKG reviewed personally: NSR [id number]  Telemetry reviewed personally: NSR    Assessment:  Principal Problem:    Cellulitis of right lower extremity  Active Problems:    Essential hypertension    Hyperlipidemia    Morbid obesity (HCC)    Type 2 diabetes mellitus with hyperglycemia (HCC)    Obstructive sleep apnea    Sepsis (HCC)    Elevated troponin    Venous stasis dermatitis of both lower extremities      Impression:  1  Elevated troponin - Type 2 MI most likely from cellulitis/sepsis  No evidence of acute coronary syndrome  2  Cellulitis/sepsis - on abx  3  Acute on chronic diastolic heart failure - would benefit from gentle diuresis  4  PAF - in NSR  Plan:  1  Continue with IV diuretics    2  Continue remainder of medications  3  No further cardiac testing necessary at this time  Would defer to outpt cardiologist need for stress testing in future

## 2021-08-22 NOTE — ASSESSMENT & PLAN NOTE
· Troponin elevated at 0 36   · Continue to trend   · Patient denies chest pain  · EKG:  Normal sinus rhythm, 77 beats per minute  · Normal echo in February 2021  · Elevation most likely due to cellulitis/sepsis presentation   · Placed on telemetry   · Consult cardiology

## 2021-08-22 NOTE — ASSESSMENT & PLAN NOTE
· Erythema/inflammation of right lower extremity x1 day  · Elevated WBC, tachycardia, meeting sepsis criteria   · Received fluids  · Continue on cefepime and vancomycin  · Elevated lower extremity  · Venous duplex of bilateral lower extremity to rule out for DVT

## 2021-08-22 NOTE — PROGRESS NOTES
[place]  Progress Note - Salima Portillo  1955, 78 y o  male MRN: [id number]  Unit/Bed#: -01 Encounter: [id number]  Primary Care Provider: Snehal Min DO   Date and time admitted to hospital: 8/21/2021  5:57 PM    Venous stasis dermatitis of both lower extremities  Assessment & Plan  · Chronic leg swelling with dermatitis rash    Elevated troponin  Assessment & Plan  · Troponin elevated at 0 36   · Continue to trend   · Patient denies chest pain  · EKG:  Normal sinus rhythm, 77 beats per minute  · Normal echo in February 2021  · Elevation most likely non MI related troponin elevation due to cellulitis/sepsis presentation   · Troponin this morning is 0 58  · Placed on telemetry   · Consult cardiology  · Denies any chest pain or shortness of breath  · Continue on Eliquis, Lipitor, Toprol-XL, Zestril    Sepsis ([address] )  Assessment & Plan  · Meeting sepsis due to tachycardia, elevated WBC (16 49) and source of infection cellulitis  · Lactic elevated at 2 3  · Started on vancomycin and cefepime in the ED, continue  · Received 1 L bolus of fluids  · Lactate is 1 9  · Follow-up Blood culture   · Trend WBC and fever curve    Obstructive sleep apnea  Assessment & Plan  · Order CPAP HS    Type 2 diabetes mellitus with hyperglycemia [facility])  Assessment & Plan  Lab Results   Component Value Date    HGBA1C 7 4 (H) 05/25/2021       Recent Labs     08/21/21  2234 08/22/21  0710 08/22/21  1054   POCGLU 192* 238* 268*       Blood Sugar Average: Last 72 hrs:  · (P) 060 [id number] regimen:  Humulin  130 units t i d  with meals  · Patient reports blood pressure drastically changes from day-to-day sometimes in the 60s  · While admitted 100 units t i d  with meals  · Start sliding scale insulin    Morbid obesity (HCC)  Assessment & Plan  Body mass index is 53 66 kg/m²    · Encouraged lifestyle changes  · Consult Nutrition    Hyperlipidemia  Assessment & Plan  · Continue atorvastatin 40 mg daily    Essential hypertension  Assessment & Plan  · Continue Norvasc 10 mg daily, metoprolol succinate 25 mg daily quinapril 40 mg at night  · Normotensive in the ED   · Continue on Norvasc, Toprol-XL and Zestril  · Monitor vitals as per protocol    * Cellulitis of right lower extremity  Assessment & Plan  · Erythema/inflammation of right lower extremity x1 day  · Elevated WBC, tachycardia, meeting sepsis criteria   · Received fluids  · Continue on cefepime and vancomycin  · Elevated lower extremity  · Venous duplex of bilateral lower extremity to rule out for DVT        Labs & Imaging: I have personally reviewed pertinent reports  VTE Pharmacologic Prophylaxis:  Eliquis  VTE Mechanical Prophylaxis: sequential compression device    Code Status:   Level 1 - Full Code    Patient Centered Rounds: I have performed bedside rounds with nursing staff today  Discussions with Specialists or Other Care Team Provider:  Cardiology    Education and Discussions with Family / Patient:  Patient states he will update his family  I offered to call    Current Length of Stay: 1 day(s)    Current Patient Status: Inpatient   Certification Statement: The patient will continue to require additional inpatient hospital stay due to see my assessment and plan  Subjective:   Patient is seen and examined at bedside  Complains of lower extremity pain, feels weak and tired  Denies any chest pain  Patient uses CPAP at home  All other ROS are negative  Objective:    Vitals: Blood pressure 137/59, pulse 78, temperature 98 5 °F (36 9 °C), resp  rate 20, height 5' 10" (1 778 m), weight (!) 170 kg (374 lb), SpO2 93 %  ,Body mass index is 53 66 kg/m²    SPO2 RA Rest      ED to Hosp-Admission (Current) from 8/21/2021 in [address] Med Surg Unit   SpO2  93 %   SpO2 Activity  At Rest   O2 Device  None (Room air)   O2 Flow Rate  --        I&O:     Intake/Output Summary (Last 24 hours) at 8/22/2021 1205  Last data filed at 8/22/2021 1001  Gross per 24 hour   Intake 830 ml   Output 1100 ml   Net -270 ml       Physical Exam:    General- Alert, lying comfortably in bed  Not in any acute distress  Neck- Supple, No JVD  CVS- regular, S1 and S2 normal  Chest- Bilateral Air entry, No rhochi, crackles or wheezing present  Abdomen- soft, nontender, not distended, no guarding or rigidity, BS+  Extremities-  has edema  Erythema, warmth, tender to touch, swelling R>L  CNS-   Alert, awake and orientedx3  No focal deficits present      Invasive Devices     Peripheral Intravenous Line            Peripheral IV 08/21/21 Right Antecubital <1 day                      Social History  reviewed  Family History   Problem Relation Age of Onset    Coronary artery disease Father     Hodgkin's lymphoma Father     Diabetes Father     Cancer Father     Diabetes type I Mother     Cancer Maternal Grandfather     Diabetes Paternal Grandmother     Emphysema Paternal Grandmother     Heart attack Paternal Grandfather     Hypertension Family     Neuropathy Family     reviewed    Meds:  Current Facility-Administered Medications   Medication Dose Route Frequency Provider Last Rate Last Admin    acetaminophen (TYLENOL) tablet 650 mg  650 mg Oral Q6H PRN Gil aPtton PA-C        amLODIPine (NORVASC) tablet 10 mg  10 mg Oral Daily Belgica Sifuentes PA-C   10 mg at 08/22/21 0856    apixaban (ELIQUIS) tablet 5 mg  5 mg Oral BID Gil Patton PA-C   5 mg at 08/22/21 0856    atorvastatin (LIPITOR) tablet 40 mg  40 mg Oral Daily Belgica Sifuentes PA-C   40 mg at 08/22/21 0856    cefepime (MAXIPIME) IVPB (premix in dextrose) 2,000 mg 50 mL  2,000 mg Intravenous Q12H Belgica Sifuentes PA-C 100 mL/hr at 08/22/21 0626 2,000 mg at 08/22/21 0626    fish oil capsule 1,000 mg  1,000 mg Oral Daily Belgica Sifuentes PA-C   1,000 mg at 08/22/21 0856    flecainide (TAMBOCOR) tablet 100 mg  100 mg Oral Q12H [address] Belgica Sifuentes PA-C   100 mg at 08/22/21 0856    fluticasone (FLONASE) 50 mcg/act nasal spray 1 spray  1 spray Nasal Daily Samson Noriega PA-C        gabapentin (NEURONTIN) capsule 300 mg  300 mg Oral BID Samson Noriega PA-C   300 mg at 08/22/21 0856    insulin lispro (HumaLOG) 100 units/mL subcutaneous injection 1-6 Units  1-6 Units Subcutaneous TID AC Belgica Sifuentes PA-C   3 Units at 08/22/21 1134    insulin lispro (HumaLOG) 100 units/mL subcutaneous injection 1-6 Units  1-6 Units Subcutaneous HS Samson Noriega PA-C   2 Units at 08/21/21 2234    insulin regular (HumuLIN R U-500) 500 units/mL CONCENTRATED injection 100 Units  100 Units Subcutaneous TID AC Belgica Sifuentes PA-C   100 Units at 08/22/21 1134    lisinopril (ZESTRIL) tablet 40 mg  40 mg Oral Daily Belgica Sifuentes PA-C   40 mg at 08/22/21 0856    metFORMIN (GLUCOPHAGE) tablet 500 mg  500 mg Oral BID With Meals Samson Noriega PA-C   500 mg at 08/22/21 0856    metoprolol succinate (TOPROL-XL) 24 hr tablet 25 mg  25 mg Oral Daily Belgica Sifuentes PA-C   25 mg at 08/22/21 0855    montelukast (SINGULAIR) tablet 10 mg  10 mg Oral Daily Samson Noriega PA-C   10 mg at 08/22/21 0856    multivitamin-minerals (CENTRUM ADULTS) tablet 1 tablet  1 tablet Oral Daily Samson Noriega PA-C   1 tablet at 08/22/21 0856    nitroglycerin (NITROSTAT) SL tablet 0 4 mg  0 4 mg Sublingual Q5 Min PRN Samson Noriega PA-C        pantoprazole (PROTONIX) EC tablet 40 mg  40 mg Oral Early Morning Samson Noriega PA-C   40 mg at 08/22/21 0626    tamsulosin (FLOMAX) capsule 0 4 mg  0 4 mg Oral Daily With Dinner Samson Noriega PA-C        traMADol Lurlene Laser) tablet 50 mg  50 mg Oral Q8H PRN Samson Noriega PA-C        vancomycin (VANCOCIN) 2,000 mg in sodium chloride 0 9 % 500 mL IVPB  2,000 mg Intravenous Q12H Belgica Sifuentes PA-C 250 mL/hr at 08/22/21 0844 2,000 mg at 08/22/21 0844      Medications Prior to Admission   Medication    acetaminophen (TYLENOL) 500 mg tablet    amLODIPine (NORVASC) 10 mg tablet    apixaban (Eliquis) 5 mg    atorvastatin (LIPITOR) 40 mg tablet    B-D UF Tachycardia  History of situs inversus  COMPARISON:  11/8/2015 and prior    VIEWS:   AP frontal; 2 images    FINDINGS:  There is a somewhat low volume inspiratory effort  The cardiomediastinal silhouette is stable  Situs inversus again noted  There is mild prominence of interstitial markings and fissural thickening  No lobar consolidation  No pleural effusion  Bones are stable  Impression  Correlate for incipient congestive change or possible bronchitis  No lobar consolidation  ##[email or web address]##[email or web address]      Workstation performed: [id number]    Results for orders placed during the hospital encounter of 01/31/17    XR chest pa & lateral    Narrative  CHEST - DUAL ENERGY    INDICATION:  Dextrocardia  Chest pain  COMPARISON:  January 31, 2017  VIEWS:  PA (including soft tissue/bone algorithms) and lateral projections; 4 images    FINDINGS:    Heart shadow is enlarged and located in the right chest consistent with dextrocardia  The appearance is unchanged from prior exam     The lungs are clear  No pneumothorax or pleural effusion  Visualized osseous structures appear within normal limits for the patient's age  Impression  No active pulmonary disease  Mild cardiomegaly        Workstation performed: [id number]      Last 24 Hours Medication List:   Current Facility-Administered Medications   Medication Dose Route Frequency Provider Last Rate    acetaminophen  650 mg Oral Q6H PRN Genora CoEVERETTE      amLODIPine  10 mg Oral Daily Genora CoEVERETTE      apixaban  5 mg Oral BID Genora CoEVERETTE      atorvastatin  40 mg Oral Daily Genora Co, EVERETTE      cefepime  2,000 mg Intravenous Q12H Belgica Sifuentes PA-C 2,000 mg (08/22/21 0626)    fish oil  1,000 mg Oral Daily Genora CoEVERETTE      flecainide  100 mg Oral Q12H [facility] Belgica Sifuentes PA-C      fluticasone  1 spray Nasal Daily Genora CoEVERETTE      gabapentin  300 mg Oral BID Genora Co, EVERETTE      insulin lispro  1-6 Units Subcutaneous TID AC Leonardo Metz PA-C      insulin lispro  1-6 Units Subcutaneous HS Leonardo Metz PA-C      insulin regular  100 Units Subcutaneous TID AC Belgica Sifuentes PA-C      lisinopril  40 mg Oral Daily Leonardo Metz PA-C      metFORMIN  500 mg Oral BID With Meals Leonardo Metz PA-C      metoprolol succinate  25 mg Oral Daily Belgica Sifuentes PA-C      montelukast  10 mg Oral Daily Leonardo Metz PA-C      multivitamin-minerals  1 tablet Oral Daily Leonardo Metz PA-C      nitroglycerin  0 4 mg Sublingual Q5 Min PRN Leonardo Metz PA-C      pantoprazole  40 mg Oral Early Morning Leonardo Metz PA-C      tamsulosin  0 4 mg Oral Daily With Dinner Leonardo Metz PA-C      traMADol  50 mg Oral Q8H PRN Leonardo Metz PA-C      vancomycin  2,000 mg Intravenous Q12H Leonardo Metz PA-C 2,000 mg (08/22/21 0844)        Today, Patient Was Seen By: Archana Padilla MD    ** Please Note: Dictation voice to text software may have been used in the creation of this document   **

## 2021-08-22 NOTE — SEPSIS NOTE
Sepsis Note   Hao Wilkerson  78 y o  male MRN: [id number]  Unit/Bed#: ED 12 Encounter: [id number]      qSOFA     Row Name 08/21/21 1900 08/21/21 1800 08/21/21 1759          Altered mental status GCS < 15  --  --  --      Respiratory Rate > / =22  0  --  0      Systolic BP < / =853  0  0  0      Q Sofa Score  0  1  0          Initial Sepsis Screening     Row Name 08/21/21 2040                Is the patient's history suggestive of a new or worsening infection? (!) Yes (Proceed)  -        Suspected source of infection  soft tissue  -LH        Are two or more of the following signs & symptoms of infection both present and new to the patient? (!) Yes (Proceed)  -        Indicate SIRS criteria  Tachycardia > 90 bpm;Leukocytosis (WBC > 14978 IJL)  -        If the answer is yes to both questions, suspicion of sepsis is present  --        If severe sepsis is present AND tissue hypoperfusion perists in the hour after fluid resuscitation or lactate > 4, the patient meets criteria for SEPTIC SHOCK  --        Are any of the following organ dysfunction criteria present within 6 hours of suspected infection and SIRS criteria that are NOT considered to be chronic conditions?   --        Organ dysfunction  --        Date of presentation of severe sepsis  --        Time of presentation of severe sepsis  --        Tissue hypoperfusion persists in the hour after crystalloid fluid administration, evidenced, by either:  --        Was hypotension present within one hour of the conclusion of crystalloid fluid administration?  --        Date of presentation of septic shock  --        Time of presentation of septic shock  --          User Key  (r) = Recorded By, (t) = Taken By, (c) = Cosigned By    [address] Name Provider Type     Dmitriy Oconnor PA-C Physician Assistant

## 2021-08-22 NOTE — ASSESSMENT & PLAN NOTE
· Continue Norvasc 10 mg daily, metoprolol succinate 25 mg daily quinapril 40 mg at night  · Normotensive in the ED   · Continue on Norvasc, Toprol-XL and Zestril  · Monitor vitals as per protocol

## 2021-08-23 PROBLEM — N17.9 AKI (ACUTE KIDNEY INJURY) (HCC): Status: ACTIVE | Noted: 2021-01-01

## 2021-08-23 NOTE — NURSING NOTE
Pt  Assisted to Bathroom at this time  Pt  With large incontinent episode all over floor  Pt  S/p Lasix and states "this happens to me all the time when they give me Lasix" Condom cath offered to patient and placed at this time  Pt  With FOSTER noted, 2L NC placed and BS were noted to be decreased  Pt  Conversing about his sa[place] adventures and in NAD at this time  Will continue to monitor  EVS called to clean floor

## 2021-08-23 NOTE — PROGRESS NOTES
[place]  Progress Note - Fani Pay  1955, 78 y o  male MRN: [id number]  Unit/Bed#: -01 Encounter: [id number]  Primary Care Provider: Amy Giordano DO   Date and time admitted to hospital: 8/21/2021  5:57 PM    * Cardiac arrest [facility])  Assessment & Plan  · Code blue on telemetry floor, found pulseless by staff nurse  · Received ACLS care for approximately 30 minutes with return of spontaneous circulation  · Review of telemetry showed ventricular tachycardia prior to arrest  · Received amiodarone, epinephrine, calcium, sodium bicarbonate during code  · Hypotensive post arrest requiring epinephrine which has since been titrated off in favor of norepinephrine  · Unclear etiology:  Ischemic in the setting of sepsis with troponin spillage vs   Versus arrhythmogenic in the setting of chronic flecainide use potentially low magnesium/hypokalemia level  · Trend neuro exam    Shock ([facility] [address] )  Assessment & Plan  · As evidenced by hypotension, lactic acidosis  · Status post cardiac arrest  · Etiology likely cardiogenic in nature possible component of distributed in the setting of prior sepsis  · Continue levo for goal map greater than 65 mm/Hg  · Repeat ECHO     Encephalopathy  Assessment & Plan  · Concern for hypoxic brain injury status post cardiac arrest with approximately 30 minute down time with active CPR  · Neuro exam as follows:  Not following verbal commands, does not withdraw to painful stimuli, pupils 3 mm and nonreactive bilaterally, occasionally over breathing the ventilator/getting on the ETT  · Neuro checks  · CT head post arrest: No intracranial mass, mass effect or midline shift  No CT signs of acute infarction  Chronic hypodensity in the right basal ganglia extending to the periventricular region likely represents the sequela of old infarct  No acute parenchymal hemorrhage  Gray-white matter differentiation appears preserved  · Maintain normothermia p r n  Tylenol and ice packs    Ventricular tachycardia (HCC)  Assessment & Plan  · V-tach noted on telemetry prior to code blue  · Patient noted to be on flecainide for Afib  · On amiodarone infusion, continue to hold flecainide  · Continue telemetry  · Maintain K greater than 4 5, Mag greater than 2  · Cardiology following    EMILY (acute kidney injury) ([facility] )  Assessment & Plan  · Creatinine on admission 1 08  · Baseline appears 0 8 - 1 0  · Creatinine now 1 46 post cardiac arrest  · Likely prerenal in the setting of reduced renal blood flow/hypotension shock  · Still continues to make urine  · Trend renal function, monitor eyes nose, with hypertension, avoid nephrotoxins    Lactic acidosis  Assessment & Plan  · Post arrest lactic acid 8 2 down trended to 2 8  · In the setting of cardiac arrest and shock  · Continue to trend as needed, receiving additional 500 mL IV fluid for now    Venous stasis dermatitis of both lower extremities  Assessment & Plan  · Local wound care per nursing    Elevated troponin  Assessment & Plan  · Initially admitted with sepsis most likely related to lower extremity cellulitis  · Noted with troponin spill from 0 34-2  · Seen by Cardiology during admission, who indicated type 2 MI in the setting of sepsis/infection  · Discussed with on-call cardiologist overnight Dr Shadia Schafer, repeat EGG post-arrest noted with isolated ST elevations in leads II but without other inhaler continuous leads, did not feel patient was a candidate for transfer for cardiac catheterization at this time  · On-call cardiology recommended to trend ECGs and  consider anticoagulation with heparin/Plavix with any further changes  · Patient on apixaban as out patient did receive his full doses today, will hold on further anticoagulation at this time, can reassess later today  · Repeat ECG without signs of focal ischemia    Sepsis ([facility] )  Assessment & Plan  · Present on admission as evidenced by tachycardia, leukocytosis  · Etiology likely to lower extremity cellulitis  · Lactic acid 2 3 on admission, during which time he received 1 L of IV fluids  · Started on cefepime, vancomycin on admission plan to continue  · Trend WBCs, fever curve, procalcitonin    Cellulitis of right lower extremity  Assessment & Plan  · Presented with redness and inflammation of right lower extremity for approximately 1 day  · Meeting sepsis criteria as above  · Continue cefepime and vancomycin    Obstructive sleep apnea  Assessment & Plan  · Continue mechanical ventilation    Type 2 diabetes mellitus with hyperglycemia [facility])  Assessment & Plan  Lab Results   Component Value Date    HGBA1C 7 4 (H) 05/25/2021       Recent Labs     08/22/21  1054 08/22/21  1610 08/22/21  2038 08/23/21  0205   POCGLU 268* 101 154* 150*       Blood Sugar Average: Last 72 hrs:  (P) 542 [id number]     · NPO  · SSI q6    Morbid obesity ([address] )  Assessment & Plan  · Nutrition consult when able    Hyperlipidemia  Assessment & Plan  · Hold home atorvastatin as NPO    Essential hypertension  Assessment & Plan  · Hold home antihypertensive in the setting of shock    Dextrocardia  Assessment & Plan  · As noted on chest x-ray  · Ensure proper positioning of ECG leads  · Prior ECHO 2/21: EF 60%    ----------------------------------------------------------------------------------------  HPI/24hr events:   · Cardiac arrest due to V-tach arrest on the evening of 8/22  · Initially requiring Levophed as high as 15 mcg/minute now off  · Does not follow verbal commands    Patient appropriate for transfer out of the ICU today?: No  Disposition: Continue Critical Care   Code Status: Level 1 - Full Code  ---------------------------------------------------------------------------------------  SUBJECTIVE  Na    Review of Systems   Unable to perform ROS: Mental status change ---------------------------------------------------------------------------------------  OBJECTIVE    Vitals   Vitals:    21 0440 21 0500 21 0513 21 0537   BP: 165/74 120/56 103/50 117/58   BP Location:       Pulse: 72 58 (!) 52 56   Resp: (!) 25 16 18 19   Temp:       TempSrc:       SpO2: 98% 96% 94% 95%   Weight:       Height:         Temp (24hrs), Av 4 °F (38 6 °C), Min:98 5 °F (36 9 °C), Max:102 6 °F (39 2 °C)  Current: Temperature: 99 8 °F (37 7 °C)  Arterial Line BP: 138/58  Arterial Line MAP (mmHg): 77 mmHg    Respiratory:  SpO2: SpO2: 95 %  ACVC [id number]%    Invasive/non-invasive ventilation settings   Respiratory    Lab Data (Last 4 hours)    None         O2/Vent Data (Last 4 hours)    None                Physical Exam  Vitals and nursing note reviewed  Constitutional:       General: He is not in acute distress  Appearance: He is obese  He is ill-appearing  He is not diaphoretic  HENT:      Head: Normocephalic and atraumatic  Mouth/Throat:      Mouth: Mucous membranes are dry  Cardiovascular:      Rate and Rhythm: Normal rate and regular rhythm  Pulses: Normal pulses  Pulmonary:      Effort: Pulmonary effort is normal  No respiratory distress  Breath sounds: No wheezing or rales  Abdominal:      General: Abdomen is protuberant  There is no distension  Palpations: Abdomen is soft  Musculoskeletal:      Cervical back: Neck supple  Right lower leg: Edema present  Left lower leg: Edema present  Skin:     General: Skin is warm and dry  Capillary Refill: Capillary refill takes less than 2 seconds  Comments: Right lower extremity erythema    Neurological:      GCS: GCS eye subscore is 4  GCS verbal subscore is 1        Comments: Intubated and sedated  Does not follow verbal commands  No response to noxious stimuli  No corneal reflex  Pupils 2mm and non-reactive bilaterally  Overbreathes ventilator/biting ETT Laboratory and Diagnostics:  Results from last 7 days   Lab Units 08/22/21 2151 08/22/21 2147 08/22/21 2039 08/22/21  1412 08/21/21  1835   WBC Thousand/uL  --  17 78* 10 72* 12 79* 16 49*   HEMOGLOBIN g/dL  --  12 4 13 2 13 2 13 3   I STAT HEMOGLOBIN g/dl 12 6  --   --   --   --    HEMATOCRIT %  --  37 8 39 9 40 4 40 5   HEMATOCRIT, ISTAT % 37  --   --   --   --    PLATELETS Thousands/uL  --  201 25* 158 198   NEUTROS PCT %  --   --   --  82* 88*   BANDS PCT %  --   --  12*  --   --    MONOS PCT %  --   --   --  8 5   MONO PCT %  --   --  8  --   --      Results from last 7 days   Lab Units 08/22/21 2151 08/22/21 2147 08/22/21 2039 08/22/21  1412 08/21/21  1835   SODIUM mmol/L  --  140 152* 137 143   POTASSIUM mmol/L  --  3 0* 5 4* 3 7 4 1   CHLORIDE mmol/L  --  101 117* 101 103   CO2 mmol/L  --  28 32 28 30   CO2, I-STAT mmol/L 29  --   --   --   --    ANION GAP mmol/L  --  11 3* 8 10   BUN mg/dL  --  21 16 16 21   CREATININE mg/dL  --  1 46* 0 99 1 02 1 08   CALCIUM mg/dL  --  8 2* 20 6* 8 0* 8 3   GLUCOSE RANDOM mg/dL  --  157* 152* 201* 231*   ALT U/L  --  70 57  --  33   AST U/L  --  99* 102*  --  18   ALK PHOS U/L  --  77 62  --  56   ALBUMIN g/dL  --  3 1* 2 2*  --  3 7   TOTAL BILIRUBIN mg/dL  --  1 20* 1 00  --  1 10*     Results from last 7 days   Lab Units 08/22/21 2039   MAGNESIUM mg/dL 1 4*      Results from last 7 days   Lab Units 08/21/21  1835   INR  1 23*   PTT seconds 32      Results from last 7 days   Lab Units 08/22/21 2147 08/22/21  1324 08/22/21  0523 08/22/21  0133 08/21/21 2034   TROPONIN I ng/mL 6 64* 2 67* 0 58* 0 34* 0 36*     Results from last 7 days   Lab Units 08/22/21 2147 08/22/21 2039 08/22/21 0523 08/21/21 2214 08/21/21  1835   LACTIC ACID mmol/L 2 8* 8 2* 1 9 2 7* 2 3*     ABG:    VBG:    Results from last 7 days   Lab Units 08/22/21  0529 08/21/21  1835   PROCALCITONIN ng/ml 0 43* 0 42*       Micro  Results from last 7 days   Lab Units 08/21/21  1835   BLOOD CULTURE  No Growth at 24 hrs  No Growth at 24 hrs  EKG: NSR rate 64 on telemetry  Imaging: I have personally reviewed pertinent reports  Intake and Output  I/O       08/21 0701 - 08/22 0700 08/22 0701 - 08/23 0700    P  O  480 590    I V  (mL/kg)  992 4 (5 8)    IV Piggyback  50    Total Intake(mL/kg) 480 (2 8) 1632 4 (9 6)    Urine (mL/kg/hr) 700 1975 (0 5)    Total Output 700 1975    Net -220 -342 7                Height and Weights   Height: 5' 10" (177 8 cm)  IBW (Ideal Body Weight): 73 kg  Body mass index is 53 66 kg/m²  Weight (last 2 days)     Date/Time   Weight    08/21/21 1759   (!) 170 (374)                Nutrition       Diet Orders   (From admission, onward)             Start     Ordered    08/22/21 2038  Diet NPO; Sips with meds  Diet effective now     Question Answer Comment   Diet Type NPO    NPO Except: Sips with meds    RD to adjust diet per protocol?  Yes        08/22/21 2037                  Active Medications  Scheduled Meds:  Current Facility-Administered Medications   Medication Dose Route Frequency Provider Last Rate    acetaminophen  650 mg Rectal Q4H PRN Nimo Rho, CRNP      amiodarone  0 5 mg/min Intravenous Continuous Nimo Rho, CRNP 0 5 mg/min (08/23/21 0310)    atorvastatin  40 mg Oral Daily Nimo Rho, CRNP      cefepime  2,000 mg Intravenous Q12H Nimo Rho, CRNP 2,000 mg (08/22/21 1801)    chlorhexidine  15 mL Mouth/Throat Q12H [address] Nimo Rho, CRNP      fentanyl citrate (PF)  100 mcg Intravenous Q1H PRN Nimo Rho, CRNP      fish oil  1,000 mg Oral Daily Nimo Rho, CRNP      insulin lispro  2-12 Units Subcutaneous Q6H [address], CRNP      multivitamin-minerals  1 tablet Oral Daily Nimo Rho, CRNP      norepinephrine  1-30 mcg/min Intravenous Titrated Nimo Rho, CRNP Stopped (08/23/21 0415)    pantoprazole  40 mg Oral Early Morning Nimo Rho, CRNP      potassium chloride  20 mEq Intravenous Q2H Estrella Anderson CRNP 20 mEq (08/23/21 0420)    potassium chloride  40 mEq Oral Once NIXON Dewitt      propofol  5-50 mcg/kg/min Intravenous Titrated Estrella Anderson CRNP 10 mcg/kg/min (08/23/21 0321)    vancomycin  2,000 mg Intravenous Q12H Estrella Anderson, CRNP 2,000 mg (08/22/21 1910)     Continuous Infusions:  amiodarone, 0 5 mg/min, Last Rate: 0 5 mg/min (08/23/21 0310)  norepinephrine, 1-30 mcg/min, Last Rate: Stopped (08/23/21 0415)  propofol, 5-50 mcg/kg/min, Last Rate: 10 mcg/kg/min (08/23/21 0321)      PRN Meds:   acetaminophen, 650 mg, Q4H PRN  fentanyl citrate (PF), 100 mcg, Q1H PRN        Invasive Devices Review  Invasive Devices     Peripheral Intravenous Line            Peripheral IV 08/21/21 Right Antecubital 1 day    Peripheral IV 08/22/21 Left Antecubital <1 day          Intraosseous Line            Intraosseous Line 08/22/21 Tibia <1 day          Arterial Line            Arterial Line 08/22/21 Right Radial <1 day          Drain            NG/OG/Enteral Tube Orogastric 16 Fr Center mouth <1 day    Urethral Catheter Non-latex; Temperature probe <1 day          Airway            ETT  Hi-Lo; Inflated;Cuffed;Oral 7 5 mm <1 day                Rationale for remaining devices: MV, hemodynamic monitoring  ---------------------------------------------------------------------------------------  Advance Directive and Living Will:      Power of :    POLST:    ---------------------------------------------------------------------------------------  Care Time Delivered:   No Critical Care time spent       NIXON Dewitt      Portions of the record may have been created with voice recognition software  Occasional wrong word or "sound a like" substitutions may have occurred due to the inherent limitations of voice recognition software    Read the chart carefully and recognize, using context, where substitutions have occurred

## 2021-08-23 NOTE — ASSESSMENT & PLAN NOTE
· Code blue on telemetry floor, found pulseless by staff nurse  · Received ACLS care for approximately 30 minutes with return of spontaneous circulation  · Review of telemetry showed ventricular tachycardia prior to arrest  · Received amiodarone, epinephrine, calcium, sodium bicarbonate during code  · Hypotensive post arrest requiring epinephrine which has since been titrated off in favor of norepinephrine  · Unclear etiology:  Ischemic in the setting of sepsis with troponin spillage vs   Versus arrhythmogenic in the setting of chronic flecainide use potentially low magnesium/hypokalemia level  · Trend neuro exam

## 2021-08-23 NOTE — ASSESSMENT & PLAN NOTE
· Post arrest lactic acid 8 2 down trended to 2 8  · In the setting of cardiac arrest and shock  · Continue to trend as needed, receiving additional 500 mL IV fluid for now

## 2021-08-23 NOTE — CASE MANAGEMENT
LOS: 2 days  Pt is not a documented bundle  Pt is not a 30 day readmission  Unplanned readmission score is 24 and yellow  Acknowledge Pt is currently intubated  CM to follow up for discharge planning as Pt progresses

## 2021-08-23 NOTE — NUTRITION
TF Goal rate with current Diprivan order is:  Jevity 1 2 @ 55 ml/hr, 2 packets No Carb ProSource  150 ml water flush q 6 hrs  Provides 2232 kcal, 103 gm protein and 1665 ml total free fluids

## 2021-08-23 NOTE — ASSESSMENT & PLAN NOTE
· V-tach noted on telemetry prior to code blue  · Patient noted to be on flecainide for Afib  · Status post ROSC during which time he received amiodarone 300 mg  · Start amiodarone infusion, does not need bolus after given during code  · Discussed with Cardiology on call overnight can discontinue flecainide for now  · Continue telemetry  · Maintain K greater than 4 5, Mag greater than 2

## 2021-08-23 NOTE — PROGRESS NOTES
[facility]  ICU Acceptance Note - Angelica Ring  1955, 78 y o  male MRN: [id number]  Unit/Bed#: -01 Encounter: [id number]  Primary Care Provider: Yamilet Mason,    Date and time admitted to hospital: 8/21/2021  5:57 PM    * Cardiac arrest [facility])  Assessment & Plan  · Code blue on telemetry floor, found pulseless by staff nurse  · Received ACLS care for approximately 30 minutes with return of spontaneous circulation  · Review of telemetry showed ventricular tachycardia prior to arrest  · Received amiodarone, epinephrine, calcium, sodium bicarbonate during code  · Hypotensive post arrest requiring epinephrine which has since been titrated off in favor of norepinephrine  · Unclear etiology:  Ischemic in the setting of sepsis with troponin spillage vs   Versus arrhythmogenic in the setting of chronic flecainide use potentially low magnesium/hypokalemia level  · Trend neuro exam    Shock ([facility] [address] )  Assessment & Plan  · As evidenced by hypotension, lactic acidosis  · Status post cardiac arrest  · Etiology likely cardiogenic in nature possible component of distributed in the setting of prior sepsis  · Was on epinephrine infusion after Code Blue, has since been weaned in favor of norepinephrine  · Maintain map greater than 65 mmHg  · A line inserted  · Likely needs central venous line   · Repeat ECHO     Encephalopathy  Assessment & Plan  · Concern for hypoxic brain injury status post cardiac arrest with approximately 30 minute down time with active CPR  · Neuro exam as follows:  Not following verbal commands, does not withdraw to painful stimuli, pupils 3 mm and nonreactive bilaterally, occasionally over breathing the ventilator/getting on the ETT  · Neuro checks  · CT head post arrest: No intracranial mass, mass effect or midline shift  No CT signs of acute infarction    Chronic hypodensity in the right basal ganglia extending to the periventricular region likely represents the sequela of old infarct  No acute parenchymal hemorrhage  Gray-white matter differentiation appears preserved  · Maintain normothermia p r n   Tylenol and ice packs, per discussion with critical care attending doctor Dostal does not require transfer for TTM    Ventricular tachycardia ([address] )  Assessment & Plan  · V-tach noted on telemetry prior to code blue  · Patient noted to be on flecainide for Afib  · Status post ROSC during which time he received amiodarone 300 mg  · Start amiodarone infusion, does not need bolus after given during code  · Discussed with Cardiology on call overnight can discontinue flecainide for now  · Continue telemetry  · Maintain K greater than 4 5, Mag greater than 2    Lactic acidosis  Assessment & Plan  · Post arrest lactic acid 8 2 down trended to 2 8  · In the setting of cardiac arrest and shock  · Continue to trend as needed, receiving additional 500 mL IV fluid for now    Venous stasis dermatitis of both lower extremities  Assessment & Plan  · Local wound care per nursing    Elevated troponin  Assessment & Plan  · Initially admitted with sepsis most likely related to lower extremity cellulitis  · Noted with troponin spill from 0 34-2  · Seen by Cardiology during admission, who indicated type 2 MI in the setting of sepsis/infection  · Discussed with on-call cardiologist overnight Dr Jm Merritt, repeat EGG post-arrest noted with isolated ST elevations in leads II but without other inhaler continuous leads, did not feel patient was a candidate for transfer for cardiac catheterization at this time  · On-call cardiology recommended to trend ECGs and  consider anticoagulation with heparin/Plavix with any further changes  · Patient on apixaban as out patient did receive his full doses today, will hold on further anticoagulation with heparin until tomorrow  · Repeat troponin pending expect higher elevated in the setting of cardiac arrest    Sepsis [facility])  Assessment & Plan  · Present on admission as evidenced by tachycardia, leukocytosis  · Etiology likely to lower extremity cellulitis  · Lactic acid 2 3 on admission, during which time he received 1 L of IV fluids  · Started on cefepime, vancomycin on admission plan to continue  · Trend WBCs, fever curve, procalcitonin    Cellulitis of right lower extremity  Assessment & Plan  · Presented with redness and inflammation of right lower extremity for approximately 1 day  · Meeting sepsis criteria as above  · Continue cefepime and vancomycin    Obstructive sleep apnea  Assessment & Plan  · Continue mechanical ventilation    Type 2 diabetes mellitus with hyperglycemia [facility])  Assessment & Plan  Lab Results   Component Value Date    HGBA1C 7 4 (H) 05/25/2021       Recent Labs     08/22/21  0710 08/22/21  1054 08/22/21  1610 08/22/21 2038   POCGLU 238* 268* 101 154*       Blood Sugar Average: Last 72 hrs:  (P) 190 6     · NPO  · SSI q6    Morbid obesity ([address] )  Assessment & Plan  · Nutrition consult when able    Hyperlipidemia  Assessment & Plan  · Hold home atorvastatin as NPO    Essential hypertension  Assessment & Plan  · Hold home antihypertensive in the setting of shock    Dextrocardia  Assessment & Plan  · As noted on chest x-ray  · Ensure proper positioning of ECG leads  · Prior ECHO 2/21: EF 60%      ----------------------------------------------------------------------------------------  HPI/24hr events:   · 50-year-old male past medical history of situs inversus, diastolic heart failure, diabetes type 2, morbid obesity, chronic venous stasis, NATHAYL, hyperlipidemia/ hypertension who presented to the emergency department 8/21 with 1 day of right lower extremity redness and inflammation  Qualified for sepsis admitted to the hospital and started on broad-spectrum antibiotics with cefepime and vancomycin      · Code blue 8/21 PM in the setting of ventricular tachycardia arrest with ROSC after 30 minutes of ACLS care, hypotensive post arrest requiring Levophed, not following commands post arrest    Patient appropriate for transfer out of the ICU today?: No  Disposition: Transfer to Critical Care   Code Status: Level 1 - Full Code  ---------------------------------------------------------------------------------------  SUBJECTIVE  None    Review of Systems   Unable to perform ROS: Intubated       ---------------------------------------------------------------------------------------  OBJECTIVE    Vitals   Vitals:    21 0145 21 0200 21 0210 21 0220   BP: 109/54 124/60 (!) 177/78 142/65   Pulse: 57 (!) 50 76 67   Resp: 22 19 (!) 24 (!) 23   Temp:  99 8 °F (37 7 °C)     TempSrc:  Oral     SpO2: 99% 98% 99% 99%   Weight:       Height:         Temp (24hrs), Av 4 °F (38 6 °C), Min:98 5 °F (36 9 °C), Max:102 6 °F (39 2 °C)  Current: Temperature: 99 8 °F (37 7 °C)  Arterial Line BP: 150/59  Arterial Line MAP (mmHg): 86 mmHg    Respiratory:  SpO2: SpO2: 99 %  Nasal Cannula O2 Flow Rate (L/min): 2 L/min    Invasive/non-invasive ventilation settings   Respiratory    Lab Data (Last 4 hours)    None         O2/Vent Data (Last 4 hours)    None                Physical Exam  Vitals and nursing note reviewed  Constitutional:       Appearance: He is ill-appearing and toxic-appearing  Interventions: He is sedated and intubated  HENT:      Head: Normocephalic and atraumatic  Mouth/Throat:      Mouth: Mucous membranes are dry  Cardiovascular:      Rate and Rhythm: Normal rate and regular rhythm  Pulmonary:      Effort: He is intubated  Breath sounds: Normal breath sounds and air entry  Chest:      Comments: Right sided heart sounds  Abdominal:      General: Abdomen is protuberant  There is no distension  Palpations: Abdomen is soft  Musculoskeletal:      Cervical back: Neck supple  Right lower leg: Edema present  Left lower leg: Edema present     Feet:      Comments: Right lower extremity erythema   Skin:     General: Skin is warm and dry  Capillary Refill: Capillary refill takes 2 to 3 seconds  Neurological:      GCS: GCS eye subscore is 1  GCS verbal subscore is 1  GCS motor subscore is 1        Comments: Does not follow verbal commands  No response to painful stimuli  Pupils 2mm and non reactive bilaterally  Intubated and sedated on precedex         Laboratory and Diagnostics:  Results from last 7 days   Lab Units 08/22/21 2151 08/22/21 2147 08/22/21 2039 08/22/21  1412 08/21/21  1835   WBC Thousand/uL  --  17 78* 10 72* 12 79* 16 49*   HEMOGLOBIN g/dL  --  12 4 13 2 13 2 13 3   I STAT HEMOGLOBIN g/dl 12 6  --   --   --   --    HEMATOCRIT %  --  37 8 39 9 40 4 40 5   HEMATOCRIT, ISTAT % 37  --   --   --   --    PLATELETS Thousands/uL  --  201 25* 158 198   NEUTROS PCT %  --   --   --  82* 88*   BANDS PCT %  --   --  12*  --   --    MONOS PCT %  --   --   --  8 5   MONO PCT %  --   --  8  --   --      Results from last 7 days   Lab Units 08/22/21 2151 08/22/21 2147 08/22/21 2039 08/22/21  1412 08/21/21  1835   SODIUM mmol/L  --  140 152* 137 143   POTASSIUM mmol/L  --  3 0* 5 4* 3 7 4 1   CHLORIDE mmol/L  --  101 117* 101 103   CO2 mmol/L  --  28 32 28 30   CO2, I-STAT mmol/L 29  --   --   --   --    ANION GAP mmol/L  --  11 3* 8 10   BUN mg/dL  --  21 16 16 21   CREATININE mg/dL  --  1 46* 0 99 1 02 1 08   CALCIUM mg/dL  --  8 2* 20 6* 8 0* 8 3   GLUCOSE RANDOM mg/dL  --  157* 152* 201* 231*   ALT U/L  --  70 57  --  33   AST U/L  --  99* 102*  --  18   ALK PHOS U/L  --  77 62  --  56   ALBUMIN g/dL  --  3 1* 2 2*  --  3 7   TOTAL BILIRUBIN mg/dL  --  1 20* 1 00  --  1 10*     Results from last 7 days   Lab Units 08/22/21  2039   MAGNESIUM mg/dL 1 4*      Results from last 7 days   Lab Units 08/21/21  1835   INR  1 23*   PTT seconds 32      Results from last 7 days   Lab Units 08/22/21  2147 08/22/21  1324 08/22/21  0523 08/22/21  0133 08/21/21  2034   TROPONIN I ng/mL 6 64* 2 67* 0 58* 0 34* 0 36*     Results from last 7 days   Lab Units 08/22/21 2147 08/22/21 2039 08/22/21  0523 08/21/21  2214 08/21/21  1835   LACTIC ACID mmol/L 2 8* 8 2* 1 9 2 7* 2 3*     ABG:    VBG:    Results from last 7 days   Lab Units 08/22/21  0529 08/21/21  1835   PROCALCITONIN ng/ml 0 43* 0 42*       Micro  Results from last 7 days   Lab Units 08/21/21  1835   BLOOD CULTURE  No Growth at 24 hrs  No Growth at 24 hrs  EKG: NSR rate 70 bpm  Imaging: I have personally reviewed pertinent reports  Intake and Output  I/O       08/21 0701 - 08/22 0700 08/22 0701 - 08/23 0700    P  O  480 590    I V  (mL/kg)  784 (4 6)    IV Piggyback  50    Total Intake(mL/kg) 480 (2 8) 1424 (8 4)    Urine (mL/kg/hr) 700 1900 (0 5)    Total Output 700 1900    Net -220 -6                Height and Weights   Height: 5' 10" (177 8 cm)  IBW (Ideal Body Weight): 73 kg  Body mass index is 53 66 kg/m²  Weight (last 2 days)     Date/Time   Weight    08/21/21 1759   (!) 170 (374)                Nutrition       Diet Orders   (From admission, onward)             Start     Ordered    08/22/21 2038  Diet NPO; Sips with meds  Diet effective now     Question Answer Comment   Diet Type NPO    NPO Except: Sips with meds    RD to adjust diet per protocol?  Yes        08/22/21 2037                  Active Medications  Scheduled Meds:  Current Facility-Administered Medications   Medication Dose Route Frequency Provider Last Rate    acetaminophen  650 mg Rectal Q4H PRN NIXON Carbone      amiodarone  0 5 mg/min Intravenous Continuous NIXON Carbone      atorvastatin  40 mg Oral Daily NIXON Carbone      cefepime  2,000 mg Intravenous Q12H NIXON Carbone 2,000 mg (08/22/21 1801)    chlorhexidine  15 mL Mouth/Throat Q12H [facility] NIXON Carbone      fentanyl citrate (PF)  100 mcg Intravenous Q1H PRN NIXON Carbone      fish oil  1,000 mg Oral Daily NIXON Carbone      insulin lispro  2-12 Units Subcutaneous Q6H [facility] Ramos Ly, CRNP      multivitamin-minerals  1 tablet Oral Daily Rmaos Ly, CRNP      norepinephrine  1-30 mcg/min Intravenous Titrated Ramos Ly, CRNP 12 mcg/min (08/23/21 0220)    pantoprazole  40 mg Oral Early Morning Ramos Ly, CRNP      potassium chloride  20 mEq Intravenous Q2H Ramos Ly, CRNP 20 mEq (08/23/21 0207)    potassium chloride  40 mEq Oral Once Ramos Ly, CRNP      propofol  5-50 mcg/kg/min Intravenous Titrated Ramos Ly, CRNP 5 mcg/kg/min (08/23/21 0213)    vancomycin  2,000 mg Intravenous Q12H Ramos Ly, CRNP 2,000 mg (08/22/21 1910)     Continuous Infusions:  amiodarone, 0 5 mg/min  norepinephrine, 1-30 mcg/min, Last Rate: 12 mcg/min (08/23/21 0220)  propofol, 5-50 mcg/kg/min, Last Rate: 5 mcg/kg/min (08/23/21 0213)      PRN Meds:   acetaminophen, 650 mg, Q4H PRN  fentanyl citrate (PF), 100 mcg, Q1H PRN        Invasive Devices Review  Invasive Devices     Peripheral Intravenous Line            Peripheral IV 08/21/21 Right Antecubital 1 day    Peripheral IV 08/22/21 Left Antecubital <1 day          Intraosseous Line            Intraosseous Line 08/22/21 Tibia <1 day          Arterial Line            Arterial Line 08/22/21 Right Radial <1 day          Drain            NG/OG/Enteral Tube Orogastric 16 Fr Center mouth <1 day    Urethral Catheter Non-latex; Temperature probe <1 day          Airway            ETT  Hi-Lo; Inflated;Cuffed;Oral 7 5 mm <1 day                Rationale for remaining devices: Mechanical ventilation, hemodynamic monitoring  ---------------------------------------------------------------------------------------  Advance Directive and Living Will:      Power of :    POLST:    ---------------------------------------------------------------------------------------  Care Time Delivered:   Upon my evaluation, this patient had a high probability of imminent or life-threatening deterioration due to cardiac arrest, venticular tachycardia, sepsis, shock, which required my direct attention, intervention, and personal management  I have personally provided 120 minutes (2030 to 2230) of critical care time, exclusive of procedures, teaching, family meetings, and any prior time recorded by providers other than myself  NIXON Cramer      Portions of the record may have been created with voice recognition software  Occasional wrong word or "sound a like" substitutions may have occurred due to the inherent limitations of voice recognition software    Read the chart carefully and recognize, using context, where substitutions have occurred

## 2021-08-23 NOTE — PROCEDURES
Date/Time: 8/22/2021 2125  Performed by: Franc Stearns RRT  Authorized by: NIXON Harris      Patient location:  ICU  Consent:     Consent obtained:  Emergent situation  Indications:     Indications: hemodynamic monitoring, multiple ABGs and continuous blood pressure monitoring    Pre-procedure details:     Skin preparation:  Chlorhexidine    Preparation: Patient was prepped and draped in sterile fashion    Anesthesia (see MAR for exact dosages): Anesthesia method:  None  Procedure details:     Location / Tip of Catheter:  Radial    Laterality:  Right    Needle gauge:  20 G    Placement technique: Percutaneous and     Sterile ultrasound techniques  Number of attempts:  1    Successful placement: yes      Transducer: waveform confirmed    Post-procedure details:     Post-procedure:  Biopatch applied, secured with tape, sterile dressing applied and sutured    CMS:  Normal    Patient tolerance of procedure:   Tolerated well, no immediate complications

## 2021-08-23 NOTE — ASSESSMENT & PLAN NOTE
Lab Results   Component Value Date    HGBA1C 7 4 (H) 05/25/2021       Recent Labs     08/22/21  1054 08/22/21  1610 08/22/21 2038 08/23/21  0205   POCGLU 268* 101 154* 150*       Blood Sugar Average: Last 72 hrs:  (P) 944 [id number]     · NPO  · SSI q6

## 2021-08-23 NOTE — ASSESSMENT & PLAN NOTE
· Presented with redness and inflammation of right lower extremity for approximately 1 day  · Meeting sepsis criteria as above  · Continue cefepime and vancomycin

## 2021-08-23 NOTE — ASSESSMENT & PLAN NOTE
· As evidenced by hypotension, lactic acidosis  · Status post cardiac arrest  · Etiology likely cardiogenic in nature possible component of distributed in the setting of prior sepsis  · Was on epinephrine infusion after Code Blue, has since been weaned in favor of norepinephrine  · Maintain map greater than 65 mmHg  · A line inserted  · Likely needs central venous line   · Repeat ECHO

## 2021-08-23 NOTE — PROGRESS NOTES
General Cardiology   Progress Note -  Team One   Fani Leon  78 y o  male MRN: [id number]    Unit/Bed#: -01 Encounter: [id number]    Assessment:    Cardiac arrest  · Cardiac arrest noted on med/surg floor last night precipitated by [facility] as per ICU NP documentation   · CPR/ACLS x 30 min with ROSC  · IV amio for Vtach; epi/calcium during code  · Hypotension noted upon ROSC; placed on Epi drip-transitioned to levophed that has since been d/c'd  · Etiology ischemic vs  Arrhythmogenic  · NM stress test from 2017: no ischemia noted  · EKG post arrest reviewed by Dr Golden Floyd with no overt ST changes noted  · Patient on flecainide as outpatient; possible this precipitated Vtach    Shock-resolved  · Hypotension noted upon ROSC; Presumed cardiogenic s/p cardiac arrest  · Placed on Epi post arrest, transitioned to Levophed that has since been d/c'd  · TTE from 2/2021:EF 60%; wall thickness moderately increased  ·  repeat echo pending    Vtach  · [facility] noted last night leading to cardiac arrest  · IV amiodarone bolus given and drip started-would continue today-will being PO load upon extubation  · K+ on admit was 4 1 ; mag as of last evening was 1 4 (no mag was ordered on admit)  · K+ and mag repleted by ICU team  · Patient on flecainide as outpatient in setting of known PAF; possibly precipitating Vtach  · NM stress test from 2017 with no ischemia  · Will review with Cardiology attending    Type 2 MI  · 0 36-0 34-0 58-2 67  · Consult performed by Dr Christiano Steward yesterday, troponin elevation deemed likely Type 2 MI in the setting of RLE cellulitis   · EKG with no acute ST/T wave changed on admit    DM2  · Management per primary team    HLD  · Lipitor 40 mg QD    Essential HTN  · SBP on admit had been 130's-140's  · Home meds: amlodipine 10 mg QD, Toprol XL 25 mg QD, accupril 40 mg QD  · Placed on levophed post cardiac arrest; drip now d/c'd  · SBP now averaging 120's-140's off all pressor/inotropic support  · All home PO meds on hold    Dextrocardia  · As noted per Dr Zulma Horta documentation    PAF  · Known hx of PAF  · Maintained on Toprol XL 25 mg QD and flecainide 100 mg BID as outpatient; now d/c'd  · Eliquis 5 mg BID; given patient is intubated, would begin IV heparin drip  · Tele is NSR with rates in the 90's    Plan/Recommendations:  · Obtain echo  · Begin IV heparin drip due to NPO with hx of PAF  · Will review patient's case with Cardiology attending to determine if there is need for updated ischemic eval vs  EP eval/ICD    _________________________________________________________________    Subjective  · Intubated/sedated      Review of Systems   Unable to perform ROS: intubated       Objective:   Vitals: Blood pressure 119/58, pulse 55, temperature 98 5 °F (36 9 °C), temperature source Oral, resp  rate 19, height 5' 10" (1 778 m), weight (!) 170 kg (374 lb), SpO2 99 %  ,     Wt Readings from Last 3 Encounters:   08/21/21 (!) 170 kg (374 lb)   05/26/21 (!) 170 kg (374 lb)   02/05/21 (!) 167 kg (368 lb)        Lab Results   Component Value Date    CREATININE 1 49 (H) 08/23/2021    CREATININE 1 46 (H) 08/22/2021    CREATININE 0 99 08/22/2021         Body mass index is 53 66 kg/m²  ,     Systolic (83LAU), URI:300 , Min:85 , BYV:480     Diastolic (77VDP), XHM:78, Min:48, Max:78          Intake/Output Summary (Last 24 hours) at 8/23/2021 0830  Last data filed at 8/23/2021 0701  Gross per 24 hour   Intake 2125 69 ml   Output 2420 ml   Net -294 31 ml     Weight (last 2 days)     Date/Time   Weight    08/21/21 1759   (!) 170 (374)                Telemetry Review: NSR with rates in the 90's    EKG s/p cardiac arrest is NSR with some transient ST elevation in lead II; resolved in subsequent EKG's    Physical Exam  Vitals reviewed  Constitutional:       General: He is not in acute distress  Appearance: He is obese  Interventions: He is intubated        Comments: Intubated/sedated   HENT:      Head: Normocephalic and atraumatic  Mouth/Throat:      Mouth: Mucous membranes are moist    Cardiovascular:      Rate and Rhythm: Normal rate and regular rhythm  Heart sounds: Normal heart sounds, S1 normal and S2 normal  No murmur heard  Pulmonary:      Effort: No respiratory distress  He is intubated  Breath sounds: Examination of the right-lower field reveals decreased breath sounds  Examination of the left-lower field reveals decreased breath sounds  Decreased breath sounds present  Abdominal:      General: Bowel sounds are normal  There is no distension  Palpations: Abdomen is soft  Musculoskeletal:         General: Normal range of motion  Cervical back: Normal range of motion and neck supple  Right lower leg: Edema present  Left lower leg: Edema present  Skin:     General: Skin is warm and dry     Neurological:      Comments: Intubated/sedated   Psychiatric:      Comments: Intubated/sedated         LABORATORY RESULTS  Results from last 7 days   Lab Units 08/22/21 2147 08/22/21  1324 08/22/21  0523   TROPONIN I ng/mL 6 64* 2 67* 0 58*     CBC with diff:   Results from last 7 days   Lab Units 08/23/21  0540 08/22/21 2151 08/22/21 2147 08/22/21 2039 08/22/21  1412 08/21/21  1835   WBC Thousand/uL  --   --  17 78* 10 72* 12 79* 16 49*   HEMOGLOBIN g/dL  --   --  12 4 13 2 13 2 13 3   I STAT HEMOGLOBIN g/dl 13 9 12 6  --   --   --   --    HEMATOCRIT %  --   --  37 8 39 9 40 4 40 5   HEMATOCRIT, ISTAT % 41 37  --   --   --   --    MCV fL  --   --  94 97 95 93   PLATELETS Thousands/uL  --   --  201 25* 158 198   MCH pg  --   --  30 8 32 0 30 9 30 5   MCHC g/dL  --   --  32 8 33 1 32 7 32 8   RDW %  --   --  14 0 14 0 13 9 13 8   MPV fL  --   --  10 8 12 2 10 7 10 6   NRBC AUTO /100 WBCs  --   --  0  --  0 0   NRBC /100 WBC  --   --   --  1  --   --        CMP:  Results from last 7 days   Lab Units 08/23/21  0540 08/23/21  0525 08/22/21 2151 08/22/21 2147 08/22/21 2039 08/22/21  1412 08/21/21  1835   POTASSIUM mmol/L  --  3 9  --  3 0* 5 4* 3 7 4 1   CHLORIDE mmol/L  --  101  --  101 117* 101 103   CO2 mmol/L  --  27  --  28 32 28 30   CO2, I-STAT mmol/L 28  --  29  --   --   --   --    BUN mg/dL  --  26*  --  21 16 16 21   CREATININE mg/dL  --  1 49*  --  1 46* 0 99 1 02 1 08   GLUCOSE, ISTAT mg/dl 164*  --  149*  --   --   --   --    CALCIUM mg/dL  --  7 8*  --  8 2* 20 6* 8 0* 8 3   AST U/L  --   --   --  99* 102*  --  18   ALT U/L  --   --   --  70 57  --  33   ALK PHOS U/L  --   --   --  77 62  --  56   EGFR ml/min/1 73sq m  --  48  --  49 78 76 71       BMP:  Results from last 7 days   Lab Units 08/23/21  0540 08/23/21  0525 08/22/21 2151 08/22/21 2147 08/22/21 2039 08/22/21  1412 08/21/21  1835   POTASSIUM mmol/L  --  3 9  --  3 0* 5 4* 3 7 4 1   CHLORIDE mmol/L  --  101  --  101 117* 101 103   CO2 mmol/L  --  27  --  28 32 28 30   CO2, I-STAT mmol/L 28 --  29  --   --   --   --    BUN mg/dL  --  26*  --  21 16 16 21   CREATININE mg/dL  --  1 49*  --  1 46* 0 99 1 02 1 08   GLUCOSE, ISTAT mg/dl 164*  --  149*  --   --   --   --    CALCIUM mg/dL  --  7 8*  --  8 2* 20 6* 8 0* 8 3       Lab Results   Component Value Date    NTBNP 294 (H) 08/21/2021    NTBNP 515 (H) 01/31/2017             Results from last 7 days   Lab Units 08/23/21  0525 08/22/21 2039   MAGNESIUM mg/dL 1 7 1 4*                 Results from last 7 days   Lab Units 08/22/21 2039   TSH 3RD GENERATON uIU/mL 1 303       Results from last 7 days   Lab Units 08/21/21  1835   INR  1 23*       Lipid Profile:   Lab Results   Component Value Date    CHOL 133 09/19/2017    CHOL 134 11/09/2015    CHOL 156 06/14/2014     Lab Results   Component Value Date    HDL 52 05/25/2021    HDL 52 10/14/2020    HDL 47 03/10/2020     Lab Results   Component Value Date    LDLCALC 63 05/25/2021    LDLCALC 75 10/14/2020    LDLCALC 48 03/10/2020     Lab Results   Component Value Date    TRIG 96 05/25/2021    TRIG 131 10/14/2020 TRIG 118 03/10/2020       Cardiac testing:   Results for orders placed during the hospital encounter of 21    Echo complete with contrast if indicated    Narrative  [address]  [address]    Transthoracic Echocardiogram  2D, M-mode, Doppler, and Color Doppler    Study date:  2021    Patient: Annelise Quiroz  MR number: [id number]  Account number: [de-identified]  : 1955  Age: 76 years  Gender: Male  Status: Outpatient  Location: [address]  Height: 70 in  Weight: 367 2 lb  BP: 170/ 68 mmHg    Indications: Paroxysmal Atrial Fibrillation    Diagnoses: I48 0 - Atrial fibrillation    Sonographer:  Inez Davis RDCS  Primary Physician:  Camilo Prince  Referring Physician:  Vanesa Fleming MD  Group:  [facility]  Interpreting Physician:  Deena Weiss MD    SUMMARY    PROCEDURE INFORMATION:  This was a technically difficult study  LEFT VENTRICLE:  Systolic function was normal  Ejection fraction was estimated to be 60 %  Although no diagnostic regional wall motion abnormality was identified, this possibility cannot be completely excluded on the basis of this study  Wall thickness was mildly to moderately increased  MITRAL VALVE:  There was trace regurgitation  HISTORY: PRIOR HISTORY: Cerebrovascular Accident, Cardiac Arrhythmia, Cardiac Disease, Chronic Atrial Fibrillation, Dextrocardia, Diabetes, Hyperlipidemia, Hypertension, Sleep Apnea, Stroke    PROCEDURE: The study was performed in the [address]  This was a routine study  The transthoracic approach was used  The study included complete 2D imaging, M-mode, complete spectral Doppler, and color Doppler  The heart rate was  84 bpm, at the start of the study  Images were obtained from the parasternal and apical acoustic windows  Images were not obtained from the subcostal or suprasternal notch acoustic windows   This was a technically difficult study     LEFT VENTRICLE: Size was normal  Systolic function was normal  Ejection fraction was estimated to be 60 %  Although no diagnostic regional wall motion abnormality was identified, this possibility cannot be completely excluded on the basis  of this study  Wall thickness was mildly to moderately increased  DOPPLER: Left ventricular diastolic function parameters were normal     RIGHT VENTRICLE: The size was normal  Systolic function was normal  Wall thickness was normal     LEFT ATRIUM: Size was normal     RIGHT ATRIUM: Size was normal  Not well visualized  MITRAL VALVE: Valve structure was normal  There was normal leaflet separation  DOPPLER: The transmitral velocity was within the normal range  There was no evidence for stenosis  There was trace regurgitation  AORTIC VALVE: The valve was trileaflet  Leaflets exhibited normal thickness, normal cuspal separation, and sclerosis  DOPPLER: Transaortic velocity was within the normal range  There was no evidence for stenosis  There was no significant  regurgitation  TRICUSPID VALVE: The valve structure was normal  There was normal leaflet separation  DOPPLER: The transtricuspid velocity was within the normal range  There was no evidence for stenosis  There was no significant regurgitation  PULMONIC VALVE: Not well visualized  DOPPLER: The transpulmonic velocity was within the normal range  There was no significant regurgitation  PERICARDIUM: A trace pericardial effusion was identified  The pericardium was normal in appearance  AORTA: The root exhibited normal size  SYSTEMIC VEINS: IVC: The inferior vena cava was normal in size  PULMONARY VEINS: DOPPLER: Doppler signals were not recordable in the pulmonary vein(s)      SYSTEM MEASUREMENT TABLES    2D  %FS: 39 19 %  Ao Diam: 3 73 cm  EDV(Teich): 97 23 ml  EF(Teich): 69 72 %  ESV(Teich): 29 44 ml  IVC: 20 15 mm  IVSd: 1 56 cm  LA Diam: 3 95 cm  LVEDV MOD A4C: 91 89 ml  LVEF MOD A4C: 67 26 %  LVESV MOD A4C: 30 08 ml  LVIDd: 4 6 cm  LVIDs: 2 8 cm  LVLd A4C: 8 02 cm  LVLs A4C: 7 02 cm  LVOT Diam: 2 15 cm  LVPWd: 1 46 cm  SV MOD A4C: 61 8 ml  SV(Teich): 67 79 ml    PW  E' Sept: 0 07 m/s  E/E' Sept: 14 57  MV A [place]: 0 93 m/s  MV Dec [place]: 4 15 m/s2  MV DecT: 242 84 ms  MV E [place]: 1 01 m/s  MV E/A Ratio: 1 09  MV PHT: 70 42 ms  MVA By PHT: 3 12 cm2    [facility] Accredited Echocardiography Laboratory    Prepared and electronically signed by    Edgar Richter MD  Signed 2021 14:59:01    Results for orders placed during the hospital encounter of 17    CHERYL    Narrative  [address]  [facility]  [facility], [address]  [phone]    Transesophageal Echocardiogram  Limited 2D, Doppler, and Color Doppler    Study date:  2017    Patient: Cuco Lyn  MR number: [id number]  Account number: [de-identified]  : 1955  Age: 57 years  Gender: Male  Status: Inpatient  Location: Bedside  Height: 70 in  Weight: 353 lb  BP: 126/ 77 mmHg    Indications: Atrial fibrillation  Diagnoses: I48 0 - Atrial fibrillation    Sonographer:  Jadyn Candelaria [facility] AE-PE  Primary Physician:  Bard Tom MD  Referring Physician:  Renzo Lancaster MD  Group:  [facility]  Interpreting Physician:  Renzo Lancaster MD    SUMMARY    LEFT VENTRICLE:  Systolic function was mildly reduced by visual assessment  Ejection fraction was estimated to be 45 %  There was mild diffuse hypokinesis  LEFT ATRIUM:  The atrium was mildly dilated  ATRIAL SEPTUM:  No defect or patent foramen ovale was identified  MITRAL VALVE:  There was mild regurgitation  TRICUSPID VALVE:  There was mild regurgitation  HISTORY: PRIOR HISTORY: Risk factors: diabetes, hypercholesterolemia, and morbid obesity  Remote stroke  PROCEDURE: The procedure was performed at the bedside  This was a routine study  The transesophageal approach was used   The study included limited 2D imaging, limited spectral Doppler, and color Doppler  The heart rate was 95 bpm, at the  start of the study  An adult omniplane probe was inserted by the attending cardiologist  Intubated with ease  One intubation attempt(s)  There was no blood detected on the probe  Image quality was good  MEDICATIONS: Propofol, 100 mg  Anesthesia administered by anesthesia team     LEFT VENTRICLE: Size was normal  Systolic function was mildly reduced by visual assessment  Ejection fraction was estimated to be 45 %  There was mild diffuse hypokinesis  Wall thickness was normal     RIGHT VENTRICLE: The size was normal  Systolic function was normal     LEFT ATRIUM: The atrium was mildly dilated  APPENDAGE: The size was normal  No thrombus was identified  DOPPLER: The function was mildly reduced (mildly reduced emptying velocity)  ATRIAL SEPTUM: No defect or patent foramen ovale was identified  RIGHT ATRIUM: Size was normal     MITRAL VALVE: Valve structure was normal  There was normal leaflet separation  There was no echocardiographic evidence of vegetation  DOPPLER: There was mild regurgitation  AORTIC VALVE: The valve was trileaflet  Leaflets exhibited normal thickness and normal cuspal separation  There was no echocardiographic evidence of vegetation  DOPPLER: There was no regurgitation  TRICUSPID VALVE: The valve structure was normal  There was normal leaflet separation  There was no echocardiographic evidence of vegetation  DOPPLER: There was mild regurgitation  PULMONIC VALVE: Leaflets exhibited normal thickness, no calcification, and normal cuspal separation  There was no echocardiographic evidence of vegetation  PERICARDIUM: There was no pericardial effusion  AORTA: The root exhibited normal size  There was no atheroma  There was no evidence for dissection  There was no evidence for aneurysm      [address] Accredited Echocardiography Laboratory    Prepared and electronically signed by    Brittany Sage MD  Signed [id number] 16:49:21    No results found for this or any previous visit  No valid procedures specified  No results found for this or any previous visit          Meds/Allergies   current meds:   Current Facility-Administered Medications   Medication Dose Route Frequency    acetaminophen (TYLENOL) rectal suppository 650 mg  650 mg Rectal Q4H PRN    amiodarone (CORDARONE) 900 mg in dextrose 5 % 500 mL infusion  0 5 mg/min Intravenous Continuous    atorvastatin (LIPITOR) tablet 40 mg  40 mg Oral Daily    cefepime (MAXIPIME) IVPB (premix in dextrose) 2,000 mg 50 mL  2,000 mg Intravenous Q12H    chlorhexidine (PERIDEX) 0 12 % oral rinse 15 mL  15 mL Mouth/Throat Q12H [facility]    fentanyl citrate (PF) 100 MCG/2ML 100 mcg  100 mcg Intravenous Q1H PRN    fish oil capsule 1,000 mg  1,000 mg Oral Daily    insulin lispro (HumaLOG) 100 units/mL subcutaneous injection 2-12 Units  2-12 Units Subcutaneous Q6H [facility]    multivitamin-minerals (CENTRUM ADULTS) tablet 1 tablet  1 tablet Oral Daily    norepinephrine (LEVOPHED) 4 mg (STANDARD CONCENTRATION) IV in sodium chloride 0 9% 250 mL  1-30 mcg/min Intravenous Titrated    pantoprazole (PROTONIX) injection 40 mg  40 mg Intravenous Q24H TU    potassium chloride oral solution 40 mEq  40 mEq Oral Once    propofol (DIPRIVAN) 1000 mg in 100 mL infusion (premix)  5-50 mcg/kg/min Intravenous Titrated    vancomycin (VANCOCIN) 2,000 mg in sodium chloride 0 9 % 500 mL IVPB  2,000 mg Intravenous Q12H     Medications Prior to Admission   Medication    acetaminophen (TYLENOL) 500 mg tablet    amLODIPine (NORVASC) 10 mg tablet    apixaban (Eliquis) 5 mg    atorvastatin (LIPITOR) 40 mg tablet    B-D UF III MINI PEN NEEDLES 31G X 5 MM MISC    Cholecalciferol (VITAMIN D) 2000 units CAPS    flecainide (TAMBOCOR) 100 mg tablet    fluticasone (FLONASE) 50 mcg/act nasal spray    gabapentin (NEURONTIN) 300 mg capsule    Insulin Regular Human, Conc, (HumuLIN R U-500 KwikPen) 500 units/mL CONCENTRATED U-500 injection pen    metFORMIN (GLUCOPHAGE) 500 mg tablet    metoprolol succinate (TOPROL-XL) 25 mg 24 hr tablet    montelukast (SINGULAIR) 10 mg tablet    Multiple Vitamins-Minerals (CENTRUM SILVER ULTRA MENS) TABS    Multiple Vitamins-Minerals (OCUVITE PRESERVISION PO)    Omega-3 Fatty Acids (FISH OIL) 1,000 mg    omeprazole (PriLOSEC) 40 MG capsule    quinapril (ACCUPRIL) 40 MG tablet    tamsulosin (FLOMAX) 0 4 mg    traMADol (ULTRAM) 50 mg tablet    TRUE METRIX BLOOD GLUCOSE TEST test strip    TRUEPLUS LANCETS 33G MISC       amiodarone, 0 5 mg/min, Last Rate: 0 5 mg/min (08/23/21 0310)  norepinephrine, 1-30 mcg/min, Last Rate: Stopped (08/23/21 7834)  propofol, 5-50 mcg/kg/min, Last Rate: 5 mcg/kg/min (08/23/21 6412)        Counseling / Coordination of Care  Total floor / unit time spent today 20 minutes  Greater than 50% of total time was spent with the patient and / or family counseling and / or coordination of care  ** Please Note: Dragon 360 Dictation voice to text software may have been used in the creation of this document   **

## 2021-08-23 NOTE — ASSESSMENT & PLAN NOTE
· Initially admitted with sepsis most likely related to lower extremity cellulitis  · Noted with troponin spill from 0 34-2  · Seen by Cardiology during admission, who indicated type 2 MI in the setting of sepsis/infection  · Discussed with on-call cardiologist overnight Dr Sheron Albright, repeat EGG post-arrest noted with isolated ST elevations in leads II but without other inhaler continuous leads, did not feel patient was a candidate for transfer for cardiac catheterization at this time  · On-call cardiology recommended to trend ECGs and  consider anticoagulation with heparin/Plavix with any further changes  · Patient on apixaban as out patient did receive his full doses today, will hold on further anticoagulation at this time, can reassess later today  · Repeat ECG without signs of focal ischemia

## 2021-08-23 NOTE — PROGRESS NOTES
Vancomycin IV Pharmacy-to-Dose Consultation    Reese Cedillo  is a 78 y o  male who is currently receiving Vancomycin IV with management by the Pharmacy Consult service  Assessment/Plan:  The patient was reviewed  No signs or symptoms of nephrotoxicity and/or infusion reactions were documented in the chart  Patient is currently on Vancomycin 2000mg IV Q12H and most recent trough level drawn at 0730 hrs today morning is 5 8 ug/ml which is sub therapeutic based on goal of 15-20  Based on todays assessment, will change current vancomycin (day # 3) dosing of 2000mg IV Q8H, with a plan for trough to be drawn at 0800 on 08/24/21  We will continue to follow the patients culture results and clinical progress daily      Brandon Daniel, Pharmacist

## 2021-08-23 NOTE — PROCEDURES
Intraosseous Line Insertion    Date/Time: 8/22/2021 8:12 PM  Performed by: NIXON Moore  Authorized by: NIXON Moore     Patient location:  Other (comment) and bedside ([facility] surg floor)  Consent:     Consent obtained:  Emergent situation  Pre-procedure details:     Site preparation:  Alcohol  Anesthesia (see MAR for exact dosages): Anesthesia method:  None  Procedure details:     Insertion site:  Proximal tibia    Laterality:  Right    Insertion device:  Drill device    IO Size:  25mm (blue)    Insertion: Needle was inserted through the bony cortex      Number of attempts:  1    Successful placement: yes      Insertion confirmation:  Aspiration of blood/marrow, easy infusion of fluids and stability of the needle  Post-procedure details:     Secured with:  Tape    Patient tolerance of procedure:   Tolerated well, no immediate complications  Comments:      Placed during cardiac arrest

## 2021-08-23 NOTE — UTILIZATION REVIEW
Initial Clinical Review    Admission: Date/Time/Statement:   Admission Orders (From admission, onward)     Ordered        08/21/21 2013  Inpatient Admission  Once                   Orders Placed This Encounter   Procedures    Inpatient Admission     Standing Status:   Standing     Number of Occurrences:   1     Order Specific Question:   Level of Care     Answer:   Med Surg [16]     Order Specific Question:   Estimated length of stay     Answer:   More than 2 Midnights     Order Specific Question:   Certification     Answer:   I certify that inpatient services are medically necessary for this patient for a duration of greater than two midnights  See H&P and MD Progress Notes for additional information about the patient's course of treatment  ED Arrival Information     Expected Arrival Acuity    - 8/21/2021 17:29 Urgent         Means of arrival Escorted by Service Admission type    Walk-In Self Critical Care/ICU Urgent         Arrival complaint    cellulitis r leg        Chief Complaint   Patient presents with    Cellulitis     c/o right lower leg cellulitis starting this AM       Initial Presentation:  this is a 78year old male from home to ED admitted inpatient due to cellulitis of RLE/Sepsis/elevated troponin  Presented  Due to RLE redness and pain starting day prior to arrival, today with fever  On exam bilateral LE edema, erythema LLE  Procalcitonin 0 42  Troponin 0 36  Lactic acid 2 3  Glucose 231  Wbc 16 49  In the ED given 1 liter IVF, started on cefepime and vancomycin  Plan is continued cefepime and vancomycin, follow cultures  Telemetry, consult cardiology and trend troponin  Start SSI with accuchecks and reduce home insulin from 130 to 100 units Humulin  tid  Date: 8/22/2021    Day 2:  Patient feels weak and tired, has LLE pain  One xam erythema, warmth tender to touch, swelling R>L  antibiotics continue     Per Cardiology 8/22/2021:   Troponin elevation is type 2 MI, from cellulitis/sepsis  No ACS  Has acute on chronic CHF and start IV diuretics  8/22/2021 - code/Cardiac arrest  - tele alarm 1956, on monitor V fib/V tach, found pulseless and unresponsice  -  cpr started  Shocked x 1  Intubated  IO line inserted  Epi gtt started  Total ACLS time about 30 minutes  Received amiodarone, epinephrine, calcium, sodium bicarbonate during code  Transfer to ICU: Epi gtt titrated off, norepinephrine started and maintain map of > 65  Viki inserted  Neuro checks as concern for hypoxic brain injury, maintain normothermia  Start amiodarone gtt   Anticoagulation with heparin gtt  Continue antibiotics     Continue mechanical ventilation     ED Triage Vitals   Temperature Pulse Respirations Blood Pressure SpO2   08/21/21 1759 08/21/21 1759 08/21/21 1759 08/21/21 1759 08/21/21 1759   99 5 °F (37 5 °C) 90 20 169/91 95 %      Temp Source Heart Rate Source Patient Position - Orthostatic VS BP Location FiO2 (%)   08/21/21 1759 08/21/21 1759 08/21/21 1759 08/21/21 1759 08/23/21 0513   Oral Monitor Lying Right arm 90      Pain Score       08/21/21 1759       6          Wt Readings from Last 1 Encounters:   08/21/21 (!) 170 kg (374 lb)     Additional Vital Signs:   08/22/21 2345  --  52Abnormal   26Abnormal   94/51  68  97/48  61 mmHg  94 %  --  --  --  --  --  --   08/22/21 2330  102 2 °F (39 °C)Abnormal   50Abnormal   23Abnormal   90/51  65  103/56  66 mmHg  95 %  --  --  --  --  --  --   08/22/21 2315  102 2 °F (39 °C)Abnormal   52Abnormal   15  85/48Abnormal   62  102/55  66 mmHg  95 %  --  --  --  --  --  --   08/22/21 2300  102 2 °F (39 °C)Abnormal   53Abnormal   16  87/49Abnormal   63  101/54  65 mmHg  96 %  --  --  --  --  --  --   08/22/21 2233  102 2 °F (39 °C)Abnormal   56  17  94/51  69  107/53  64 mmHg  97 %  --  --  --  --  --  --   08/22/21 2200  102 4 °F (39 1 °C)Abnormal   66  21  94/53  70  123/54  70 mmHg  95 %  --  --  --  --  --  --   08/22/21 2145  102 6 °F (39 2 °C)Abnormal   67  17  95/54  71  0/0  0 mmHg  96 %  --  --  --  --  --  --   08/22/21 2130  102 6 °F (39 2 °C)Abnormal   72  18  104/50  72  124/43  62 mmHg  97 %  --  --  --  --  --  --   08/22/21 2115  102 4 °F (39 1 °C)Abnormal   73  22  107/53  76  --  --  --  --  --  --  --  --  --   08/22/21 2059  100 9 °F (38 3 °C)Abnormal   90  44Abnormal   149/71  100  --  --  99 %  --  --  --  --  --  Lying   08/22/21 2030  --  --  --  --  --  --  --  99 %  --  --  --  --  --  --   08/22/21 20:22:36  --  107Abnormal   --  --  --  --  --  99 %  --  --  --  --  --  --   08/22/21 20:13:36  --  154Abnormal   --  --  --  --  --  84 %Abnormal   --  --  --  --  --  --   08/22/21 20:11:36  --  152Abnormal   --  --  --  --  --  92 %  --  --  --  --  --  --   08/22/21 1700  99 1 °F (37 3 °C)  72  20  124/62  --  --  --  95 %  --  --  --  None (Room air)  --  Lying   08/22/21 14:13:08  101 7 °F (38 7 °C)Abnormal   82  20  121/56  78  --  --  92 %  --  --  --  --  --  --   08/22/21 07:13:06  98 5 °F (36 9 °C)  --  20  137/59  85  --  --  --  --  --  --  --  --  --   08/21/21 23:42:41  98 5 °F (36 9 °C)  78  20  149/67  94  --  --  94 %  --  --  --  --  --  --   08/21/21 1900  --  79  20  141/63  90  --  --  95 %  --  --  --  None (Room air)  --  Sitting   08/21/21 1800  --  88  --  169/91  122  --  --  95 %  --  --  --  None (Room air)           Pertinent Labs/Diagnostic Test Results:   8/21/2021 ECG - Normal sinus rhythm  Low voltage QRS  Septal infarct (cited on or before 01-FEB-2017)  Lateral infarct (cited on or before 01-FEB-2017)  Abnormal ECG  When compared with ECG of 02-FEB-2017 05:35,  Sinus rhythm has replaced Atrial fibrillation  Criteria for Inferior infarct are no longer Present    8/22/2021 ecg - Normal sinus rhythm  Inferior infarct , age undetermined  Anterolateral infarct (cited on or before 01-FEB-2017)  Abnormal ECG  When compared with ECG of 21-AUG-2021 20:28, (unconfirmed)  Inferior infarct is now Present  T wave inversion now evident in Lateral leads    8/22/2021 CxR - Tip of endotracheal tube 4 cm above the rena  2   Pulmonary vascular congestion  3   Dextrocardia, consistent with the patient's known situs inversus  8/23/2021 ct head No acute intracranial hemorrhage    2   No definite findings to suggest anoxic brain injury    8/23/2021 CxR - ET tube 5 cm above the rena  Mild pulmonary venous congestion   Situs inversus  Results from last 7 days   Lab Units 08/23/21  0540 08/22/21 2151 08/22/21 2147 08/22/21 2039 08/22/21  1412 08/21/21  1835   WBC Thousand/uL  --   --  17 78* 10 72* 12 79* 16 49*   HEMOGLOBIN g/dL  --   --  12 4 13 2 13 2 13 3   I STAT HEMOGLOBIN g/dl 13 9 12 6  --   --   --   --    HEMATOCRIT %  --   --  37 8 39 9 40 4 40 5   HEMATOCRIT, ISTAT % 41 37  --   --   --   --    PLATELETS Thousands/uL  --   --  201 25* 158 198   NEUTROS ABS Thousands/µL  --   --   --   --  10 50* 14 40*   BANDS PCT %  --   --   --  12*  --   --      Results from last 7 days   Lab Units 08/23/21  0540 08/23/21  0525 08/22/21 2151 08/22/21 2147 08/22/21 2039 08/22/21  1412 08/21/21  1835   SODIUM mmol/L  --  139  --  140 152* 137 143   POTASSIUM mmol/L  --  3 9  --  3 0* 5 4* 3 7 4 1   CHLORIDE mmol/L  --  101  --  101 117* 101 103   CO2 mmol/L  --  27  --  28 32 28 30   CO2, I-STAT mmol/L 28  --  29  --   --   --   --    ANION GAP mmol/L  --  11  --  11 3* 8 10   BUN mg/dL  --  26*  --  21 16 16 21   CREATININE mg/dL  --  1 49*  --  1 46* 0 99 1 02 1 08   EGFR ml/min/1 73sq m  --  48  --  49 78 76 71   CALCIUM mg/dL  --  7 8*  --  8 2* 20 6* 8 0* 8 3   CALCIUM, IONIZED mmol/L  --   --   --   --  2 14*  --   --    MAGNESIUM mg/dL  --  1 7  --   --  1 4*  --   --    PHOSPHORUS mg/dL  --  3 1  --   --   --   --   --      Results from last 7 days   Lab Units 08/22/21 2147 08/22/21 2039 08/21/21  1835   AST U/L 99* 102* 18   ALT U/L 70 57 33   ALK PHOS U/L 77 62 56   TOTAL PROTEIN g/dL 6 4 5 3* 7 2   ALBUMIN g/dL 3  1* 2 2* 3 7   TOTAL BILIRUBIN mg/dL 1 20* 1 00 1 10*     Results from last 7 days   Lab Units 08/23/21  0633 08/23/21  0205 08/22/21  2038 08/22/21  1610 08/22/21  1054 08/22/21  0710 08/21/21  2234   POC GLUCOSE mg/dl 171* 150* 154* 101 268* 238* 192*     Results from last 7 days   Lab Units 08/23/21  0525 08/22/21 2147 08/22/21  2039 08/22/21  1412 08/21/21  1835   GLUCOSE RANDOM mg/dL 163* 157* 152* 201* 231*     Results from last 7 days   Lab Units 08/23/21  0540 08/22/21  2151   PH, OLMAN I-STAT  7 390 7 332   PCO2, OLMAN ISTAT mm HG 43 8 52 2*   PO2, OLMAN ISTAT mm HG 86 0* 107 0*   HCO3, OLMAN ISTAT mmol/L 26 5 27 6   I STAT BASE EXC mmol/L 1 1   I STAT O2 SAT % 96* 98*     Results from last 7 days   Lab Units 08/23/21  1023 08/22/21 2147 08/22/21  1324 08/22/21  0523 08/22/21  0133 08/21/21  2034   TROPONIN I ng/mL 6 22* 6 64* 2 67* 0 58* 0 34* 0 36*     Results from last 7 days   Lab Units 08/23/21  1023 08/21/21  1835   PROTIME seconds 18 9* 15 5*   INR  1 59* 1 23*   PTT seconds 39* 32     Results from last 7 days   Lab Units 08/22/21  2039   TSH 3RD GENERATON uIU/mL 1 303     Results from last 7 days   Lab Units 08/22/21 2147 08/22/21  0529 08/21/21  1835   PROCALCITONIN ng/ml 0 90* 0 43* 0 42*     Results from last 7 days   Lab Units 08/23/21  0525 08/22/21 2147 08/22/21 2039 08/22/21  0523 08/21/21  2214 08/21/21  1835   LACTIC ACID mmol/L 1 2 2 8* 8 2* 1 9 2 7* 2 3*     Results from last 7 days   Lab Units 08/21/21  1835   NT-PRO BNP pg/mL 294*     Results from last 7 days   Lab Units 08/23/21  0525   LIPASE u/L 60*     Results from last 7 days   Lab Units 08/21/21  1835   BLOOD CULTURE  No Growth at 24 hrs  No Growth at 24 hrs       ED Treatment:   Medication Administration from 08/21/2021 1728 to 08/21/2021 2318      Date/Time Order Dose Route Action Comments    08/21/2021 1836 sodium chloride 0 9 % bolus 1,000 mL 1,000 mL Intravenous New Bag     08/21/2021 1911 cefepime (MAXIPIME) IVPB (premix in dextrose) 2,000 mg 50 mL 2,000 mg Intravenous New Bag     08/21/2021 2021 vancomycin (VANCOCIN) 2,000 mg in sodium chloride 0 9 % 500 mL IVPB 2,000 mg Intravenous New Bag     08/21/2021 2231 apixaban (ELIQUIS) tablet 5 mg 5 mg Oral Given     08/21/2021 2311 flecainide (TAMBOCOR) tablet 100 mg 100 mg Oral Given     08/21/2021 2231 gabapentin (NEURONTIN) capsule 300 mg 300 mg Oral Given     08/21/2021 2234 insulin lispro (HumaLOG) 100 units/mL subcutaneous injection 1-6 Units 2 Units Subcutaneous Given 192        Past Medical History:   Diagnosis Date    Arthritis     Asthma     BPH (benign prostatic hyperplasia)     Cardiac arrhythmia     resolved 09Feb2017    Cardiac disease     [facility]fib [facility]     Chronic pain     [facility]re[facility]    Dextrocardia     Diabetes mellitus ([facility] )     GERD (gastroesophageal reflux disease)     Hyperlipidemia     Hypertension     Renal disorder     kidney stone    Sleep apnea     Stroke (cerebrum) ([facility]      Present on Admission:   Cellulitis of right lower extremity   Essential hypertension   Hyperlipidemia   Obstructive sleep apnea   Type 2 diabetes mellitus with hyperglycemia ([facility]   Sepsis ([facility]   Elevated troponin   Venous stasis dermatitis of both lower extremities   Shock ([facility]   Encephalopathy   Ventricular tachycardia ([facility]   EMILY (acute kidney injury) ([facility] )      Admitting Diagnosis: Cellulitis [L03 90]  Leukocytosis [D72 829]  Elevated troponin [R77 8]  Age/Sex: 78 y o  male  Admission Orders:  8/29/2021 2013 inpatient   Scheduled Medications:  atorvastatin, 40 mg, Oral, Daily  cefepime, 2,000 mg, Intravenous, Q12H  chlorhexidine, 15 mL, Mouth/Throat, Q12H [address]  fish oil, 1,000 mg, Oral, Daily  insulin lispro, 2-12 Units, Subcutaneous, Q6H TU  ipratropium, 0 5 mg, Nebulization, TID  levalbuterol, 1 25 mg, Nebulization, TID  metroNIDAZOLE, 500 mg, Intravenous, Q8H  multivitamin-minerals, 1 tablet, Oral, Daily  pantoprazole, 40 mg, Intravenous, Q24H TU  potassium chloride, 40 mEq, Oral, Once  senna-docusate sodium, 1 tablet, Oral, HS  vancomycin, 2,000 mg, Intravenous, Q8H      Continuous IV Infusions:  amiodarone, 0 5 mg/min, Intravenous, Continuous  heparin (porcine), 3-20 Units/kg/hr (Order-Specific), Intravenous, Titrated  propofol, 5-50 mcg/kg/min, Intravenous, Titrated      PRN Meds:  acetaminophen, 650 mg, Rectal, Q4H PRN  fentanyl citrate (PF), 100 mcg, Intravenous, Q1H PRN    Restraints non violent  Neuro checks every hour  [place]  Mechanical ventilation   Diet - enteral     IP CONSULT TO CARDIOLOGY      Network Utilization Review Department  ATTENTION: Please call with any questions or concerns to [phone] and carefully listen to the prompts so that you are directed to the right person  All voicemails are confidential   Silvestre Crawford all requests for admission clinical reviews, approved or denied determinations and any other requests to dedicated fax number below belonging to the campus where the patient is receiving treatment   List of dedicated fax numbers for the Facilities:  [address] [place] [address] DENIALS (Administrative/Medical Necessity) [phone]   [address] (Maternity/NICU/Pediatrics) [phone] [address] Brittany Snell 7012 [address] [place] [address] Osiel Lugo [address] [facility] [address] [facility] [phone]

## 2021-08-23 NOTE — PLAN OF CARE
Problem: PAIN - ADULT  Goal: Verbalizes/displays adequate comfort level or baseline comfort level  Description: Interventions:  - Encourage patient to monitor pain and request assistance  - Assess pain using appropriate pain scale  - Administer analgesics based on type and severity of pain and evaluate response  - Implement non-pharmacological measures as appropriate and evaluate response  - Consider cultural and social influences on pain and pain management  - Notify physician/advanced practitioner if interventions unsuccessful or patient reports new pain  8/23/2021 0257 by Emi Gleason RN  Outcome: Progressing  8/23/2021 0256 by Emi Gleason RN  Outcome: Progressing     Problem: INFECTION - ADULT  Goal: Absence or prevention of progression during hospitalization  Description: INTERVENTIONS:  - Assess and monitor for signs and symptoms of infection  - Monitor lab/diagnostic results  - Monitor all insertion sites, i e  indwelling lines, tubes, and drains  - Monitor endotracheal if appropriate and nasal secretions for changes in amount and color  - [place] appropriate cooling/warming therapies per order  - Administer medications as ordered  - Instruct and encourage patient and family to use good hand hygiene technique  - Identify and instruct in appropriate isolation precautions for identified infection/condition  8/23/2021 0257 by Emi Gleason RN  Outcome: Progressing  8/23/2021 0256 by Emi Gleason RN  Outcome: Progressing  Goal: Absence of fever/infection during neutropenic period  Description: INTERVENTIONS:  - Monitor WBC    8/23/2021 0257 by Emi Gleason RN  Outcome: Progressing  8/23/2021 0256 by Emi Gleason RN  Outcome: Progressing     Problem: SAFETY ADULT  Goal: Patient will remain free of falls  Description: INTERVENTIONS:  - Educate patient/family on patient safety including physical limitations  - Instruct patient to call for assistance with activity   - Consult OT/PT to assist with strengthening/mobility   - Keep Call bell within reach  - Keep bed low and locked with side rails adjusted as appropriate  - Keep care items and personal belongings within reach  - Initiate and maintain comfort rounds  - Make Fall Risk Sign visible to staff    - Apply yellow socks and bracelet for high fall risk patients  - Consider moving patient to room near nurses station  8/23/2021 0257 by Naseem Hansen RN  Outcome: Progressing  8/23/2021 0256 by Naseem Hansen RN  Outcome: Progressing  Goal: Maintain or return to baseline ADL function  Description: INTERVENTIONS:  -  Assess patient's ability to carry out ADLs; assess patient's baseline for ADL function and identify physical deficits which impact ability to perform ADLs (bathing, care of mouth/teeth, toileting, grooming, dressing, etc )  - Assess/evaluate cause of self-care deficits   - Assess range of motion  - Assess patient's mobility; develop plan if impaired  - Assess patient's need for assistive devices and provide as appropriate  - Encourage maximum independence but intervene and supervise when necessary  - Involve family in performance of ADLs  - Assess for home care needs following discharge   - Consider OT consult to assist with ADL evaluation and planning for discharge  - Provide patient education as appropriate  8/23/2021 0257 by Naseem Hanesn RN  Outcome: Progressing  8/23/2021 0256 by Naseem Hansen RN  Outcome: Progressing  Goal: Maintains/Returns to pre admission functional level  Description: INTERVENTIONS:  - Perform BMAT or MOVE assessment daily    - Set and communicate daily mobility goal to care team and patient/family/caregiver     - Collaborate with rehabilitation services on mobility goals if consulted    Problem: DISCHARGE PLANNING  Goal: Discharge to home or other facility with appropriate resources  Description: INTERVENTIONS:  - Identify barriers to discharge w/patient and caregiver  - Arrange for needed discharge resources and transportation as appropriate  - Identify discharge learning needs (meds, wound care, etc )  - Arrange for interpretive services to assist at discharge as needed  - Refer to Case Management Department for coordinating discharge planning if the patient needs post-hospital services based on physician/advanced practitioner order or complex needs related to functional status, cognitive ability, or social support system  8/23/2021 0257 by Violet Markham RN  Outcome: Progressing  8/23/2021 0256 by Violet Markham RN  Outcome: Progressing     Problem: MOBILITY - ADULT  Goal: Maintain or return to baseline ADL function  Description: INTERVENTIONS:  -  Assess patient's ability to carry out ADLs; assess patient's baseline for ADL function and identify physical deficits which impact ability to perform ADLs (bathing, care of mouth/teeth, toileting, grooming, dressing, etc )  - Assess/evaluate cause of self-care deficits   - Assess range of motion  - Assess patient's mobility; develop plan if impaired  - Assess patient's need for assistive devices and provide as appropriate  - Encourage maximum independence but intervene and supervise when necessary  - Involve family in performance of ADLs  - Assess for home care needs following discharge   - Consider OT consult to assist with ADL evaluation and planning for discharge  - Provide patient education as appropriate  8/23/2021 0257 by Violet Markham RN  Outcome: Progressing  8/23/2021 0256 by Violet Markham RN  Outcome: Progressing  Goal: Maintains/Returns to pre admission functional level  Description: INTERVENTIONS:  - Perform BMAT or MOVE assessment daily    - Set and communicate daily mobility goal to care team and patient/family/caregiver     - Collaborate with rehabilitation services on mobility goals if consulted    Problem: Potential for Falls  Goal: Patient will remain free of falls  Description: INTERVENTIONS:  - Educate patient/family on patient safety including physical limitations  - Instruct patient to call for assistance with activity   - Consult OT/PT to assist with strengthening/mobility   - Keep Call bell within reach  - Keep bed low and locked with side rails adjusted as appropriate  - Keep care items and personal belongings within reach  - Initiate and maintain comfort rounds  - Make Fall Risk Sign visible to staff    - Apply yellow socks and bracelet for high fall risk patients  - Consider moving patient to room near nurses station  8/23/2021 0257 by Tyra Dean RN  Outcome: Progressing  8/23/2021 0256 by Tyra Dean RN  Outcome: Progressing     - Out of bed for toileting  - Record patient progress and toleration of activity level   8/23/2021 0257 by Tyra Dean RN  Outcome: Progressing  8/23/2021 0256 by Tyra Dean RN  Outcome: Progressing     - Out of bed for toileting  - Record patient progress and toleration of activity level   8/23/2021 0257 by Tyra Dean RN  Outcome: Progressing  8/23/2021 0256 by Tyra Dean RN  Outcome: Progressing

## 2021-08-23 NOTE — ASSESSMENT & PLAN NOTE
Lab Results   Component Value Date    HGBA1C 7 4 (H) 05/25/2021       Recent Labs     08/22/21  0710 08/22/21  1054 08/22/21  1610 08/22/21 2038   POCGLU 238* 268* 101 154*       Blood Sugar Average: Last 72 hrs:  (P) 190 6     · NPO  · SSI q6

## 2021-08-23 NOTE — ASSESSMENT & PLAN NOTE
· V-tach noted on telemetry prior to code blue  · Patient noted to be on flecainide for Afib  · On amiodarone infusion, continue to hold flecainide  · Continue telemetry  · Maintain K greater than 4 5, Mag greater than 2  · Cardiology following

## 2021-08-23 NOTE — PROGRESS NOTES
Increased pts rr to 18, decreased fio2 30% and pulled ett to 22 @lip per the request of [facility] ICU NP

## 2021-08-23 NOTE — PROCEDURES
Intubation    Date/Time: 8/22/2021 8:10 PM  Performed by: NIXON Luciano  Authorized by: NIXON Luciano     Patient location:  Bedside and other (comment) (Med/surg floor)  Consent:     Consent obtained:  Emergent situation  Pre-procedure details:     Patient status:  Unresponsive    Mallampati score:  4    Pretreatment medications:  None    Paralytics:  None  Indications:     Indications for intubation: other (comment)      Indications for intubation comment:  Cardiac arrest  Procedure details:     Preoxygenation:  Bag valve mask    CPR in progress: yes      Intubation method:  Oral    Oral intubation technique:  Direct ([place]grath)    Laryngoscope blade: Mac 4    Tube size (mm):  7 5    Tube type:  Cuffed    Number of attempts: Three attempts  Ventilation between attempts: yes      Cricoid pressure: yes      Tube visualized through cords: yes    Placement assessment:     ETT to lip:  23    Tube secured with:  ETT cifuentes    Breath sounds:  Absent over the epigastrium and equal    Placement verification: chest rise, condensation, CXR verification, equal breath sounds, ETCO2 detector and tube exhalation      CXR findings:  ETT in proper place    Ventilator settings:  ACVC [id number]%  Comments:      Difficult airway given body habitus and anatomy, initial laryngoscopy noted with large amounts of secretions which improved with suctioning   Repeat laryngoscopy after repositioning with improved visualization, noted with large epiglottis

## 2021-08-23 NOTE — ASSESSMENT & PLAN NOTE
· Post arrest lactic acid 8 2 down trended to 2 8  · In the setting of cardiac arrest and shock  · Continue to trend as needed, receiving additional 500 mL IV fluid for now 0 = understands/communicates without difficulty

## 2021-08-23 NOTE — QUICK NOTE
Called brother/JACOB Vargas Paget and updated him regarding Kayode's code blue event  Discussed events surrounding his cardiac arrest/intubation, treatment rendered to him during the event and his transfer to the ICU  I discussed his down time during CPR and my concern for hypoxic brain injury  We discussed he care plan moving forward including frequent neuro exams, eventual CT head, and ongoing assessments

## 2021-08-23 NOTE — ASSESSMENT & PLAN NOTE
· Concern for hypoxic brain injury status post cardiac arrest with approximately 30 minute down time with active CPR  · Neuro exam as follows:  Not following verbal commands, does not withdraw to painful stimuli, pupils 3 mm and nonreactive bilaterally, occasionally over breathing the ventilator/getting on the ETT  · Neuro checks  · CT head post arrest: No intracranial mass, mass effect or midline shift  No CT signs of acute infarction  Chronic hypodensity in the right basal ganglia extending to the periventricular region likely represents the sequela of old infarct  No acute parenchymal hemorrhage  Gray-white matter differentiation appears preserved  · Maintain normothermia p r n   Tylenol and ice packs, per discussion with critical care attending doctor Dostal does not require transfer for TTM

## 2021-08-23 NOTE — ASSESSMENT & PLAN NOTE
· Present on admission as evidenced by tachycardia, leukocytosis  · Etiology likely to lower extremity cellulitis  · Lactic acid 2 3 on admission, during which time he received 1 L of IV fluids  · Started on cefepime, vancomycin on admission plan to continue  · Trend WBCs, fever curve, procalcitonin

## 2021-08-23 NOTE — ASSESSMENT & PLAN NOTE
· As evidenced by hypotension, lactic acidosis  · Status post cardiac arrest  · Etiology likely cardiogenic in nature possible component of distributed in the setting of prior sepsis  · Continue levo for goal map greater than 65 mm/Hg  · Repeat ECHO

## 2021-08-23 NOTE — NURSING NOTE
At approximately 1956 pt's tele monitor alarmed  Upon noticing V-fib/Vtach on the monitor I ran into patients room and found him unresponsive and pulseless  Code called immediately and CPR was started  Code meds pushed by ICU RN Samson Mejia, pulse check was negative, patient was shocked and  a rhythm was noted  CPR continued as per MD  pt  Was intubated and placement varified  IO was placed In right leg by MD and meds were given and laboratory studies sent  Pt  Was stabilized and sent to ICU with bedside report given to [facility]  All belongings sent with patient to ICU

## 2021-08-23 NOTE — ASSESSMENT & PLAN NOTE
· Creatinine on admission 1 08  · Baseline appears 0 8 - 1 0  · Creatinine now 1 46 post cardiac arrest  · Likely prerenal in the setting of reduced renal blood flow/hypotension shock  · Still continues to make urine  · Trend renal function, monitor eyes nose, with hypertension, avoid nephrotoxins

## 2021-08-23 NOTE — ASSESSMENT & PLAN NOTE
· Initially admitted with sepsis most likely related to lower extremity cellulitis  · Noted with troponin spill from 0 34-2  · Seen by Cardiology during admission, who indicated type 2 MI in the setting of sepsis/infection  · Discussed with on-call cardiologist overnight Dr Darya Dawkins, repeat EGG post-arrest noted with isolated ST elevations in leads II but without other inhaler continuous leads, did not feel patient was a candidate for transfer for cardiac catheterization at this time  · On-call cardiology recommended to trend ECGs and  consider anticoagulation with heparin/Plavix with any further changes  · Patient on apixaban as out patient did receive his full doses today, will hold on further anticoagulation with heparin until tomorrow  · Repeat troponin pending expect higher elevated in the setting of cardiac arrest

## 2021-08-24 NOTE — PROGRESS NOTES
Vancomycin IV Pharmacy-to-Dose Consultation    Esha Renner  is a 78 y o  male who is currently receiving Vancomycin IV with management by the Pharmacy Consult service  Assessment/Plan:  The patient was reviewed  No signs or symptoms of nephrotoxicity and/or infusion reactions were documented in the chart  Patient is currently on Vancomycin 2000mg IV Q8H and most recent trough level drawn at 0810 hrs is 31 1 ug/ml which is supra-therapeutic based on goal of 15-20  We will hold further vancomycin doses at this time and plan to get random vancomycin level in 12 hrs  Will start at lower scheduled dose when random level falls less than 20 ug/ml  We will continue to follow the patients culture results and clinical progress daily      Edwardo Raphael, Pharmacist

## 2021-08-24 NOTE — ASSESSMENT & PLAN NOTE
· As evidenced by hypotension, lactic acidosis  · Status post cardiac arrest  · Etiology likely cardiogenic in nature possible component of distributed in the setting of prior sepsis  · Levophed weaned off  · Repeat ECHO 8/23 - showed EF 60% with no regional wall abnormalities

## 2021-08-24 NOTE — RESPIRATORY THERAPY NOTE
Respiratory Therapy Note  Pt remains on PSV 5/10 50% FIo2  Pt suctioned for small yellow secretions  BS equal b/l, with expiratory wheezes present  Confirm ETT s/p cxr  Pt currently receiving mini neb treatment for bronchodilation  Refer to RN notes for sedation  08/24/21 1206   Respiratory Assessment   Assessment Type Assess only   General Appearance Sedated   Respiratory Pattern Assisted   Chest Assessment Chest expansion symmetrical   Bilateral Breath Sounds Expiratory wheezes   Cough None   Suction ET Tube   O2 Device vent   Vent Information   Vent ID [id number]   Vent type Clinton C3   Clinton C3/G5 Vent Mode SPONT   $ Pulse Oximetry Spot Check Charge Completed   SpO2 97 %   SPONT Settings   FIO2 (%) 50 %   PEEP (cmH2O) 10 cmH2O   Pressure Support (cmH2O) 5 cmH20   Flow Trigger (LPM) 3 LPM   Humidification Heater   Heater Temp 98 2 °F (36 8 °C)   SPONT Actuals   Resp Rate (BPM) 25 BPM   VT (mL) 413 mL   MAP (cmH2O) 11 cmH2O   Peak Pressure (cmH2O) 19 cmH2O   I:E Ratio (Obs)   (variable)   RSBI (f/vt) 60 f/Vt   Heater Temperature (Obs) 98 6 °F (37 °C)   SPONT Alarms   High Peak Pressure (cmH20) 40 cmH2O   High Resp Rate (BPM) 35 BPM   High MV (L/min) 20 L/min   Low MV (L/min) 3 5 L/min   High Spont VTE (mL) 800 mL   Low Spont VTE (mL) 200 mL   Apnea Time (S) 20 S   SPONT Apnea Settings   Resp Rate (BPM) 14 BPM   FIO2 (%) 100 %   Apnea Time (S) 20 S   Maintenance   Alarm (pink) cable attached No   Resuscitation bag with peep valve at bedside Yes   Water bag changed No   Circuit changed No   Daily Screen   Patient safety screen outcome: Passed   IHI Ventilator Associated Pneumonia Bundle   Daily Awakening Trials Performed Yes   Head of Bed Elevated HOB 30   ETT  Hi-Lo; Inflated;Cuffed;Oral 7 5 mm   Placement Date/Time: 08/22/21 2020   Mask Ventilation: Ventilated by mask (1)  Preoxygenated: Yes  Type: Hi-Lo; Inflated;Cuffed;Oral  Tube Size: 7 5 mm  Location: Oral  Insertion attempts: 2  Placement Verification: Auscultation; Chest x-ray;End tidal C       Secured at (cm) 26   Measured from [address]   Repositioned Right to [address] [place] [address] by Commercial tube cifuentes   HI-LO Suction  Intermittent suction   HI-LO Secretions Small;Yellow   HI-LO Intervention Patent

## 2021-08-24 NOTE — ASSESSMENT & PLAN NOTE
· Code blue on telemetry floor on 8/23/2021, found pulseless by staff nurse  · Received ACLS care for approximately 30 minutes with return of spontaneous circulation  · Review of telemetry showed ventricular tachycardia prior to arrest  · Received amiodarone, epinephrine, calcium, sodium bicarbonate during code  · Hypotensive post arrest requiring epinephrine which has since been titrated off in favor of norepinephrine  · Unclear etiology:  Ischemic in the setting of sepsis with troponin spillage vs   Versus arrhythmogenic in the setting of chronic flecainide use potentially low magnesium/hypokalemia level  · Trend neuro exam

## 2021-08-24 NOTE — PROGRESS NOTES
[place]  Progress Note - Fani Pay  1955, 78 y o  male MRN: [id number]  Unit/Bed#: -01 Encounter: [id number]  Primary Care Provider: Amy Giordano DO   Date and time admitted to hospital: 8/21/2021  5:57 PM    * Cardiac arrest [facility])  Assessment & Plan  · Code blue on telemetry floor on 8/23/2021, found pulseless by staff nurse  · Received ACLS care for approximately 30 minutes with return of spontaneous circulation  · Review of telemetry showed ventricular tachycardia prior to arrest  · Received amiodarone, epinephrine, calcium, sodium bicarbonate during code  · Hypotensive post arrest requiring epinephrine which has since been titrated off in favor of norepinephrine  · Unclear etiology:  Ischemic in the setting of sepsis with troponin spillage vs   Versus arrhythmogenic in the setting of chronic flecainide use potentially low magnesium/hypokalemia level  · Trend neuro exam    Shock ([address] )  Assessment & Plan  · As evidenced by hypotension, lactic acidosis  · Status post cardiac arrest  · Etiology likely cardiogenic in nature possible component of distributed in the setting of prior sepsis  · Levophed weaned off  · Repeat ECHO 8/23 - showed EF 60% with no regional wall abnormalities     Encephalopathy  Assessment & Plan  · Concern for hypoxic brain injury status post cardiac arrest with approximately 30 minute down time with active CPR  · Neuro exam as follows:  Not following verbal commands, does not withdraw to painful stimuli, pupils 3 mm and nonreactive bilaterally, over breathing the ventilator/getting on the ETT despite sedation   · Neuro checks  · CT head post arrest: No intracranial mass, mass effect or midline shift  No CT signs of acute infarction  Chronic hypodensity in the right basal ganglia extending to the periventricular region likely represents the sequela of old infarct  No acute parenchymal hemorrhage    Gray-white matter differentiation appears preserved    Ventricular tachycardia ([facility] )  Assessment & Plan  · V-tach noted on telemetry prior to code blue  · Patient noted to be on flecainide for Afib  · On amiodarone infusion, continue to hold flecainide  · Continue telemetry  · Maintain K greater than 4 5, Mag greater than 2  · Cardiology following    Dextrocardia  Assessment & Plan  · As noted on chest x-ray  · Ensure proper positioning of ECG leads  · Prior ECHO 2/21: EF 60%  · Repeat echo 8/23 - EF 60% with no RWA     Essential hypertension  Assessment & Plan  · Hold home antihypertensive in the setting of shock    Hyperlipidemia  Assessment & Plan  · Hold home atorvastatin as NPO    Morbid obesity ([facility] )  Assessment & Plan  · Nutrition following - continue tube feeds     Type 2 diabetes mellitus with hyperglycemia [facility])  Assessment & Plan  Lab Results   Component Value Date    HGBA1C 7 4 (H) 05/25/2021       Recent Labs     08/23/21  0633 08/23/21  1221 08/23/21  1733 08/24/21  0018   POCGLU 171* 185* 168* 216*       Blood Sugar Average: Last 72 hrs:  (P) 184 3     · NPO  · SSI q6    Obstructive sleep apnea  Assessment & Plan  · Continue mechanical ventilation    Cellulitis of right lower extremity  Assessment & Plan  · Presented with redness and inflammation of right lower extremity for approximately 1 day  · Meeting sepsis criteria as above  · Continue cefepime, vancomycin and flagyl     Sepsis ([facility] )  Assessment & Plan  · Present on admission as evidenced by tachycardia, leukocytosis  · Etiology likely to lower extremity cellulitis  · Lactic acid 2 3 on admission, during which time he received 1 L of IV fluids  · Continue vanco, cefepime and flagyl   · Trend WBCs, fever curve, procalcitonin    Elevated troponin  Assessment & Plan  · Initially admitted with sepsis most likely related to lower extremity cellulitis  · Noted with troponin spill from 0 34-2  · Seen by Cardiology during admission, who indicated type 2 MI in the setting of sepsis/infection  · Placed on heparin infusion by cardiology post Code Blue  · Repeat ECG without signs of focal ischemia    Venous stasis dermatitis of both lower extremities  Assessment & Plan  · Local wound care per nursing    Lactic acidosis  Assessment & Plan  · Post arrest lactic acid 8 2 down trended to 2 8  · In the setting of cardiac arrest and shock  · Continue to trend as needed    EMILY (acute kidney injury) ([address] )  Assessment & Plan  · Creatinine on admission 1 08  · Baseline appears 0 8 - 1 0  · Creatinine elevated post cardiac arrest   · Likely prerenal in the setting of reduced renal blood flow/hypotension shock  · Still continues to make urine  · Trend renal function, monitor eyes nose, with hypertension, avoid nephrotoxins      ----------------------------------------------------------------------------------------  HPI/24hr events:  No acute events overnight  Patient remained mechanical ventilation was overing breathe the ventilator despite sedation  He is hemodynamically stable      Patient appropriate for transfer out of the ICU today?: No  Disposition: Continue Critical Care   Code Status: Level 1 - Full Code  ---------------------------------------------------------------------------------------  SUBJECTIVE  Intubated     Review of Systems   Unable to perform ROS: Intubated     Review of systems was unable to be performed secondary to intubated   ---------------------------------------------------------------------------------------  OBJECTIVE    Vitals   Vitals:    21 0151 21 0200 21 0300 21 0417   BP:  168/74 165/74    BP Location:       Pulse: 99 99 96    Resp: 22  (!) 23    Temp: 100 5 °F (38 1 °C)      TempSrc: Axillary      SpO2: 94% 94% 94% 94%   Weight:       Height:         Temp (24hrs), Av 6 °F (37 6 °C), Min:98 5 °F (36 9 °C), Max:101 1 °F (38 4 °C)  Current: Temperature: 100 5 °F (38 1 °C)  Arterial Line BP: 158/51  Arterial Line MAP (mmHg): 81 mmHg    Respiratory:  SpO2: SpO2: 94 %  Nasal Cannula O2 Flow Rate (L/min): 2 L/min    Invasive/non-invasive ventilation settings   Respiratory    Lab Data (Last 4 hours)    None         O2/Vent Data (Last 4 hours)    None                Physical Exam  Vitals and nursing note reviewed  Constitutional:       Appearance: He is obese  He is ill-appearing and toxic-appearing  Comments: B/L wrist restraints in placed    HENT:      Head: Normocephalic and atraumatic  Right Ear: Tympanic membrane, ear canal and external ear normal       Left Ear: Tympanic membrane, ear canal and external ear normal       Nose: Nose normal       Mouth/Throat:      Mouth: Mucous membranes are dry  Pharynx: Oropharynx is clear  Eyes:      Comments: Upward gaze  Pupils reactive but sluggish    Cardiovascular:      Rate and Rhythm: Normal rate and regular rhythm  Pulses: Normal pulses  Heart sounds: Normal heart sounds  Pulmonary:      Comments: ETT on mechanical ventilation   B/L breath sounds diminished/coarse   Abdominal:      Comments: OGT with tube feeds   + bowel sounds    Genitourinary:     Comments: Urinary catheter  Musculoskeletal:         General: Swelling present  Cervical back: Normal range of motion and neck supple  Skin:     General: Skin is warm and dry  Capillary Refill: Capillary refill takes less than 2 seconds     Neurological:      Comments: Does not follow commands  No painful withdraw  Tachypnea  + protective reflexes     Psychiatric:      Comments: Intubated and sedated          Laboratory and Diagnostics:  Results from last 7 days   Lab Units 08/24/21  0506 08/23/21  0540 08/22/21  2151 08/22/21  2147 08/22/21  2039 08/22/21  1412 08/21/21  1835   WBC Thousand/uL 14 50*  --   --  17 78* 10 72* 12 79* 16 49*   HEMOGLOBIN g/dL 11 8*  --   --  12 4 13 2 13 2 13 3   I STAT HEMOGLOBIN g/dl  --  13 9 12 6  --   --   --   --    HEMATOCRIT % 36 9  --   --  37 8 39 9 40 4 40 5 HEMATOCRIT, ISTAT %  --  41 37  --   --   --   --    PLATELETS Thousands/uL 193  --   --  201 25* 158 198   NEUTROS PCT % 81*  --   --   --   --  82* 88*   BANDS PCT %  --   --   --   --  12*  --   --    MONOS PCT % 10  --   --   --   --  8 5   MONO PCT %  --   --   --   --  8  --   --      Results from last 7 days   Lab Units 08/23/21  1603 08/23/21  0540 08/23/21  0525 08/22/21  2151 08/22/21  2147 08/22/21  2039 08/22/21  1412 08/21/21  1835   SODIUM mmol/L 139  --  139  --  140 152* 137 143   POTASSIUM mmol/L 3 9  --  3 9  --  3 0* 5 4* 3 7 4 1   CHLORIDE mmol/L 102  --  101  --  101 117* 101 103   CO2 mmol/L 26  --  27  --  28 32 28 30   CO2, I-STAT mmol/L  --  28  --  29  --   --   --   --    ANION GAP mmol/L 11  --  11  --  11 3* 8 10   BUN mg/dL 27*  --  26*  --  21 16 16 21   CREATININE mg/dL 1 66*  --  1 49*  --  1 46* 0 99 1 02 1 08   CALCIUM mg/dL 7 9*  --  7 8*  --  8 2* 20 6* 8 0* 8 3   GLUCOSE RANDOM mg/dL 180*  --  163*  --  157* 152* 201* 231*   ALT U/L  --   --   --   --  70 57  --  33   AST U/L  --   --   --   --  99* 102*  --  18   ALK PHOS U/L  --   --   --   --  77 62  --  56   ALBUMIN g/dL  --   --   --   --  3 1* 2 2*  --  3 7   TOTAL BILIRUBIN mg/dL  --   --   --   --  1 20* 1 00  --  1 10*     Results from last 7 days   Lab Units 08/23/21  1603 08/23/21  0525 08/22/21  2039   MAGNESIUM mg/dL 2 0 1 7 1 4*   PHOSPHORUS mg/dL  --  3 1  --       Results from last 7 days   Lab Units 08/24/21  0154 08/23/21  1607 08/23/21  1023 08/21/21  1835   INR   --   --  1 59* 1 23*   PTT seconds 54* 49* 39* 32      Results from last 7 days   Lab Units 08/23/21  1023 08/22/21  2147 08/22/21  1324 08/22/21  0523 08/22/21  0133 08/21/21  2034   TROPONIN I ng/mL 6 22* 6 64* 2 67* 0 58* 0 34* 0 36*     Results from last 7 days   Lab Units 08/23/21  0525 08/22/21  2147 08/22/21  2039 08/22/21  0523 08/21/21  2214 08/21/21  1835   LACTIC ACID mmol/L 1 2 2 8* 8 2* 1 9 2 7* 2 3*     ABG:    VBG:    Results from last 7 days   Lab Units 08/22/21  2147 08/22/21  0529 08/21/21  1835   PROCALCITONIN ng/ml 0 90* 0 43* 0 42*       Micro  Results from last 7 days   Lab Units 08/21/21  1835   BLOOD CULTURE  No Growth at 48 hrs  No Growth at 48 hrs  EKG: NSR alarms on   Imaging: I have personally reviewed pertinent reports  CT head wo contrast    Result Date: 8/23/2021  Impression: 1  No acute intracranial hemorrhage  2   No definite findings to suggest anoxic brain injury  Short interval follow-up head CT may be considered  Workstation performed: [id number]     X-ray chest 1 view    Result Date: 8/23/2021  Impression: 1  Tip of endotracheal tube 4 cm above the rena  2   Pulmonary vascular congestion  3   Dextrocardia, consistent with the patient's known situs inversus  Workstation performed: [id number]     XR chest portable ICU    Result Date: 8/23/2021  Impression: ET tube 5 cm above the rena Mild pulmonary venous congestion  Situs inversus  Workstation performed: [id number]     Intake and Output  I/O       08/22 0701 - 08/23 0700 08/23 0701 - 08/24 0700    P  O  590 0    I V  (mL/kg) 1185 7 (7) 929 1 (5 5)    NG/GT 50 0    IV Piggyback 250 1500    Feedings  266    Total Intake(mL/kg) 2075 7 (12 2) 2695 1 (15 9)    Urine (mL/kg/hr) 2170 (0 5) 2905 (0 7)    Emesis/NG output 250     Total Output 2420 2905    Net -344 3 -209 9          Unmeasured Urine Occurrence 1 x           Height and Weights   Height: 5' 10" (177 8 cm)  IBW (Ideal Body Weight): 73 kg  Body mass index is 53 66 kg/m²    Weight (last 2 days)     None            Nutrition       Diet Orders   (From admission, onward)             Start     Ordered    08/23/21 1946  Diet Enteral/Parenteral; Tube Feeding No Oral Diet; Jevity 1 2 Leobardo; Continuous; 55; Prosource TF- Two Packets; 150; Every 6 hours  Diet effective now     Question Answer Comment   Diet Type Enteral/Parenteral    Enteral/Parenteral Tube Feeding No Oral Diet    Tube Feeding Formula: Jevity 1 2 Leobardo    Bolus/Cyclic/Continuous Continuous    Tube Feeding Goal Rate (mL/hr): 55    Prosource Protein Liquid - No Carb Prosource TF- Two Packets    Tube Feeding flush (mL): 150    Water flush frequency: Every 6 hours    RD to adjust diet per protocol?  Yes        08/23/21 1945                  Active Medications  Scheduled Meds:  Current Facility-Administered Medications   Medication Dose Route Frequency Provider Last Rate    acetaminophen  640 mg Oral Q4H PRN NIXON Phillip      amiodarone  0 5 mg/min Intravenous Continuous FALLON VelezNP 0 5 mg/min (08/23/21 2104)    atorvastatin  40 mg Oral Daily NIXON Velez      cefepime  2,000 mg Intravenous Q12H NIXON Velez Stopped (08/23/21 2000)    chlorhexidine  15 mL Mouth/Throat Q12H [facility] NIXON Velez      fentanyl citrate (PF)  100 mcg Intravenous Q1H PRN NIXON Velez      fish oil  1,000 mg Oral Daily NIXON Velez      heparin (porcine)  3-20 Units/kg/hr (Order-Specific) Intravenous Titrated ZONIA Whiteside-C 15 1 Units/kg/hr (08/24/21 0238)    insulin lispro  2-12 Units Subcutaneous Q6H [facility] NIXON Velez      ipratropium  0 5 mg Nebulization TID ZONIA Whiteside-RADHA      levalbuterol  1 25 mg Nebulization TID ZONIA Whiteside-RADHA      metroNIDAZOLE  500 mg Intravenous Q8H Pita Daly PA-C 500 mg (08/24/21 0248)    multivitamin-minerals  1 tablet Oral Daily NIXON Velez      pantoprazole  40 mg Intravenous Q24H [facility] NIXON Velez      potassium chloride  40 mEq Oral Once NIXON Velez      propofol  5-50 mcg/kg/min Intravenous Titrated NIXON Velez 35 mcg/kg/min (08/24/21 0511)    senna-docusate sodium  1 tablet Oral HS Pita Daly PA-C      vancomycin  2,000 mg Intravenous Q8H Kitty Huatal, DO 2,000 mg (08/24/21 0019)     Continuous Infusions:  amiodarone, 0 5 mg/min, Last Rate: 0 5 mg/min (08/23/21 2104)  heparin (porcine), 3-20 Units/kg/hr (Order-Specific), Last Rate: 15 1 Units/kg/hr (08/24/21 7138)  propofol, 5-50 mcg/kg/min, Last Rate: 35 mcg/kg/min (08/24/21 0511)      PRN Meds:   acetaminophen, 640 mg, Q4H PRN  fentanyl citrate (PF), 100 mcg, Q1H PRN        Invasive Devices Review  Invasive Devices     Peripheral Intravenous Line            Peripheral IV 08/21/21 Right Antecubital 2 days    Peripheral IV 08/22/21 Left Forearm 1 day    Peripheral IV 08/23/21 Left Antecubital <1 day    Peripheral IV 08/23/21 Right Forearm <1 day          Arterial Line            Arterial Line 08/22/21 Right Radial 1 day          Drain            NG/OG/Enteral Tube Orogastric 16 Fr Center mouth 1 day    Urethral Catheter Non-latex; Temperature probe 1 day          Airway            ETT  Hi-Lo; Inflated;Cuffed;Oral 7 5 mm 1 day                ---------------------------------------------------------------------------------------  Care Time Delivered:   No Critical Care time spent       NIXON Packer      Portions of the record may have been created with voice recognition software  Occasional wrong word or "sound a like" substitutions may have occurred due to the inherent limitations of voice recognition software    Read the chart carefully and recognize, using context, where substitutions have occurred

## 2021-08-24 NOTE — ASSESSMENT & PLAN NOTE
· Initially admitted with sepsis most likely related to lower extremity cellulitis  · Noted with troponin spill from 0 34-2  · Seen by Cardiology during admission, who indicated type 2 MI in the setting of sepsis/infection  · Placed on heparin infusion by cardiology post Code Blue  · Repeat ECG without signs of focal ischemia

## 2021-08-24 NOTE — ACP (ADVANCE CARE PLANNING)
Advanced Care Planning Note     Angelica Ring  78 y o  male MRN: [id number]    Unit/Bed#: -01 Encounter: [id number]    Alina Alvarado  is a 78year old male male that presented today secondary to having an acute condition requiring critical care provider evaluation  The patient has chronic comorbidities, including but not limited to HFpEF, DM, morbid obesity, chronic venous stasis, NATHALY (Cpap), HTN, HLD, and now is inflicted with following acute conditions: cardiac arrest   Due to the severity of the patient's acute condition and/or the extent of chronic conditions, there is need for advanced care planning at this time  Please see my previous documentation in regards to the patient's current condition, assessment, and treatment plan  During this discussion with the patient and/or family members, it was established that all stake holders were agreeable and understood the rationale for advanced care planning to occur at this time  The following individuals were present & participated in the face to face conversation I had about the patient's advanced directives & advanced care planning: Brother Freddy Aviles, Sister Patsy Brayan       Family meeting held with patient's brother Nolan Hood and sister Beba Morrow  They understand that unfortunately Alina Alvarado has had a  cardiac arrrest with resultant poor neurological function suspected related to anoxic brain injury with poor prognosis and low likelihood of neurologic recovery  At this time, they would like to update his code status to level 2 DNR  They would not wish him to be on a ventilator nor have a feeding tube long term  They asked appropriate questions about transitioning to comfort care and would like to readdress this tomorrow after MRI/EEG are complete  Total time spent, (25) minutes (1500 to 1525)      CODE STATUS: Level 2 - DNAR: but accepts endotracheal intubation  POA:    POLST:      Afua Cason PA-C  DATE: August 24, 2021  TIME: 7:10 PM

## 2021-08-24 NOTE — ASSESSMENT & PLAN NOTE
· Presented with redness and inflammation of right lower extremity for approximately 1 day  · Meeting sepsis criteria as above  · Continue cefepime, vancomycin and flagyl

## 2021-08-24 NOTE — ASSESSMENT & PLAN NOTE
Lab Results   Component Value Date    HGBA1C 7 4 (H) 05/25/2021       Recent Labs     08/23/21  0633 08/23/21  1221 08/23/21  1733 08/24/21  0018   POCGLU 171* 185* 168* 216*       Blood Sugar Average: Last 72 hrs:  (P) 184 3     · NPO  · SSI q6

## 2021-08-24 NOTE — ASSESSMENT & PLAN NOTE
· Post arrest lactic acid 8 2 down trended to 2 8  · In the setting of cardiac arrest and shock  · Continue to trend as needed

## 2021-08-24 NOTE — ASSESSMENT & PLAN NOTE
· Concern for hypoxic brain injury status post cardiac arrest with approximately 30 minute down time with active CPR  · Neuro exam as follows:  Not following verbal commands, does not withdraw to painful stimuli, pupils 3 mm and nonreactive bilaterally, over breathing the ventilator/getting on the ETT despite sedation   · Neuro checks  · CT head post arrest: No intracranial mass, mass effect or midline shift  No CT signs of acute infarction  Chronic hypodensity in the right basal ganglia extending to the periventricular region likely represents the sequela of old infarct  No acute parenchymal hemorrhage    Gray-white matter differentiation appears preserved

## 2021-08-24 NOTE — QUICK NOTE
Patient examined with propofol held for >30 minutes  On examination eyes open spontaneously but do not track nor attend  No response to commands  No motor response to noxious stimuli  Pupils 3mm and reactive but sluggish  Corneal reflex intact  Cough reflex present  Gag reflex absent  No response to nasal tickle  At time of examination patient asynchronous with ventilator and hypertensive with   Sedation re-initiated       Charlie Turpin PA-C

## 2021-08-24 NOTE — ASSESSMENT & PLAN NOTE
· Present on admission as evidenced by tachycardia, leukocytosis  · Etiology likely to lower extremity cellulitis  · Lactic acid 2 3 on admission, during which time he received 1 L of IV fluids  · Continue vanco, cefepime and flagyl   · Trend WBCs, fever curve, procalcitonin

## 2021-08-24 NOTE — ASSESSMENT & PLAN NOTE
· As noted on chest x-ray  · Ensure proper positioning of ECG leads  · Prior ECHO 2/21: EF 60%  · Repeat echo 8/23 - EF 60% with no RWA

## 2021-08-24 NOTE — ASSESSMENT & PLAN NOTE
· Creatinine on admission 1 08  · Baseline appears 0 8 - 1 0  · Creatinine elevated post cardiac arrest   · Likely prerenal in the setting of reduced renal blood flow/hypotension shock  · Still continues to make urine  · Trend renal function, monitor eyes nose, with hypertension, avoid nephrotoxins

## 2021-08-25 PROBLEM — R57.9 SHOCK (HCC): Status: RESOLVED | Noted: 2021-01-01 | Resolved: 2021-01-01

## 2021-08-25 PROBLEM — E87.2 LACTIC ACIDOSIS: Status: RESOLVED | Noted: 2021-01-01 | Resolved: 2021-01-01

## 2021-08-25 NOTE — PROGRESS NOTES
Vancomycin Assessment    Ashely Walker  is a 78 y o  male who is currently receiving vancomycin 1750 mg daily PRN when level < 20 for skin-soft tissue infection     Relevant clinical data and objective history reviewed:  Creatinine   Date Value Ref Range Status   08/25/2021 1 78 (H) 0 60 - 1 30 mg/dL Final     Comment:     Standardized to IDMS reference method   08/24/2021 1 73 (H) 0 60 - 1 30 mg/dL Final     Comment:     Standardized to IDMS reference method   08/24/2021 1 71 (H) 0 60 - 1 30 mg/dL Final     Comment:     Standardized to IDMS reference method   09/19/2017 0 83 0 76 - 1 27 mg/dL Final   06/08/2017 0 92 0 76 - 1 27 mg/dL Final   12/20/2016 0 89 0 76 - 1 27 mg/dL Final     Vancomycin Rm   Date Value Ref Range Status   08/25/2021 15 9 ug/mL Final     /65   Pulse 66   Temp 100 °F (37 8 °C)   Resp 14   Ht 5' 10" (1 778 m)   Wt (!) 168 kg (369 lb 4 3 oz)   SpO2 96%   BMI 52 98 kg/m²   I/O last 3 completed shifts: In: 5311 1 [I V :1985  1; NG/GT:210; IV Piggyback:2200; Feedings:916]  Out: 5165 [Urine:5165]  Lab Results   Component Value Date/Time    BUN 36 (H) 08/25/2021 05:18 AM    BUN 16 05/25/2021 09:52 AM    WBC 11 01 (H) 08/25/2021 05:18 AM    WBC 6 98 11/30/2015 06:13 AM    HGB 11 2 (L) 08/25/2021 05:18 AM    HGB 13 9 11/30/2015 06:13 AM    HCT 35 8 (L) 08/25/2021 05:18 AM    HCT 40 8 11/30/2015 06:13 AM    MCV 97 08/25/2021 05:18 AM    MCV 90 11/30/2015 06:13 AM     08/25/2021 05:18 AM     11/30/2015 06:13 AM     Temp Readings from Last 3 Encounters:   08/25/21 100 °F (37 8 °C)   10/20/20 98 4 °F (36 9 °C)   12/20/19 (!) 97 4 °F (36 3 °C)     Vancomycin Days of Therapy: 5    Assessment/Plan  The patient is currently on vancomycin utilizing pulse dosing  Baseline risks associated with therapy include: pre-existing renal impairment and advanced age    The patient is receiving 1750 mg daily PRN when level < 20 with the most recent vancomycin level being at steady-state and therapeutic based on a goal of 15-20 (appropriate for most indications) ; therefore, is clinically appropriate and dose will be continued with dose by level  Pharmacy will continue to follow closely for s/sx of nephrotoxicity, infusion reactions, and appropriateness of therapy  BMP and CBC will be ordered per protocol  Plan for random level for 8/26/21 0600 (AM Draw), and if level <20, will give the dose accordingly  Pharmacy will continue to follow the patients culture results and clinical progress daily      Anat Santillan, Pharmacist, PharmD, BCPS

## 2021-08-25 NOTE — DEATH NOTE
INPATIENT DEATH NOTE  Ashely Walker  78 y o  male MRN: [id number]  Unit/Bed#: -01 Encounter: [id number]    Date, Time and Cause of Death    Date of Death: 21  Time of Death:  7:05 PM  Preliminary Cause of Death: Cardiac arrest  Entered by: NIXON Guaman [KF1 1]     Attribution     KF1 1 NIXON Guaman 21 19:11           Patient's Information  Pronounced by: NIXON Guaman  Did the patient's death occur in the ED?: No  Did the patient's death occur in the OR?: No  Did the patient's death occur less than 10 days post-op?: No  Did the patient's death occur within 24 hours of admission?: No  Was code status DNR at the time of death?: Yes    PHYSICAL EXAM:  Unresponsive to noxious stimuli, Spontaneous respirations absent, Breath sounds absent, Carotid pulse absent, Heart sounds absent, Pupillary light reflex absent and Corneal blink reflex absent    Medical Examiner notification criteria:  NONE APPLICABLE   Medical Examiner's office notified?:  No, does not meet ME notification criteria       Family Notification  Was the family notified?: Yes  Date Notified: 21  Time Notified:   Notified by: K  [id number]  Name of Family Notified of Death: Tanesha Stains and Adam Heckler   Relationship to Patient: Sister, Brother  Family Notification Route:  At bedside  Was the family told to contact a  home?: Yes  Name of  Home[de-identified] [facility]    Autopsy Options:  Post-mortem examination declined by next of kin    Primary Service Attending Physician notified?:  yes - Attending:  Rae Beaulieu responsible for completing Discharge Summary:  Wicho Guaman

## 2021-08-25 NOTE — PROGRESS NOTES
Vancomycin IV Pharmacy-to-Dose Consultation     Ean Spain  is a 78 y o  male who is currently receiving Vancomycin IV with management by the Pharmacy Consult service      Assessment/Plan:  The patient was reviewed  No signs or symptoms of nephrotoxicity and/or infusion reactions were documented in the chart       A random level from earlier today came back as 20 1 mcg/mL  We will hold further vancomycin doses at this time and plan to get another random vancomycin level with morning labs  Will start at lower scheduled dose when random level falls less than 20 ug/ml       We will continue to follow the patients culture results and clinical progress daily      Christine Caicedo, Pharmacist

## 2021-08-25 NOTE — ASSESSMENT & PLAN NOTE
· Concern for hypoxic brain injury status post cardiac arrest with approximately 30 minute down time with active CPR  · Neuro exam as follows:  Not following verbal commands, does not withdraw to painful stimuli, pupils 3 mm and nonreactive bilaterally, over breathing the ventilator/getting on the ETT despite sedation   · Continue Neuro checks  · CT head post arrest: No intracranial mass, mass effect or midline shift  No CT signs of acute infarction  Chronic hypodensity in the right basal ganglia extending to the periventricular region likely represents the sequela of old infarct  No acute parenchymal hemorrhage    Gray-white matter differentiation appears preserved  · EEG shows no brain activity  · MRI pending

## 2021-08-25 NOTE — ASSESSMENT & PLAN NOTE
· Presented with redness and inflammation of right lower extremity for approximately 1 day  · Meeting sepsis criteria as above  · Continue cefepime, and flagyl

## 2021-08-25 NOTE — ACP (ADVANCE CARE PLANNING)
Advanced Care Planning Note     Josefina Clemons  78 y o  male MRN: [id number]    Unit/Bed#: -01 Encounter: [id number]    Ty Cho is a 78 male that presented today secondary to having an acute condition requiring critical care provider evaluation  The patient has chronic comorbidities, including but not limited to atrial fibrillation, morbid obesity, diabetes type 2, chronic venous stasis, cellulitis, NATHALY, hypertension/hyperlipidemia, and now is inflicted with following acute conditions:  Diffuse cerebral edema concerning for hypoxic brain injury status post cardiac arrest, toxic metabolic encephalopathy, acute kidney injury  Due to the severity of the patient's acute condition and/or the extent of chronic conditions, there is need for advanced care planning at this time  Please see my previous documentation in regards to the patient's current condition, assessment, and treatment plan  During this discussion with the patient and/or family members, it was established that all stake holders were agreeable and understood the rationale for advanced care planning to occur at this time  The following individuals were present & participated in the face to face conversation I had about the patient's advanced directives & advanced care planning: Irving-Brother, Mark-Sister   Please see my following comments for details of the conversation & decisions that were made:    I reviewed recent neuroimaging including EEG studies and CT head as well as our ongoing clinical examinations regarding Kayode's neuro prognostication  I reviewed the findings of the EEG which indicated severe diffuse cerebral dysfunction of nonspecific etiology and CT head performed today showing diffuse brain edema and the pathophysiology behind these etiologies suggestive of anoxic brain injury  We reviewed the trends in his neuro examination status post cardiac arrest again suggesting anoxic brain injury    I discussed what an anoxic brain injury is and how it manifests as leading to permanent severe neurological dysfunction/persistant vegetative state  Kayode's family expressed to me that [facility] would never have wanted to have lived on artificial life support such as a breathing machine or artificial tube feedings  We reviewed what comfort care/withdrawal of life sustaining measures is and would look like  After careful thought Danie Mortimer (Kayode's POA) felt that [facility] wishes would best be served by withdrawing life sustaining treatments in pursuit of comfort measures  After family has had time to spend with [facility], will proceed with level 4 comfort measures  Total time spent, (25) minutes (1625 to 1650)      CODE STATUS: Level 2 - DNAR: but accepts endotracheal intubation  POA:    POLST:      SIGNATURE: NIXON Flores  DATE: August 25, 2021  TIME: 5:26 PM

## 2021-08-25 NOTE — ASSESSMENT & PLAN NOTE
· Noted episodes of hypertension past 24 hours, restart metoprolol 25 BID with PRN hydralazine vs  Labetalol  · Consider advancement of sedation/analgesia

## 2021-08-25 NOTE — PROGRESS NOTES
[place]  Progress Note - Chai Carrier  1955, 78 y o  male MRN: [id number]  Unit/Bed#: -01 Encounter: [id number]  Primary Care Provider: Shira Conti DO   Date and time admitted to hospital: 8/21/2021  5:57 PM    * Cardiac arrest [facility])  Assessment & Plan  · Code blue on telemetry floor on 8/23/2021, found pulseless by staff nurse  · Received ACLS care for approximately 30 minutes with return of spontaneous circulation  · Review of telemetry showed ventricular tachycardia prior to arrest  · Received amiodarone, epinephrine, calcium, sodium bicarbonate during code  · Hypotensive post arrest requiring epinephrine which has since been titrated off in favor of norepinephrine  · Unclear etiology:  Ischemic in the setting of sepsis with troponin spillage vs   Versus arrhythmogenic in the setting of chronic flecainide use potentially low magnesium/hypokalemia level  · Trend neuro exam  · EEG done shows no brain activity  · MRI pending    Shock [facility])  Assessment & Plan  · As evidenced by hypotension, lactic acidosis  · Status post cardiac arrest  · Etiology likely cardiogenic in nature possible component of distributed in the setting of prior sepsis  · Levophed weaned off  · Repeat ECHO 8/23 - showed EF 60% with no regional wall abnormalities     Encephalopathy  Assessment & Plan  · Concern for hypoxic brain injury status post cardiac arrest with approximately 30 minute down time with active CPR  · Neuro exam as follows:  Not following verbal commands, does not withdraw to painful stimuli, pupils 3 mm and nonreactive bilaterally, over breathing the ventilator/getting on the ETT despite sedation   · Continue Neuro checks  · CT head post arrest: No intracranial mass, mass effect or midline shift  No CT signs of acute infarction  Chronic hypodensity in the right basal ganglia extending to the periventricular region likely represents the sequela of old infarct   No acute parenchymal hemorrhage    Gray-white matter differentiation appears preserved  · EEG shows no brain activity  · MRI pending    Ventricular tachycardia ([facility] )  Assessment & Plan  · V-tach noted on telemetry prior to code blue  · Patient noted to be on flecainide for Afib  · On amiodarone infusion, continue to hold flecainide  · Continue telemetry  · Maintain K greater than 4 5, Mag greater than 2  · Cardiology following    Dextrocardia  Assessment & Plan  · As noted on chest x-ray  · Ensure proper positioning of ECG leads  · Prior ECHO 2/21: EF 60%  · Repeat echo 8/23 - EF 60% with no RWA     Essential hypertension  Assessment & Plan  · Hold home antihypertensive in the setting of shock    Hyperlipidemia  Assessment & Plan  · Hold home atorvastatin as NPO    Morbid obesity ([facility] )  Assessment & Plan  · Nutrition following - continue tube feeds     Type 2 diabetes mellitus with hyperglycemia [facility])  Assessment & Plan  Lab Results   Component Value Date    HGBA1C 7 4 (H) 05/25/2021       Recent Labs     08/24/21  0607 08/24/21  1220 08/24/21  1724 08/25/21  0010   POCGLU 267* 350* 293* 334*       Blood Sugar Average: Last 72 hrs:  (P) 222 [id number]     · NPO  · SSI q6    Obstructive sleep apnea  Assessment & Plan  · Continue mechanical ventilation    Cellulitis of right lower extremity  Assessment & Plan  · Presented with redness and inflammation of right lower extremity for approximately 1 day  · Meeting sepsis criteria as above  · Continue cefepime, and flagyl     Sepsis ([facility] )  Assessment & Plan  · Present on admission as evidenced by tachycardia, leukocytosis  · Etiology likely to lower extremity cellulitis  · Lactic acid 2 3 on admission, during which time he received 1 L of IV fluids  · Continue cefepime and flagyl   · Trend WBCs, fever curve, procalcitonin    Elevated troponin  Assessment & Plan  · Initially admitted with sepsis most likely related to lower extremity cellulitis  · Noted with troponin spill from 0 34-2  · Seen by Cardiology during admission, who indicated type 2 MI in the setting of sepsis/infection  · Placed on heparin infusion by cardiology post Code Blue  · Repeat ECG without signs of focal ischemia    Venous stasis dermatitis of both lower extremities  Assessment & Plan  · Local wound care per nursing    Lactic acidosis  Assessment & Plan  · Post arrest lactic acid 8 2 down trended to 2 8  · In the setting of cardiac arrest and shock  · Continue to trend as needed    EMILY (acute kidney injury) ([facility] )  Assessment & Plan  · Creatinine on admission 1 08  · Baseline appears 0 8 - 1 0  · Creatinine elevated post cardiac arrest   · Likely prerenal in the setting of reduced renal blood flow/hypotension shock  · Still continues to make urine  · Trend renal function, monitor eyes nose, with hypertension, avoid nephrotoxins        ----------------------------------------------------------------------------------------  HPI/24hr events:  Patient continued to be hypertensive overnight was systolic blood pressure is 180s to 200s  He continued to receive multiple doses of p r n  Fentanyl and fentanyl infusion was started  Initially home dose Norvasc was restarted with no improvement in blood pressure  Patient was given hydralazine 10 mg IV x1 with no improvement in blood pressure  Patient's propofol and fentanyl infusions were increased with still no improvement in blood pressure  Patient received labetalol 10 mg IV x1 with systolic blood pressure less than 160  Approximately 4:30 a m , the patient became hypoxic with oxygen saturation 82%  No secretions were noted  Patient was turned and repositioned was still no improvement in oxygenation  Patient was transition back to [address] [place] [address] settings 520/14/70/10 with improvement in oxygen saturation      Patient appropriate for transfer out of the ICU today?: No  Disposition: Continue Critical Care   Code Status: Level 2 - DNAR: but accepts endotracheal intubation  ---------------------------------------------------------------------------------------  SUBJECTIVE  Intubated     Review of Systems   Unable to perform ROS: Intubated     Review of systems was unable to be performed secondary to Intubation  ---------------------------------------------------------------------------------------  OBJECTIVE    Vitals   Vitals:    21 0245 21 0300 21 0319 21 0400   BP:  (!) 197/77  140/65   Pulse: 87 89  71   Resp: (!) 24 (!) 26  22   Temp:   100 1 °F (37 8 °C)    TempSrc:   Axillary    SpO2: 90% 92%  93%   Weight:       Height:         Temp (24hrs), Av 8 °F (37 7 °C), Min:99 6 °F (37 6 °C), Max:100 1 °F (37 8 °C)  Current: Temperature: 100 1 °F (37 8 °C)  Arterial Line BP: 119/73  Arterial Line MAP (mmHg): 82 mmHg    Respiratory:  SpO2: SpO2: 93 %  Nasal Cannula O2 Flow Rate (L/min): 2 L/min    Invasive/non-invasive ventilation settings   Respiratory    Lab Data (Last 4 hours)    None         O2/Vent Data (Last 4 hours)    None                Physical Exam  Vitals and nursing note reviewed  Constitutional:       Appearance: He is obese  He is ill-appearing and toxic-appearing  HENT:      Head: Normocephalic and atraumatic  Right Ear: Tympanic membrane, ear canal and external ear normal       Left Ear: Tympanic membrane, ear canal and external ear normal       Nose: Nose normal       Mouth/Throat:      Mouth: Mucous membranes are dry  Pharynx: Oropharynx is clear  Eyes:      Extraocular Movements: Extraocular movements intact  Conjunctiva/sclera: Conjunctivae normal       Comments: Pupils 2 mm, sluggish, reactive   Cardiovascular:      Rate and Rhythm: Normal rate and regular rhythm  Pulses: Normal pulses  Heart sounds: Normal heart sounds  Pulmonary:      Comments: ETT on mechanical ventilation  Current settings 520/14/70/10  Abdominal:      General: Bowel sounds are normal       Palpations: Abdomen is soft  Comments: OG tube   Genitourinary:     Comments: Urinary catheter  Musculoskeletal:      Cervical back: Normal range of motion and neck supple  Comments: +1 bilateral upper and lower extremity edema   Skin:     General: Skin is warm and dry  Capillary Refill: Capillary refill takes less than 2 seconds        Comments: Right lower extremity cellulitis   Neurological:      Comments: Intubated  Does not follow commands  Does not withdrawal to painful stimuli  No gag, no cough, no corneals         Laboratory and Diagnostics:  Results from last 7 days   Lab Units 08/24/21  0506 08/23/21  0540 08/22/21  2151 08/22/21  2147 08/22/21  2039 08/22/21  1412 08/21/21  1835   WBC Thousand/uL 14 50*  --   --  17 78* 10 72* 12 79* 16 49*   HEMOGLOBIN g/dL 11 8*  --   --  12 4 13 2 13 2 13 3   I STAT HEMOGLOBIN g/dl  --  13 9 12 6  --   --   --   --    HEMATOCRIT % 36 9  --   --  37 8 39 9 40 4 40 5   HEMATOCRIT, ISTAT %  --  41 37  --   --   --   --    PLATELETS Thousands/uL 193  --   --  201 25* 158 198   NEUTROS PCT % 81*  --   --   --   --  82* 88*   BANDS PCT %  --   --   --   --  12*  --   --    MONOS PCT % 10  --   --   --   --  8 5   MONO PCT %  --   --   --   --  8  --   --      Results from last 7 days   Lab Units 08/24/21  1724 08/24/21  0506 08/23/21  1603 08/23/21  0540 08/23/21  0525 08/22/21  2151 08/22/21  2147 08/22/21  2039 08/22/21  1412 08/21/21  1835   SODIUM mmol/L 139 139 139  --  139  --  140 152* 137 143   POTASSIUM mmol/L 4 4 4 3 3 9  --  3 9  --  3 0* 5 4* 3 7 4 1   CHLORIDE mmol/L 102 103 102  --  101  --  101 117* 101 103   CO2 mmol/L 30 26 26  --  27  --  28 32 28 30   CO2, I-STAT mmol/L  --   --   --  28  --  29  --   --   --   --    ANION GAP mmol/L 7 10 11  --  11  --  11 3* 8 10   BUN mg/dL 34* 31* 27*  --  26*  --  21 16 16 21   CREATININE mg/dL 1 73* 1 71* 1 66*  --  1 49*  --  1 46* 0 99 1 02 1 08   CALCIUM mg/dL 7 6* 7 6* 7 9*  --  7 8*  --  8 2* 20 6* 8 0* 8 3   GLUCOSE RANDOM mg/dL 333* 293* 180*  --  163*  --  157* 152* 201* 231*   ALT U/L  --   --   --   --   --   --  70 57  --  33   AST U/L  --   --   --   --   --   --  99* 102*  --  18   ALK PHOS U/L  --   --   --   --   --   --  77 62  --  56   ALBUMIN g/dL  --   --   --   --   --   --  3 1* 2 2*  --  3 7   TOTAL BILIRUBIN mg/dL  --   --   --   --   --   --  1 20* 1 00  --  1 10*     Results from last 7 days   Lab Units 08/24/21  1724 08/24/21  0506 08/23/21  1603 08/23/21  0525 08/22/21  2039   MAGNESIUM mg/dL 2 3 2 3 2 0 1 7 1 4*   PHOSPHORUS mg/dL  --  4 2*  --  3 1  --       Results from last 7 days   Lab Units 08/24/21  2332 08/24/21  1724 08/24/21  0810 08/24/21  0154 08/23/21  1607 08/23/21  1023 08/21/21  1835   INR   --   --   --   --   --  1 59* 1 23*   PTT seconds 58* 63* 54* 54* 49* 39* 32      Results from last 7 days   Lab Units 08/23/21  1023 08/22/21  2147 08/22/21  1324 08/22/21  0523 08/22/21  0133 08/21/21  2034   TROPONIN I ng/mL 6 22* 6 64* 2 67* 0 58* 0 34* 0 36*     Results from last 7 days   Lab Units 08/23/21  0525 08/22/21  2147 08/22/21  2039 08/22/21  0523 08/21/21  2214 08/21/21  1835   LACTIC ACID mmol/L 1 2 2 8* 8 2* 1 9 2 7* 2 3*     ABG:    VBG:    Results from last 7 days   Lab Units 08/24/21  0506 08/22/21  2147 08/22/21  0529 08/21/21  1835   PROCALCITONIN ng/ml 5 17* 0 90* 0 43* 0 42*       Micro  Results from last 7 days   Lab Units 08/21/21  1835   BLOOD CULTURE  No Growth at 72 hrs  No Growth at 72 hrs  EKG:  Normal sinus rhythm heart rate 65  Imaging: I have personally reviewed pertinent reports  CT head wo contrast    Result Date: 8/23/2021  Impression: 1  No acute intracranial hemorrhage  2   No definite findings to suggest anoxic brain injury  Short interval follow-up head CT may be considered  Workstation performed: [id number]     X-ray chest 1 view    Result Date: 8/23/2021  Impression: 1  Tip of endotracheal tube 4 cm above the rena  2   Pulmonary vascular congestion   3  Dextrocardia, consistent with the patient's known situs inversus  Workstation performed: [id number]     XR chest portable ICU    Result Date: 8/23/2021  Impression: ET tube 5 cm above the rena Mild pulmonary venous congestion  Situs inversus  Workstation performed: [id number]     Intake and Output  I/O       08/23 0701 - 08/24 0700 08/24 0701 - 08/25 0700    P  O  0     I V  (mL/kg) 1176 9 (6 9) 1738 1 (10 2)    NG/GT 0 360    IV Piggyback 2100 250    Feedings 387 806    Total Intake(mL/kg) 3663 9 (21 6) 3154 1 (18 6)    Urine (mL/kg/hr) 3255 (0 8) 4225 (1)    Total Output 3255 4225    Net +408 9 -1070 9                Height and Weights   Height: 5' 10" (177 8 cm)  IBW (Ideal Body Weight): 73 kg  Body mass index is 53 66 kg/m²  Weight (last 2 days)     None            Nutrition       Diet Orders   (From admission, onward)             Start     Ordered    08/23/21 1946  Diet Enteral/Parenteral; Tube Feeding No Oral Diet; Jevity 1 2 Leobardo; Continuous; 55; Prosource TF- Two Packets; 150; Every 6 hours  Diet effective now     Question Answer Comment   Diet Type Enteral/Parenteral    Enteral/Parenteral Tube Feeding No Oral Diet    Tube Feeding Formula: Jevity 1 2 Leobardo    Bolus/Cyclic/Continuous Continuous    Tube Feeding Goal Rate (mL/hr): 55    Prosource Protein Liquid - No Carb Prosource TF- Two Packets    Tube Feeding flush (mL): 150    Water flush frequency: Every 6 hours    RD to adjust diet per protocol?  Yes        08/23/21 1945                  Active Medications  Scheduled Meds:  Current Facility-Administered Medications   Medication Dose Route Frequency Provider Last Rate    acetaminophen  640 mg Oral Q4H PRN Simeon Lock, CRNP      amiodarone  0 5 mg/min Intravenous Continuous Tod Cull, CRNP 0 5 mg/min (08/25/21 0223)    amLODIPine  10 mg Oral Daily Simeon Lock, CRNP      atorvastatin  40 mg Oral Daily Tod Cull, CRNP      cefepime  2,000 mg Intravenous Q12H Tod Cull, CRNP 2,000 mg (08/24/21 1839)    chlorhexidine  15 mL Mouth/Throat Q12H [facility] Nimo Harrison, CRNP      fentaNYL  50 mcg/hr Intravenous Continuous Sergio Storeyor, FALLONNP 100 mcg/hr (08/25/21 0203)    fentanyl citrate (PF)  100 mcg Intravenous Q1H PRN Nimo Rho, CRNP      fish oil  1,000 mg Oral Daily Nimo Rho, CRNP      heparin (porcine)  3-20 Units/kg/hr (Order-Specific) Intravenous Titrated Steve Toledo PA-C 19 1 Units/kg/hr (08/25/21 0053)    insulin lispro  4-20 Units Subcutaneous Q6H [facility] Pita Daly PA-C      ipratropium  0 5 mg Nebulization TID ZONIA Marcum-RADHA      levalbuterol  1 25 mg Nebulization TID Steve Toledo PA-C      metroNIDAZOLE  500 mg Intravenous Q8H ZONIA Marcum-C 500 mg (08/25/21 0142)    multivitamin-minerals  1 tablet Oral Daily Nimo Harrison, NIXON      pantoprazole  40 mg Intravenous Q24H [facility] Nimo Harrison, CRNP      propofol  5-50 mcg/kg/min Intravenous Titrated Nimo Rho, CRNP 50 mcg/kg/min (08/25/21 0314)    senna-docusate sodium  1 tablet Oral HS Pita Daly PA-C      vancomycin  15 mg/kg (Adjusted) Intravenous Daily PRN Kitty Massey DO       Continuous Infusions:  amiodarone, 0 5 mg/min, Last Rate: 0 5 mg/min (08/25/21 0223)  fentaNYL, 50 mcg/hr, Last Rate: 100 mcg/hr (08/25/21 0203)  heparin (porcine), 3-20 Units/kg/hr (Order-Specific), Last Rate: 19 1 Units/kg/hr (08/25/21 0053)  propofol, 5-50 mcg/kg/min, Last Rate: 50 mcg/kg/min (08/25/21 0314)      PRN Meds:   acetaminophen, 640 mg, Q4H PRN  fentanyl citrate (PF), 100 mcg, Q1H PRN  vancomycin, 15 mg/kg (Adjusted), Daily PRN        Invasive Devices Review  Invasive Devices     Peripheral Intravenous Line            Peripheral IV 08/22/21 Left Forearm 2 days    Peripheral IV 08/23/21 Right Forearm 1 day    Peripheral IV 08/25/21 Left;Upper;Ventral (anterior) Arm <1 day          Arterial Line            Arterial Line 08/22/21 Right Radial 2 days          Drain NG/OG/Enteral Tube Orogastric 16 Fr Center mouth 2 days    Urethral Catheter Non-latex; Temperature probe 2 days          Airway            ETT  Hi-Lo; Inflated;Cuffed;Oral 7 5 mm 2 days                ---------------------------------------------------------------------------------------  Care Time Delivered:   No Critical Care time spent       NIXON Rodriguez      Portions of the record may have been created with voice recognition software  Occasional wrong word or "sound a like" substitutions may have occurred due to the inherent limitations of voice recognition software    Read the chart carefully and recognize, using context, where substitutions have occurred

## 2021-08-25 NOTE — ASSESSMENT & PLAN NOTE
· Code blue on telemetry floor on 8/23/2021, found pulseless by staff nurse  · Received ACLS care for approximately 30 minutes with return of spontaneous circulation  · Review of telemetry showed ventricular tachycardia prior to arrest  · Received amiodarone, epinephrine, calcium, sodium bicarbonate during code  · Hypotensive post arrest requiring epinephrine which has since been titrated off in favor of norepinephrine  · Unclear etiology:  Ischemic in the setting of sepsis with troponin spillage vs   Versus arrhythmogenic in the setting of chronic flecainide use potentially low magnesium/hypokalemia level  · Trend neuro exam  · EEG done shows no brain activity  · MRI pending

## 2021-08-25 NOTE — ASSESSMENT & PLAN NOTE
· Code blue on telemetry floor on 8/23/2021, found pulseless by staff nurse  · Received ACLS care for approximately 30 minutes with return of spontaneous circulation  · Review of telemetry showed ventricular tachycardia prior to arrest  · ACLS care, defib x 1 for vfib, amiodarone, epinephrine, calcium, sodium bicarbonate during code  · Hypotensive post arrest requiring epinephrine which has since been titrated off in favor of norepinephrine  · Unclear etiology:  Ischemic in the setting of sepsis with troponin spillage vs   Versus arrhythmogenic in the setting of chronic flecainide use potentially low magnesium/hypokalemia level  · Trend neuro exam  · EEG done shows no brain activity  · MRI pending

## 2021-08-25 NOTE — PROGRESS NOTES
The patients vancomycin therapy has been completed / discontinued  Thank you for allowing us to take part in this patient's care  Pharmacy will sign-off now; please call or re-consult if there are any questions         Wes Silva PharmD

## 2021-08-25 NOTE — ASSESSMENT & PLAN NOTE
Lab Results   Component Value Date    HGBA1C 7 4 (H) 05/25/2021       Recent Labs     08/24/21  1220 08/24/21  1724 08/25/21  0010 08/25/21  0551   POCGLU 350* 293* 334* 322*       Blood Sugar Average: Last 72 hrs:  (P) 229 [id number]     · Continued episodes of hyperglycemia, start non dka insulin infusion  · Goal glucose 140-180

## 2021-08-25 NOTE — ASSESSMENT & PLAN NOTE
· Present on admission as evidenced by tachycardia, leukocytosis  · Etiology likely to lower extremity cellulitis  · Lactic acid 2 3 on admission, during which time he received 1 L of IV fluids  · Continue cefepime and flagyl   · Trend WBCs, fever curve, procalcitonin

## 2021-08-25 NOTE — QUICK NOTE
Updated family Solitario Mon regarding delay in MRI and plan for CT head, recent neuro exams and overnight events  He reports he will be in later to spend time with his brother

## 2021-08-25 NOTE — ASSESSMENT & PLAN NOTE
· V-tach noted on telemetry prior to code blue  · Patient noted to be on flecainide for Afib  · On amiodarone infusion, continue to hold flecainide  · Continue telemetry  · Maintain K greater than 4, Mag greater than 2  · Cardiology following  · Metoprolol 25 BID started

## 2021-08-25 NOTE — ASSESSMENT & PLAN NOTE
· Initially admitted with sepsis most likely related to lower extremity cellulitis  · Noted with troponin spill from 0 34-2  · Seen by Cardiology during admission, who indicated type 2 MI in the setting of sepsis/infection  · Placed on heparin infusion by cardiology post Code Blue  · Repeat ECG without signs of focal ischemia  · Repeat ECHO without RWMA

## 2021-08-25 NOTE — DISCHARGE SUMMARY
Discharge Summary - Mechelle Lee  78 y o  male MRN: [id number]    Unit/Bed#: -01 Encounter: [id number] PCP: Eulalia Adam DO    Admission Date:   Admission Orders (From admission, onward)     Ordered        08/21/21 2013  Inpatient Admission  Once                     Admitting Diagnosis: Cellulitis [L03 90]  Leukocytosis [D72 829]  Elevated troponin [R77 8]    HPI: As per ZONIA Suresh "Mechelle Lee  is a 78 y o  male with a PMH of type 2 diabetes, morbid obesity, venous stasis dermatitis, NATHALY, hyperlipidemia, hypertension who presents with erythema and inflammation of his right lower extremity that he noticed this morning  Has chronic bilateral lower leg edema with venous stasis dermatitis  Patient denies injury to either leg  He is having discomfort with ambulation due to the increased swelling/redness  Had chills at home  Denies fevers, chest pain, shortness of breath, abdominal discomfort, nausea, vomiting or diarrhea       Has been compliant with his home medications  Does not smoke and drinks socially "     Procedures Performed:   Orders Placed This Encounter   Procedures    Intubation    IO Line Insertion       Summary of Hospital Course: The patient was admitted & treated for presumed cellulitis & was a Code blue on 8/21 for vtach arrest, ROSC achieved after 30 minutes of ALS  The patient was intubated, lined and transferred to the ICU  Unfortunately, due to prolonged downtime he sustained an anoxic brain injury  There were ongoing discussions with regards to goals of care with family, brother Zane Harkins and sister Radha Zendejas  Given the unlikelihood of recovery and overall poor prognosis the patient was transitioned to comfort measures on 8/25  The patient was extubated, with spo2 around 30-40%  Upon my assessment after administration of IV ativan and fentanyl the patient appeared uncomfortable, he was tachypneic - RR 28 and belly breathing  Family expressed concern   A dose of dilaudid was given for respiratory distress & glycopyrrolate for excessive secretions  The patient appeared more comfortable post interventions  He passed away peacefully @ 1905 with both siblings present at bedside  Significant Findings, Care, Treatment and Services Provided:     Code blue 8/21 x 30 mins until ROSC   Anoxic brain injury    Complications:   Vtach arrest resulting in anoxic brain injury     Disposition:      Final Diagnosis:     V-tach arrest with prolonged ROSC resulting in anoxic brain injury       Medical Problems     Resolved Problems  Date Reviewed: 2021        Resolved    Shock ([address] ) 2021     Resolved by  NIXON Morton    Lactic acidosis 2021     Resolved by  NIXON Morton                Condition at Time of Death: Comfort measures     Date, Time and Cause of Death    Date of Death: 21  Time of Death:  7:05 PM  Preliminary Cause of Death: Cardiac arrest  Entered by: NIXON Martin [KF1 1]     Attribution     KF1 1 Xavier Mclaughlin, [address] 21 19:11          Death Note:    INPATIENT DEATH NOTE  Dionne Baires  78 y o  male MRN: [id number]  Unit/Bed#: [facility] 305- Encounter: [id number]    Date, Time and Cause of Death    Date of Death: 21  Time of Death:  7:05 PM  Preliminary Cause of Death: Cardiac arrest  Entered by: NIXON Martin [KF1 1]     Attribution     KF1 1 Xavier Mclaughlin, [address] 21 19:11           Patient's Information  Pronounced by: NIXON Martin  Did the patient's death occur in the ED?: No  Did the patient's death occur in the OR?: No  Did the patient's death occur less than 10 days post-op?: No  Did the patient's death occur within 24 hours of admission?: No  Was code status DNR at the time of death?: Yes    PHYSICAL EXAM:  Unresponsive to noxious stimuli, Spontaneous respirations absent, Breath sounds absent, Carotid pulse absent, Heart sounds absent, Pupillary light reflex absent and Corneal blink reflex absent    Medical Examiner notification criteria:  NONE APPLICABLE   Medical Examiner's office notified?:  No, does not meet ME notification criteria       Family Notification  Was the family notified?: Yes  Date Notified: 21  Time Notified:   Notified by: K  [address]  Name of Family Notified of Death: Lucio Porter and Shiraz Guerrero   Relationship to Patient: Sister, Brother  Family Notification Route:  At bedside  Was the family told to contact a  home?: Yes  Name of  Home[de-identified] [facility]    Autopsy Options:  Post-mortem examination declined by next of kin    Primary Service Attending Physician notified?:  yes - Attending:  Dru Luong responsible for completing Discharge Summary:  Ursula Scott, [facility]

## 2021-08-25 NOTE — ASSESSMENT & PLAN NOTE
Lab Results   Component Value Date    HGBA1C 7 4 (H) 05/25/2021       Recent Labs     08/24/21  0607 08/24/21  1220 08/24/21  1724 08/25/21  0010   POCGLU 267* 350* 293* 334*       Blood Sugar Average: Last 72 hrs:  (P) 222 [id number]     · NPO  · SSI q6

## 2021-08-25 NOTE — PROGRESS NOTES
All belongings sent home with patient's sister Chuck Mckeon and brother Solitraio Gomez at bedside  Emotional support provided   Rae Spring RN

## 2021-08-26 LAB
BACTERIA BLD CULT: NORMAL
BACTERIA BLD CULT: NORMAL
MRSA NOSE QL CULT: NORMAL

## 2021-08-26 NOTE — UTILIZATION REVIEW
Notification of Discharge   This is a Notification of Discharge from our facility [address]  Please be advised that this patient has been discharge from our facility  Below you will find the admission and discharge date and time including the patients disposition  UTILIZATION REVIEW CONTACT:  Jaswant Dupree  Utilization   Network Utilization Review Department  Phone: [phone] x carefully listen to the prompts  All voicemails are confidential   Email: [email or web address]     PHYSICIAN ADVISORY SERVICES:  FOR [id number] REVIEW - MEDICAL NECESSITY DENIAL  Phone: [phone]  Fax: [phone]  Email: [email or web address]     PRESENTATION DATE: 2021  5:57 PM  OBERVATION ADMISSION DATE:   INPATIENT ADMISSION DATE: 21  8:13 PM   DISCHARGE DATE: 2021 10:16 PM  DISPOSITION:        IMPORTANT INFORMATION:  Send all requests for admission clinical reviews, approved or denied determinations and any other requests to dedicated fax number below belonging to the campus where the patient is receiving treatment   List of dedicated fax numbers:  [address] DENIALS (Administrative/Medical Necessity) [phone]   [address] (Maternity/NICU/Pediatrics) [phone]   [facility]ia [phone]   Bibi Fontenot [phone]   Farhan Cannon [phone]   [facility] [phone]   [facility]  [phone]   [address] [facility] [address]  [address] [facility] 1000 [facility] [phone]

## 2021-08-26 NOTE — PROGRESS NOTES
Patient transported via security to be picked up by [facility] home @4854

## 2021-08-26 NOTE — PROGRESS NOTES
RN received phone call from cornea donation with Muriel Schaefer from [employer], all questions answered       Josef Lemus RN

## 2021-09-02 LAB — NSE SERPL IA-MCNC: 17.4 NG/ML (ref 0–17.6)

## 2022-01-03 NOTE — CLINICAL RISK MANAGEMENT
Progress Note - Death in [facility]  78 y o  male MRN: [id number]  Unit/Bed#: -01 Encounter: [id number]      Late entry: Patient  within 24 hours of restraint  with Soft restraint right wrist and Soft restraint left wrist  Death unrelated to use of restraints  This situation was tracked internally

## 2022-09-19 NOTE — ASSESSMENT & PLAN NOTE
LOV 7-6-22 · Concern for hypoxic brain injury status post cardiac arrest with approximately 30 minute down time with active CPR  · Neuro exam as follows:  Not following verbal commands, does not withdraw to painful stimuli, pupils 3 mm and nonreactive bilaterally, occasionally over breathing the ventilator/getting on the ETT  · Neuro checks  · CT head post arrest: No intracranial mass, mass effect or midline shift  No CT signs of acute infarction  Chronic hypodensity in the right basal ganglia extending to the periventricular region likely represents the sequela of old infarct  No acute parenchymal hemorrhage  Gray-white matter differentiation appears preserved  · Maintain normothermia p r n   Tylenol and ice packs
